# Patient Record
Sex: FEMALE | Race: WHITE | NOT HISPANIC OR LATINO | Employment: OTHER | URBAN - METROPOLITAN AREA
[De-identification: names, ages, dates, MRNs, and addresses within clinical notes are randomized per-mention and may not be internally consistent; named-entity substitution may affect disease eponyms.]

---

## 2017-01-03 ENCOUNTER — COMMUNICATION - HEALTHEAST (OUTPATIENT)
Dept: INTERNAL MEDICINE | Facility: CLINIC | Age: 77
End: 2017-01-03

## 2017-01-03 ENCOUNTER — AMBULATORY - HEALTHEAST (OUTPATIENT)
Dept: LAB | Facility: CLINIC | Age: 77
End: 2017-01-03

## 2017-01-03 ENCOUNTER — OFFICE VISIT - HEALTHEAST (OUTPATIENT)
Dept: PHYSICAL THERAPY | Facility: REHABILITATION | Age: 77
End: 2017-01-03

## 2017-01-03 DIAGNOSIS — M62.838 MUSCLE SPASMS OF NECK: ICD-10-CM

## 2017-01-03 DIAGNOSIS — M54.6 PAIN IN THORACIC SPINE: ICD-10-CM

## 2017-01-03 DIAGNOSIS — I26.99 PULMONARY EMBOLISM (H): ICD-10-CM

## 2017-01-03 DIAGNOSIS — M54.2 CERVICALGIA: ICD-10-CM

## 2017-01-03 DIAGNOSIS — R07.81 RIB PAIN ON LEFT SIDE: ICD-10-CM

## 2017-01-03 DIAGNOSIS — R29.898 DECREASED ROM OF NECK: ICD-10-CM

## 2017-01-03 DIAGNOSIS — R29.3 ABNORMAL POSTURE: ICD-10-CM

## 2017-01-05 ENCOUNTER — OFFICE VISIT - HEALTHEAST (OUTPATIENT)
Dept: PHYSICAL THERAPY | Facility: REHABILITATION | Age: 77
End: 2017-01-05

## 2017-01-05 ENCOUNTER — RECORDS - HEALTHEAST (OUTPATIENT)
Dept: ADMINISTRATIVE | Facility: OTHER | Age: 77
End: 2017-01-05

## 2017-01-05 DIAGNOSIS — E11.69 HYPERLIPIDEMIA ASSOCIATED WITH TYPE 2 DIABETES MELLITUS (H): ICD-10-CM

## 2017-01-05 DIAGNOSIS — R07.81 RIB PAIN ON LEFT SIDE: ICD-10-CM

## 2017-01-05 DIAGNOSIS — E78.5 HYPERLIPIDEMIA ASSOCIATED WITH TYPE 2 DIABETES MELLITUS (H): ICD-10-CM

## 2017-01-05 DIAGNOSIS — E66.9 OBESITY, UNSPECIFIED: ICD-10-CM

## 2017-01-05 DIAGNOSIS — R29.3 ABNORMAL POSTURE: ICD-10-CM

## 2017-01-05 DIAGNOSIS — I10 ESSENTIAL HYPERTENSION, BENIGN: ICD-10-CM

## 2017-01-05 DIAGNOSIS — M54.6 PAIN IN THORACIC SPINE: ICD-10-CM

## 2017-01-05 DIAGNOSIS — R29.898 DECREASED ROM OF NECK: ICD-10-CM

## 2017-01-05 DIAGNOSIS — M62.838 MUSCLE SPASMS OF NECK: ICD-10-CM

## 2017-01-05 DIAGNOSIS — M54.2 CERVICALGIA: ICD-10-CM

## 2017-01-10 ENCOUNTER — COMMUNICATION - HEALTHEAST (OUTPATIENT)
Dept: INTERNAL MEDICINE | Facility: CLINIC | Age: 77
End: 2017-01-10

## 2017-01-10 ENCOUNTER — OFFICE VISIT - HEALTHEAST (OUTPATIENT)
Dept: PHYSICAL THERAPY | Facility: REHABILITATION | Age: 77
End: 2017-01-10

## 2017-01-10 ENCOUNTER — AMBULATORY - HEALTHEAST (OUTPATIENT)
Dept: LAB | Facility: CLINIC | Age: 77
End: 2017-01-10

## 2017-01-10 DIAGNOSIS — I26.99 PULMONARY EMBOLISM (H): ICD-10-CM

## 2017-01-10 DIAGNOSIS — R29.898 DECREASED ROM OF NECK: ICD-10-CM

## 2017-01-10 DIAGNOSIS — R07.81 RIB PAIN ON LEFT SIDE: ICD-10-CM

## 2017-01-10 DIAGNOSIS — M54.6 PAIN IN THORACIC SPINE: ICD-10-CM

## 2017-01-10 DIAGNOSIS — R29.3 ABNORMAL POSTURE: ICD-10-CM

## 2017-01-10 DIAGNOSIS — M54.2 CERVICALGIA: ICD-10-CM

## 2017-01-10 DIAGNOSIS — M62.838 MUSCLE SPASMS OF NECK: ICD-10-CM

## 2017-01-12 ENCOUNTER — OFFICE VISIT - HEALTHEAST (OUTPATIENT)
Dept: PHYSICAL THERAPY | Facility: REHABILITATION | Age: 77
End: 2017-01-12

## 2017-01-12 DIAGNOSIS — R07.81 RIB PAIN ON LEFT SIDE: ICD-10-CM

## 2017-01-12 DIAGNOSIS — M54.2 CERVICALGIA: ICD-10-CM

## 2017-01-12 DIAGNOSIS — R29.898 DECREASED ROM OF NECK: ICD-10-CM

## 2017-01-12 DIAGNOSIS — M62.838 MUSCLE SPASMS OF NECK: ICD-10-CM

## 2017-01-12 DIAGNOSIS — R29.3 ABNORMAL POSTURE: ICD-10-CM

## 2017-01-12 DIAGNOSIS — M54.6 PAIN IN THORACIC SPINE: ICD-10-CM

## 2017-01-17 ENCOUNTER — OFFICE VISIT - HEALTHEAST (OUTPATIENT)
Dept: PHYSICAL THERAPY | Facility: REHABILITATION | Age: 77
End: 2017-01-17

## 2017-01-17 DIAGNOSIS — R07.81 RIB PAIN ON LEFT SIDE: ICD-10-CM

## 2017-01-17 DIAGNOSIS — R29.898 DECREASED ROM OF NECK: ICD-10-CM

## 2017-01-17 DIAGNOSIS — M54.6 PAIN IN THORACIC SPINE: ICD-10-CM

## 2017-01-17 DIAGNOSIS — R29.3 ABNORMAL POSTURE: ICD-10-CM

## 2017-01-17 DIAGNOSIS — M62.838 MUSCLE SPASMS OF NECK: ICD-10-CM

## 2017-01-17 DIAGNOSIS — M54.2 CERVICALGIA: ICD-10-CM

## 2017-01-24 ENCOUNTER — AMBULATORY - HEALTHEAST (OUTPATIENT)
Dept: LAB | Facility: CLINIC | Age: 77
End: 2017-01-24

## 2017-01-24 ENCOUNTER — OFFICE VISIT - HEALTHEAST (OUTPATIENT)
Dept: PHYSICAL THERAPY | Facility: REHABILITATION | Age: 77
End: 2017-01-24

## 2017-01-24 ENCOUNTER — COMMUNICATION - HEALTHEAST (OUTPATIENT)
Dept: INTERNAL MEDICINE | Facility: CLINIC | Age: 77
End: 2017-01-24

## 2017-01-24 DIAGNOSIS — R07.81 RIB PAIN ON LEFT SIDE: ICD-10-CM

## 2017-01-24 DIAGNOSIS — M54.2 CERVICALGIA: ICD-10-CM

## 2017-01-24 DIAGNOSIS — R29.898 DECREASED ROM OF NECK: ICD-10-CM

## 2017-01-24 DIAGNOSIS — I26.99 PULMONARY EMBOLISM (H): ICD-10-CM

## 2017-01-24 DIAGNOSIS — M54.6 PAIN IN THORACIC SPINE: ICD-10-CM

## 2017-01-24 DIAGNOSIS — R29.3 ABNORMAL POSTURE: ICD-10-CM

## 2017-01-25 ENCOUNTER — COMMUNICATION - HEALTHEAST (OUTPATIENT)
Dept: INTERNAL MEDICINE | Facility: CLINIC | Age: 77
End: 2017-01-25

## 2017-01-25 DIAGNOSIS — K21.9 ESOPHAGEAL REFLUX: ICD-10-CM

## 2017-01-25 DIAGNOSIS — E11.69 HYPERLIPIDEMIA ASSOCIATED WITH TYPE 2 DIABETES MELLITUS (H): ICD-10-CM

## 2017-01-25 DIAGNOSIS — E78.5 HYPERLIPIDEMIA ASSOCIATED WITH TYPE 2 DIABETES MELLITUS (H): ICD-10-CM

## 2017-01-27 ENCOUNTER — OFFICE VISIT - HEALTHEAST (OUTPATIENT)
Dept: INTERNAL MEDICINE | Facility: CLINIC | Age: 77
End: 2017-01-27

## 2017-01-27 DIAGNOSIS — R91.8 PULMONARY NODULES: ICD-10-CM

## 2017-01-27 DIAGNOSIS — E11.9 TYPE 2 DIABETES MELLITUS (H): ICD-10-CM

## 2017-01-27 DIAGNOSIS — I10 HTN (HYPERTENSION): ICD-10-CM

## 2017-01-27 DIAGNOSIS — I26.99 PULMONARY EMBOLISM (H): ICD-10-CM

## 2017-01-27 LAB — HBA1C MFR BLD: 6.9 % (ref 3.5–6)

## 2017-01-31 ENCOUNTER — OFFICE VISIT - HEALTHEAST (OUTPATIENT)
Dept: PHYSICAL THERAPY | Facility: REHABILITATION | Age: 77
End: 2017-01-31

## 2017-01-31 DIAGNOSIS — R29.898 DECREASED ROM OF NECK: ICD-10-CM

## 2017-01-31 DIAGNOSIS — R29.3 ABNORMAL POSTURE: ICD-10-CM

## 2017-01-31 DIAGNOSIS — M54.2 CERVICALGIA: ICD-10-CM

## 2017-01-31 DIAGNOSIS — M54.6 PAIN IN THORACIC SPINE: ICD-10-CM

## 2017-01-31 DIAGNOSIS — M62.838 MUSCLE SPASMS OF NECK: ICD-10-CM

## 2017-02-07 ENCOUNTER — OFFICE VISIT - HEALTHEAST (OUTPATIENT)
Dept: PHYSICAL THERAPY | Facility: REHABILITATION | Age: 77
End: 2017-02-07

## 2017-02-07 DIAGNOSIS — R29.3 ABNORMAL POSTURE: ICD-10-CM

## 2017-02-07 DIAGNOSIS — M54.2 CERVICALGIA: ICD-10-CM

## 2017-02-07 DIAGNOSIS — M62.838 MUSCLE SPASMS OF NECK: ICD-10-CM

## 2017-02-07 DIAGNOSIS — R29.898 DECREASED ROM OF NECK: ICD-10-CM

## 2017-02-07 DIAGNOSIS — M54.6 PAIN IN THORACIC SPINE: ICD-10-CM

## 2017-02-14 ENCOUNTER — COMMUNICATION - HEALTHEAST (OUTPATIENT)
Dept: INTERNAL MEDICINE | Facility: CLINIC | Age: 77
End: 2017-02-14

## 2017-02-15 ENCOUNTER — COMMUNICATION - HEALTHEAST (OUTPATIENT)
Dept: INTERNAL MEDICINE | Facility: CLINIC | Age: 77
End: 2017-02-15

## 2017-02-15 ENCOUNTER — AMBULATORY - HEALTHEAST (OUTPATIENT)
Dept: LAB | Facility: CLINIC | Age: 77
End: 2017-02-15

## 2017-02-15 DIAGNOSIS — I26.99 PULMONARY EMBOLISM (H): ICD-10-CM

## 2017-02-24 ENCOUNTER — OFFICE VISIT - HEALTHEAST (OUTPATIENT)
Dept: PHYSICAL THERAPY | Facility: REHABILITATION | Age: 77
End: 2017-02-24

## 2017-02-24 ENCOUNTER — COMMUNICATION - HEALTHEAST (OUTPATIENT)
Dept: INTERNAL MEDICINE | Facility: CLINIC | Age: 77
End: 2017-02-24

## 2017-02-24 ENCOUNTER — AMBULATORY - HEALTHEAST (OUTPATIENT)
Dept: LAB | Facility: CLINIC | Age: 77
End: 2017-02-24

## 2017-02-24 DIAGNOSIS — R29.898 DECREASED ROM OF NECK: ICD-10-CM

## 2017-02-24 DIAGNOSIS — M62.838 MUSCLE SPASMS OF NECK: ICD-10-CM

## 2017-02-24 DIAGNOSIS — M54.6 PAIN IN THORACIC SPINE: ICD-10-CM

## 2017-02-24 DIAGNOSIS — I26.99 PULMONARY EMBOLISM (H): ICD-10-CM

## 2017-02-24 DIAGNOSIS — M54.2 CERVICALGIA: ICD-10-CM

## 2017-03-02 ENCOUNTER — AMBULATORY - HEALTHEAST (OUTPATIENT)
Dept: LAB | Facility: CLINIC | Age: 77
End: 2017-03-02

## 2017-03-02 ENCOUNTER — OFFICE VISIT - HEALTHEAST (OUTPATIENT)
Dept: PHYSICAL THERAPY | Facility: REHABILITATION | Age: 77
End: 2017-03-02

## 2017-03-02 ENCOUNTER — COMMUNICATION - HEALTHEAST (OUTPATIENT)
Dept: INTERNAL MEDICINE | Facility: CLINIC | Age: 77
End: 2017-03-02

## 2017-03-02 DIAGNOSIS — M62.838 MUSCLE SPASMS OF NECK: ICD-10-CM

## 2017-03-02 DIAGNOSIS — M54.2 CERVICALGIA: ICD-10-CM

## 2017-03-02 DIAGNOSIS — M54.6 PAIN IN THORACIC SPINE: ICD-10-CM

## 2017-03-02 DIAGNOSIS — I26.99 PULMONARY EMBOLISM (H): ICD-10-CM

## 2017-03-02 DIAGNOSIS — R29.898 DECREASED ROM OF NECK: ICD-10-CM

## 2017-03-02 DIAGNOSIS — R29.3 ABNORMAL POSTURE: ICD-10-CM

## 2017-03-07 ENCOUNTER — COMMUNICATION - HEALTHEAST (OUTPATIENT)
Dept: INTERNAL MEDICINE | Facility: CLINIC | Age: 77
End: 2017-03-07

## 2017-03-07 DIAGNOSIS — E11.9 TYPE 2 DIABETES MELLITUS WITHOUT COMPLICATION (H): ICD-10-CM

## 2017-03-07 DIAGNOSIS — I26.99 PULMONARY EMBOLISM (H): ICD-10-CM

## 2017-03-09 ENCOUNTER — COMMUNICATION - HEALTHEAST (OUTPATIENT)
Dept: INTERNAL MEDICINE | Facility: CLINIC | Age: 77
End: 2017-03-09

## 2017-03-09 ENCOUNTER — OFFICE VISIT - HEALTHEAST (OUTPATIENT)
Dept: PHYSICAL THERAPY | Facility: REHABILITATION | Age: 77
End: 2017-03-09

## 2017-03-09 ENCOUNTER — AMBULATORY - HEALTHEAST (OUTPATIENT)
Dept: LAB | Facility: CLINIC | Age: 77
End: 2017-03-09

## 2017-03-09 DIAGNOSIS — M54.2 CERVICALGIA: ICD-10-CM

## 2017-03-09 DIAGNOSIS — I26.99 PULMONARY EMBOLISM (H): ICD-10-CM

## 2017-03-09 DIAGNOSIS — M54.6 PAIN IN THORACIC SPINE: ICD-10-CM

## 2017-03-09 DIAGNOSIS — R29.3 ABNORMAL POSTURE: ICD-10-CM

## 2017-03-09 DIAGNOSIS — R29.898 DECREASED ROM OF NECK: ICD-10-CM

## 2017-03-09 DIAGNOSIS — M62.838 MUSCLE SPASMS OF NECK: ICD-10-CM

## 2017-03-13 ENCOUNTER — RECORDS - HEALTHEAST (OUTPATIENT)
Dept: ADMINISTRATIVE | Facility: OTHER | Age: 77
End: 2017-03-13

## 2017-03-14 ENCOUNTER — OFFICE VISIT - HEALTHEAST (OUTPATIENT)
Dept: INTERNAL MEDICINE | Facility: CLINIC | Age: 77
End: 2017-03-14

## 2017-03-14 DIAGNOSIS — E11.69 HYPERLIPIDEMIA ASSOCIATED WITH TYPE 2 DIABETES MELLITUS (H): ICD-10-CM

## 2017-03-14 DIAGNOSIS — I10 ESSENTIAL HYPERTENSION: ICD-10-CM

## 2017-03-14 DIAGNOSIS — E78.5 HYPERLIPIDEMIA ASSOCIATED WITH TYPE 2 DIABETES MELLITUS (H): ICD-10-CM

## 2017-03-14 DIAGNOSIS — M94.9 DISORDER OF BONE AND CARTILAGE: ICD-10-CM

## 2017-03-14 DIAGNOSIS — M89.9 DISORDER OF BONE AND CARTILAGE: ICD-10-CM

## 2017-03-14 DIAGNOSIS — Z51.81 MEDICATION MONITORING ENCOUNTER: ICD-10-CM

## 2017-03-14 DIAGNOSIS — E11.9 TYPE 2 DIABETES MELLITUS WITHOUT COMPLICATION (H): ICD-10-CM

## 2017-03-14 LAB
CHOLEST SERPL-MCNC: 196 MG/DL
FASTING STATUS PATIENT QL REPORTED: NO
HBA1C MFR BLD: 6.6 % (ref 3.5–6)
HDLC SERPL-MCNC: 53 MG/DL
LDLC SERPL CALC-MCNC: 92 MG/DL
TRIGL SERPL-MCNC: 257 MG/DL

## 2017-03-14 ASSESSMENT — MIFFLIN-ST. JEOR: SCORE: 1437.51

## 2017-03-15 ENCOUNTER — COMMUNICATION - HEALTHEAST (OUTPATIENT)
Dept: INTERNAL MEDICINE | Facility: CLINIC | Age: 77
End: 2017-03-15

## 2017-03-15 DIAGNOSIS — I26.99 PULMONARY EMBOLISM (H): ICD-10-CM

## 2017-03-16 ENCOUNTER — OFFICE VISIT - HEALTHEAST (OUTPATIENT)
Dept: PHYSICAL THERAPY | Facility: REHABILITATION | Age: 77
End: 2017-03-16

## 2017-03-16 ENCOUNTER — COMMUNICATION - HEALTHEAST (OUTPATIENT)
Dept: INTERNAL MEDICINE | Facility: CLINIC | Age: 77
End: 2017-03-16

## 2017-03-16 ENCOUNTER — AMBULATORY - HEALTHEAST (OUTPATIENT)
Dept: LAB | Facility: CLINIC | Age: 77
End: 2017-03-16

## 2017-03-16 DIAGNOSIS — I26.99 PULMONARY EMBOLISM (H): ICD-10-CM

## 2017-03-16 DIAGNOSIS — R29.3 ABNORMAL POSTURE: ICD-10-CM

## 2017-03-16 DIAGNOSIS — R29.898 DECREASED ROM OF NECK: ICD-10-CM

## 2017-03-16 DIAGNOSIS — M54.2 CERVICALGIA: ICD-10-CM

## 2017-03-16 DIAGNOSIS — M62.838 MUSCLE SPASMS OF NECK: ICD-10-CM

## 2017-03-21 ENCOUNTER — RECORDS - HEALTHEAST (OUTPATIENT)
Dept: ADMINISTRATIVE | Facility: OTHER | Age: 77
End: 2017-03-21

## 2017-03-24 ENCOUNTER — RECORDS - HEALTHEAST (OUTPATIENT)
Dept: ADMINISTRATIVE | Facility: OTHER | Age: 77
End: 2017-03-24

## 2017-03-24 ENCOUNTER — AMBULATORY - HEALTHEAST (OUTPATIENT)
Dept: LAB | Facility: CLINIC | Age: 77
End: 2017-03-24

## 2017-03-24 ENCOUNTER — COMMUNICATION - HEALTHEAST (OUTPATIENT)
Dept: INTERNAL MEDICINE | Facility: CLINIC | Age: 77
End: 2017-03-24

## 2017-03-24 ENCOUNTER — RECORDS - HEALTHEAST (OUTPATIENT)
Dept: BONE DENSITY | Facility: CLINIC | Age: 77
End: 2017-03-24

## 2017-03-24 DIAGNOSIS — M89.9 DISORDER OF BONE, UNSPECIFIED: ICD-10-CM

## 2017-03-24 DIAGNOSIS — M94.9 DISORDER OF CARTILAGE, UNSPECIFIED: ICD-10-CM

## 2017-03-24 DIAGNOSIS — I26.99 PULMONARY EMBOLISM (H): ICD-10-CM

## 2017-03-31 ENCOUNTER — AMBULATORY - HEALTHEAST (OUTPATIENT)
Dept: LAB | Facility: CLINIC | Age: 77
End: 2017-03-31

## 2017-03-31 ENCOUNTER — COMMUNICATION - HEALTHEAST (OUTPATIENT)
Dept: INTERNAL MEDICINE | Facility: CLINIC | Age: 77
End: 2017-03-31

## 2017-03-31 DIAGNOSIS — I26.99 PULMONARY EMBOLISM (H): ICD-10-CM

## 2017-04-06 ENCOUNTER — AMBULATORY - HEALTHEAST (OUTPATIENT)
Dept: LAB | Facility: CLINIC | Age: 77
End: 2017-04-06

## 2017-04-06 ENCOUNTER — COMMUNICATION - HEALTHEAST (OUTPATIENT)
Dept: NURSING | Facility: CLINIC | Age: 77
End: 2017-04-06

## 2017-04-06 ENCOUNTER — OFFICE VISIT - HEALTHEAST (OUTPATIENT)
Dept: PHYSICAL THERAPY | Facility: REHABILITATION | Age: 77
End: 2017-04-06

## 2017-04-06 DIAGNOSIS — M62.838 MUSCLE SPASMS OF NECK: ICD-10-CM

## 2017-04-06 DIAGNOSIS — I26.99 PULMONARY EMBOLISM (H): ICD-10-CM

## 2017-04-06 DIAGNOSIS — R07.81 RIB PAIN ON LEFT SIDE: ICD-10-CM

## 2017-04-06 DIAGNOSIS — R29.898 DECREASED ROM OF NECK: ICD-10-CM

## 2017-04-06 DIAGNOSIS — M54.2 CERVICALGIA: ICD-10-CM

## 2017-04-07 ENCOUNTER — COMMUNICATION - HEALTHEAST (OUTPATIENT)
Dept: INTERNAL MEDICINE | Facility: CLINIC | Age: 77
End: 2017-04-07

## 2017-04-07 DIAGNOSIS — I26.99 PULMONARY EMBOLISM (H): ICD-10-CM

## 2017-04-13 ENCOUNTER — COMMUNICATION - HEALTHEAST (OUTPATIENT)
Dept: NURSING | Facility: CLINIC | Age: 77
End: 2017-04-13

## 2017-04-13 ENCOUNTER — AMBULATORY - HEALTHEAST (OUTPATIENT)
Dept: LAB | Facility: CLINIC | Age: 77
End: 2017-04-13

## 2017-04-13 DIAGNOSIS — I26.99 PULMONARY EMBOLISM (H): ICD-10-CM

## 2017-04-18 ENCOUNTER — OFFICE VISIT - HEALTHEAST (OUTPATIENT)
Dept: INTERNAL MEDICINE | Facility: CLINIC | Age: 77
End: 2017-04-18

## 2017-04-18 ENCOUNTER — COMMUNICATION - HEALTHEAST (OUTPATIENT)
Dept: INTERNAL MEDICINE | Facility: CLINIC | Age: 77
End: 2017-04-18

## 2017-04-18 ENCOUNTER — COMMUNICATION - HEALTHEAST (OUTPATIENT)
Dept: SCHEDULING | Facility: CLINIC | Age: 77
End: 2017-04-18

## 2017-04-18 DIAGNOSIS — I26.99 PULMONARY EMBOLISM (H): ICD-10-CM

## 2017-04-18 DIAGNOSIS — J40 BRONCHITIS: ICD-10-CM

## 2017-04-18 DIAGNOSIS — J10.1 INFLUENZA B: ICD-10-CM

## 2017-04-27 ENCOUNTER — AMBULATORY - HEALTHEAST (OUTPATIENT)
Dept: LAB | Facility: CLINIC | Age: 77
End: 2017-04-27

## 2017-04-27 ENCOUNTER — COMMUNICATION - HEALTHEAST (OUTPATIENT)
Dept: INTERNAL MEDICINE | Facility: CLINIC | Age: 77
End: 2017-04-27

## 2017-04-27 DIAGNOSIS — I26.99 PULMONARY EMBOLISM (H): ICD-10-CM

## 2017-05-02 ENCOUNTER — OFFICE VISIT - HEALTHEAST (OUTPATIENT)
Dept: PHYSICAL THERAPY | Facility: REHABILITATION | Age: 77
End: 2017-05-02

## 2017-05-02 DIAGNOSIS — R29.898 DECREASED ROM OF NECK: ICD-10-CM

## 2017-05-02 DIAGNOSIS — R07.81 RIB PAIN ON LEFT SIDE: ICD-10-CM

## 2017-05-02 DIAGNOSIS — M54.6 PAIN IN THORACIC SPINE: ICD-10-CM

## 2017-05-02 DIAGNOSIS — M54.2 CERVICALGIA: ICD-10-CM

## 2017-05-02 DIAGNOSIS — M62.838 MUSCLE SPASMS OF NECK: ICD-10-CM

## 2017-05-02 DIAGNOSIS — R29.3 ABNORMAL POSTURE: ICD-10-CM

## 2017-05-05 ENCOUNTER — COMMUNICATION - HEALTHEAST (OUTPATIENT)
Dept: INTERNAL MEDICINE | Facility: CLINIC | Age: 77
End: 2017-05-05

## 2017-05-05 ENCOUNTER — AMBULATORY - HEALTHEAST (OUTPATIENT)
Dept: LAB | Facility: CLINIC | Age: 77
End: 2017-05-05

## 2017-05-05 DIAGNOSIS — I26.99 PULMONARY EMBOLISM (H): ICD-10-CM

## 2017-05-09 ENCOUNTER — OFFICE VISIT - HEALTHEAST (OUTPATIENT)
Dept: PHYSICAL THERAPY | Facility: REHABILITATION | Age: 77
End: 2017-05-09

## 2017-05-09 DIAGNOSIS — R29.3 ABNORMAL POSTURE: ICD-10-CM

## 2017-05-09 DIAGNOSIS — R07.81 RIB PAIN ON LEFT SIDE: ICD-10-CM

## 2017-05-09 DIAGNOSIS — R29.898 DECREASED ROM OF NECK: ICD-10-CM

## 2017-05-09 DIAGNOSIS — M54.6 PAIN IN THORACIC SPINE: ICD-10-CM

## 2017-05-09 DIAGNOSIS — M54.2 CERVICALGIA: ICD-10-CM

## 2017-05-09 DIAGNOSIS — M62.838 MUSCLE SPASMS OF NECK: ICD-10-CM

## 2017-05-11 ENCOUNTER — AMBULATORY - HEALTHEAST (OUTPATIENT)
Dept: LAB | Facility: CLINIC | Age: 77
End: 2017-05-11

## 2017-05-11 ENCOUNTER — COMMUNICATION - HEALTHEAST (OUTPATIENT)
Dept: INTERNAL MEDICINE | Facility: CLINIC | Age: 77
End: 2017-05-11

## 2017-05-11 ENCOUNTER — OFFICE VISIT - HEALTHEAST (OUTPATIENT)
Dept: PHYSICAL THERAPY | Facility: REHABILITATION | Age: 77
End: 2017-05-11

## 2017-05-11 DIAGNOSIS — R29.3 ABNORMAL POSTURE: ICD-10-CM

## 2017-05-11 DIAGNOSIS — I26.99 PULMONARY EMBOLISM (H): ICD-10-CM

## 2017-05-11 DIAGNOSIS — R29.898 DECREASED ROM OF NECK: ICD-10-CM

## 2017-05-11 DIAGNOSIS — M62.838 MUSCLE SPASMS OF NECK: ICD-10-CM

## 2017-05-11 DIAGNOSIS — R07.81 RIB PAIN ON LEFT SIDE: ICD-10-CM

## 2017-05-11 DIAGNOSIS — M54.6 PAIN IN THORACIC SPINE: ICD-10-CM

## 2017-05-11 DIAGNOSIS — M54.2 CERVICALGIA: ICD-10-CM

## 2017-05-16 ENCOUNTER — OFFICE VISIT - HEALTHEAST (OUTPATIENT)
Dept: PHYSICAL THERAPY | Facility: REHABILITATION | Age: 77
End: 2017-05-16

## 2017-05-16 DIAGNOSIS — R07.81 RIB PAIN ON LEFT SIDE: ICD-10-CM

## 2017-05-16 DIAGNOSIS — R29.3 ABNORMAL POSTURE: ICD-10-CM

## 2017-05-16 DIAGNOSIS — R29.898 DECREASED ROM OF NECK: ICD-10-CM

## 2017-05-16 DIAGNOSIS — M54.2 CERVICALGIA: ICD-10-CM

## 2017-05-16 DIAGNOSIS — M62.838 MUSCLE SPASMS OF NECK: ICD-10-CM

## 2017-05-16 DIAGNOSIS — M54.6 PAIN IN THORACIC SPINE: ICD-10-CM

## 2017-05-25 ENCOUNTER — OFFICE VISIT - HEALTHEAST (OUTPATIENT)
Dept: PHYSICAL THERAPY | Facility: REHABILITATION | Age: 77
End: 2017-05-25

## 2017-05-25 ENCOUNTER — AMBULATORY - HEALTHEAST (OUTPATIENT)
Dept: LAB | Facility: CLINIC | Age: 77
End: 2017-05-25

## 2017-05-25 ENCOUNTER — COMMUNICATION - HEALTHEAST (OUTPATIENT)
Dept: FAMILY MEDICINE | Facility: CLINIC | Age: 77
End: 2017-05-25

## 2017-05-25 DIAGNOSIS — G89.29 CHRONIC LEFT SHOULDER PAIN: ICD-10-CM

## 2017-05-25 DIAGNOSIS — M54.2 CERVICALGIA: ICD-10-CM

## 2017-05-25 DIAGNOSIS — M25.512 CHRONIC LEFT SHOULDER PAIN: ICD-10-CM

## 2017-05-25 DIAGNOSIS — R29.3 ABNORMAL POSTURE: ICD-10-CM

## 2017-05-25 DIAGNOSIS — I26.99 PULMONARY EMBOLISM (H): ICD-10-CM

## 2017-05-25 DIAGNOSIS — R29.898 DECREASED ROM OF NECK: ICD-10-CM

## 2017-05-25 DIAGNOSIS — M62.838 MUSCLE SPASMS OF NECK: ICD-10-CM

## 2017-05-30 ENCOUNTER — OFFICE VISIT - HEALTHEAST (OUTPATIENT)
Dept: PHYSICAL THERAPY | Facility: REHABILITATION | Age: 77
End: 2017-05-30

## 2017-05-30 DIAGNOSIS — R29.3 ABNORMAL POSTURE: ICD-10-CM

## 2017-05-30 DIAGNOSIS — R29.898 DECREASED ROM OF NECK: ICD-10-CM

## 2017-05-30 DIAGNOSIS — M62.838 MUSCLE SPASMS OF NECK: ICD-10-CM

## 2017-05-30 DIAGNOSIS — M54.2 CERVICALGIA: ICD-10-CM

## 2017-06-14 ENCOUNTER — COMMUNICATION - HEALTHEAST (OUTPATIENT)
Dept: INTERNAL MEDICINE | Facility: CLINIC | Age: 77
End: 2017-06-14

## 2017-06-14 ENCOUNTER — OFFICE VISIT - HEALTHEAST (OUTPATIENT)
Dept: INTERNAL MEDICINE | Facility: CLINIC | Age: 77
End: 2017-06-14

## 2017-06-14 DIAGNOSIS — R19.7 DIARRHEA: ICD-10-CM

## 2017-06-14 DIAGNOSIS — E11.9 TYPE 2 DIABETES MELLITUS WITHOUT COMPLICATION (H): ICD-10-CM

## 2017-06-14 DIAGNOSIS — I26.99 PULMONARY EMBOLISM (H): ICD-10-CM

## 2017-06-14 DIAGNOSIS — K58.9 IRRITABLE BOWEL SYNDROME (IBS): ICD-10-CM

## 2017-06-14 LAB — HBA1C MFR BLD: 6.6 % (ref 3.5–6)

## 2017-07-10 ENCOUNTER — AMBULATORY - HEALTHEAST (OUTPATIENT)
Dept: INTERNAL MEDICINE | Facility: CLINIC | Age: 77
End: 2017-07-10

## 2017-07-10 ENCOUNTER — COMMUNICATION - HEALTHEAST (OUTPATIENT)
Dept: NURSING | Facility: CLINIC | Age: 77
End: 2017-07-10

## 2017-07-11 ENCOUNTER — AMBULATORY - HEALTHEAST (OUTPATIENT)
Dept: LAB | Facility: CLINIC | Age: 77
End: 2017-07-11

## 2017-07-11 ENCOUNTER — COMMUNICATION - HEALTHEAST (OUTPATIENT)
Dept: FAMILY MEDICINE | Facility: CLINIC | Age: 77
End: 2017-07-11

## 2017-07-11 DIAGNOSIS — I26.99 PULMONARY EMBOLISM (H): ICD-10-CM

## 2017-08-11 ENCOUNTER — COMMUNICATION - HEALTHEAST (OUTPATIENT)
Dept: INTERNAL MEDICINE | Facility: CLINIC | Age: 77
End: 2017-08-11

## 2017-08-11 ENCOUNTER — AMBULATORY - HEALTHEAST (OUTPATIENT)
Dept: LAB | Facility: CLINIC | Age: 77
End: 2017-08-11

## 2017-08-11 DIAGNOSIS — I26.99 PULMONARY EMBOLISM (H): ICD-10-CM

## 2017-08-23 ENCOUNTER — RECORDS - HEALTHEAST (OUTPATIENT)
Dept: ADMINISTRATIVE | Facility: OTHER | Age: 77
End: 2017-08-23

## 2017-09-13 ENCOUNTER — AMBULATORY - HEALTHEAST (OUTPATIENT)
Dept: LAB | Facility: CLINIC | Age: 77
End: 2017-09-13

## 2017-09-13 ENCOUNTER — COMMUNICATION - HEALTHEAST (OUTPATIENT)
Dept: INTERNAL MEDICINE | Facility: CLINIC | Age: 77
End: 2017-09-13

## 2017-09-13 DIAGNOSIS — I26.99 PULMONARY EMBOLISM (H): ICD-10-CM

## 2017-10-17 ENCOUNTER — COMMUNICATION - HEALTHEAST (OUTPATIENT)
Dept: INTERNAL MEDICINE | Facility: CLINIC | Age: 77
End: 2017-10-17

## 2017-10-17 ENCOUNTER — AMBULATORY - HEALTHEAST (OUTPATIENT)
Dept: LAB | Facility: CLINIC | Age: 77
End: 2017-10-17

## 2017-10-17 DIAGNOSIS — I26.99 PULMONARY EMBOLISM (H): ICD-10-CM

## 2017-10-17 DIAGNOSIS — Z51.81 MEDICATION MONITORING ENCOUNTER: ICD-10-CM

## 2017-10-17 DIAGNOSIS — E11.9 TYPE 2 DIABETES MELLITUS WITHOUT COMPLICATION, WITHOUT LONG-TERM CURRENT USE OF INSULIN (H): ICD-10-CM

## 2017-10-17 LAB
HBA1C MFR BLD: 6.6 % (ref 3.5–6)
LDLC SERPL CALC-MCNC: 108 MG/DL

## 2017-10-18 ENCOUNTER — OFFICE VISIT - HEALTHEAST (OUTPATIENT)
Dept: INTERNAL MEDICINE | Facility: CLINIC | Age: 77
End: 2017-10-18

## 2017-10-18 DIAGNOSIS — I10 ESSENTIAL HYPERTENSION: ICD-10-CM

## 2017-10-18 DIAGNOSIS — E11.9 TYPE 2 DIABETES MELLITUS WITHOUT COMPLICATION, WITHOUT LONG-TERM CURRENT USE OF INSULIN (H): ICD-10-CM

## 2017-10-18 DIAGNOSIS — K52.9 CHRONIC DIARRHEA: ICD-10-CM

## 2017-10-23 ENCOUNTER — COMMUNICATION - HEALTHEAST (OUTPATIENT)
Dept: INTERNAL MEDICINE | Facility: CLINIC | Age: 77
End: 2017-10-23

## 2017-10-23 ENCOUNTER — AMBULATORY - HEALTHEAST (OUTPATIENT)
Dept: INTERNAL MEDICINE | Facility: CLINIC | Age: 77
End: 2017-10-23

## 2017-10-24 ENCOUNTER — COMMUNICATION - HEALTHEAST (OUTPATIENT)
Dept: INTERNAL MEDICINE | Facility: CLINIC | Age: 77
End: 2017-10-24

## 2017-10-27 ENCOUNTER — OFFICE VISIT - HEALTHEAST (OUTPATIENT)
Dept: INTERNAL MEDICINE | Facility: CLINIC | Age: 77
End: 2017-10-27

## 2017-10-27 DIAGNOSIS — J40 BRONCHITIS: ICD-10-CM

## 2017-11-21 ENCOUNTER — COMMUNICATION - HEALTHEAST (OUTPATIENT)
Dept: INTERNAL MEDICINE | Facility: CLINIC | Age: 77
End: 2017-11-21

## 2017-12-01 ENCOUNTER — AMBULATORY - HEALTHEAST (OUTPATIENT)
Dept: LAB | Facility: CLINIC | Age: 77
End: 2017-12-01

## 2017-12-01 ENCOUNTER — COMMUNICATION - HEALTHEAST (OUTPATIENT)
Dept: INTERNAL MEDICINE | Facility: CLINIC | Age: 77
End: 2017-12-01

## 2017-12-01 DIAGNOSIS — I26.99 PULMONARY EMBOLISM (H): ICD-10-CM

## 2017-12-18 ENCOUNTER — COMMUNICATION - HEALTHEAST (OUTPATIENT)
Dept: INTERNAL MEDICINE | Facility: CLINIC | Age: 77
End: 2017-12-18

## 2017-12-18 DIAGNOSIS — E11.69 COMBINED HYPERLIPIDEMIA ASSOCIATED WITH TYPE 2 DIABETES MELLITUS (H): ICD-10-CM

## 2017-12-18 DIAGNOSIS — E78.2 COMBINED HYPERLIPIDEMIA ASSOCIATED WITH TYPE 2 DIABETES MELLITUS (H): ICD-10-CM

## 2017-12-19 ENCOUNTER — COMMUNICATION - HEALTHEAST (OUTPATIENT)
Dept: INTERNAL MEDICINE | Facility: CLINIC | Age: 77
End: 2017-12-19

## 2017-12-19 ENCOUNTER — AMBULATORY - HEALTHEAST (OUTPATIENT)
Dept: LAB | Facility: CLINIC | Age: 77
End: 2017-12-19

## 2017-12-19 DIAGNOSIS — I26.99 PULMONARY EMBOLISM (H): ICD-10-CM

## 2017-12-20 ENCOUNTER — RECORDS - HEALTHEAST (OUTPATIENT)
Dept: ADMINISTRATIVE | Facility: OTHER | Age: 77
End: 2017-12-20

## 2017-12-28 ENCOUNTER — COMMUNICATION - HEALTHEAST (OUTPATIENT)
Dept: INTERNAL MEDICINE | Facility: CLINIC | Age: 77
End: 2017-12-28

## 2017-12-28 DIAGNOSIS — E78.5 HYPERLIPIDEMIA ASSOCIATED WITH TYPE 2 DIABETES MELLITUS (H): ICD-10-CM

## 2017-12-28 DIAGNOSIS — E11.69 HYPERLIPIDEMIA ASSOCIATED WITH TYPE 2 DIABETES MELLITUS (H): ICD-10-CM

## 2017-12-28 DIAGNOSIS — K21.9 ESOPHAGEAL REFLUX: ICD-10-CM

## 2017-12-29 ENCOUNTER — COMMUNICATION - HEALTHEAST (OUTPATIENT)
Dept: INTERNAL MEDICINE | Facility: CLINIC | Age: 77
End: 2017-12-29

## 2017-12-29 DIAGNOSIS — K21.9 ESOPHAGEAL REFLUX: ICD-10-CM

## 2017-12-29 DIAGNOSIS — E78.5 HYPERLIPIDEMIA ASSOCIATED WITH TYPE 2 DIABETES MELLITUS (H): ICD-10-CM

## 2017-12-29 DIAGNOSIS — E11.69 HYPERLIPIDEMIA ASSOCIATED WITH TYPE 2 DIABETES MELLITUS (H): ICD-10-CM

## 2017-12-29 DIAGNOSIS — I10 ESSENTIAL HYPERTENSION: ICD-10-CM

## 2018-01-01 ENCOUNTER — COMMUNICATION - HEALTHEAST (OUTPATIENT)
Dept: INTERNAL MEDICINE | Facility: CLINIC | Age: 78
End: 2018-01-01

## 2018-01-05 ENCOUNTER — COMMUNICATION - HEALTHEAST (OUTPATIENT)
Dept: INTERNAL MEDICINE | Facility: CLINIC | Age: 78
End: 2018-01-05

## 2018-01-12 ENCOUNTER — COMMUNICATION - HEALTHEAST (OUTPATIENT)
Dept: INTERNAL MEDICINE | Facility: CLINIC | Age: 78
End: 2018-01-12

## 2018-01-12 ENCOUNTER — OFFICE VISIT - HEALTHEAST (OUTPATIENT)
Dept: INTERNAL MEDICINE | Facility: CLINIC | Age: 78
End: 2018-01-12

## 2018-01-12 DIAGNOSIS — K58.0 IRRITABLE BOWEL SYNDROME WITH DIARRHEA: ICD-10-CM

## 2018-01-12 DIAGNOSIS — I10 ESSENTIAL HYPERTENSION, BENIGN: ICD-10-CM

## 2018-01-12 DIAGNOSIS — Z12.39 SPECIAL SCREENING EXAMINATION FOR NEOPLASM OF BREAST: ICD-10-CM

## 2018-01-12 DIAGNOSIS — R49.0 HOARSENESS OF VOICE: ICD-10-CM

## 2018-01-12 DIAGNOSIS — I10 ESSENTIAL HYPERTENSION: ICD-10-CM

## 2018-01-12 DIAGNOSIS — Z00.00 PREVENTATIVE HEALTH CARE: ICD-10-CM

## 2018-01-12 DIAGNOSIS — I26.99 PULMONARY EMBOLISM (H): ICD-10-CM

## 2018-01-12 DIAGNOSIS — E11.9 TYPE 2 DIABETES MELLITUS WITHOUT COMPLICATION, WITHOUT LONG-TERM CURRENT USE OF INSULIN (H): ICD-10-CM

## 2018-01-12 DIAGNOSIS — E04.9 NON-TOXIC NODULAR GOITER: ICD-10-CM

## 2018-01-12 LAB
ALBUMIN SERPL-MCNC: 3.6 G/DL (ref 3.5–5)
ALP SERPL-CCNC: 84 U/L (ref 45–120)
ALT SERPL W P-5'-P-CCNC: 24 U/L (ref 0–45)
ANION GAP SERPL CALCULATED.3IONS-SCNC: 12 MMOL/L (ref 5–18)
AST SERPL W P-5'-P-CCNC: 22 U/L (ref 0–40)
BILIRUB SERPL-MCNC: 0.5 MG/DL (ref 0–1)
BUN SERPL-MCNC: 17 MG/DL (ref 8–28)
CALCIUM SERPL-MCNC: 9.6 MG/DL (ref 8.5–10.5)
CHLORIDE BLD-SCNC: 105 MMOL/L (ref 98–107)
CO2 SERPL-SCNC: 25 MMOL/L (ref 22–31)
CREAT SERPL-MCNC: 0.66 MG/DL (ref 0.6–1.1)
ERYTHROCYTE [DISTWIDTH] IN BLOOD BY AUTOMATED COUNT: 13.4 % (ref 11–14.5)
GFR SERPL CREATININE-BSD FRML MDRD: >60 ML/MIN/1.73M2
GLUCOSE BLD-MCNC: 104 MG/DL (ref 70–125)
HBA1C MFR BLD: 6.7 % (ref 3.5–6)
HCT VFR BLD AUTO: 41.6 % (ref 35–47)
HGB BLD-MCNC: 13.8 G/DL (ref 12–16)
INR PPP: 1.9 (ref 0.9–1.1)
MCH RBC QN AUTO: 27.5 PG (ref 27–34)
MCHC RBC AUTO-ENTMCNC: 33.2 G/DL (ref 32–36)
MCV RBC AUTO: 83 FL (ref 80–100)
PLATELET # BLD AUTO: 260 THOU/UL (ref 140–440)
PMV BLD AUTO: 9.2 FL (ref 7–10)
POTASSIUM BLD-SCNC: 4.5 MMOL/L (ref 3.5–5)
PROT SERPL-MCNC: 7 G/DL (ref 6–8)
RBC # BLD AUTO: 5.02 MILL/UL (ref 3.8–5.4)
SODIUM SERPL-SCNC: 142 MMOL/L (ref 136–145)
TSH SERPL DL<=0.005 MIU/L-ACNC: 0.63 UIU/ML (ref 0.3–5)
WBC: 11.6 THOU/UL (ref 4–11)

## 2018-01-26 ENCOUNTER — COMMUNICATION - HEALTHEAST (OUTPATIENT)
Dept: NURSING | Facility: CLINIC | Age: 78
End: 2018-01-26

## 2018-01-26 ENCOUNTER — AMBULATORY - HEALTHEAST (OUTPATIENT)
Dept: LAB | Facility: CLINIC | Age: 78
End: 2018-01-26

## 2018-01-26 DIAGNOSIS — I26.99 PULMONARY EMBOLISM (H): ICD-10-CM

## 2018-01-26 LAB — INR PPP: 2.6 (ref 0.9–1.1)

## 2018-02-07 ENCOUNTER — COMMUNICATION - HEALTHEAST (OUTPATIENT)
Dept: INTERNAL MEDICINE | Facility: CLINIC | Age: 78
End: 2018-02-07

## 2018-02-07 ENCOUNTER — OFFICE VISIT - HEALTHEAST (OUTPATIENT)
Dept: INTERNAL MEDICINE | Facility: CLINIC | Age: 78
End: 2018-02-07

## 2018-02-07 ENCOUNTER — AMBULATORY - HEALTHEAST (OUTPATIENT)
Dept: INTERNAL MEDICINE | Facility: CLINIC | Age: 78
End: 2018-02-07

## 2018-02-07 ENCOUNTER — COMMUNICATION - HEALTHEAST (OUTPATIENT)
Dept: NURSING | Facility: CLINIC | Age: 78
End: 2018-02-07

## 2018-02-07 DIAGNOSIS — I49.9 VENTRICULAR DYSRHYTHMIA: ICD-10-CM

## 2018-02-07 DIAGNOSIS — I49.3 PVC'S (PREMATURE VENTRICULAR CONTRACTIONS): ICD-10-CM

## 2018-02-07 DIAGNOSIS — I10 ESSENTIAL HYPERTENSION: ICD-10-CM

## 2018-02-07 DIAGNOSIS — I26.99 PULMONARY EMBOLISM (H): ICD-10-CM

## 2018-02-07 DIAGNOSIS — I49.3 VENTRICULAR PREMATURE DEPOLARIZATION: ICD-10-CM

## 2018-02-07 DIAGNOSIS — I49.9 IRREGULAR HEART RATE: ICD-10-CM

## 2018-02-07 LAB
ANION GAP SERPL CALCULATED.3IONS-SCNC: 12 MMOL/L (ref 5–18)
BUN SERPL-MCNC: 16 MG/DL (ref 8–28)
CALCIUM SERPL-MCNC: 9.7 MG/DL (ref 8.5–10.5)
CHLORIDE BLD-SCNC: 105 MMOL/L (ref 98–107)
CO2 SERPL-SCNC: 24 MMOL/L (ref 22–31)
CREAT SERPL-MCNC: 0.75 MG/DL (ref 0.6–1.1)
GFR SERPL CREATININE-BSD FRML MDRD: >60 ML/MIN/1.73M2
GLUCOSE BLD-MCNC: 178 MG/DL (ref 70–125)
INR PPP: 3.5 (ref 0.9–1.1)
MAGNESIUM SERPL-MCNC: 1.5 MG/DL (ref 1.8–2.6)
POTASSIUM BLD-SCNC: 4.2 MMOL/L (ref 3.5–5)
SODIUM SERPL-SCNC: 141 MMOL/L (ref 136–145)

## 2018-02-08 ENCOUNTER — COMMUNICATION - HEALTHEAST (OUTPATIENT)
Dept: INTERNAL MEDICINE | Facility: CLINIC | Age: 78
End: 2018-02-08

## 2018-02-08 LAB
ATRIAL RATE - MUSE: 100 BPM
DIASTOLIC BLOOD PRESSURE - MUSE: NORMAL MMHG
INTERPRETATION ECG - MUSE: NORMAL
P AXIS - MUSE: 35 DEGREES
PR INTERVAL - MUSE: 164 MS
QRS DURATION - MUSE: 86 MS
QT - MUSE: 398 MS
QTC - MUSE: 513 MS
R AXIS - MUSE: 67 DEGREES
SYSTOLIC BLOOD PRESSURE - MUSE: NORMAL MMHG
T AXIS - MUSE: 30 DEGREES
VENTRICULAR RATE- MUSE: 100 BPM

## 2018-02-09 ENCOUNTER — COMMUNICATION - HEALTHEAST (OUTPATIENT)
Dept: INTERNAL MEDICINE | Facility: CLINIC | Age: 78
End: 2018-02-09

## 2018-02-09 DIAGNOSIS — I26.99 PE (PULMONARY THROMBOEMBOLISM) (H): ICD-10-CM

## 2018-02-12 ENCOUNTER — HOSPITAL ENCOUNTER (OUTPATIENT)
Dept: MAMMOGRAPHY | Facility: CLINIC | Age: 78
Discharge: HOME | End: 2018-02-12
Attending: INTERNAL MEDICINE

## 2018-02-12 DIAGNOSIS — Z00.00 PREVENTATIVE HEALTH CARE: ICD-10-CM

## 2018-02-12 DIAGNOSIS — Z12.31 VISIT FOR SCREENING MAMMOGRAM: ICD-10-CM

## 2018-02-13 ENCOUNTER — COMMUNICATION - HEALTHEAST (OUTPATIENT)
Dept: INTERNAL MEDICINE | Facility: CLINIC | Age: 78
End: 2018-02-13

## 2018-02-13 ENCOUNTER — RECORDS - HEALTHEAST (OUTPATIENT)
Dept: ADMINISTRATIVE | Facility: OTHER | Age: 78
End: 2018-02-13

## 2018-02-13 ENCOUNTER — HOSPITAL ENCOUNTER (OUTPATIENT)
Dept: CARDIOLOGY | Facility: CLINIC | Age: 78
Discharge: HOME | End: 2018-02-13
Attending: INTERNAL MEDICINE

## 2018-02-13 DIAGNOSIS — I49.9 IRREGULAR HEART RATE: ICD-10-CM

## 2018-02-15 ENCOUNTER — HOSPITAL ENCOUNTER (OUTPATIENT)
Dept: CARDIOLOGY | Facility: CLINIC | Age: 78
Discharge: HOME | End: 2018-02-15
Attending: INTERNAL MEDICINE

## 2018-02-15 DIAGNOSIS — I49.3 VENTRICULAR PREMATURE DEPOLARIZATION: ICD-10-CM

## 2018-02-15 DIAGNOSIS — I49.9 VENTRICULAR DYSRHYTHMIA: ICD-10-CM

## 2018-02-15 LAB
AORTIC ROOT: 2.9 CM
AORTIC VALVE MEAN VELOCITY: 115 CM/S
ASCENDING AORTA: 3.3 CM
AV DIMENSIONLESS INDEX VTI: 0.6
AV MEAN GRADIENT: 6 MMHG
AV PEAK GRADIENT: 10 MMHG
AV VALVE AREA: 2.1 CM2
AV VELOCITY RATIO: 0.5
BSA FOR ECHO PROCEDURE: 2.11 M2
CV BLOOD PRESSURE: NORMAL MMHG
CV ECHO HEIGHT: 62 IN
CV ECHO WEIGHT: 224 LBS
DOP CALC AO PEAK VEL: 158 CM/S
DOP CALC AO VTI: 34.6 CM
DOP CALC LVOT AREA: 3.46 CM2
DOP CALC LVOT DIAMETER: 2.1 CM
DOP CALC LVOT PEAK VEL: 82 CM/S
DOP CALC LVOT STROKE VOLUME: 73.4 CM3
DOP CALCLVOT PEAK VEL VTI: 21.2 CM
EJECTION FRACTION: 60 % (ref 55–75)
FRACTIONAL SHORTENING: 26.5 % (ref 28–44)
INTERVENTRICULAR SEPTUM IN END DIASTOLE: 1.1 CM (ref 0.6–0.9)
IVS/PW RATIO: 0.8
LA AREA 1: 17.9 CM2
LA AREA 2: 15 CM2
LEFT ATRIUM LENGTH: 5.26 CM
LEFT ATRIUM SIZE: 4.1 CM
LEFT ATRIUM VOLUME INDEX: 20.6 ML/M2
LEFT ATRIUM VOLUME: 43.4 ML
LEFT VENTRICLE DIASTOLIC VOLUME INDEX: 22.7 CM3/M2 (ref 34–74)
LEFT VENTRICLE DIASTOLIC VOLUME: 48 CM3 (ref 46–106)
LEFT VENTRICLE MASS INDEX: 107.3 G/M2
LEFT VENTRICLE SYSTOLIC VOLUME INDEX: 9 CM3/M2 (ref 11–31)
LEFT VENTRICLE SYSTOLIC VOLUME: 19 CM3 (ref 14–42)
LEFT VENTRICULAR INTERNAL DIMENSION IN DIASTOLE: 4.9 CM (ref 3.8–5.2)
LEFT VENTRICULAR INTERNAL DIMENSION IN SYSTOLE: 3.6 CM (ref 2.2–3.5)
LEFT VENTRICULAR MASS: 226.4 G
LEFT VENTRICULAR OUTFLOW TRACT MEAN GRADIENT: 2 MMHG
LEFT VENTRICULAR OUTFLOW TRACT MEAN VELOCITY: 57.1 CM/S
LEFT VENTRICULAR OUTFLOW TRACT PEAK GRADIENT: 3 MMHG
LEFT VENTRICULAR POSTERIOR WALL IN END DIASTOLE: 1.3 CM (ref 0.6–0.9)
LV STROKE VOLUME INDEX: 34.8 ML/M2
MITRAL VALVE E/A RATIO: 0.8
MV AVERAGE E/E' RATIO: 9.7 CM/S
MV DECELERATION TIME: 218 MS
MV E'TISSUE VEL-LAT: 8.38 CM/S
MV E'TISSUE VEL-MED: 6.53 CM/S
MV LATERAL E/E' RATIO: 8.7
MV MEDIAL E/E' RATIO: 11.1
MV PEAK A VELOCITY: 87.9 CM/S
MV PEAK E VELOCITY: 72.6 CM/S
NUC REST DIASTOLIC VOLUME INDEX: 3586 LBS
NUC REST SYSTOLIC VOLUME INDEX: 62 IN
TRICUSPID REGURGITATION PEAK PRESSURE GRADIENT: 29.2 MMHG
TRICUSPID VALVE ANULAR PLANE SYSTOLIC EXCURSION: 2.4 CM
TRICUSPID VALVE PEAK REGURGITANT VELOCITY: 270 CM/S

## 2018-02-15 ASSESSMENT — MIFFLIN-ST. JEOR: SCORE: 1449.87

## 2018-02-16 ENCOUNTER — OFFICE VISIT - HEALTHEAST (OUTPATIENT)
Dept: INTERNAL MEDICINE | Facility: CLINIC | Age: 78
End: 2018-02-16

## 2018-02-16 ENCOUNTER — COMMUNICATION - HEALTHEAST (OUTPATIENT)
Dept: INTERNAL MEDICINE | Facility: CLINIC | Age: 78
End: 2018-02-16

## 2018-02-16 DIAGNOSIS — I49.3 FREQUENT PVCS: ICD-10-CM

## 2018-02-16 DIAGNOSIS — I26.99 PE (PULMONARY THROMBOEMBOLISM) (H): ICD-10-CM

## 2018-02-16 DIAGNOSIS — I10 HYPERTENSION: ICD-10-CM

## 2018-02-16 DIAGNOSIS — I26.99 PULMONARY EMBOLISM (H): ICD-10-CM

## 2018-02-16 LAB — INR PPP: 2.2 (ref 0.9–1.1)

## 2018-02-27 ENCOUNTER — COMMUNICATION - HEALTHEAST (OUTPATIENT)
Dept: INTERNAL MEDICINE | Facility: CLINIC | Age: 78
End: 2018-02-27

## 2018-02-27 DIAGNOSIS — I10 ESSENTIAL HYPERTENSION, BENIGN: ICD-10-CM

## 2018-03-02 ENCOUNTER — COMMUNICATION - HEALTHEAST (OUTPATIENT)
Dept: INTERNAL MEDICINE | Facility: CLINIC | Age: 78
End: 2018-03-02

## 2018-03-02 ENCOUNTER — AMBULATORY - HEALTHEAST (OUTPATIENT)
Dept: LAB | Facility: CLINIC | Age: 78
End: 2018-03-02

## 2018-03-02 DIAGNOSIS — I26.99 PULMONARY EMBOLISM (H): ICD-10-CM

## 2018-03-02 DIAGNOSIS — I26.99 PE (PULMONARY THROMBOEMBOLISM) (H): ICD-10-CM

## 2018-03-02 LAB — INR PPP: 2.7 (ref 0.9–1.1)

## 2018-03-09 ENCOUNTER — OFFICE VISIT - HEALTHEAST (OUTPATIENT)
Dept: CARDIOLOGY | Facility: CLINIC | Age: 78
End: 2018-03-09

## 2018-03-09 ENCOUNTER — COMMUNICATION - HEALTHEAST (OUTPATIENT)
Dept: INTERNAL MEDICINE | Facility: CLINIC | Age: 78
End: 2018-03-09

## 2018-03-09 ENCOUNTER — OFFICE VISIT - HEALTHEAST (OUTPATIENT)
Dept: INTERNAL MEDICINE | Facility: CLINIC | Age: 78
End: 2018-03-09

## 2018-03-09 DIAGNOSIS — I49.3 FREQUENT PVCS: ICD-10-CM

## 2018-03-09 DIAGNOSIS — J01.90 ACUTE SINUSITIS: ICD-10-CM

## 2018-03-09 DIAGNOSIS — E78.5 HYPERLIPIDEMIA ASSOCIATED WITH TYPE 2 DIABETES MELLITUS (H): ICD-10-CM

## 2018-03-09 DIAGNOSIS — J02.9 SORE THROAT: ICD-10-CM

## 2018-03-09 DIAGNOSIS — Z88.0 ALLERGY HISTORY, PENICILLIN: ICD-10-CM

## 2018-03-09 DIAGNOSIS — I49.9 VENTRICULAR ARRHYTHMIA: ICD-10-CM

## 2018-03-09 DIAGNOSIS — E11.69 HYPERLIPIDEMIA ASSOCIATED WITH TYPE 2 DIABETES MELLITUS (H): ICD-10-CM

## 2018-03-09 LAB
ATRIAL RATE - MUSE: 85 BPM
DEPRECATED S PYO AG THROAT QL EIA: NORMAL
DIASTOLIC BLOOD PRESSURE - MUSE: NORMAL MMHG
INTERPRETATION ECG - MUSE: NORMAL
P AXIS - MUSE: 53 DEGREES
PR INTERVAL - MUSE: 144 MS
QRS DURATION - MUSE: 92 MS
QT - MUSE: 362 MS
QTC - MUSE: 430 MS
R AXIS - MUSE: 56 DEGREES
SYSTOLIC BLOOD PRESSURE - MUSE: NORMAL MMHG
T AXIS - MUSE: 4 DEGREES
VENTRICULAR RATE- MUSE: 85 BPM

## 2018-03-09 ASSESSMENT — MIFFLIN-ST. JEOR: SCORE: 1440.24

## 2018-03-10 LAB — GROUP A STREP BY PCR: NORMAL

## 2018-03-15 ENCOUNTER — COMMUNICATION - HEALTHEAST (OUTPATIENT)
Dept: INTERNAL MEDICINE | Facility: CLINIC | Age: 78
End: 2018-03-15

## 2018-03-16 ENCOUNTER — COMMUNICATION - HEALTHEAST (OUTPATIENT)
Dept: INTERNAL MEDICINE | Facility: CLINIC | Age: 78
End: 2018-03-16

## 2018-03-16 ENCOUNTER — OFFICE VISIT - HEALTHEAST (OUTPATIENT)
Dept: INTERNAL MEDICINE | Facility: CLINIC | Age: 78
End: 2018-03-16

## 2018-03-16 ENCOUNTER — COMMUNICATION - HEALTHEAST (OUTPATIENT)
Dept: SCHEDULING | Facility: CLINIC | Age: 78
End: 2018-03-16

## 2018-03-16 DIAGNOSIS — I26.99 PULMONARY EMBOLISM (H): ICD-10-CM

## 2018-03-16 DIAGNOSIS — J98.8 RESPIRATORY TRACT INFECTION: ICD-10-CM

## 2018-03-16 DIAGNOSIS — J45.909 ASTHMA: ICD-10-CM

## 2018-03-16 DIAGNOSIS — I10 ESSENTIAL HYPERTENSION, BENIGN: ICD-10-CM

## 2018-03-16 DIAGNOSIS — R49.0 HOARSENESS: ICD-10-CM

## 2018-03-16 DIAGNOSIS — I26.99 PE (PULMONARY THROMBOEMBOLISM) (H): ICD-10-CM

## 2018-03-16 LAB — INR PPP: 2.7 (ref 0.9–1.1)

## 2018-03-19 ENCOUNTER — COMMUNICATION - HEALTHEAST (OUTPATIENT)
Dept: INTERNAL MEDICINE | Facility: CLINIC | Age: 78
End: 2018-03-19

## 2018-03-20 ENCOUNTER — COMMUNICATION - HEALTHEAST (OUTPATIENT)
Dept: INTERNAL MEDICINE | Facility: CLINIC | Age: 78
End: 2018-03-20

## 2018-03-20 DIAGNOSIS — I26.99 PULMONARY EMBOLISM (H): ICD-10-CM

## 2018-04-02 ENCOUNTER — AMBULATORY - HEALTHEAST (OUTPATIENT)
Dept: LAB | Facility: CLINIC | Age: 78
End: 2018-04-02

## 2018-04-02 ENCOUNTER — COMMUNICATION - HEALTHEAST (OUTPATIENT)
Dept: ANTICOAGULATION | Facility: CLINIC | Age: 78
End: 2018-04-02

## 2018-04-02 DIAGNOSIS — I26.99 PULMONARY EMBOLISM (H): ICD-10-CM

## 2018-04-02 DIAGNOSIS — I26.99 PE (PULMONARY THROMBOEMBOLISM) (H): ICD-10-CM

## 2018-04-02 LAB — INR PPP: 2.3 (ref 0.9–1.1)

## 2018-04-03 ENCOUNTER — RECORDS - HEALTHEAST (OUTPATIENT)
Dept: ADMINISTRATIVE | Facility: OTHER | Age: 78
End: 2018-04-03

## 2018-04-06 ENCOUNTER — COMMUNICATION - HEALTHEAST (OUTPATIENT)
Dept: SCHEDULING | Facility: CLINIC | Age: 78
End: 2018-04-06

## 2018-04-06 ENCOUNTER — OFFICE VISIT - HEALTHEAST (OUTPATIENT)
Dept: INTERNAL MEDICINE | Facility: CLINIC | Age: 78
End: 2018-04-06

## 2018-04-06 ENCOUNTER — COMMUNICATION - HEALTHEAST (OUTPATIENT)
Dept: INTERNAL MEDICINE | Facility: CLINIC | Age: 78
End: 2018-04-06

## 2018-04-06 DIAGNOSIS — E11.9 TYPE 2 DIABETES MELLITUS WITHOUT COMPLICATION, WITHOUT LONG-TERM CURRENT USE OF INSULIN (H): ICD-10-CM

## 2018-04-06 DIAGNOSIS — I10 ESSENTIAL HYPERTENSION, BENIGN: ICD-10-CM

## 2018-04-06 DIAGNOSIS — I26.99 PULMONARY EMBOLISM (H): ICD-10-CM

## 2018-04-06 DIAGNOSIS — R49.0 HOARSENESS: ICD-10-CM

## 2018-04-17 ENCOUNTER — RECORDS - HEALTHEAST (OUTPATIENT)
Dept: ADMINISTRATIVE | Facility: OTHER | Age: 78
End: 2018-04-17

## 2018-04-25 ENCOUNTER — COMMUNICATION - HEALTHEAST (OUTPATIENT)
Dept: INTERNAL MEDICINE | Facility: CLINIC | Age: 78
End: 2018-04-25

## 2018-04-26 ENCOUNTER — AMBULATORY - HEALTHEAST (OUTPATIENT)
Dept: LAB | Facility: CLINIC | Age: 78
End: 2018-04-26

## 2018-04-26 ENCOUNTER — AMBULATORY - HEALTHEAST (OUTPATIENT)
Dept: INTERNAL MEDICINE | Facility: CLINIC | Age: 78
End: 2018-04-26

## 2018-04-26 ENCOUNTER — COMMUNICATION - HEALTHEAST (OUTPATIENT)
Dept: ANTICOAGULATION | Facility: CLINIC | Age: 78
End: 2018-04-26

## 2018-04-26 DIAGNOSIS — I26.99 PULMONARY EMBOLISM (H): ICD-10-CM

## 2018-04-26 DIAGNOSIS — I26.99 PE (PULMONARY THROMBOEMBOLISM) (H): ICD-10-CM

## 2018-04-26 DIAGNOSIS — E11.9 DIABETES (H): ICD-10-CM

## 2018-04-26 DIAGNOSIS — E04.9 GOITER: ICD-10-CM

## 2018-04-26 LAB
ANION GAP SERPL CALCULATED.3IONS-SCNC: 12 MMOL/L (ref 5–18)
BUN SERPL-MCNC: 17 MG/DL (ref 8–28)
CALCIUM SERPL-MCNC: 9 MG/DL (ref 8.5–10.5)
CHLORIDE BLD-SCNC: 106 MMOL/L (ref 98–107)
CHOLEST SERPL-MCNC: 167 MG/DL
CO2 SERPL-SCNC: 23 MMOL/L (ref 22–31)
CREAT SERPL-MCNC: 0.71 MG/DL (ref 0.6–1.1)
FASTING STATUS PATIENT QL REPORTED: YES
GFR SERPL CREATININE-BSD FRML MDRD: >60 ML/MIN/1.73M2
GLUCOSE BLD-MCNC: 109 MG/DL (ref 70–125)
HBA1C MFR BLD: 7 % (ref 3.5–6)
HDLC SERPL-MCNC: 50 MG/DL
INR PPP: 2.4 (ref 0.9–1.1)
LDLC SERPL CALC-MCNC: 90 MG/DL
POTASSIUM BLD-SCNC: 4.5 MMOL/L (ref 3.5–5)
SODIUM SERPL-SCNC: 141 MMOL/L (ref 136–145)
TRIGL SERPL-MCNC: 134 MG/DL

## 2018-04-30 ENCOUNTER — COMMUNICATION - HEALTHEAST (OUTPATIENT)
Dept: INTERNAL MEDICINE | Facility: CLINIC | Age: 78
End: 2018-04-30

## 2018-05-01 ENCOUNTER — RECORDS - HEALTHEAST (OUTPATIENT)
Dept: ADMINISTRATIVE | Facility: OTHER | Age: 78
End: 2018-05-01

## 2018-05-03 ENCOUNTER — AMBULATORY - HEALTHEAST (OUTPATIENT)
Dept: SCHEDULING | Facility: CLINIC | Age: 78
End: 2018-05-03

## 2018-05-03 ENCOUNTER — COMMUNICATION - HEALTHEAST (OUTPATIENT)
Dept: INTERNAL MEDICINE | Facility: CLINIC | Age: 78
End: 2018-05-03

## 2018-05-03 DIAGNOSIS — E04.9 GOITER: ICD-10-CM

## 2018-05-08 ENCOUNTER — OFFICE VISIT - HEALTHEAST (OUTPATIENT)
Dept: INTERNAL MEDICINE | Facility: CLINIC | Age: 78
End: 2018-05-08

## 2018-05-08 DIAGNOSIS — E04.9 NON-TOXIC NODULAR GOITER: ICD-10-CM

## 2018-05-08 DIAGNOSIS — E11.9 TYPE 2 DIABETES MELLITUS WITHOUT COMPLICATION, WITHOUT LONG-TERM CURRENT USE OF INSULIN (H): ICD-10-CM

## 2018-05-08 DIAGNOSIS — I10 ESSENTIAL HYPERTENSION, BENIGN: ICD-10-CM

## 2018-05-17 ENCOUNTER — RECORDS - HEALTHEAST (OUTPATIENT)
Dept: ADMINISTRATIVE | Facility: OTHER | Age: 78
End: 2018-05-17

## 2018-06-05 ENCOUNTER — COMMUNICATION - HEALTHEAST (OUTPATIENT)
Dept: ANTICOAGULATION | Facility: CLINIC | Age: 78
End: 2018-06-05

## 2018-06-11 ENCOUNTER — COMMUNICATION - HEALTHEAST (OUTPATIENT)
Dept: INTERNAL MEDICINE | Facility: CLINIC | Age: 78
End: 2018-06-11

## 2018-06-11 ENCOUNTER — COMMUNICATION - HEALTHEAST (OUTPATIENT)
Dept: ANTICOAGULATION | Facility: CLINIC | Age: 78
End: 2018-06-11

## 2018-06-11 ENCOUNTER — AMBULATORY - HEALTHEAST (OUTPATIENT)
Dept: LAB | Facility: CLINIC | Age: 78
End: 2018-06-11

## 2018-06-11 DIAGNOSIS — I26.99 PE (PULMONARY THROMBOEMBOLISM) (H): ICD-10-CM

## 2018-06-11 DIAGNOSIS — I26.99 PULMONARY EMBOLISM (H): ICD-10-CM

## 2018-06-11 LAB — INR PPP: 4.4 (ref 0.9–1.1)

## 2018-06-20 ENCOUNTER — COMMUNICATION - HEALTHEAST (OUTPATIENT)
Dept: ANTICOAGULATION | Facility: CLINIC | Age: 78
End: 2018-06-20

## 2018-06-20 ENCOUNTER — OFFICE VISIT - HEALTHEAST (OUTPATIENT)
Dept: INTERNAL MEDICINE | Facility: CLINIC | Age: 78
End: 2018-06-20

## 2018-06-20 DIAGNOSIS — E11.9 TYPE 2 DIABETES MELLITUS WITHOUT COMPLICATION, WITHOUT LONG-TERM CURRENT USE OF INSULIN (H): ICD-10-CM

## 2018-06-20 DIAGNOSIS — K58.9 IBS (IRRITABLE BOWEL SYNDROME): ICD-10-CM

## 2018-06-20 DIAGNOSIS — I10 ESSENTIAL HYPERTENSION, BENIGN: ICD-10-CM

## 2018-06-20 DIAGNOSIS — I26.99 PULMONARY EMBOLISM (H): ICD-10-CM

## 2018-06-20 DIAGNOSIS — I26.99 PE (PULMONARY THROMBOEMBOLISM) (H): ICD-10-CM

## 2018-06-20 LAB — INR PPP: 2.2 (ref 0.9–1.1)

## 2018-06-27 ENCOUNTER — RECORDS - HEALTHEAST (OUTPATIENT)
Dept: ADMINISTRATIVE | Facility: OTHER | Age: 78
End: 2018-06-27

## 2018-07-01 ENCOUNTER — COMMUNICATION - HEALTHEAST (OUTPATIENT)
Dept: INTERNAL MEDICINE | Facility: CLINIC | Age: 78
End: 2018-07-01

## 2018-07-01 DIAGNOSIS — K58.0 IRRITABLE BOWEL SYNDROME WITH DIARRHEA: ICD-10-CM

## 2018-07-02 ENCOUNTER — COMMUNICATION - HEALTHEAST (OUTPATIENT)
Dept: ANTICOAGULATION | Facility: CLINIC | Age: 78
End: 2018-07-02

## 2018-07-02 ENCOUNTER — AMBULATORY - HEALTHEAST (OUTPATIENT)
Dept: LAB | Facility: CLINIC | Age: 78
End: 2018-07-02

## 2018-07-02 DIAGNOSIS — I26.99 PE (PULMONARY THROMBOEMBOLISM) (H): ICD-10-CM

## 2018-07-02 DIAGNOSIS — I26.99 PULMONARY EMBOLISM (H): ICD-10-CM

## 2018-07-02 LAB — INR PPP: 2.9 (ref 0.9–1.1)

## 2018-07-30 ENCOUNTER — COMMUNICATION - HEALTHEAST (OUTPATIENT)
Dept: ANTICOAGULATION | Facility: CLINIC | Age: 78
End: 2018-07-30

## 2018-07-30 ENCOUNTER — AMBULATORY - HEALTHEAST (OUTPATIENT)
Dept: LAB | Facility: CLINIC | Age: 78
End: 2018-07-30

## 2018-07-30 DIAGNOSIS — I26.99 PULMONARY EMBOLISM (H): ICD-10-CM

## 2018-07-30 DIAGNOSIS — I26.99 PE (PULMONARY THROMBOEMBOLISM) (H): ICD-10-CM

## 2018-07-30 LAB — INR PPP: 2.7 (ref 0.9–1.1)

## 2018-08-03 ENCOUNTER — COMMUNICATION - HEALTHEAST (OUTPATIENT)
Dept: INTERNAL MEDICINE | Facility: CLINIC | Age: 78
End: 2018-08-03

## 2018-08-08 ENCOUNTER — COMMUNICATION - HEALTHEAST (OUTPATIENT)
Dept: INTERNAL MEDICINE | Facility: CLINIC | Age: 78
End: 2018-08-08

## 2018-08-08 ENCOUNTER — OFFICE VISIT - HEALTHEAST (OUTPATIENT)
Dept: INTERNAL MEDICINE | Facility: CLINIC | Age: 78
End: 2018-08-08

## 2018-08-08 DIAGNOSIS — I10 ESSENTIAL HYPERTENSION, BENIGN: ICD-10-CM

## 2018-08-08 DIAGNOSIS — K59.1 FUNCTIONAL DIARRHEA: ICD-10-CM

## 2018-08-08 DIAGNOSIS — I49.3 PVC'S (PREMATURE VENTRICULAR CONTRACTIONS): ICD-10-CM

## 2018-08-08 DIAGNOSIS — E11.9 TYPE 2 DIABETES MELLITUS WITHOUT COMPLICATION, WITHOUT LONG-TERM CURRENT USE OF INSULIN (H): ICD-10-CM

## 2018-08-08 LAB
ANION GAP SERPL CALCULATED.3IONS-SCNC: 10 MMOL/L (ref 5–18)
BUN SERPL-MCNC: 18 MG/DL (ref 8–28)
CALCIUM SERPL-MCNC: 9.3 MG/DL (ref 8.5–10.5)
CHLORIDE BLD-SCNC: 107 MMOL/L (ref 98–107)
CO2 SERPL-SCNC: 23 MMOL/L (ref 22–31)
CREAT SERPL-MCNC: 0.69 MG/DL (ref 0.6–1.1)
ERYTHROCYTE [DISTWIDTH] IN BLOOD BY AUTOMATED COUNT: 13.4 % (ref 11–14.5)
GFR SERPL CREATININE-BSD FRML MDRD: >60 ML/MIN/1.73M2
GLUCOSE BLD-MCNC: 138 MG/DL (ref 70–125)
HBA1C MFR BLD: 6.9 % (ref 3.5–6)
HCT VFR BLD AUTO: 40.6 % (ref 35–47)
HGB BLD-MCNC: 13.4 G/DL (ref 12–16)
MCH RBC QN AUTO: 28.1 PG (ref 27–34)
MCHC RBC AUTO-ENTMCNC: 33.1 G/DL (ref 32–36)
MCV RBC AUTO: 85 FL (ref 80–100)
PLATELET # BLD AUTO: 227 THOU/UL (ref 140–440)
PMV BLD AUTO: 8.2 FL (ref 7–10)
POTASSIUM BLD-SCNC: 4.1 MMOL/L (ref 3.5–5)
RBC # BLD AUTO: 4.77 MILL/UL (ref 3.8–5.4)
SODIUM SERPL-SCNC: 140 MMOL/L (ref 136–145)
WBC: 11.4 THOU/UL (ref 4–11)

## 2018-08-15 ENCOUNTER — COMMUNICATION - HEALTHEAST (OUTPATIENT)
Dept: INTERNAL MEDICINE | Facility: CLINIC | Age: 78
End: 2018-08-15

## 2018-09-04 ENCOUNTER — RECORDS - HEALTHEAST (OUTPATIENT)
Dept: ADMINISTRATIVE | Facility: OTHER | Age: 78
End: 2018-09-04

## 2018-09-06 ENCOUNTER — COMMUNICATION - HEALTHEAST (OUTPATIENT)
Dept: ANTICOAGULATION | Facility: CLINIC | Age: 78
End: 2018-09-06

## 2018-09-11 ENCOUNTER — COMMUNICATION - HEALTHEAST (OUTPATIENT)
Dept: ANTICOAGULATION | Facility: CLINIC | Age: 78
End: 2018-09-11

## 2018-09-11 ENCOUNTER — AMBULATORY - HEALTHEAST (OUTPATIENT)
Dept: NURSING | Facility: CLINIC | Age: 78
End: 2018-09-11

## 2018-09-11 ENCOUNTER — AMBULATORY - HEALTHEAST (OUTPATIENT)
Dept: LAB | Facility: CLINIC | Age: 78
End: 2018-09-11

## 2018-09-11 DIAGNOSIS — I26.99 PE (PULMONARY THROMBOEMBOLISM) (H): ICD-10-CM

## 2018-09-11 DIAGNOSIS — I26.99 PULMONARY EMBOLISM (H): ICD-10-CM

## 2018-09-11 DIAGNOSIS — Z23 FLU VACCINE NEED: ICD-10-CM

## 2018-09-11 LAB — INR PPP: 2.7 (ref 0.9–1.1)

## 2018-09-13 ENCOUNTER — RECORDS - HEALTHEAST (OUTPATIENT)
Dept: ADMINISTRATIVE | Facility: OTHER | Age: 78
End: 2018-09-13

## 2018-09-18 ENCOUNTER — COMMUNICATION - HEALTHEAST (OUTPATIENT)
Dept: ADMINISTRATIVE | Facility: CLINIC | Age: 78
End: 2018-09-18

## 2018-09-18 DIAGNOSIS — Z00.00 ROUTINE HEALTH MAINTENANCE: ICD-10-CM

## 2018-09-18 DIAGNOSIS — Z86.0100 HISTORY OF COLONIC POLYPS: ICD-10-CM

## 2018-09-20 ENCOUNTER — COMMUNICATION - HEALTHEAST (OUTPATIENT)
Dept: INTERNAL MEDICINE | Facility: CLINIC | Age: 78
End: 2018-09-20

## 2018-09-20 DIAGNOSIS — Z51.81 MEDICATION MONITORING ENCOUNTER: ICD-10-CM

## 2018-09-21 ENCOUNTER — COMMUNICATION - HEALTHEAST (OUTPATIENT)
Dept: INTERNAL MEDICINE | Facility: CLINIC | Age: 78
End: 2018-09-21

## 2018-09-27 ENCOUNTER — OFFICE VISIT - HEALTHEAST (OUTPATIENT)
Dept: INTERNAL MEDICINE | Facility: CLINIC | Age: 78
End: 2018-09-27

## 2018-09-27 DIAGNOSIS — E11.9 TYPE 2 DIABETES MELLITUS WITHOUT COMPLICATION, WITHOUT LONG-TERM CURRENT USE OF INSULIN (H): ICD-10-CM

## 2018-09-27 DIAGNOSIS — L65.9 HAIR LOSS: ICD-10-CM

## 2018-09-27 DIAGNOSIS — E04.9 GOITER: ICD-10-CM

## 2018-09-27 DIAGNOSIS — L60.0 INGROWN NAIL OF FOURTH TOE OF LEFT FOOT: ICD-10-CM

## 2018-09-27 LAB
ERYTHROCYTE [SEDIMENTATION RATE] IN BLOOD BY WESTERGREN METHOD: 12 MM/HR (ref 0–20)
TSH SERPL DL<=0.005 MIU/L-ACNC: 0.73 UIU/ML (ref 0.3–5)

## 2018-10-05 ENCOUNTER — RECORDS - HEALTHEAST (OUTPATIENT)
Dept: ADMINISTRATIVE | Facility: OTHER | Age: 78
End: 2018-10-05

## 2018-10-05 ENCOUNTER — COMMUNICATION - HEALTHEAST (OUTPATIENT)
Dept: INTERNAL MEDICINE | Facility: CLINIC | Age: 78
End: 2018-10-05

## 2018-10-19 ENCOUNTER — RECORDS - HEALTHEAST (OUTPATIENT)
Dept: ADMINISTRATIVE | Facility: OTHER | Age: 78
End: 2018-10-19

## 2018-10-22 ENCOUNTER — RECORDS - HEALTHEAST (OUTPATIENT)
Dept: ADMINISTRATIVE | Facility: OTHER | Age: 78
End: 2018-10-22

## 2018-10-29 ENCOUNTER — COMMUNICATION - HEALTHEAST (OUTPATIENT)
Dept: PHARMACY | Facility: CLINIC | Age: 78
End: 2018-10-29

## 2018-10-30 ENCOUNTER — COMMUNICATION - HEALTHEAST (OUTPATIENT)
Dept: ANTICOAGULATION | Facility: CLINIC | Age: 78
End: 2018-10-30

## 2018-11-07 ENCOUNTER — AMBULATORY - HEALTHEAST (OUTPATIENT)
Dept: LAB | Facility: CLINIC | Age: 78
End: 2018-11-07

## 2018-11-07 ENCOUNTER — COMMUNICATION - HEALTHEAST (OUTPATIENT)
Dept: ANTICOAGULATION | Facility: CLINIC | Age: 78
End: 2018-11-07

## 2018-11-07 DIAGNOSIS — I26.99 PE (PULMONARY THROMBOEMBOLISM) (H): ICD-10-CM

## 2018-11-07 DIAGNOSIS — I26.99 PULMONARY EMBOLISM (H): ICD-10-CM

## 2018-11-07 LAB — INR PPP: 2.8 (ref 0.9–1.1)

## 2018-11-08 ENCOUNTER — COMMUNICATION - HEALTHEAST (OUTPATIENT)
Dept: PHARMACY | Facility: CLINIC | Age: 78
End: 2018-11-08

## 2018-11-08 DIAGNOSIS — I10 ESSENTIAL HYPERTENSION, BENIGN: ICD-10-CM

## 2018-11-08 DIAGNOSIS — E11.9 TYPE 2 DIABETES MELLITUS WITHOUT COMPLICATION, WITHOUT LONG-TERM CURRENT USE OF INSULIN (H): ICD-10-CM

## 2018-11-08 DIAGNOSIS — J45.40 MODERATE PERSISTENT ASTHMA, UNSPECIFIED WHETHER COMPLICATED: ICD-10-CM

## 2018-11-08 DIAGNOSIS — I49.3 PVC'S (PREMATURE VENTRICULAR CONTRACTIONS): ICD-10-CM

## 2018-11-08 DIAGNOSIS — K58.0 IRRITABLE BOWEL SYNDROME WITH DIARRHEA: ICD-10-CM

## 2018-11-08 DIAGNOSIS — I26.99 OTHER PULMONARY EMBOLISM WITHOUT ACUTE COR PULMONALE, UNSPECIFIED CHRONICITY (H): ICD-10-CM

## 2018-11-09 ENCOUNTER — OFFICE VISIT - HEALTHEAST (OUTPATIENT)
Dept: INTERNAL MEDICINE | Facility: CLINIC | Age: 78
End: 2018-11-09

## 2018-11-09 DIAGNOSIS — I49.3 PVC'S (PREMATURE VENTRICULAR CONTRACTIONS): ICD-10-CM

## 2018-11-09 DIAGNOSIS — E11.9 TYPE 2 DIABETES MELLITUS WITHOUT COMPLICATION, WITHOUT LONG-TERM CURRENT USE OF INSULIN (H): ICD-10-CM

## 2018-11-09 DIAGNOSIS — M25.561 RIGHT KNEE PAIN: ICD-10-CM

## 2018-11-09 LAB
ANION GAP SERPL CALCULATED.3IONS-SCNC: 9 MMOL/L (ref 5–18)
BUN SERPL-MCNC: 17 MG/DL (ref 8–28)
CALCIUM SERPL-MCNC: 10 MG/DL (ref 8.5–10.5)
CHLORIDE BLD-SCNC: 105 MMOL/L (ref 98–107)
CO2 SERPL-SCNC: 27 MMOL/L (ref 22–31)
CREAT SERPL-MCNC: 0.72 MG/DL (ref 0.6–1.1)
GFR SERPL CREATININE-BSD FRML MDRD: >60 ML/MIN/1.73M2
GLUCOSE BLD-MCNC: 126 MG/DL (ref 70–125)
HBA1C MFR BLD: 6.9 % (ref 3.5–6)
POTASSIUM BLD-SCNC: 4.3 MMOL/L (ref 3.5–5)
SODIUM SERPL-SCNC: 141 MMOL/L (ref 136–145)

## 2018-11-14 ENCOUNTER — COMMUNICATION - HEALTHEAST (OUTPATIENT)
Dept: INTERNAL MEDICINE | Facility: CLINIC | Age: 78
End: 2018-11-14

## 2018-11-14 DIAGNOSIS — I26.99 OTHER PULMONARY EMBOLISM WITHOUT ACUTE COR PULMONALE, UNSPECIFIED CHRONICITY (H): ICD-10-CM

## 2018-12-10 ENCOUNTER — HOSPITAL ENCOUNTER (OUTPATIENT)
Dept: CARDIOLOGY | Facility: CLINIC | Age: 78
Discharge: HOME | End: 2018-12-10
Attending: INTERNAL MEDICINE

## 2018-12-10 DIAGNOSIS — I49.3 PVC'S (PREMATURE VENTRICULAR CONTRACTIONS): ICD-10-CM

## 2018-12-10 LAB
AORTIC ROOT: 3.3 CM
AORTIC VALVE MEAN VELOCITY: 138 CM/S
ASCENDING AORTA: 3.5 CM
AV DIMENSIONLESS INDEX VTI: 0.6
AV MEAN GRADIENT: 8 MMHG
AV PEAK GRADIENT: 15.8 MMHG
AV VALVE AREA: 2 CM2
AV VELOCITY RATIO: 0.5
BSA FOR ECHO PROCEDURE: 2.11 M2
CV BLOOD PRESSURE: ABNORMAL MMHG
CV ECHO HEIGHT: 62 IN
CV ECHO WEIGHT: 224 LBS
DOP CALC AO PEAK VEL: 199 CM/S
DOP CALC AO VTI: 40.3 CM
DOP CALC LVOT AREA: 3.46 CM2
DOP CALC LVOT DIAMETER: 2.1 CM
DOP CALC LVOT PEAK VEL: 108 CM/S
DOP CALC LVOT STROKE VOLUME: 79.6 CM3
DOP CALCLVOT PEAK VEL VTI: 23 CM
EJECTION FRACTION: 65 % (ref 55–75)
FRACTIONAL SHORTENING: 24.5 % (ref 28–44)
INTERVENTRICULAR SEPTUM IN END DIASTOLE: 1 CM (ref 0.6–0.9)
IVS/PW RATIO: 1.3
LA AREA 2: 26.8 CM2
LEFT ATRIUM LENGTH: 6.07 CM
LEFT ATRIUM SIZE: 4.2 CM
LEFT ATRIUM TO AORTIC ROOT RATIO: 1.27 NO UNITS
LEFT VENTRICLE CARDIAC INDEX: 1.9 L/MIN/M2
LEFT VENTRICLE CARDIAC OUTPUT: 4 L/MIN
LEFT VENTRICLE DIASTOLIC VOLUME INDEX: 46.4 CM3/M2 (ref 29–61)
LEFT VENTRICLE DIASTOLIC VOLUME: 98 CM3 (ref 46–106)
LEFT VENTRICLE HEART RATE: 50 BPM
LEFT VENTRICLE MASS INDEX: 72.5 G/M2
LEFT VENTRICLE SYSTOLIC VOLUME INDEX: 16.1 CM3/M2 (ref 8–24)
LEFT VENTRICLE SYSTOLIC VOLUME: 34 CM3 (ref 14–42)
LEFT VENTRICULAR INTERNAL DIMENSION IN DIASTOLE: 4.9 CM (ref 3.8–5.2)
LEFT VENTRICULAR INTERNAL DIMENSION IN SYSTOLE: 3.7 CM (ref 2.2–3.5)
LEFT VENTRICULAR MASS: 153 G
LEFT VENTRICULAR OUTFLOW TRACT MEAN GRADIENT: 3 MMHG
LEFT VENTRICULAR OUTFLOW TRACT MEAN VELOCITY: 75.7 CM/S
LEFT VENTRICULAR OUTFLOW TRACT PEAK GRADIENT: 5 MMHG
LEFT VENTRICULAR POSTERIOR WALL IN END DIASTOLE: 0.8 CM (ref 0.6–0.9)
LV STROKE VOLUME INDEX: 37.7 ML/M2
MITRAL VALVE E/A RATIO: 0.7
MV AVERAGE E/E' RATIO: 10.5 CM/S
MV DECELERATION TIME: 203 MS
MV E'TISSUE VEL-LAT: 7.02 CM/S
MV E'TISSUE VEL-MED: 4.97 CM/S
MV LATERAL E/E' RATIO: 8.9
MV MEDIAL E/E' RATIO: 12.6
MV PEAK A VELOCITY: 89.8 CM/S
MV PEAK E VELOCITY: 62.7 CM/S
NUC REST DIASTOLIC VOLUME INDEX: 3584 LBS
NUC REST SYSTOLIC VOLUME INDEX: 62 IN
TRICUSPID VALVE ANULAR PLANE SYSTOLIC EXCURSION: 2.5 CM

## 2018-12-10 ASSESSMENT — MIFFLIN-ST. JEOR: SCORE: 1444.31

## 2018-12-14 ENCOUNTER — COMMUNICATION - HEALTHEAST (OUTPATIENT)
Dept: INTERNAL MEDICINE | Facility: CLINIC | Age: 78
End: 2018-12-14

## 2018-12-14 DIAGNOSIS — Z51.81 MEDICATION MONITORING ENCOUNTER: ICD-10-CM

## 2018-12-14 DIAGNOSIS — K21.9 ESOPHAGEAL REFLUX: ICD-10-CM

## 2018-12-26 ENCOUNTER — COMMUNICATION - HEALTHEAST (OUTPATIENT)
Dept: ANTICOAGULATION | Facility: CLINIC | Age: 78
End: 2018-12-26

## 2019-01-02 ENCOUNTER — COMMUNICATION - HEALTHEAST (OUTPATIENT)
Dept: INTERNAL MEDICINE | Facility: CLINIC | Age: 79
End: 2019-01-02

## 2019-01-02 ENCOUNTER — RECORDS - HEALTHEAST (OUTPATIENT)
Dept: ANTICOAGULATION | Facility: CLINIC | Age: 79
End: 2019-01-02

## 2019-01-02 ENCOUNTER — AMBULATORY - HEALTHEAST (OUTPATIENT)
Dept: LAB | Facility: CLINIC | Age: 79
End: 2019-01-02

## 2019-01-02 DIAGNOSIS — I26.99 OTHER PULMONARY EMBOLISM WITHOUT ACUTE COR PULMONALE, UNSPECIFIED CHRONICITY (H): ICD-10-CM

## 2019-01-02 DIAGNOSIS — I26.99 PE (PULMONARY THROMBOEMBOLISM) (H): ICD-10-CM

## 2019-01-02 LAB — INR PPP: 3.5 (ref 0.9–1.1)

## 2019-01-08 ENCOUNTER — RECORDS - HEALTHEAST (OUTPATIENT)
Dept: ADMINISTRATIVE | Facility: OTHER | Age: 79
End: 2019-01-08

## 2019-01-09 ENCOUNTER — RECORDS - HEALTHEAST (OUTPATIENT)
Dept: ADMINISTRATIVE | Facility: OTHER | Age: 79
End: 2019-01-09

## 2019-01-09 ENCOUNTER — COMMUNICATION - HEALTHEAST (OUTPATIENT)
Dept: INTERNAL MEDICINE | Facility: CLINIC | Age: 79
End: 2019-01-09

## 2019-01-09 DIAGNOSIS — I26.99 OTHER PULMONARY EMBOLISM WITHOUT ACUTE COR PULMONALE, UNSPECIFIED CHRONICITY (H): ICD-10-CM

## 2019-01-15 ENCOUNTER — COMMUNICATION - HEALTHEAST (OUTPATIENT)
Dept: ANTICOAGULATION | Facility: CLINIC | Age: 79
End: 2019-01-15

## 2019-01-15 DIAGNOSIS — I26.99 OTHER PULMONARY EMBOLISM WITHOUT ACUTE COR PULMONALE, UNSPECIFIED CHRONICITY (H): ICD-10-CM

## 2019-01-23 ENCOUNTER — COMMUNICATION - HEALTHEAST (OUTPATIENT)
Dept: ANTICOAGULATION | Facility: CLINIC | Age: 79
End: 2019-01-23

## 2019-01-23 ENCOUNTER — AMBULATORY - HEALTHEAST (OUTPATIENT)
Dept: LAB | Facility: CLINIC | Age: 79
End: 2019-01-23

## 2019-01-23 DIAGNOSIS — I26.99 OTHER PULMONARY EMBOLISM WITHOUT ACUTE COR PULMONALE, UNSPECIFIED CHRONICITY (H): ICD-10-CM

## 2019-01-23 LAB — INR PPP: 2.4 (ref 0.9–1.1)

## 2019-01-30 ENCOUNTER — COMMUNICATION - HEALTHEAST (OUTPATIENT)
Dept: INTERNAL MEDICINE | Facility: CLINIC | Age: 79
End: 2019-01-30

## 2019-02-06 ENCOUNTER — COMMUNICATION - HEALTHEAST (OUTPATIENT)
Dept: ANTICOAGULATION | Facility: CLINIC | Age: 79
End: 2019-02-06

## 2019-02-08 ENCOUNTER — COMMUNICATION - HEALTHEAST (OUTPATIENT)
Dept: ANTICOAGULATION | Facility: CLINIC | Age: 79
End: 2019-02-08

## 2019-02-08 ENCOUNTER — OFFICE VISIT - HEALTHEAST (OUTPATIENT)
Dept: INTERNAL MEDICINE | Facility: CLINIC | Age: 79
End: 2019-02-08

## 2019-02-08 DIAGNOSIS — E11.9 TYPE 2 DIABETES MELLITUS WITHOUT COMPLICATION, WITHOUT LONG-TERM CURRENT USE OF INSULIN (H): ICD-10-CM

## 2019-02-08 DIAGNOSIS — Z79.899 ENCOUNTER FOR LONG-TERM (CURRENT) USE OF MEDICATIONS: ICD-10-CM

## 2019-02-08 DIAGNOSIS — I49.3 PVC'S (PREMATURE VENTRICULAR CONTRACTIONS): ICD-10-CM

## 2019-02-08 DIAGNOSIS — M79.10 MYALGIA: ICD-10-CM

## 2019-02-08 DIAGNOSIS — E04.9 NON-TOXIC NODULAR GOITER: ICD-10-CM

## 2019-02-08 DIAGNOSIS — I10 ESSENTIAL HYPERTENSION, BENIGN: ICD-10-CM

## 2019-02-08 DIAGNOSIS — I26.99 OTHER PULMONARY EMBOLISM WITHOUT ACUTE COR PULMONALE, UNSPECIFIED CHRONICITY (H): ICD-10-CM

## 2019-02-08 LAB
ALBUMIN SERPL-MCNC: 3.6 G/DL (ref 3.5–5)
ALP SERPL-CCNC: 79 U/L (ref 45–120)
ALT SERPL W P-5'-P-CCNC: 19 U/L (ref 0–45)
ANION GAP SERPL CALCULATED.3IONS-SCNC: 10 MMOL/L (ref 5–18)
AST SERPL W P-5'-P-CCNC: 18 U/L (ref 0–40)
BILIRUB SERPL-MCNC: 0.6 MG/DL (ref 0–1)
BUN SERPL-MCNC: 19 MG/DL (ref 8–28)
CALCIUM SERPL-MCNC: 9.4 MG/DL (ref 8.5–10.5)
CHLORIDE BLD-SCNC: 106 MMOL/L (ref 98–107)
CHOLEST SERPL-MCNC: 191 MG/DL
CO2 SERPL-SCNC: 25 MMOL/L (ref 22–31)
CREAT SERPL-MCNC: 0.75 MG/DL (ref 0.6–1.1)
GFR SERPL CREATININE-BSD FRML MDRD: >60 ML/MIN/1.73M2
GLUCOSE BLD-MCNC: 134 MG/DL (ref 70–125)
HBA1C MFR BLD: 7 % (ref 3.5–6)
HDLC SERPL-MCNC: 56 MG/DL
INR PPP: 2.6 (ref 0.9–1.1)
LDLC SERPL CALC-MCNC: 112 MG/DL
LDLC SERPL CALC-MCNC: 83 MG/DL
POTASSIUM BLD-SCNC: 4.1 MMOL/L (ref 3.5–5)
PROT SERPL-MCNC: 6.8 G/DL (ref 6–8)
SODIUM SERPL-SCNC: 141 MMOL/L (ref 136–145)
TRIGL SERPL-MCNC: 261 MG/DL

## 2019-02-28 ENCOUNTER — RECORDS - HEALTHEAST (OUTPATIENT)
Dept: ADMINISTRATIVE | Facility: OTHER | Age: 79
End: 2019-02-28

## 2019-03-01 ENCOUNTER — AMBULATORY - HEALTHEAST (OUTPATIENT)
Dept: LAB | Facility: CLINIC | Age: 79
End: 2019-03-01

## 2019-03-01 ENCOUNTER — COMMUNICATION - HEALTHEAST (OUTPATIENT)
Dept: INTERNAL MEDICINE | Facility: CLINIC | Age: 79
End: 2019-03-01

## 2019-03-01 ENCOUNTER — COMMUNICATION - HEALTHEAST (OUTPATIENT)
Dept: ANTICOAGULATION | Facility: CLINIC | Age: 79
End: 2019-03-01

## 2019-03-01 DIAGNOSIS — I26.99 OTHER PULMONARY EMBOLISM WITHOUT ACUTE COR PULMONALE, UNSPECIFIED CHRONICITY (H): ICD-10-CM

## 2019-03-01 DIAGNOSIS — M25.569 KNEE PAIN: ICD-10-CM

## 2019-03-01 LAB
C REACTIVE PROTEIN LHE: 2.2 MG/DL (ref 0–0.8)
ERYTHROCYTE [DISTWIDTH] IN BLOOD BY AUTOMATED COUNT: 12.6 % (ref 11–14.5)
ERYTHROCYTE [SEDIMENTATION RATE] IN BLOOD BY WESTERGREN METHOD: 14 MM/HR (ref 0–20)
HCT VFR BLD AUTO: 40.9 % (ref 35–47)
HGB BLD-MCNC: 13.8 G/DL (ref 12–16)
INR PPP: 3.3 (ref 0.9–1.1)
MCH RBC QN AUTO: 28.4 PG (ref 27–34)
MCHC RBC AUTO-ENTMCNC: 33.7 G/DL (ref 32–36)
MCV RBC AUTO: 84 FL (ref 80–100)
PLATELET # BLD AUTO: 243 THOU/UL (ref 140–440)
PMV BLD AUTO: 8.5 FL (ref 7–10)
RBC # BLD AUTO: 4.85 MILL/UL (ref 3.8–5.4)
WBC: 11.9 THOU/UL (ref 4–11)

## 2019-03-04 ENCOUNTER — COMMUNICATION - HEALTHEAST (OUTPATIENT)
Dept: INTERNAL MEDICINE | Facility: CLINIC | Age: 79
End: 2019-03-04

## 2019-03-04 ENCOUNTER — RECORDS - HEALTHEAST (OUTPATIENT)
Dept: ADMINISTRATIVE | Facility: OTHER | Age: 79
End: 2019-03-04

## 2019-03-06 ENCOUNTER — OFFICE VISIT - HEALTHEAST (OUTPATIENT)
Dept: INTERNAL MEDICINE | Facility: CLINIC | Age: 79
End: 2019-03-06

## 2019-03-06 DIAGNOSIS — E11.9 TYPE 2 DIABETES MELLITUS WITHOUT COMPLICATION, WITHOUT LONG-TERM CURRENT USE OF INSULIN (H): ICD-10-CM

## 2019-03-06 DIAGNOSIS — R05.9 COUGH: ICD-10-CM

## 2019-03-06 LAB
FLUAV AG SPEC QL IA: NORMAL
FLUBV AG SPEC QL IA: NORMAL

## 2019-03-15 ENCOUNTER — COMMUNICATION - HEALTHEAST (OUTPATIENT)
Dept: LAB | Facility: CLINIC | Age: 79
End: 2019-03-15

## 2019-03-15 DIAGNOSIS — M25.569 KNEE PAIN: ICD-10-CM

## 2019-03-18 ENCOUNTER — COMMUNICATION - HEALTHEAST (OUTPATIENT)
Dept: ANTICOAGULATION | Facility: CLINIC | Age: 79
End: 2019-03-18

## 2019-03-18 ENCOUNTER — AMBULATORY - HEALTHEAST (OUTPATIENT)
Dept: LAB | Facility: CLINIC | Age: 79
End: 2019-03-18

## 2019-03-18 DIAGNOSIS — M25.569 KNEE PAIN: ICD-10-CM

## 2019-03-18 DIAGNOSIS — I26.99 OTHER PULMONARY EMBOLISM WITHOUT ACUTE COR PULMONALE, UNSPECIFIED CHRONICITY (H): ICD-10-CM

## 2019-03-18 LAB
C REACTIVE PROTEIN LHE: 0.4 MG/DL (ref 0–0.8)
ERYTHROCYTE [DISTWIDTH] IN BLOOD BY AUTOMATED COUNT: 12.6 % (ref 11–14.5)
ERYTHROCYTE [SEDIMENTATION RATE] IN BLOOD BY WESTERGREN METHOD: 14 MM/HR (ref 0–20)
HCT VFR BLD AUTO: 41.4 % (ref 35–47)
HGB BLD-MCNC: 13.8 G/DL (ref 12–16)
INR PPP: 3 (ref 0.9–1.1)
MCH RBC QN AUTO: 28.2 PG (ref 27–34)
MCHC RBC AUTO-ENTMCNC: 33.4 G/DL (ref 32–36)
MCV RBC AUTO: 84 FL (ref 80–100)
PLATELET # BLD AUTO: 300 THOU/UL (ref 140–440)
PMV BLD AUTO: 8.1 FL (ref 7–10)
RBC # BLD AUTO: 4.9 MILL/UL (ref 3.8–5.4)
WBC: 10.5 THOU/UL (ref 4–11)

## 2019-03-19 ENCOUNTER — RECORDS - HEALTHEAST (OUTPATIENT)
Dept: ADMINISTRATIVE | Facility: OTHER | Age: 79
End: 2019-03-19

## 2019-04-02 ENCOUNTER — COMMUNICATION - HEALTHEAST (OUTPATIENT)
Dept: ANTICOAGULATION | Facility: CLINIC | Age: 79
End: 2019-04-02

## 2019-04-02 ENCOUNTER — OFFICE VISIT - HEALTHEAST (OUTPATIENT)
Dept: INTERNAL MEDICINE | Facility: CLINIC | Age: 79
End: 2019-04-02

## 2019-04-02 DIAGNOSIS — I26.99 OTHER PULMONARY EMBOLISM WITHOUT ACUTE COR PULMONALE, UNSPECIFIED CHRONICITY (H): ICD-10-CM

## 2019-04-02 DIAGNOSIS — R29.898 WEAKNESS OF RIGHT LOWER EXTREMITY: ICD-10-CM

## 2019-04-02 DIAGNOSIS — I49.3 PVC'S (PREMATURE VENTRICULAR CONTRACTIONS): ICD-10-CM

## 2019-04-02 DIAGNOSIS — E11.9 TYPE 2 DIABETES MELLITUS WITHOUT COMPLICATION, WITHOUT LONG-TERM CURRENT USE OF INSULIN (H): ICD-10-CM

## 2019-04-02 DIAGNOSIS — I10 ESSENTIAL HYPERTENSION, BENIGN: ICD-10-CM

## 2019-04-02 LAB — INR PPP: 3 (ref 0.9–1.1)

## 2019-04-21 ENCOUNTER — COMMUNICATION - HEALTHEAST (OUTPATIENT)
Dept: INTERNAL MEDICINE | Facility: CLINIC | Age: 79
End: 2019-04-21

## 2019-04-21 DIAGNOSIS — E11.9 TYPE 2 DIABETES MELLITUS WITHOUT COMPLICATION, WITHOUT LONG-TERM CURRENT USE OF INSULIN (H): ICD-10-CM

## 2019-04-29 ENCOUNTER — COMMUNICATION - HEALTHEAST (OUTPATIENT)
Dept: INTERNAL MEDICINE | Facility: CLINIC | Age: 79
End: 2019-04-29

## 2019-05-08 ENCOUNTER — COMMUNICATION - HEALTHEAST (OUTPATIENT)
Dept: ANTICOAGULATION | Facility: CLINIC | Age: 79
End: 2019-05-08

## 2019-05-10 ENCOUNTER — OFFICE VISIT - HEALTHEAST (OUTPATIENT)
Dept: INTERNAL MEDICINE | Facility: CLINIC | Age: 79
End: 2019-05-10

## 2019-05-10 ENCOUNTER — RECORDS - HEALTHEAST (OUTPATIENT)
Dept: ANTICOAGULATION | Facility: CLINIC | Age: 79
End: 2019-05-10

## 2019-05-10 ENCOUNTER — COMMUNICATION - HEALTHEAST (OUTPATIENT)
Dept: ANTICOAGULATION | Facility: CLINIC | Age: 79
End: 2019-05-10

## 2019-05-10 DIAGNOSIS — I10 ESSENTIAL HYPERTENSION, BENIGN: ICD-10-CM

## 2019-05-10 DIAGNOSIS — K58.0 IRRITABLE BOWEL SYNDROME WITH DIARRHEA: ICD-10-CM

## 2019-05-10 DIAGNOSIS — I26.99 OTHER PULMONARY EMBOLISM WITHOUT ACUTE COR PULMONALE, UNSPECIFIED CHRONICITY (H): ICD-10-CM

## 2019-05-10 DIAGNOSIS — E11.9 TYPE 2 DIABETES MELLITUS WITHOUT COMPLICATION, WITHOUT LONG-TERM CURRENT USE OF INSULIN (H): ICD-10-CM

## 2019-05-10 LAB
ANION GAP SERPL CALCULATED.3IONS-SCNC: 13 MMOL/L (ref 5–18)
BUN SERPL-MCNC: 16 MG/DL (ref 8–28)
CALCIUM SERPL-MCNC: 10.7 MG/DL (ref 8.5–10.5)
CHLORIDE BLD-SCNC: 105 MMOL/L (ref 98–107)
CO2 SERPL-SCNC: 24 MMOL/L (ref 22–31)
CREAT SERPL-MCNC: 0.75 MG/DL (ref 0.6–1.1)
ERYTHROCYTE [DISTWIDTH] IN BLOOD BY AUTOMATED COUNT: 12.5 % (ref 11–14.5)
GFR SERPL CREATININE-BSD FRML MDRD: >60 ML/MIN/1.73M2
GLUCOSE BLD-MCNC: 114 MG/DL (ref 70–125)
HBA1C MFR BLD: 7 % (ref 3.5–6)
HCT VFR BLD AUTO: 44.3 % (ref 35–47)
HGB BLD-MCNC: 14.7 G/DL (ref 12–16)
INR PPP: 2.9 (ref 0.9–1.1)
MCH RBC QN AUTO: 28.1 PG (ref 27–34)
MCHC RBC AUTO-ENTMCNC: 33.1 G/DL (ref 32–36)
MCV RBC AUTO: 85 FL (ref 80–100)
PLATELET # BLD AUTO: 221 THOU/UL (ref 140–440)
PMV BLD AUTO: 8.7 FL (ref 7–10)
POTASSIUM BLD-SCNC: 4.3 MMOL/L (ref 3.5–5)
RBC # BLD AUTO: 5.22 MILL/UL (ref 3.8–5.4)
SODIUM SERPL-SCNC: 142 MMOL/L (ref 136–145)
WBC: 10.9 THOU/UL (ref 4–11)

## 2019-05-10 ASSESSMENT — MIFFLIN-ST. JEOR: SCORE: 1444.31

## 2019-06-14 ENCOUNTER — COMMUNICATION - HEALTHEAST (OUTPATIENT)
Dept: ANTICOAGULATION | Facility: CLINIC | Age: 79
End: 2019-06-14

## 2019-06-21 ENCOUNTER — AMBULATORY - HEALTHEAST (OUTPATIENT)
Dept: LAB | Facility: CLINIC | Age: 79
End: 2019-06-21

## 2019-06-21 ENCOUNTER — COMMUNICATION - HEALTHEAST (OUTPATIENT)
Dept: ANTICOAGULATION | Facility: CLINIC | Age: 79
End: 2019-06-21

## 2019-06-21 DIAGNOSIS — I26.99 OTHER PULMONARY EMBOLISM WITHOUT ACUTE COR PULMONALE, UNSPECIFIED CHRONICITY (H): ICD-10-CM

## 2019-06-21 LAB — INR PPP: 2.3 (ref 0.9–1.1)

## 2019-06-25 ENCOUNTER — OFFICE VISIT - HEALTHEAST (OUTPATIENT)
Dept: INTERNAL MEDICINE | Facility: CLINIC | Age: 79
End: 2019-06-25

## 2019-06-25 DIAGNOSIS — E78.5 HYPERLIPIDEMIA ASSOCIATED WITH TYPE 2 DIABETES MELLITUS (H): ICD-10-CM

## 2019-06-25 DIAGNOSIS — E11.69 HYPERLIPIDEMIA ASSOCIATED WITH TYPE 2 DIABETES MELLITUS (H): ICD-10-CM

## 2019-06-25 DIAGNOSIS — J45.40 MODERATE PERSISTENT ASTHMA, UNSPECIFIED WHETHER COMPLICATED: ICD-10-CM

## 2019-06-25 DIAGNOSIS — E11.9 TYPE 2 DIABETES MELLITUS WITHOUT COMPLICATION, WITHOUT LONG-TERM CURRENT USE OF INSULIN (H): ICD-10-CM

## 2019-06-25 DIAGNOSIS — I10 ESSENTIAL HYPERTENSION, BENIGN: ICD-10-CM

## 2019-06-25 DIAGNOSIS — I49.3 PVC'S (PREMATURE VENTRICULAR CONTRACTIONS): ICD-10-CM

## 2019-07-30 ENCOUNTER — COMMUNICATION - HEALTHEAST (OUTPATIENT)
Dept: ANTICOAGULATION | Facility: CLINIC | Age: 79
End: 2019-07-30

## 2019-08-07 ENCOUNTER — AMBULATORY - HEALTHEAST (OUTPATIENT)
Dept: INTERNAL MEDICINE | Facility: CLINIC | Age: 79
End: 2019-08-07

## 2019-08-07 ENCOUNTER — COMMUNICATION - HEALTHEAST (OUTPATIENT)
Dept: ANTICOAGULATION | Facility: CLINIC | Age: 79
End: 2019-08-07

## 2019-08-07 ENCOUNTER — AMBULATORY - HEALTHEAST (OUTPATIENT)
Dept: LAB | Facility: CLINIC | Age: 79
End: 2019-08-07

## 2019-08-07 DIAGNOSIS — I26.99 OTHER PULMONARY EMBOLISM WITHOUT ACUTE COR PULMONALE, UNSPECIFIED CHRONICITY (H): ICD-10-CM

## 2019-08-07 LAB — INR PPP: 2.5 (ref 0.9–1.1)

## 2019-08-13 ENCOUNTER — COMMUNICATION - HEALTHEAST (OUTPATIENT)
Dept: ANTICOAGULATION | Facility: CLINIC | Age: 79
End: 2019-08-13

## 2019-09-17 ENCOUNTER — COMMUNICATION - HEALTHEAST (OUTPATIENT)
Dept: ANTICOAGULATION | Facility: CLINIC | Age: 79
End: 2019-09-17

## 2019-09-17 ENCOUNTER — OFFICE VISIT - HEALTHEAST (OUTPATIENT)
Dept: INTERNAL MEDICINE | Facility: CLINIC | Age: 79
End: 2019-09-17

## 2019-09-17 DIAGNOSIS — M89.9 DISORDER OF BONE: ICD-10-CM

## 2019-09-17 DIAGNOSIS — I26.99 OTHER PULMONARY EMBOLISM WITHOUT ACUTE COR PULMONALE, UNSPECIFIED CHRONICITY (H): ICD-10-CM

## 2019-09-17 DIAGNOSIS — E11.9 TYPE 2 DIABETES MELLITUS WITHOUT COMPLICATION, WITHOUT LONG-TERM CURRENT USE OF INSULIN (H): ICD-10-CM

## 2019-09-17 DIAGNOSIS — Z12.31 ENCOUNTER FOR SCREENING MAMMOGRAM FOR MALIGNANT NEOPLASM OF BREAST: ICD-10-CM

## 2019-09-17 DIAGNOSIS — M85.80 LOW BONE MASS: ICD-10-CM

## 2019-09-17 DIAGNOSIS — I10 ESSENTIAL HYPERTENSION, BENIGN: ICD-10-CM

## 2019-09-17 DIAGNOSIS — Z23 FLU VACCINE NEED: ICD-10-CM

## 2019-09-17 DIAGNOSIS — E55.9 VITAMIN D DEFICIENCY: ICD-10-CM

## 2019-09-17 DIAGNOSIS — Z00.00 PREVENTATIVE HEALTH CARE: ICD-10-CM

## 2019-09-17 LAB
ANION GAP SERPL CALCULATED.3IONS-SCNC: 8 MMOL/L (ref 5–18)
BUN SERPL-MCNC: 22 MG/DL (ref 8–28)
CALCIUM SERPL-MCNC: 9.6 MG/DL (ref 8.5–10.5)
CHLORIDE BLD-SCNC: 107 MMOL/L (ref 98–107)
CO2 SERPL-SCNC: 27 MMOL/L (ref 22–31)
CREAT SERPL-MCNC: 0.76 MG/DL (ref 0.6–1.1)
GFR SERPL CREATININE-BSD FRML MDRD: >60 ML/MIN/1.73M2
GLUCOSE BLD-MCNC: 165 MG/DL (ref 70–125)
HBA1C MFR BLD: 7.1 % (ref 3.5–6)
INR PPP: 2.5 (ref 0.9–1.1)
POTASSIUM BLD-SCNC: 4.8 MMOL/L (ref 3.5–5)
SODIUM SERPL-SCNC: 142 MMOL/L (ref 136–145)

## 2019-09-20 ENCOUNTER — RECORDS - HEALTHEAST (OUTPATIENT)
Dept: ADMINISTRATIVE | Facility: OTHER | Age: 79
End: 2019-09-20

## 2019-09-30 ENCOUNTER — RECORDS - HEALTHEAST (OUTPATIENT)
Dept: HEALTH INFORMATION MANAGEMENT | Facility: CLINIC | Age: 79
End: 2019-09-30

## 2019-10-02 ENCOUNTER — RECORDS - HEALTHEAST (OUTPATIENT)
Dept: BONE DENSITY | Facility: CLINIC | Age: 79
End: 2019-10-02

## 2019-10-02 DIAGNOSIS — M85.80 OTHER SPECIFIED DISORDERS OF BONE DENSITY AND STRUCTURE, UNSPECIFIED SITE: ICD-10-CM

## 2019-10-02 DIAGNOSIS — M89.9 DISORDER OF BONE, UNSPECIFIED: ICD-10-CM

## 2019-10-08 ENCOUNTER — RECORDS - HEALTHEAST (OUTPATIENT)
Dept: ADMINISTRATIVE | Facility: OTHER | Age: 79
End: 2019-10-08

## 2019-10-30 ENCOUNTER — COMMUNICATION - HEALTHEAST (OUTPATIENT)
Dept: CARDIOLOGY | Facility: CLINIC | Age: 79
End: 2019-10-30

## 2019-11-01 ENCOUNTER — AMBULATORY - HEALTHEAST (OUTPATIENT)
Dept: LAB | Facility: CLINIC | Age: 79
End: 2019-11-01

## 2019-11-01 ENCOUNTER — COMMUNICATION - HEALTHEAST (OUTPATIENT)
Dept: ANTICOAGULATION | Facility: CLINIC | Age: 79
End: 2019-11-01

## 2019-11-01 DIAGNOSIS — I26.99 OTHER PULMONARY EMBOLISM WITHOUT ACUTE COR PULMONALE, UNSPECIFIED CHRONICITY (H): ICD-10-CM

## 2019-11-01 LAB — INR PPP: 3.3 (ref 0.9–1.1)

## 2019-11-04 ENCOUNTER — AMBULATORY - HEALTHEAST (OUTPATIENT)
Dept: CARDIOLOGY | Facility: CLINIC | Age: 79
End: 2019-11-04

## 2019-11-04 DIAGNOSIS — I49.3 PVC'S (PREMATURE VENTRICULAR CONTRACTIONS): ICD-10-CM

## 2019-11-04 DIAGNOSIS — I49.9 IRREGULAR HEART RATE: ICD-10-CM

## 2019-11-04 DIAGNOSIS — I49.8 BIGEMINY: ICD-10-CM

## 2019-11-04 DIAGNOSIS — Z00.6 RESEARCH EXAM: ICD-10-CM

## 2019-11-04 LAB
ATRIAL RATE - MUSE: 101 BPM
DIASTOLIC BLOOD PRESSURE - MUSE: NORMAL
INTERPRETATION ECG - MUSE: NORMAL
P AXIS - MUSE: 56 DEGREES
PR INTERVAL - MUSE: 160 MS
QRS DURATION - MUSE: 88 MS
QT - MUSE: 380 MS
QTC - MUSE: 492 MS
R AXIS - MUSE: 73 DEGREES
SYSTOLIC BLOOD PRESSURE - MUSE: NORMAL
T AXIS - MUSE: 33 DEGREES
VENTRICULAR RATE- MUSE: 101 BPM

## 2019-11-04 ASSESSMENT — MIFFLIN-ST. JEOR: SCORE: 1473.68

## 2019-11-05 ENCOUNTER — HOSPITAL ENCOUNTER (OUTPATIENT)
Dept: MAMMOGRAPHY | Facility: CLINIC | Age: 79
Discharge: HOME | End: 2019-11-05
Attending: INTERNAL MEDICINE

## 2019-11-05 DIAGNOSIS — Z00.00 PREVENTATIVE HEALTH CARE: ICD-10-CM

## 2019-11-05 DIAGNOSIS — Z12.31 ENCOUNTER FOR SCREENING MAMMOGRAM FOR MALIGNANT NEOPLASM OF BREAST: ICD-10-CM

## 2019-11-07 ENCOUNTER — AMBULATORY - HEALTHEAST (OUTPATIENT)
Dept: CARDIOLOGY | Facility: CLINIC | Age: 79
End: 2019-11-07

## 2019-11-13 ENCOUNTER — COMMUNICATION - HEALTHEAST (OUTPATIENT)
Dept: CARDIOLOGY | Facility: CLINIC | Age: 79
End: 2019-11-13

## 2019-11-13 DIAGNOSIS — I49.3 PVC'S (PREMATURE VENTRICULAR CONTRACTIONS): ICD-10-CM

## 2019-11-18 ENCOUNTER — COMMUNICATION - HEALTHEAST (OUTPATIENT)
Dept: CARDIOLOGY | Facility: CLINIC | Age: 79
End: 2019-11-18

## 2019-11-22 ENCOUNTER — COMMUNICATION - HEALTHEAST (OUTPATIENT)
Dept: ANTICOAGULATION | Facility: CLINIC | Age: 79
End: 2019-11-22

## 2019-11-22 ENCOUNTER — HOSPITAL ENCOUNTER (OUTPATIENT)
Dept: CARDIOLOGY | Facility: CLINIC | Age: 79
Discharge: HOME | End: 2019-11-22
Attending: INTERNAL MEDICINE

## 2019-11-22 DIAGNOSIS — I49.3 PVC'S (PREMATURE VENTRICULAR CONTRACTIONS): ICD-10-CM

## 2019-11-22 LAB
AORTIC ROOT: 3.2 CM
AORTIC VALVE MEAN VELOCITY: 79.9 CM/S
ASCENDING AORTA: 3.3 CM
AV DIMENSIONLESS INDEX VTI: 1
AV MEAN GRADIENT: 3 MMHG
AV PEAK GRADIENT: 9.9 MMHG
AV VALVE AREA: 3 CM2
AV VELOCITY RATIO: 0.5
BSA FOR ECHO PROCEDURE: 2.13 M2
CV BLOOD PRESSURE: ABNORMAL MMHG
CV ECHO HEIGHT: 62.3 IN
CV ECHO WEIGHT: 229 LBS
DOP CALC AO PEAK VEL: 157 CM/S
DOP CALC AO VTI: 16.5 CM
DOP CALC LVOT AREA: 3.14 CM2
DOP CALC LVOT DIAMETER: 2 CM
DOP CALC LVOT PEAK VEL: 79.7 CM/S
DOP CALC LVOT STROKE VOLUME: 49.3 CM3
DOP CALCLVOT PEAK VEL VTI: 15.7 CM
EJECTION FRACTION: 63 % (ref 55–75)
FRACTIONAL SHORTENING: 36.4 % (ref 28–44)
INTERVENTRICULAR SEPTUM IN END DIASTOLE: 1 CM (ref 0.6–0.9)
IVS/PW RATIO: 1
LA AREA 1: 22.2 CM2
LA AREA 2: 18 CM2
LEFT ATRIUM LENGTH: 5.62 CM
LEFT ATRIUM SIZE: 4.6 CM
LEFT ATRIUM TO AORTIC ROOT RATIO: 1.44 NO UNITS
LEFT ATRIUM VOLUME INDEX: 28.4 ML/M2
LEFT ATRIUM VOLUME: 60.4 ML
LEFT VENTRICLE CARDIAC INDEX: 1.5 L/MIN/M2
LEFT VENTRICLE CARDIAC OUTPUT: 3.2 L/MIN
LEFT VENTRICLE DIASTOLIC VOLUME INDEX: 37.6 CM3/M2 (ref 29–61)
LEFT VENTRICLE DIASTOLIC VOLUME: 80 CM3 (ref 46–106)
LEFT VENTRICLE HEART RATE: 65 BPM
LEFT VENTRICLE MASS INDEX: 100.1 G/M2
LEFT VENTRICLE SYSTOLIC VOLUME INDEX: 14.1 CM3/M2 (ref 8–24)
LEFT VENTRICLE SYSTOLIC VOLUME: 30 CM3 (ref 14–42)
LEFT VENTRICULAR INTERNAL DIMENSION IN DIASTOLE: 5.5 CM (ref 3.8–5.2)
LEFT VENTRICULAR INTERNAL DIMENSION IN SYSTOLE: 3.5 CM (ref 2.2–3.5)
LEFT VENTRICULAR MASS: 213.2 G
LEFT VENTRICULAR OUTFLOW TRACT MEAN GRADIENT: 1 MMHG
LEFT VENTRICULAR OUTFLOW TRACT MEAN VELOCITY: 56.6 CM/S
LEFT VENTRICULAR OUTFLOW TRACT PEAK GRADIENT: 3 MMHG
LEFT VENTRICULAR POSTERIOR WALL IN END DIASTOLE: 1 CM (ref 0.6–0.9)
LV STROKE VOLUME INDEX: 23.1 ML/M2
MV E'TISSUE VEL-LAT: 5.85 CM/S
MV E'TISSUE VEL-MED: 4.68 CM/S
NUC REST DIASTOLIC VOLUME INDEX: 3664 LBS
NUC REST SYSTOLIC VOLUME INDEX: 62.25 IN
TRICUSPID REGURGITATION PEAK PRESSURE GRADIENT: 23.6 MMHG
TRICUSPID VALVE ANULAR PLANE SYSTOLIC EXCURSION: 2.2 CM
TRICUSPID VALVE PEAK REGURGITANT VELOCITY: 243 CM/S

## 2019-11-22 ASSESSMENT — MIFFLIN-ST. JEOR: SCORE: 1470.96

## 2019-11-26 ENCOUNTER — COMMUNICATION - HEALTHEAST (OUTPATIENT)
Dept: SCHEDULING | Facility: CLINIC | Age: 79
End: 2019-11-26

## 2019-11-27 ENCOUNTER — COMMUNICATION - HEALTHEAST (OUTPATIENT)
Dept: ANTICOAGULATION | Facility: CLINIC | Age: 79
End: 2019-11-27

## 2019-11-27 DIAGNOSIS — I26.99 OTHER PULMONARY EMBOLISM WITHOUT ACUTE COR PULMONALE, UNSPECIFIED CHRONICITY (H): ICD-10-CM

## 2019-12-02 ENCOUNTER — COMMUNICATION - HEALTHEAST (OUTPATIENT)
Dept: INTERNAL MEDICINE | Facility: CLINIC | Age: 79
End: 2019-12-02

## 2019-12-04 ENCOUNTER — COMMUNICATION - HEALTHEAST (OUTPATIENT)
Dept: ANTICOAGULATION | Facility: CLINIC | Age: 79
End: 2019-12-04

## 2019-12-04 ENCOUNTER — OFFICE VISIT - HEALTHEAST (OUTPATIENT)
Dept: CARDIOLOGY | Facility: CLINIC | Age: 79
End: 2019-12-04

## 2019-12-04 ENCOUNTER — COMMUNICATION - HEALTHEAST (OUTPATIENT)
Dept: INTERNAL MEDICINE | Facility: CLINIC | Age: 79
End: 2019-12-04

## 2019-12-04 ENCOUNTER — OFFICE VISIT - HEALTHEAST (OUTPATIENT)
Dept: INTERNAL MEDICINE | Facility: CLINIC | Age: 79
End: 2019-12-04

## 2019-12-04 DIAGNOSIS — Z51.81 ENCOUNTER FOR MONITORING SOTALOL THERAPY: ICD-10-CM

## 2019-12-04 DIAGNOSIS — I49.3 PVC'S (PREMATURE VENTRICULAR CONTRACTIONS): ICD-10-CM

## 2019-12-04 DIAGNOSIS — I10 ESSENTIAL HYPERTENSION, BENIGN: ICD-10-CM

## 2019-12-04 DIAGNOSIS — E04.9 NON-TOXIC NODULAR GOITER: ICD-10-CM

## 2019-12-04 DIAGNOSIS — I26.99 PULMONARY EMBOLISM (H): ICD-10-CM

## 2019-12-04 DIAGNOSIS — R10.9 FLANK PAIN: ICD-10-CM

## 2019-12-04 DIAGNOSIS — I48.0 PAF (PAROXYSMAL ATRIAL FIBRILLATION) (H): ICD-10-CM

## 2019-12-04 DIAGNOSIS — I20.89 ANGINAL EQUIVALENT (H): ICD-10-CM

## 2019-12-04 DIAGNOSIS — E11.9 TYPE 2 DIABETES MELLITUS WITHOUT COMPLICATION, WITHOUT LONG-TERM CURRENT USE OF INSULIN (H): ICD-10-CM

## 2019-12-04 DIAGNOSIS — Z79.899 ENCOUNTER FOR MONITORING SOTALOL THERAPY: ICD-10-CM

## 2019-12-04 DIAGNOSIS — I26.99 OTHER PULMONARY EMBOLISM WITHOUT ACUTE COR PULMONALE, UNSPECIFIED CHRONICITY (H): ICD-10-CM

## 2019-12-04 LAB
ALBUMIN UR-MCNC: NEGATIVE MG/DL
APPEARANCE UR: CLEAR
BACTERIA #/AREA URNS HPF: ABNORMAL HPF
BILIRUB UR QL STRIP: NEGATIVE
COLOR UR AUTO: YELLOW
ERYTHROCYTE [DISTWIDTH] IN BLOOD BY AUTOMATED COUNT: 13 % (ref 11–14.5)
GLUCOSE UR STRIP-MCNC: NEGATIVE MG/DL
HBA1C MFR BLD: 7.2 % (ref 3.5–6)
HCT VFR BLD AUTO: 43.6 % (ref 35–47)
HGB BLD-MCNC: 14.6 G/DL (ref 12–16)
HGB UR QL STRIP: ABNORMAL
INR PPP: 2.8 (ref 0.9–1.1)
KETONES UR STRIP-MCNC: NEGATIVE MG/DL
LEUKOCYTE ESTERASE UR QL STRIP: ABNORMAL
MCH RBC QN AUTO: 29.3 PG (ref 27–34)
MCHC RBC AUTO-ENTMCNC: 33.5 G/DL (ref 32–36)
MCV RBC AUTO: 87 FL (ref 80–100)
NITRATE UR QL: NEGATIVE
PH UR STRIP: 5.5 [PH] (ref 5–8)
PLATELET # BLD AUTO: 256 THOU/UL (ref 140–440)
PMV BLD AUTO: 8.8 FL (ref 7–10)
RBC # BLD AUTO: 4.99 MILL/UL (ref 3.8–5.4)
RBC #/AREA URNS AUTO: ABNORMAL HPF
SP GR UR STRIP: <=1.005 (ref 1–1.03)
SQUAMOUS #/AREA URNS AUTO: ABNORMAL LPF
TSH SERPL DL<=0.005 MIU/L-ACNC: 0.66 UIU/ML (ref 0.3–5)
UROBILINOGEN UR STRIP-ACNC: ABNORMAL
WBC #/AREA URNS AUTO: ABNORMAL HPF
WBC: 10.8 THOU/UL (ref 4–11)

## 2019-12-04 ASSESSMENT — MIFFLIN-ST. JEOR
SCORE: 1485.47
SCORE: 1482.58

## 2019-12-05 LAB — BACTERIA SPEC CULT: NO GROWTH

## 2019-12-09 ENCOUNTER — AMBULATORY - HEALTHEAST (OUTPATIENT)
Dept: CARDIOLOGY | Facility: CLINIC | Age: 79
End: 2019-12-09

## 2019-12-09 DIAGNOSIS — Z51.81 ENCOUNTER FOR MONITORING SOTALOL THERAPY: ICD-10-CM

## 2019-12-09 DIAGNOSIS — I48.0 PAROXYSMAL ATRIAL FIBRILLATION (H): ICD-10-CM

## 2019-12-09 DIAGNOSIS — I49.3 PVC'S (PREMATURE VENTRICULAR CONTRACTIONS): ICD-10-CM

## 2019-12-09 DIAGNOSIS — Z79.899 ENCOUNTER FOR MONITORING SOTALOL THERAPY: ICD-10-CM

## 2019-12-09 DIAGNOSIS — I48.0 PAF (PAROXYSMAL ATRIAL FIBRILLATION) (H): ICD-10-CM

## 2019-12-09 LAB
ATRIAL RATE - MUSE: 83 BPM
DIASTOLIC BLOOD PRESSURE - MUSE: NORMAL
INTERPRETATION ECG - MUSE: NORMAL
P AXIS - MUSE: 2 DEGREES
PR INTERVAL - MUSE: 170 MS
QRS DURATION - MUSE: 88 MS
QT - MUSE: 380 MS
QTC - MUSE: 446 MS
R AXIS - MUSE: 76 DEGREES
SYSTOLIC BLOOD PRESSURE - MUSE: NORMAL
T AXIS - MUSE: 35 DEGREES
VENTRICULAR RATE- MUSE: 83 BPM

## 2019-12-09 ASSESSMENT — MIFFLIN-ST. JEOR: SCORE: 1479.58

## 2019-12-10 ENCOUNTER — AMBULATORY - HEALTHEAST (OUTPATIENT)
Dept: CARDIOLOGY | Facility: CLINIC | Age: 79
End: 2019-12-10

## 2019-12-10 ENCOUNTER — COMMUNICATION - HEALTHEAST (OUTPATIENT)
Dept: CARDIOLOGY | Facility: CLINIC | Age: 79
End: 2019-12-10

## 2019-12-10 DIAGNOSIS — I49.3 PVC'S (PREMATURE VENTRICULAR CONTRACTIONS): ICD-10-CM

## 2019-12-10 DIAGNOSIS — I48.0 PAF (PAROXYSMAL ATRIAL FIBRILLATION) (H): ICD-10-CM

## 2019-12-13 ENCOUNTER — AMBULATORY - HEALTHEAST (OUTPATIENT)
Dept: CARDIOLOGY | Facility: CLINIC | Age: 79
End: 2019-12-13

## 2019-12-13 DIAGNOSIS — I48.0 PAROXYSMAL ATRIAL FIBRILLATION (H): ICD-10-CM

## 2019-12-13 DIAGNOSIS — I49.3 PVC'S (PREMATURE VENTRICULAR CONTRACTIONS): ICD-10-CM

## 2019-12-13 LAB
ATRIAL RATE - MUSE: 79 BPM
DIASTOLIC BLOOD PRESSURE - MUSE: NORMAL
INTERPRETATION ECG - MUSE: NORMAL
P AXIS - MUSE: 16 DEGREES
PR INTERVAL - MUSE: 154 MS
QRS DURATION - MUSE: 88 MS
QT - MUSE: 406 MS
QTC - MUSE: 465 MS
R AXIS - MUSE: -16 DEGREES
SYSTOLIC BLOOD PRESSURE - MUSE: NORMAL
T AXIS - MUSE: 31 DEGREES
VENTRICULAR RATE- MUSE: 79 BPM

## 2019-12-13 ASSESSMENT — MIFFLIN-ST. JEOR: SCORE: 1475.49

## 2019-12-14 ENCOUNTER — AMBULATORY - HEALTHEAST (OUTPATIENT)
Dept: CARDIOLOGY | Facility: CLINIC | Age: 79
End: 2019-12-14

## 2019-12-16 ENCOUNTER — COMMUNICATION - HEALTHEAST (OUTPATIENT)
Dept: INTERNAL MEDICINE | Facility: CLINIC | Age: 79
End: 2019-12-16

## 2019-12-16 ENCOUNTER — AMBULATORY - HEALTHEAST (OUTPATIENT)
Dept: CARDIOLOGY | Facility: CLINIC | Age: 79
End: 2019-12-16

## 2019-12-16 ENCOUNTER — COMMUNICATION - HEALTHEAST (OUTPATIENT)
Dept: CARDIOLOGY | Facility: CLINIC | Age: 79
End: 2019-12-16

## 2019-12-16 DIAGNOSIS — J45.20 ASTHMA IN ADULT, MILD INTERMITTENT, UNCOMPLICATED: ICD-10-CM

## 2019-12-16 DIAGNOSIS — J30.89 NON-SEASONAL ALLERGIC RHINITIS, UNSPECIFIED TRIGGER: ICD-10-CM

## 2019-12-16 DIAGNOSIS — E78.00 HYPERCHOLESTEROLEMIA: ICD-10-CM

## 2019-12-16 DIAGNOSIS — I10 ESSENTIAL HYPERTENSION, BENIGN: ICD-10-CM

## 2019-12-16 DIAGNOSIS — K21.9 ESOPHAGEAL REFLUX: ICD-10-CM

## 2019-12-16 DIAGNOSIS — I48.0 PAF (PAROXYSMAL ATRIAL FIBRILLATION) (H): ICD-10-CM

## 2019-12-16 DIAGNOSIS — N39.0 URINARY TRACT INFECTION WITHOUT HEMATURIA, SITE UNSPECIFIED: ICD-10-CM

## 2019-12-16 DIAGNOSIS — K58.9 IRRITABLE BOWEL SYNDROME, UNSPECIFIED TYPE: ICD-10-CM

## 2019-12-16 DIAGNOSIS — E11.9 TYPE 2 DIABETES MELLITUS WITHOUT COMPLICATION, WITHOUT LONG-TERM CURRENT USE OF INSULIN (H): ICD-10-CM

## 2019-12-16 DIAGNOSIS — I48.20 CHRONIC ATRIAL FIBRILLATION (H): ICD-10-CM

## 2019-12-16 DIAGNOSIS — I49.3 PVC'S (PREMATURE VENTRICULAR CONTRACTIONS): ICD-10-CM

## 2019-12-18 ENCOUNTER — SURGERY - HEALTHEAST (OUTPATIENT)
Dept: CARDIOLOGY | Facility: CLINIC | Age: 79
End: 2019-12-18

## 2019-12-19 ENCOUNTER — AMBULATORY - HEALTHEAST (OUTPATIENT)
Dept: CARDIOLOGY | Facility: CLINIC | Age: 79
End: 2019-12-19

## 2019-12-20 ENCOUNTER — COMMUNICATION - HEALTHEAST (OUTPATIENT)
Dept: INTERNAL MEDICINE | Facility: CLINIC | Age: 79
End: 2019-12-20

## 2019-12-20 DIAGNOSIS — J45.40 MODERATE PERSISTENT ASTHMA, UNSPECIFIED WHETHER COMPLICATED: ICD-10-CM

## 2019-12-26 ENCOUNTER — COMMUNICATION - HEALTHEAST (OUTPATIENT)
Dept: ANTICOAGULATION | Facility: CLINIC | Age: 79
End: 2019-12-26

## 2019-12-26 DIAGNOSIS — I26.99 PULMONARY EMBOLISM (H): ICD-10-CM

## 2019-12-30 ENCOUNTER — COMMUNICATION - HEALTHEAST (OUTPATIENT)
Dept: INTERNAL MEDICINE | Facility: CLINIC | Age: 79
End: 2019-12-30

## 2019-12-30 ENCOUNTER — COMMUNICATION - HEALTHEAST (OUTPATIENT)
Dept: CARDIOLOGY | Facility: CLINIC | Age: 79
End: 2019-12-30

## 2019-12-30 DIAGNOSIS — E11.9 TYPE 2 DIABETES MELLITUS WITHOUT COMPLICATION, WITHOUT LONG-TERM CURRENT USE OF INSULIN (H): ICD-10-CM

## 2019-12-30 DIAGNOSIS — I49.3 PVC'S (PREMATURE VENTRICULAR CONTRACTIONS): ICD-10-CM

## 2019-12-30 DIAGNOSIS — I48.0 PAF (PAROXYSMAL ATRIAL FIBRILLATION) (H): ICD-10-CM

## 2020-01-07 ENCOUNTER — COMMUNICATION - HEALTHEAST (OUTPATIENT)
Dept: ANTICOAGULATION | Facility: CLINIC | Age: 80
End: 2020-01-07

## 2020-01-08 ENCOUNTER — COMMUNICATION - HEALTHEAST (OUTPATIENT)
Dept: CARDIOLOGY | Facility: CLINIC | Age: 80
End: 2020-01-08

## 2020-01-08 ENCOUNTER — COMMUNICATION - HEALTHEAST (OUTPATIENT)
Dept: ANTICOAGULATION | Facility: CLINIC | Age: 80
End: 2020-01-08

## 2020-01-08 ENCOUNTER — AMBULATORY - HEALTHEAST (OUTPATIENT)
Dept: LAB | Facility: CLINIC | Age: 80
End: 2020-01-08

## 2020-01-08 DIAGNOSIS — I26.99 PULMONARY EMBOLISM (H): ICD-10-CM

## 2020-01-08 DIAGNOSIS — I26.99 OTHER PULMONARY EMBOLISM WITHOUT ACUTE COR PULMONALE, UNSPECIFIED CHRONICITY (H): ICD-10-CM

## 2020-01-08 LAB — INR PPP: 2.7 (ref 0.9–1.1)

## 2020-01-09 ENCOUNTER — RECORDS - HEALTHEAST (OUTPATIENT)
Dept: CARDIOLOGY | Facility: CLINIC | Age: 80
End: 2020-01-09

## 2020-01-10 ENCOUNTER — COMMUNICATION - HEALTHEAST (OUTPATIENT)
Dept: INTERNAL MEDICINE | Facility: CLINIC | Age: 80
End: 2020-01-10

## 2020-01-10 DIAGNOSIS — E11.9 TYPE 2 DIABETES MELLITUS WITHOUT COMPLICATION, WITHOUT LONG-TERM CURRENT USE OF INSULIN (H): ICD-10-CM

## 2020-01-14 ENCOUNTER — HOSPITAL ENCOUNTER (OUTPATIENT)
Dept: MRI IMAGING | Facility: HOSPITAL | Age: 80
Discharge: HOME | End: 2020-01-14
Attending: INTERNAL MEDICINE

## 2020-01-14 DIAGNOSIS — I20.89 ANGINAL EQUIVALENT (H): ICD-10-CM

## 2020-01-14 DIAGNOSIS — I49.3 PVC'S (PREMATURE VENTRICULAR CONTRACTIONS): ICD-10-CM

## 2020-01-14 DIAGNOSIS — I48.0 PAF (PAROXYSMAL ATRIAL FIBRILLATION) (H): ICD-10-CM

## 2020-01-14 LAB
ATRIAL RATE - MUSE: 77 BPM
ATRIAL RATE - MUSE: 83 BPM
CREAT BLD-MCNC: 0.8 MG/DL (ref 0.6–1.1)
DIASTOLIC BLOOD PRESSURE - MUSE: NORMAL
DIASTOLIC BLOOD PRESSURE - MUSE: NORMAL
GFR SERPL CREATININE-BSD FRML MDRD: >60 ML/MIN/1.73M2
INTERPRETATION ECG - MUSE: NORMAL
INTERPRETATION ECG - MUSE: NORMAL
MR HEIGHT: 1.58 M
MR WEIGHT: 104.3 KG
P AXIS - MUSE: 35 DEGREES
P AXIS - MUSE: 9 DEGREES
PR INTERVAL - MUSE: 150 MS
PR INTERVAL - MUSE: 160 MS
QRS DURATION - MUSE: 92 MS
QRS DURATION - MUSE: 94 MS
QT - MUSE: 388 MS
QT - MUSE: 414 MS
QTC - MUSE: 455 MS
QTC - MUSE: 468 MS
R AXIS - MUSE: 61 DEGREES
R AXIS - MUSE: 75 DEGREES
SYSTOLIC BLOOD PRESSURE - MUSE: NORMAL
SYSTOLIC BLOOD PRESSURE - MUSE: NORMAL
T AXIS - MUSE: 15 DEGREES
T AXIS - MUSE: 35 DEGREES
VENTRICULAR RATE- MUSE: 77 BPM
VENTRICULAR RATE- MUSE: 83 BPM

## 2020-01-20 ENCOUNTER — COMMUNICATION - HEALTHEAST (OUTPATIENT)
Dept: ANTICOAGULATION | Facility: CLINIC | Age: 80
End: 2020-01-20

## 2020-01-20 ENCOUNTER — OFFICE VISIT - HEALTHEAST (OUTPATIENT)
Dept: CARDIOLOGY | Facility: CLINIC | Age: 80
End: 2020-01-20

## 2020-01-20 DIAGNOSIS — I26.99 PULMONARY EMBOLISM (H): ICD-10-CM

## 2020-01-20 DIAGNOSIS — I49.3 PVC'S (PREMATURE VENTRICULAR CONTRACTIONS): ICD-10-CM

## 2020-01-20 ASSESSMENT — MIFFLIN-ST. JEOR: SCORE: 1470.96

## 2020-01-27 ENCOUNTER — COMMUNICATION - HEALTHEAST (OUTPATIENT)
Dept: INTERNAL MEDICINE | Facility: CLINIC | Age: 80
End: 2020-01-27

## 2020-01-27 DIAGNOSIS — E11.9 TYPE 2 DIABETES MELLITUS WITHOUT COMPLICATION, WITHOUT LONG-TERM CURRENT USE OF INSULIN (H): ICD-10-CM

## 2020-02-04 ENCOUNTER — COMMUNICATION - HEALTHEAST (OUTPATIENT)
Dept: ANTICOAGULATION | Facility: CLINIC | Age: 80
End: 2020-02-04

## 2020-02-04 ENCOUNTER — OFFICE VISIT - HEALTHEAST (OUTPATIENT)
Dept: INTERNAL MEDICINE | Facility: CLINIC | Age: 80
End: 2020-02-04

## 2020-02-04 DIAGNOSIS — I26.99 PULMONARY EMBOLISM (H): ICD-10-CM

## 2020-02-04 DIAGNOSIS — I10 ESSENTIAL HYPERTENSION, BENIGN: ICD-10-CM

## 2020-02-04 DIAGNOSIS — E78.5 HYPERLIPIDEMIA ASSOCIATED WITH TYPE 2 DIABETES MELLITUS (H): ICD-10-CM

## 2020-02-04 DIAGNOSIS — E11.9 TYPE 2 DIABETES MELLITUS WITHOUT COMPLICATION, WITHOUT LONG-TERM CURRENT USE OF INSULIN (H): ICD-10-CM

## 2020-02-04 DIAGNOSIS — I26.99 OTHER PULMONARY EMBOLISM WITHOUT ACUTE COR PULMONALE, UNSPECIFIED CHRONICITY (H): ICD-10-CM

## 2020-02-04 DIAGNOSIS — I49.3 PVC'S (PREMATURE VENTRICULAR CONTRACTIONS): ICD-10-CM

## 2020-02-04 DIAGNOSIS — E11.69 HYPERLIPIDEMIA ASSOCIATED WITH TYPE 2 DIABETES MELLITUS (H): ICD-10-CM

## 2020-02-04 LAB — INR PPP: 3.3 (ref 0.9–1.1)

## 2020-02-05 ENCOUNTER — COMMUNICATION - HEALTHEAST (OUTPATIENT)
Dept: INTERNAL MEDICINE | Facility: CLINIC | Age: 80
End: 2020-02-05

## 2020-02-05 DIAGNOSIS — I48.0 PAF (PAROXYSMAL ATRIAL FIBRILLATION) (H): ICD-10-CM

## 2020-02-05 DIAGNOSIS — J45.20 ASTHMA IN ADULT, MILD INTERMITTENT, UNCOMPLICATED: ICD-10-CM

## 2020-02-05 DIAGNOSIS — N39.0 URINARY TRACT INFECTION WITHOUT HEMATURIA, SITE UNSPECIFIED: ICD-10-CM

## 2020-02-05 DIAGNOSIS — E78.00 HYPERCHOLESTEROLEMIA: ICD-10-CM

## 2020-02-05 DIAGNOSIS — K58.9 IRRITABLE BOWEL SYNDROME, UNSPECIFIED TYPE: ICD-10-CM

## 2020-02-05 DIAGNOSIS — E11.9 TYPE 2 DIABETES MELLITUS WITHOUT COMPLICATION, WITHOUT LONG-TERM CURRENT USE OF INSULIN (H): ICD-10-CM

## 2020-02-05 DIAGNOSIS — J30.89 NON-SEASONAL ALLERGIC RHINITIS, UNSPECIFIED TRIGGER: ICD-10-CM

## 2020-02-05 DIAGNOSIS — I10 ESSENTIAL HYPERTENSION, BENIGN: ICD-10-CM

## 2020-02-05 DIAGNOSIS — K21.9 ESOPHAGEAL REFLUX: ICD-10-CM

## 2020-02-05 DIAGNOSIS — I49.3 PVC'S (PREMATURE VENTRICULAR CONTRACTIONS): ICD-10-CM

## 2020-02-07 ENCOUNTER — COMMUNICATION - HEALTHEAST (OUTPATIENT)
Dept: INTERNAL MEDICINE | Facility: CLINIC | Age: 80
End: 2020-02-07

## 2020-02-26 ENCOUNTER — COMMUNICATION - HEALTHEAST (OUTPATIENT)
Dept: ANTICOAGULATION | Facility: CLINIC | Age: 80
End: 2020-02-26

## 2020-03-03 ENCOUNTER — OFFICE VISIT - HEALTHEAST (OUTPATIENT)
Dept: INTERNAL MEDICINE | Facility: CLINIC | Age: 80
End: 2020-03-03

## 2020-03-03 ENCOUNTER — COMMUNICATION - HEALTHEAST (OUTPATIENT)
Dept: ANTICOAGULATION | Facility: CLINIC | Age: 80
End: 2020-03-03

## 2020-03-03 DIAGNOSIS — E04.9 GOITER: ICD-10-CM

## 2020-03-03 DIAGNOSIS — I26.99 OTHER PULMONARY EMBOLISM WITHOUT ACUTE COR PULMONALE, UNSPECIFIED CHRONICITY (H): ICD-10-CM

## 2020-03-03 DIAGNOSIS — K58.0 IRRITABLE BOWEL SYNDROME WITH DIARRHEA: ICD-10-CM

## 2020-03-03 DIAGNOSIS — I26.99 PULMONARY EMBOLISM (H): ICD-10-CM

## 2020-03-03 DIAGNOSIS — E11.9 TYPE 2 DIABETES MELLITUS WITHOUT COMPLICATION, WITHOUT LONG-TERM CURRENT USE OF INSULIN (H): ICD-10-CM

## 2020-03-03 DIAGNOSIS — I48.0 PAROXYSMAL ATRIAL FIBRILLATION (H): ICD-10-CM

## 2020-03-03 LAB
ANION GAP SERPL CALCULATED.3IONS-SCNC: 10 MMOL/L (ref 5–18)
BUN SERPL-MCNC: 18 MG/DL (ref 8–28)
CALCIUM SERPL-MCNC: 9.3 MG/DL (ref 8.5–10.5)
CHLORIDE BLD-SCNC: 104 MMOL/L (ref 98–107)
CO2 SERPL-SCNC: 26 MMOL/L (ref 22–31)
CREAT SERPL-MCNC: 0.71 MG/DL (ref 0.6–1.1)
ERYTHROCYTE [DISTWIDTH] IN BLOOD BY AUTOMATED COUNT: 12.1 % (ref 11–14.5)
GFR SERPL CREATININE-BSD FRML MDRD: >60 ML/MIN/1.73M2
GLUCOSE BLD-MCNC: 94 MG/DL (ref 70–125)
HBA1C MFR BLD: 7.3 % (ref 3.5–6)
HCT VFR BLD AUTO: 44.3 % (ref 35–47)
HGB BLD-MCNC: 14.9 G/DL (ref 12–16)
INR PPP: 4 (ref 0.9–1.1)
MCH RBC QN AUTO: 29.9 PG (ref 27–34)
MCHC RBC AUTO-ENTMCNC: 33.6 G/DL (ref 32–36)
MCV RBC AUTO: 89 FL (ref 80–100)
PLATELET # BLD AUTO: 222 THOU/UL (ref 140–440)
PMV BLD AUTO: 8.6 FL (ref 7–10)
POTASSIUM BLD-SCNC: 4.6 MMOL/L (ref 3.5–5)
RBC # BLD AUTO: 4.97 MILL/UL (ref 3.8–5.4)
SODIUM SERPL-SCNC: 140 MMOL/L (ref 136–145)
WBC: 10 THOU/UL (ref 4–11)

## 2020-03-03 RX ORDER — METRONIDAZOLE 7.5 MG/G
1 GEL TOPICAL PRN
Status: SHIPPED | COMMUNITY
Start: 2020-03-03

## 2020-03-05 ENCOUNTER — HOSPITAL ENCOUNTER (OUTPATIENT)
Dept: ULTRASOUND IMAGING | Facility: CLINIC | Age: 80
Discharge: HOME | End: 2020-03-05
Attending: INTERNAL MEDICINE

## 2020-03-05 DIAGNOSIS — E04.9 GOITER: ICD-10-CM

## 2020-03-06 ENCOUNTER — COMMUNICATION - HEALTHEAST (OUTPATIENT)
Dept: ADMINISTRATIVE | Facility: CLINIC | Age: 80
End: 2020-03-06

## 2020-03-06 ENCOUNTER — COMMUNICATION - HEALTHEAST (OUTPATIENT)
Dept: INTERNAL MEDICINE | Facility: CLINIC | Age: 80
End: 2020-03-06

## 2020-03-11 ENCOUNTER — COMMUNICATION - HEALTHEAST (OUTPATIENT)
Dept: ANTICOAGULATION | Facility: CLINIC | Age: 80
End: 2020-03-11

## 2020-03-13 ENCOUNTER — AMBULATORY - HEALTHEAST (OUTPATIENT)
Dept: LAB | Facility: CLINIC | Age: 80
End: 2020-03-13

## 2020-03-13 ENCOUNTER — COMMUNICATION - HEALTHEAST (OUTPATIENT)
Dept: ANTICOAGULATION | Facility: CLINIC | Age: 80
End: 2020-03-13

## 2020-03-13 DIAGNOSIS — I26.99 PULMONARY EMBOLISM (H): ICD-10-CM

## 2020-03-13 LAB — INR PPP: 3.3 (ref 0.9–1.1)

## 2020-03-20 ENCOUNTER — COMMUNICATION - HEALTHEAST (OUTPATIENT)
Dept: ANTICOAGULATION | Facility: CLINIC | Age: 80
End: 2020-03-20

## 2020-03-20 ENCOUNTER — AMBULATORY - HEALTHEAST (OUTPATIENT)
Dept: LAB | Facility: CLINIC | Age: 80
End: 2020-03-20

## 2020-03-20 DIAGNOSIS — I26.99 PULMONARY EMBOLISM (H): ICD-10-CM

## 2020-03-20 LAB — INR PPP: 4.3 (ref 0.9–1.1)

## 2020-03-27 ENCOUNTER — AMBULATORY - HEALTHEAST (OUTPATIENT)
Dept: LAB | Facility: CLINIC | Age: 80
End: 2020-03-27

## 2020-03-27 ENCOUNTER — COMMUNICATION - HEALTHEAST (OUTPATIENT)
Dept: ANTICOAGULATION | Facility: CLINIC | Age: 80
End: 2020-03-27

## 2020-03-27 DIAGNOSIS — I26.99 PULMONARY EMBOLISM (H): ICD-10-CM

## 2020-03-27 LAB — INR PPP: 1.7 (ref 0.9–1.1)

## 2020-04-03 ENCOUNTER — AMBULATORY - HEALTHEAST (OUTPATIENT)
Dept: LAB | Facility: CLINIC | Age: 80
End: 2020-04-03

## 2020-04-03 ENCOUNTER — COMMUNICATION - HEALTHEAST (OUTPATIENT)
Dept: ANTICOAGULATION | Facility: CLINIC | Age: 80
End: 2020-04-03

## 2020-04-03 DIAGNOSIS — I26.99 PULMONARY EMBOLISM (H): ICD-10-CM

## 2020-04-03 LAB — INR PPP: 2 (ref 0.9–1.1)

## 2020-04-10 ENCOUNTER — COMMUNICATION - HEALTHEAST (OUTPATIENT)
Dept: ANTICOAGULATION | Facility: CLINIC | Age: 80
End: 2020-04-10

## 2020-04-10 ENCOUNTER — AMBULATORY - HEALTHEAST (OUTPATIENT)
Dept: LAB | Facility: CLINIC | Age: 80
End: 2020-04-10

## 2020-04-10 DIAGNOSIS — I26.99 PULMONARY EMBOLISM (H): ICD-10-CM

## 2020-04-10 LAB — INR PPP: 2.1 (ref 0.9–1.1)

## 2020-04-21 ENCOUNTER — AMBULATORY - HEALTHEAST (OUTPATIENT)
Dept: LAB | Facility: CLINIC | Age: 80
End: 2020-04-21

## 2020-04-21 ENCOUNTER — COMMUNICATION - HEALTHEAST (OUTPATIENT)
Dept: ANTICOAGULATION | Facility: CLINIC | Age: 80
End: 2020-04-21

## 2020-04-21 DIAGNOSIS — I26.99 PULMONARY EMBOLISM (H): ICD-10-CM

## 2020-04-21 LAB — INR PPP: 2.5 (ref 0.9–1.1)

## 2020-05-13 ENCOUNTER — COMMUNICATION - HEALTHEAST (OUTPATIENT)
Dept: PHARMACY | Facility: HOSPITAL | Age: 80
End: 2020-05-13

## 2020-05-13 DIAGNOSIS — I49.3 PVC'S (PREMATURE VENTRICULAR CONTRACTIONS): ICD-10-CM

## 2020-05-13 DIAGNOSIS — I48.0 PAF (PAROXYSMAL ATRIAL FIBRILLATION) (H): ICD-10-CM

## 2020-05-21 ENCOUNTER — AMBULATORY - HEALTHEAST (OUTPATIENT)
Dept: LAB | Facility: CLINIC | Age: 80
End: 2020-05-21

## 2020-05-21 ENCOUNTER — COMMUNICATION - HEALTHEAST (OUTPATIENT)
Dept: ANTICOAGULATION | Facility: CLINIC | Age: 80
End: 2020-05-21

## 2020-05-21 DIAGNOSIS — I26.99 PULMONARY EMBOLISM (H): ICD-10-CM

## 2020-05-21 LAB — INR PPP: 2.8 (ref 0.9–1.1)

## 2020-06-08 ENCOUNTER — COMMUNICATION - HEALTHEAST (OUTPATIENT)
Dept: INTERNAL MEDICINE | Facility: CLINIC | Age: 80
End: 2020-06-08

## 2020-06-17 ENCOUNTER — AMBULATORY - HEALTHEAST (OUTPATIENT)
Dept: LAB | Facility: CLINIC | Age: 80
End: 2020-06-17

## 2020-06-17 ENCOUNTER — COMMUNICATION - HEALTHEAST (OUTPATIENT)
Dept: ANTICOAGULATION | Facility: CLINIC | Age: 80
End: 2020-06-17

## 2020-06-17 ENCOUNTER — COMMUNICATION - HEALTHEAST (OUTPATIENT)
Dept: INTERNAL MEDICINE | Facility: CLINIC | Age: 80
End: 2020-06-17

## 2020-06-17 DIAGNOSIS — K21.9 ESOPHAGEAL REFLUX: ICD-10-CM

## 2020-06-17 DIAGNOSIS — I10 ESSENTIAL HYPERTENSION, BENIGN: ICD-10-CM

## 2020-06-17 DIAGNOSIS — J30.89 NON-SEASONAL ALLERGIC RHINITIS, UNSPECIFIED TRIGGER: ICD-10-CM

## 2020-06-17 DIAGNOSIS — I48.0 PAF (PAROXYSMAL ATRIAL FIBRILLATION) (H): ICD-10-CM

## 2020-06-17 DIAGNOSIS — J45.40 MODERATE PERSISTENT ASTHMA, UNSPECIFIED WHETHER COMPLICATED: ICD-10-CM

## 2020-06-17 DIAGNOSIS — E11.9 TYPE 2 DIABETES MELLITUS WITHOUT COMPLICATION, WITHOUT LONG-TERM CURRENT USE OF INSULIN (H): ICD-10-CM

## 2020-06-17 DIAGNOSIS — K58.9 IRRITABLE BOWEL SYNDROME, UNSPECIFIED TYPE: ICD-10-CM

## 2020-06-17 DIAGNOSIS — I26.99 PULMONARY EMBOLISM (H): ICD-10-CM

## 2020-06-17 DIAGNOSIS — I49.3 PVC'S (PREMATURE VENTRICULAR CONTRACTIONS): ICD-10-CM

## 2020-06-17 DIAGNOSIS — J45.20 ASTHMA IN ADULT, MILD INTERMITTENT, UNCOMPLICATED: ICD-10-CM

## 2020-06-17 DIAGNOSIS — N39.0 URINARY TRACT INFECTION WITHOUT HEMATURIA, SITE UNSPECIFIED: ICD-10-CM

## 2020-06-17 DIAGNOSIS — E78.00 HYPERCHOLESTEROLEMIA: ICD-10-CM

## 2020-06-17 DIAGNOSIS — Z51.81 MEDICATION MONITORING ENCOUNTER: ICD-10-CM

## 2020-06-17 LAB
HBA1C MFR BLD: 6.9 % (ref 3.5–6)
INR PPP: 3.9 (ref 0.9–1.1)

## 2020-06-18 ENCOUNTER — OFFICE VISIT - HEALTHEAST (OUTPATIENT)
Dept: INTERNAL MEDICINE | Facility: CLINIC | Age: 80
End: 2020-06-18

## 2020-06-18 DIAGNOSIS — E04.1 THYROID NODULE: ICD-10-CM

## 2020-06-18 DIAGNOSIS — K58.9 IRRITABLE BOWEL SYNDROME, UNSPECIFIED TYPE: ICD-10-CM

## 2020-06-18 DIAGNOSIS — E11.9 TYPE 2 DIABETES MELLITUS WITHOUT COMPLICATION, WITHOUT LONG-TERM CURRENT USE OF INSULIN (H): ICD-10-CM

## 2020-06-18 DIAGNOSIS — K31.84 GASTROPARESIS: ICD-10-CM

## 2020-06-29 ENCOUNTER — COMMUNICATION - HEALTHEAST (OUTPATIENT)
Dept: ANTICOAGULATION | Facility: CLINIC | Age: 80
End: 2020-06-29

## 2020-06-29 ENCOUNTER — AMBULATORY - HEALTHEAST (OUTPATIENT)
Dept: LAB | Facility: CLINIC | Age: 80
End: 2020-06-29

## 2020-06-29 DIAGNOSIS — I26.99 PULMONARY EMBOLISM (H): ICD-10-CM

## 2020-06-29 LAB — INR PPP: 3.9 (ref 0.9–1.1)

## 2020-07-13 ENCOUNTER — COMMUNICATION - HEALTHEAST (OUTPATIENT)
Dept: ANTICOAGULATION | Facility: CLINIC | Age: 80
End: 2020-07-13

## 2020-07-13 ENCOUNTER — AMBULATORY - HEALTHEAST (OUTPATIENT)
Dept: LAB | Facility: CLINIC | Age: 80
End: 2020-07-13

## 2020-07-13 DIAGNOSIS — I26.99 PULMONARY EMBOLISM (H): ICD-10-CM

## 2020-07-13 LAB — INR PPP: 2 (ref 0.9–1.1)

## 2020-07-20 ENCOUNTER — HOSPITAL ENCOUNTER (OUTPATIENT)
Dept: CARDIOLOGY | Facility: CLINIC | Age: 80
Discharge: HOME | End: 2020-07-20
Attending: INTERNAL MEDICINE

## 2020-07-20 ENCOUNTER — COMMUNICATION - HEALTHEAST (OUTPATIENT)
Dept: ANTICOAGULATION | Facility: CLINIC | Age: 80
End: 2020-07-20

## 2020-07-20 ENCOUNTER — AMBULATORY - HEALTHEAST (OUTPATIENT)
Dept: LAB | Facility: CLINIC | Age: 80
End: 2020-07-20

## 2020-07-20 DIAGNOSIS — I26.99 PULMONARY EMBOLISM (H): ICD-10-CM

## 2020-07-20 DIAGNOSIS — I49.3 PVC'S (PREMATURE VENTRICULAR CONTRACTIONS): ICD-10-CM

## 2020-07-20 LAB
AORTIC ROOT: 3.3 CM
BSA FOR ECHO PROCEDURE: 2.13 M2
CV BLOOD PRESSURE: ABNORMAL MMHG
CV ECHO HEIGHT: 62.3 IN
CV ECHO WEIGHT: 228 LBS
EJECTION FRACTION: 57 % (ref 55–75)
FRACTIONAL SHORTENING: 21.7 % (ref 28–44)
INR PPP: 2.5 (ref 0.9–1.1)
INTERVENTRICULAR SEPTUM IN END DIASTOLE: 1.2 CM (ref 0.6–0.9)
IVS/PW RATIO: 1
LA AREA 1: 23.6 CM2
LA AREA 2: 18.5 CM2
LEFT ATRIUM LENGTH: 5 CM
LEFT ATRIUM SIZE: 4 CM
LEFT ATRIUM TO AORTIC ROOT RATIO: 1.21 NO UNITS
LEFT ATRIUM VOLUME INDEX: 34.8 ML/M2
LEFT ATRIUM VOLUME: 74.2 ML
LEFT VENTRICLE DIASTOLIC VOLUME INDEX: 38 CM3/M2 (ref 29–61)
LEFT VENTRICLE DIASTOLIC VOLUME: 81 CM3 (ref 46–106)
LEFT VENTRICLE HEART RATE: 70 BPM
LEFT VENTRICLE MASS INDEX: 96.2 G/M2
LEFT VENTRICLE SYSTOLIC VOLUME INDEX: 16.4 CM3/M2 (ref 8–24)
LEFT VENTRICLE SYSTOLIC VOLUME: 35 CM3 (ref 14–42)
LEFT VENTRICULAR INTERNAL DIMENSION IN DIASTOLE: 4.6 CM (ref 3.8–5.2)
LEFT VENTRICULAR INTERNAL DIMENSION IN SYSTOLE: 3.6 CM (ref 2.2–3.5)
LEFT VENTRICULAR MASS: 205 G
LEFT VENTRICULAR POSTERIOR WALL IN END DIASTOLE: 1.2 CM (ref 0.6–0.9)
NUC REST DIASTOLIC VOLUME INDEX: 3648 LBS
NUC REST SYSTOLIC VOLUME INDEX: 62.25 IN
TRICUSPID VALVE ANULAR PLANE SYSTOLIC EXCURSION: 2.5 CM

## 2020-07-20 ASSESSMENT — MIFFLIN-ST. JEOR: SCORE: 1466.42

## 2020-08-04 ENCOUNTER — COMMUNICATION - HEALTHEAST (OUTPATIENT)
Dept: PHARMACY | Facility: HOSPITAL | Age: 80
End: 2020-08-04

## 2020-08-04 DIAGNOSIS — E78.00 HYPERCHOLESTEROLEMIA: ICD-10-CM

## 2020-08-04 DIAGNOSIS — Z51.81 MEDICATION MONITORING ENCOUNTER: ICD-10-CM

## 2020-08-05 ENCOUNTER — COMMUNICATION - HEALTHEAST (OUTPATIENT)
Dept: PHARMACY | Facility: HOSPITAL | Age: 80
End: 2020-08-05

## 2020-08-05 DIAGNOSIS — E11.9 TYPE 2 DIABETES MELLITUS WITHOUT COMPLICATION, WITHOUT LONG-TERM CURRENT USE OF INSULIN (H): ICD-10-CM

## 2020-08-05 DIAGNOSIS — J45.40 MODERATE PERSISTENT ASTHMA, UNSPECIFIED WHETHER COMPLICATED: ICD-10-CM

## 2020-08-06 ENCOUNTER — AMBULATORY - HEALTHEAST (OUTPATIENT)
Dept: LAB | Facility: CLINIC | Age: 80
End: 2020-08-06

## 2020-08-06 ENCOUNTER — COMMUNICATION - HEALTHEAST (OUTPATIENT)
Dept: ANTICOAGULATION | Facility: CLINIC | Age: 80
End: 2020-08-06

## 2020-08-06 DIAGNOSIS — I26.99 PULMONARY EMBOLISM (H): ICD-10-CM

## 2020-08-06 LAB — INR PPP: 2.6 (ref 0.9–1.1)

## 2020-08-19 ENCOUNTER — COMMUNICATION - HEALTHEAST (OUTPATIENT)
Dept: CARDIOLOGY | Facility: CLINIC | Age: 80
End: 2020-08-19

## 2020-08-20 ENCOUNTER — OFFICE VISIT - HEALTHEAST (OUTPATIENT)
Dept: CARDIOLOGY | Facility: CLINIC | Age: 80
End: 2020-08-20

## 2020-08-20 DIAGNOSIS — I49.3 PVC'S (PREMATURE VENTRICULAR CONTRACTIONS): ICD-10-CM

## 2020-08-20 ASSESSMENT — MIFFLIN-ST. JEOR: SCORE: 1451.45

## 2020-09-11 ENCOUNTER — AMBULATORY - HEALTHEAST (OUTPATIENT)
Dept: LAB | Facility: CLINIC | Age: 80
End: 2020-09-11

## 2020-09-11 ENCOUNTER — COMMUNICATION - HEALTHEAST (OUTPATIENT)
Dept: ANTICOAGULATION | Facility: CLINIC | Age: 80
End: 2020-09-11

## 2020-09-11 DIAGNOSIS — I26.99 PULMONARY EMBOLISM (H): ICD-10-CM

## 2020-09-11 LAB — INR PPP: 2.4 (ref 0.9–1.1)

## 2020-10-01 ENCOUNTER — RECORDS - HEALTHEAST (OUTPATIENT)
Dept: ADMINISTRATIVE | Facility: OTHER | Age: 80
End: 2020-10-01

## 2020-10-01 LAB — RETINOPATHY: NORMAL

## 2020-10-02 ENCOUNTER — COMMUNICATION - HEALTHEAST (OUTPATIENT)
Dept: INTERNAL MEDICINE | Facility: CLINIC | Age: 80
End: 2020-10-02

## 2020-10-02 ENCOUNTER — COMMUNICATION - HEALTHEAST (OUTPATIENT)
Dept: ANTICOAGULATION | Facility: CLINIC | Age: 80
End: 2020-10-02

## 2020-10-02 ENCOUNTER — OFFICE VISIT - HEALTHEAST (OUTPATIENT)
Dept: INTERNAL MEDICINE | Facility: CLINIC | Age: 80
End: 2020-10-02

## 2020-10-02 DIAGNOSIS — R06.09 DYSPNEA ON EXERTION: ICD-10-CM

## 2020-10-02 DIAGNOSIS — Z23 ENCOUNTER FOR IMMUNIZATION: ICD-10-CM

## 2020-10-02 DIAGNOSIS — I26.99 PULMONARY EMBOLISM (H): ICD-10-CM

## 2020-10-02 DIAGNOSIS — I26.99 PULMONARY EMBOLISM, OTHER, UNSPECIFIED CHRONICITY, UNSPECIFIED WHETHER ACUTE COR PULMONALE PRESENT (H): ICD-10-CM

## 2020-10-02 DIAGNOSIS — M54.2 NECK PAIN: ICD-10-CM

## 2020-10-02 DIAGNOSIS — R42 VERTIGO: ICD-10-CM

## 2020-10-02 DIAGNOSIS — E55.9 VITAMIN D DEFICIENCY: ICD-10-CM

## 2020-10-02 DIAGNOSIS — R61 NIGHT SWEATS: ICD-10-CM

## 2020-10-02 DIAGNOSIS — E11.9 TYPE 2 DIABETES MELLITUS WITHOUT COMPLICATION, WITHOUT LONG-TERM CURRENT USE OF INSULIN (H): ICD-10-CM

## 2020-10-02 DIAGNOSIS — I49.3 VENTRICULAR ECTOPY: ICD-10-CM

## 2020-10-02 LAB
ALBUMIN SERPL-MCNC: 3.6 G/DL (ref 3.5–5)
ALP SERPL-CCNC: 77 U/L (ref 45–120)
ALT SERPL W P-5'-P-CCNC: 20 U/L (ref 0–45)
ANION GAP SERPL CALCULATED.3IONS-SCNC: 10 MMOL/L (ref 5–18)
AST SERPL W P-5'-P-CCNC: 15 U/L (ref 0–40)
BILIRUB SERPL-MCNC: 0.5 MG/DL (ref 0–1)
BNP SERPL-MCNC: 180 PG/ML (ref 0–147)
BUN SERPL-MCNC: 18 MG/DL (ref 8–28)
CALCIUM SERPL-MCNC: 9.4 MG/DL (ref 8.5–10.5)
CHLORIDE BLD-SCNC: 105 MMOL/L (ref 98–107)
CO2 SERPL-SCNC: 25 MMOL/L (ref 22–31)
CREAT SERPL-MCNC: 0.77 MG/DL (ref 0.6–1.1)
ERYTHROCYTE [DISTWIDTH] IN BLOOD BY AUTOMATED COUNT: 12.5 % (ref 11–14.5)
GFR SERPL CREATININE-BSD FRML MDRD: >60 ML/MIN/1.73M2
GLUCOSE BLD-MCNC: 112 MG/DL (ref 70–125)
HBA1C MFR BLD: 7 %
HCT VFR BLD AUTO: 44 % (ref 35–47)
HGB BLD-MCNC: 14.9 G/DL (ref 12–16)
INR PPP: 3 (ref 0.9–1.1)
LDLC SERPL CALC-MCNC: 97 MG/DL
MCH RBC QN AUTO: 30.5 PG (ref 27–34)
MCHC RBC AUTO-ENTMCNC: 34 G/DL (ref 32–36)
MCV RBC AUTO: 90 FL (ref 80–100)
PLATELET # BLD AUTO: 247 THOU/UL (ref 140–440)
PMV BLD AUTO: 9 FL (ref 7–10)
POTASSIUM BLD-SCNC: 4.3 MMOL/L (ref 3.5–5)
PROT SERPL-MCNC: 6.9 G/DL (ref 6–8)
RBC # BLD AUTO: 4.9 MILL/UL (ref 3.8–5.4)
SODIUM SERPL-SCNC: 140 MMOL/L (ref 136–145)
WBC: 10.9 THOU/UL (ref 4–11)

## 2020-10-05 ENCOUNTER — COMMUNICATION - HEALTHEAST (OUTPATIENT)
Dept: INTERNAL MEDICINE | Facility: CLINIC | Age: 80
End: 2020-10-05

## 2020-10-05 DIAGNOSIS — I49.3 PVC'S (PREMATURE VENTRICULAR CONTRACTIONS): ICD-10-CM

## 2020-10-05 DIAGNOSIS — I10 ESSENTIAL HYPERTENSION, BENIGN: ICD-10-CM

## 2020-10-05 DIAGNOSIS — E78.00 HYPERCHOLESTEROLEMIA: ICD-10-CM

## 2020-10-05 DIAGNOSIS — N95.2 ATROPHIC VAGINITIS: ICD-10-CM

## 2020-10-05 DIAGNOSIS — K21.9 ESOPHAGEAL REFLUX: ICD-10-CM

## 2020-10-05 DIAGNOSIS — K58.9 IRRITABLE BOWEL SYNDROME, UNSPECIFIED TYPE: ICD-10-CM

## 2020-10-05 DIAGNOSIS — J45.40 MODERATE PERSISTENT ASTHMA, UNSPECIFIED WHETHER COMPLICATED: ICD-10-CM

## 2020-10-05 DIAGNOSIS — N39.0 URINARY TRACT INFECTION WITHOUT HEMATURIA, SITE UNSPECIFIED: ICD-10-CM

## 2020-10-05 DIAGNOSIS — Z51.81 MEDICATION MONITORING ENCOUNTER: ICD-10-CM

## 2020-10-05 DIAGNOSIS — J30.89 NON-SEASONAL ALLERGIC RHINITIS, UNSPECIFIED TRIGGER: ICD-10-CM

## 2020-10-05 DIAGNOSIS — I26.99 PULMONARY EMBOLISM (H): ICD-10-CM

## 2020-10-05 DIAGNOSIS — I48.0 PAF (PAROXYSMAL ATRIAL FIBRILLATION) (H): ICD-10-CM

## 2020-10-05 DIAGNOSIS — E11.9 TYPE 2 DIABETES MELLITUS WITHOUT COMPLICATION, WITHOUT LONG-TERM CURRENT USE OF INSULIN (H): ICD-10-CM

## 2020-10-05 DIAGNOSIS — J45.20 ASTHMA IN ADULT, MILD INTERMITTENT, UNCOMPLICATED: ICD-10-CM

## 2020-10-05 RX ORDER — MONTELUKAST SODIUM 10 MG/1
10 TABLET ORAL DAILY
Qty: 90 TABLET | Refills: 3 | Status: SHIPPED | OUTPATIENT
Start: 2020-10-05 | End: 2021-11-23

## 2020-10-05 RX ORDER — LANCING DEVICE
EACH MISCELLANEOUS
Qty: 1 EACH | Refills: 3 | Status: SHIPPED | OUTPATIENT
Start: 2020-10-05 | End: 2022-12-06

## 2020-10-05 RX ORDER — DICYCLOMINE HYDROCHLORIDE 10 MG/1
10 CAPSULE ORAL 2 TIMES DAILY PRN
Qty: 90 CAPSULE | Refills: 3 | Status: SHIPPED | OUTPATIENT
Start: 2020-10-05 | End: 2022-01-18

## 2020-10-05 RX ORDER — SOTALOL HYDROCHLORIDE 80 MG/1
40 TABLET ORAL 2 TIMES DAILY
Qty: 90 TABLET | Refills: 3 | Status: SHIPPED | OUTPATIENT
Start: 2020-10-05 | End: 2021-11-22

## 2020-10-05 RX ORDER — BECLOMETHASONE DIPROPIONATE HFA 40 UG/1
2 AEROSOL, METERED RESPIRATORY (INHALATION) 2 TIMES DAILY
Qty: 31.8 G | Refills: 3 | Status: SHIPPED | OUTPATIENT
Start: 2020-10-05 | End: 2021-11-23

## 2020-10-05 RX ORDER — ATORVASTATIN CALCIUM 80 MG/1
80 TABLET, FILM COATED ORAL AT BEDTIME
Qty: 90 TABLET | Refills: 3 | Status: SHIPPED | OUTPATIENT
Start: 2020-10-05 | End: 2021-10-24

## 2020-10-05 RX ORDER — ALBUTEROL SULFATE 90 UG/1
AEROSOL, METERED RESPIRATORY (INHALATION)
Qty: 25.5 G | Refills: 3 | Status: SHIPPED | OUTPATIENT
Start: 2020-10-05 | End: 2021-11-23

## 2020-10-05 RX ORDER — METFORMIN HCL 500 MG
1000 TABLET, EXTENDED RELEASE 24 HR ORAL 2 TIMES DAILY
Qty: 270 TABLET | Refills: 4 | Status: SHIPPED | OUTPATIENT
Start: 2020-10-05 | End: 2021-08-04

## 2020-10-05 RX ORDER — AMLODIPINE BESYLATE 5 MG/1
5 TABLET ORAL DAILY
Qty: 90 TABLET | Refills: 3 | Status: SHIPPED | OUTPATIENT
Start: 2020-10-05 | End: 2021-11-22

## 2020-10-05 RX ORDER — ESTRADIOL 0.1 MG/G
2 CREAM VAGINAL EVERY OTHER DAY
Qty: 42.5 G | Refills: 0 | Status: SHIPPED | OUTPATIENT
Start: 2020-10-05 | End: 2021-11-23

## 2020-10-05 RX ORDER — LOSARTAN POTASSIUM 100 MG/1
100 TABLET ORAL DAILY
Qty: 90 TABLET | Refills: 3 | Status: SHIPPED | OUTPATIENT
Start: 2020-10-05 | End: 2021-10-24

## 2020-10-05 RX ORDER — CLINDAMYCIN HCL 150 MG
CAPSULE ORAL
Qty: 30 CAPSULE | Refills: 3 | Status: SHIPPED | OUTPATIENT
Start: 2020-10-05 | End: 2022-06-01

## 2020-10-05 RX ORDER — MONTELUKAST SODIUM 4 MG/1
1 TABLET, CHEWABLE ORAL 2 TIMES DAILY
Qty: 180 TABLET | Refills: 3 | Status: SHIPPED | OUTPATIENT
Start: 2020-10-05 | End: 2022-01-28

## 2020-10-08 ENCOUNTER — RECORDS - HEALTHEAST (OUTPATIENT)
Dept: HEALTH INFORMATION MANAGEMENT | Facility: CLINIC | Age: 80
End: 2020-10-08

## 2020-10-09 ENCOUNTER — OFFICE VISIT - HEALTHEAST (OUTPATIENT)
Dept: OCCUPATIONAL THERAPY | Facility: REHABILITATION | Age: 80
End: 2020-10-09

## 2020-10-09 DIAGNOSIS — M54.2 NECK PAIN: ICD-10-CM

## 2020-10-09 DIAGNOSIS — H81.12 BENIGN PAROXYSMAL POSITIONAL VERTIGO, LEFT: ICD-10-CM

## 2020-10-09 DIAGNOSIS — R26.81 UNSTEADINESS ON FEET: ICD-10-CM

## 2020-10-09 DIAGNOSIS — Z78.9 DECREASED ACTIVITIES OF DAILY LIVING (ADL): ICD-10-CM

## 2020-10-13 ENCOUNTER — COMMUNICATION - HEALTHEAST (OUTPATIENT)
Dept: INTERNAL MEDICINE | Facility: CLINIC | Age: 80
End: 2020-10-13

## 2020-10-21 ENCOUNTER — OFFICE VISIT - HEALTHEAST (OUTPATIENT)
Dept: PHYSICAL THERAPY | Facility: REHABILITATION | Age: 80
End: 2020-10-21

## 2020-10-21 DIAGNOSIS — R29.898 DECREASED ROM OF NECK: ICD-10-CM

## 2020-10-21 DIAGNOSIS — H81.12 BPPV (BENIGN PAROXYSMAL POSITIONAL VERTIGO), LEFT: ICD-10-CM

## 2020-10-21 DIAGNOSIS — M54.2 ACUTE NECK PAIN: ICD-10-CM

## 2020-10-21 DIAGNOSIS — R29.3 ABNORMAL POSTURE: ICD-10-CM

## 2020-10-27 ENCOUNTER — OFFICE VISIT - HEALTHEAST (OUTPATIENT)
Dept: PHYSICAL THERAPY | Facility: REHABILITATION | Age: 80
End: 2020-10-27

## 2020-10-27 DIAGNOSIS — R29.3 ABNORMAL POSTURE: ICD-10-CM

## 2020-10-27 DIAGNOSIS — M54.2 ACUTE NECK PAIN: ICD-10-CM

## 2020-10-27 DIAGNOSIS — R29.898 DECREASED ROM OF NECK: ICD-10-CM

## 2020-10-27 DIAGNOSIS — H81.12 BPPV (BENIGN PAROXYSMAL POSITIONAL VERTIGO), LEFT: ICD-10-CM

## 2020-10-30 ENCOUNTER — OFFICE VISIT - HEALTHEAST (OUTPATIENT)
Dept: PHYSICAL THERAPY | Facility: REHABILITATION | Age: 80
End: 2020-10-30

## 2020-10-30 DIAGNOSIS — R29.898 DECREASED ROM OF NECK: ICD-10-CM

## 2020-10-30 DIAGNOSIS — H81.12 BPPV (BENIGN PAROXYSMAL POSITIONAL VERTIGO), LEFT: ICD-10-CM

## 2020-10-30 DIAGNOSIS — M54.2 ACUTE NECK PAIN: ICD-10-CM

## 2020-10-30 DIAGNOSIS — R29.3 ABNORMAL POSTURE: ICD-10-CM

## 2020-11-03 ENCOUNTER — OFFICE VISIT - HEALTHEAST (OUTPATIENT)
Dept: PHYSICAL THERAPY | Facility: REHABILITATION | Age: 80
End: 2020-11-03

## 2020-11-03 DIAGNOSIS — R29.3 ABNORMAL POSTURE: ICD-10-CM

## 2020-11-03 DIAGNOSIS — R29.898 DECREASED ROM OF NECK: ICD-10-CM

## 2020-11-03 DIAGNOSIS — M54.2 ACUTE NECK PAIN: ICD-10-CM

## 2020-11-03 DIAGNOSIS — H81.12 BPPV (BENIGN PAROXYSMAL POSITIONAL VERTIGO), LEFT: ICD-10-CM

## 2020-11-05 ENCOUNTER — OFFICE VISIT - HEALTHEAST (OUTPATIENT)
Dept: PHYSICAL THERAPY | Facility: REHABILITATION | Age: 80
End: 2020-11-05

## 2020-11-05 DIAGNOSIS — M54.2 ACUTE NECK PAIN: ICD-10-CM

## 2020-11-05 DIAGNOSIS — R29.898 DECREASED ROM OF NECK: ICD-10-CM

## 2020-11-05 DIAGNOSIS — R29.3 ABNORMAL POSTURE: ICD-10-CM

## 2020-11-05 DIAGNOSIS — H81.12 BPPV (BENIGN PAROXYSMAL POSITIONAL VERTIGO), LEFT: ICD-10-CM

## 2020-11-06 ENCOUNTER — OFFICE VISIT - HEALTHEAST (OUTPATIENT)
Dept: INTERNAL MEDICINE | Facility: CLINIC | Age: 80
End: 2020-11-06

## 2020-11-06 ENCOUNTER — COMMUNICATION - HEALTHEAST (OUTPATIENT)
Dept: ANTICOAGULATION | Facility: CLINIC | Age: 80
End: 2020-11-06

## 2020-11-06 DIAGNOSIS — E11.9 TYPE 2 DIABETES MELLITUS WITHOUT COMPLICATION, WITHOUT LONG-TERM CURRENT USE OF INSULIN (H): ICD-10-CM

## 2020-11-06 DIAGNOSIS — R42 LIGHT HEADED: ICD-10-CM

## 2020-11-06 DIAGNOSIS — M54.2 NECK PAIN: ICD-10-CM

## 2020-11-06 DIAGNOSIS — I48.0 PAROXYSMAL ATRIAL FIBRILLATION (H): ICD-10-CM

## 2020-11-06 DIAGNOSIS — I26.99 PULMONARY EMBOLISM, OTHER, UNSPECIFIED CHRONICITY, UNSPECIFIED WHETHER ACUTE COR PULMONALE PRESENT (H): ICD-10-CM

## 2020-11-06 DIAGNOSIS — R42 VERTIGO: ICD-10-CM

## 2020-11-06 DIAGNOSIS — I26.99 PULMONARY EMBOLISM (H): ICD-10-CM

## 2020-11-06 DIAGNOSIS — I49.3 PVC'S (PREMATURE VENTRICULAR CONTRACTIONS): ICD-10-CM

## 2020-11-06 DIAGNOSIS — I10 ESSENTIAL HYPERTENSION, BENIGN: ICD-10-CM

## 2020-11-06 LAB — INR PPP: 2.6 (ref 0.9–1.1)

## 2020-11-09 ENCOUNTER — COMMUNICATION - HEALTHEAST (OUTPATIENT)
Dept: INTERNAL MEDICINE | Facility: CLINIC | Age: 80
End: 2020-11-09

## 2020-11-09 DIAGNOSIS — E11.9 TYPE 2 DIABETES MELLITUS WITHOUT COMPLICATION, WITHOUT LONG-TERM CURRENT USE OF INSULIN (H): ICD-10-CM

## 2020-11-10 ENCOUNTER — OFFICE VISIT - HEALTHEAST (OUTPATIENT)
Dept: PHYSICAL THERAPY | Facility: REHABILITATION | Age: 80
End: 2020-11-10

## 2020-11-10 DIAGNOSIS — R29.898 DECREASED ROM OF NECK: ICD-10-CM

## 2020-11-10 DIAGNOSIS — M54.2 ACUTE NECK PAIN: ICD-10-CM

## 2020-11-10 DIAGNOSIS — H81.12 BPPV (BENIGN PAROXYSMAL POSITIONAL VERTIGO), LEFT: ICD-10-CM

## 2020-11-10 DIAGNOSIS — R29.3 ABNORMAL POSTURE: ICD-10-CM

## 2020-11-12 ENCOUNTER — COMMUNICATION - HEALTHEAST (OUTPATIENT)
Dept: INTERNAL MEDICINE | Facility: CLINIC | Age: 80
End: 2020-11-12

## 2020-11-12 DIAGNOSIS — H90.8 MIXED CONDUCTIVE AND SENSORINEURAL HEARING LOSS, UNSPECIFIED LATERALITY: ICD-10-CM

## 2020-11-17 ENCOUNTER — OFFICE VISIT - HEALTHEAST (OUTPATIENT)
Dept: PHYSICAL THERAPY | Facility: REHABILITATION | Age: 80
End: 2020-11-17

## 2020-11-17 DIAGNOSIS — R29.3 ABNORMAL POSTURE: ICD-10-CM

## 2020-11-17 DIAGNOSIS — H81.12 BPPV (BENIGN PAROXYSMAL POSITIONAL VERTIGO), LEFT: ICD-10-CM

## 2020-11-17 DIAGNOSIS — M54.2 ACUTE NECK PAIN: ICD-10-CM

## 2020-11-17 DIAGNOSIS — R29.898 DECREASED ROM OF NECK: ICD-10-CM

## 2020-11-24 ENCOUNTER — OFFICE VISIT - HEALTHEAST (OUTPATIENT)
Dept: PHYSICAL THERAPY | Facility: REHABILITATION | Age: 80
End: 2020-11-24

## 2020-11-24 DIAGNOSIS — H81.12 BPPV (BENIGN PAROXYSMAL POSITIONAL VERTIGO), LEFT: ICD-10-CM

## 2020-11-24 DIAGNOSIS — M54.2 ACUTE NECK PAIN: ICD-10-CM

## 2020-11-24 DIAGNOSIS — R29.898 DECREASED ROM OF NECK: ICD-10-CM

## 2020-11-24 DIAGNOSIS — R29.3 ABNORMAL POSTURE: ICD-10-CM

## 2020-12-01 ENCOUNTER — OFFICE VISIT - HEALTHEAST (OUTPATIENT)
Dept: PHYSICAL THERAPY | Facility: REHABILITATION | Age: 80
End: 2020-12-01

## 2020-12-01 DIAGNOSIS — R29.898 DECREASED ROM OF NECK: ICD-10-CM

## 2020-12-01 DIAGNOSIS — R29.3 ABNORMAL POSTURE: ICD-10-CM

## 2020-12-01 DIAGNOSIS — H81.12 BPPV (BENIGN PAROXYSMAL POSITIONAL VERTIGO), LEFT: ICD-10-CM

## 2020-12-01 DIAGNOSIS — M54.2 ACUTE NECK PAIN: ICD-10-CM

## 2020-12-07 ENCOUNTER — AMBULATORY - HEALTHEAST (OUTPATIENT)
Dept: LAB | Facility: CLINIC | Age: 80
End: 2020-12-07

## 2020-12-07 ENCOUNTER — COMMUNICATION - HEALTHEAST (OUTPATIENT)
Dept: ANTICOAGULATION | Facility: CLINIC | Age: 80
End: 2020-12-07

## 2020-12-07 DIAGNOSIS — I26.99 PULMONARY EMBOLISM (H): ICD-10-CM

## 2020-12-07 LAB — INR PPP: 1.4 (ref 0.9–1.1)

## 2020-12-08 ENCOUNTER — COMMUNICATION - HEALTHEAST (OUTPATIENT)
Dept: SCHEDULING | Facility: CLINIC | Age: 80
End: 2020-12-08

## 2020-12-08 ENCOUNTER — OFFICE VISIT - HEALTHEAST (OUTPATIENT)
Dept: PHYSICAL THERAPY | Facility: REHABILITATION | Age: 80
End: 2020-12-08

## 2020-12-08 ENCOUNTER — COMMUNICATION - HEALTHEAST (OUTPATIENT)
Dept: INTERNAL MEDICINE | Facility: CLINIC | Age: 80
End: 2020-12-08

## 2020-12-08 DIAGNOSIS — R29.898 DECREASED ROM OF NECK: ICD-10-CM

## 2020-12-08 DIAGNOSIS — R19.7 DIARRHEA, UNSPECIFIED TYPE: ICD-10-CM

## 2020-12-08 DIAGNOSIS — E11.9 TYPE 2 DIABETES MELLITUS WITHOUT COMPLICATION, WITHOUT LONG-TERM CURRENT USE OF INSULIN (H): ICD-10-CM

## 2020-12-08 DIAGNOSIS — M54.2 ACUTE NECK PAIN: ICD-10-CM

## 2020-12-08 DIAGNOSIS — K31.84 GASTROPARESIS: ICD-10-CM

## 2020-12-08 DIAGNOSIS — K58.0 IRRITABLE BOWEL SYNDROME WITH DIARRHEA: ICD-10-CM

## 2020-12-08 DIAGNOSIS — R29.3 ABNORMAL POSTURE: ICD-10-CM

## 2020-12-08 DIAGNOSIS — H81.12 BPPV (BENIGN PAROXYSMAL POSITIONAL VERTIGO), LEFT: ICD-10-CM

## 2020-12-09 ENCOUNTER — OFFICE VISIT - HEALTHEAST (OUTPATIENT)
Dept: CARDIOLOGY | Facility: CLINIC | Age: 80
End: 2020-12-09

## 2020-12-09 DIAGNOSIS — I49.3 PVC'S (PREMATURE VENTRICULAR CONTRACTIONS): ICD-10-CM

## 2020-12-09 DIAGNOSIS — I49.3 FREQUENT UNIFOCAL PVCS: ICD-10-CM

## 2020-12-14 ENCOUNTER — COMMUNICATION - HEALTHEAST (OUTPATIENT)
Dept: ANTICOAGULATION | Facility: CLINIC | Age: 80
End: 2020-12-14

## 2020-12-14 ENCOUNTER — AMBULATORY - HEALTHEAST (OUTPATIENT)
Dept: LAB | Facility: CLINIC | Age: 80
End: 2020-12-14

## 2020-12-14 DIAGNOSIS — I26.99 PULMONARY EMBOLISM (H): ICD-10-CM

## 2020-12-14 LAB — INR PPP: 1.3 (ref 0.9–1.1)

## 2020-12-15 ENCOUNTER — OFFICE VISIT - HEALTHEAST (OUTPATIENT)
Dept: PHYSICAL THERAPY | Facility: REHABILITATION | Age: 80
End: 2020-12-15

## 2020-12-15 DIAGNOSIS — R29.898 DECREASED ROM OF NECK: ICD-10-CM

## 2020-12-15 DIAGNOSIS — M54.2 ACUTE NECK PAIN: ICD-10-CM

## 2020-12-15 DIAGNOSIS — R29.3 ABNORMAL POSTURE: ICD-10-CM

## 2020-12-15 DIAGNOSIS — H81.12 BPPV (BENIGN PAROXYSMAL POSITIONAL VERTIGO), LEFT: ICD-10-CM

## 2020-12-22 ENCOUNTER — HOSPITAL ENCOUNTER (OUTPATIENT)
Dept: MAMMOGRAPHY | Facility: CLINIC | Age: 80
Discharge: HOME | End: 2020-12-22

## 2020-12-22 DIAGNOSIS — Z12.31 VISIT FOR SCREENING MAMMOGRAM: ICD-10-CM

## 2020-12-29 ENCOUNTER — AMBULATORY - HEALTHEAST (OUTPATIENT)
Dept: LAB | Facility: CLINIC | Age: 80
End: 2020-12-29

## 2020-12-29 ENCOUNTER — COMMUNICATION - HEALTHEAST (OUTPATIENT)
Dept: ANTICOAGULATION | Facility: CLINIC | Age: 80
End: 2020-12-29

## 2020-12-29 DIAGNOSIS — I26.99 PULMONARY EMBOLISM (H): ICD-10-CM

## 2020-12-29 LAB — INR PPP: 3 (ref 0.9–1.1)

## 2021-01-05 ENCOUNTER — OFFICE VISIT - HEALTHEAST (OUTPATIENT)
Dept: PHYSICAL THERAPY | Facility: REHABILITATION | Age: 81
End: 2021-01-05

## 2021-01-05 ENCOUNTER — AMBULATORY - HEALTHEAST (OUTPATIENT)
Dept: LAB | Facility: CLINIC | Age: 81
End: 2021-01-05

## 2021-01-05 ENCOUNTER — COMMUNICATION - HEALTHEAST (OUTPATIENT)
Dept: ANTICOAGULATION | Facility: CLINIC | Age: 81
End: 2021-01-05

## 2021-01-05 DIAGNOSIS — I26.99 PULMONARY EMBOLISM (H): ICD-10-CM

## 2021-01-05 DIAGNOSIS — R29.3 ABNORMAL POSTURE: ICD-10-CM

## 2021-01-05 DIAGNOSIS — M54.2 ACUTE NECK PAIN: ICD-10-CM

## 2021-01-05 DIAGNOSIS — H81.12 BPPV (BENIGN PAROXYSMAL POSITIONAL VERTIGO), LEFT: ICD-10-CM

## 2021-01-05 DIAGNOSIS — R29.898 DECREASED ROM OF NECK: ICD-10-CM

## 2021-01-05 LAB — INR PPP: 2.7 (ref 0.9–1.1)

## 2021-01-06 ENCOUNTER — HOSPITAL ENCOUNTER (OUTPATIENT)
Dept: CARDIOLOGY | Facility: HOSPITAL | Age: 81
Discharge: HOME | End: 2021-01-06
Attending: INTERNAL MEDICINE

## 2021-01-06 DIAGNOSIS — I49.3 PVC'S (PREMATURE VENTRICULAR CONTRACTIONS): ICD-10-CM

## 2021-01-06 LAB
BSA FOR ECHO PROCEDURE: 2.1 M2
CV BLOOD PRESSURE: NORMAL MMHG
CV ECHO HEIGHT: 62.3 IN
CV ECHO WEIGHT: 222 LBS
EJECTION FRACTION: 57 % (ref 55–75)
LEFT VENTRICLE DIASTOLIC VOLUME INDEX: 37.6 CM3/M2 (ref 29–61)
LEFT VENTRICLE DIASTOLIC VOLUME: 79 CM3 (ref 46–106)
LEFT VENTRICLE HEART RATE: 65 BPM
LEFT VENTRICLE SYSTOLIC VOLUME INDEX: 16.2 CM3/M2 (ref 8–24)
LEFT VENTRICLE SYSTOLIC VOLUME: 34 CM3 (ref 14–42)
NUC REST DIASTOLIC VOLUME INDEX: 3552 LBS
NUC REST SYSTOLIC VOLUME INDEX: 62.25 IN

## 2021-01-06 ASSESSMENT — MIFFLIN-ST. JEOR: SCORE: 1439.21

## 2021-01-12 ENCOUNTER — AMBULATORY - HEALTHEAST (OUTPATIENT)
Dept: LAB | Facility: CLINIC | Age: 81
End: 2021-01-12

## 2021-01-12 ENCOUNTER — OFFICE VISIT - HEALTHEAST (OUTPATIENT)
Dept: PHYSICAL THERAPY | Facility: REHABILITATION | Age: 81
End: 2021-01-12

## 2021-01-12 ENCOUNTER — COMMUNICATION - HEALTHEAST (OUTPATIENT)
Dept: ANTICOAGULATION | Facility: CLINIC | Age: 81
End: 2021-01-12

## 2021-01-12 DIAGNOSIS — I26.99 PULMONARY EMBOLISM (H): ICD-10-CM

## 2021-01-12 DIAGNOSIS — R29.898 DECREASED ROM OF NECK: ICD-10-CM

## 2021-01-12 DIAGNOSIS — M54.2 ACUTE NECK PAIN: ICD-10-CM

## 2021-01-12 DIAGNOSIS — R29.3 ABNORMAL POSTURE: ICD-10-CM

## 2021-01-12 DIAGNOSIS — H81.12 BPPV (BENIGN PAROXYSMAL POSITIONAL VERTIGO), LEFT: ICD-10-CM

## 2021-01-12 LAB — INR PPP: 1.9 (ref 0.9–1.1)

## 2021-01-18 ENCOUNTER — COMMUNICATION - HEALTHEAST (OUTPATIENT)
Dept: ANTICOAGULATION | Facility: CLINIC | Age: 81
End: 2021-01-18

## 2021-01-18 DIAGNOSIS — I48.0 PAROXYSMAL ATRIAL FIBRILLATION (H): ICD-10-CM

## 2021-01-18 DIAGNOSIS — I26.99 PULMONARY EMBOLISM (H): ICD-10-CM

## 2021-01-19 ENCOUNTER — AMBULATORY - HEALTHEAST (OUTPATIENT)
Dept: LAB | Facility: CLINIC | Age: 81
End: 2021-01-19

## 2021-01-19 ENCOUNTER — COMMUNICATION - HEALTHEAST (OUTPATIENT)
Dept: ANTICOAGULATION | Facility: CLINIC | Age: 81
End: 2021-01-19

## 2021-01-19 ENCOUNTER — OFFICE VISIT - HEALTHEAST (OUTPATIENT)
Dept: PHYSICAL THERAPY | Facility: REHABILITATION | Age: 81
End: 2021-01-19

## 2021-01-19 DIAGNOSIS — I48.0 PAROXYSMAL ATRIAL FIBRILLATION (H): ICD-10-CM

## 2021-01-19 DIAGNOSIS — M54.2 ACUTE NECK PAIN: ICD-10-CM

## 2021-01-19 DIAGNOSIS — R29.3 ABNORMAL POSTURE: ICD-10-CM

## 2021-01-19 DIAGNOSIS — R29.898 DECREASED ROM OF NECK: ICD-10-CM

## 2021-01-19 DIAGNOSIS — I26.99 PULMONARY EMBOLISM (H): ICD-10-CM

## 2021-01-19 DIAGNOSIS — H81.12 BPPV (BENIGN PAROXYSMAL POSITIONAL VERTIGO), LEFT: ICD-10-CM

## 2021-01-19 LAB — INR PPP: 2.4 (ref 0.9–1.1)

## 2021-01-25 ENCOUNTER — AMBULATORY - HEALTHEAST (OUTPATIENT)
Dept: LAB | Facility: CLINIC | Age: 81
End: 2021-01-25

## 2021-01-25 ENCOUNTER — COMMUNICATION - HEALTHEAST (OUTPATIENT)
Dept: ANTICOAGULATION | Facility: CLINIC | Age: 81
End: 2021-01-25

## 2021-01-25 DIAGNOSIS — I48.0 PAROXYSMAL ATRIAL FIBRILLATION (H): ICD-10-CM

## 2021-01-25 DIAGNOSIS — I26.99 PULMONARY EMBOLISM (H): ICD-10-CM

## 2021-01-25 LAB — INR PPP: 2.9 (ref 0.9–1.1)

## 2021-01-29 ENCOUNTER — COMMUNICATION - HEALTHEAST (OUTPATIENT)
Dept: INTERNAL MEDICINE | Facility: CLINIC | Age: 81
End: 2021-01-29

## 2021-01-29 DIAGNOSIS — R19.7 DIARRHEA, UNSPECIFIED TYPE: ICD-10-CM

## 2021-01-29 DIAGNOSIS — Z51.81 MEDICATION MONITORING ENCOUNTER: ICD-10-CM

## 2021-02-09 ENCOUNTER — OFFICE VISIT - HEALTHEAST (OUTPATIENT)
Dept: INTERNAL MEDICINE | Facility: CLINIC | Age: 81
End: 2021-02-09

## 2021-02-09 ENCOUNTER — COMMUNICATION - HEALTHEAST (OUTPATIENT)
Dept: ANTICOAGULATION | Facility: CLINIC | Age: 81
End: 2021-02-09

## 2021-02-09 DIAGNOSIS — K58.0 IRRITABLE BOWEL SYNDROME WITH DIARRHEA: ICD-10-CM

## 2021-02-09 DIAGNOSIS — I26.99 PULMONARY EMBOLISM (H): ICD-10-CM

## 2021-02-09 DIAGNOSIS — D12.6 BENIGN NEOPLASM OF COLON, UNSPECIFIED PART OF COLON: ICD-10-CM

## 2021-02-09 DIAGNOSIS — I49.3 PVC'S (PREMATURE VENTRICULAR CONTRACTIONS): ICD-10-CM

## 2021-02-09 DIAGNOSIS — I10 ESSENTIAL HYPERTENSION, BENIGN: ICD-10-CM

## 2021-02-09 DIAGNOSIS — E11.9 TYPE 2 DIABETES MELLITUS WITHOUT COMPLICATION, WITHOUT LONG-TERM CURRENT USE OF INSULIN (H): ICD-10-CM

## 2021-02-09 LAB
ANION GAP SERPL CALCULATED.3IONS-SCNC: 8 MMOL/L (ref 5–18)
BUN SERPL-MCNC: 20 MG/DL (ref 8–28)
CALCIUM SERPL-MCNC: 9 MG/DL (ref 8.5–10.5)
CHLORIDE BLD-SCNC: 104 MMOL/L (ref 98–107)
CO2 SERPL-SCNC: 29 MMOL/L (ref 22–31)
CREAT SERPL-MCNC: 0.74 MG/DL (ref 0.6–1.1)
ERYTHROCYTE [DISTWIDTH] IN BLOOD BY AUTOMATED COUNT: 13.8 % (ref 11–14.5)
GFR SERPL CREATININE-BSD FRML MDRD: >60 ML/MIN/1.73M2
GLUCOSE BLD-MCNC: 187 MG/DL (ref 70–125)
HBA1C MFR BLD: 6.7 %
HCT VFR BLD AUTO: 43 % (ref 35–47)
HGB BLD-MCNC: 14.1 G/DL (ref 12–16)
INR PPP: 3.4 (ref 0.9–1.1)
MCH RBC QN AUTO: 29.5 PG (ref 27–34)
MCHC RBC AUTO-ENTMCNC: 32.8 G/DL (ref 32–36)
MCV RBC AUTO: 90 FL (ref 80–100)
PLATELET # BLD AUTO: 260 THOU/UL (ref 140–440)
PMV BLD AUTO: 10.4 FL (ref 7–10)
POTASSIUM BLD-SCNC: 4.4 MMOL/L (ref 3.5–5)
RBC # BLD AUTO: 4.78 MILL/UL (ref 3.8–5.4)
SODIUM SERPL-SCNC: 141 MMOL/L (ref 136–145)
WBC: 11 THOU/UL (ref 4–11)

## 2021-02-12 ENCOUNTER — COMMUNICATION - HEALTHEAST (OUTPATIENT)
Dept: INTERNAL MEDICINE | Facility: CLINIC | Age: 81
End: 2021-02-12

## 2021-02-12 DIAGNOSIS — I26.99 PULMONARY EMBOLISM WITHOUT ACUTE COR PULMONALE, UNSPECIFIED CHRONICITY, UNSPECIFIED PULMONARY EMBOLISM TYPE (H): ICD-10-CM

## 2021-02-16 ENCOUNTER — COMMUNICATION - HEALTHEAST (OUTPATIENT)
Dept: ANTICOAGULATION | Facility: CLINIC | Age: 81
End: 2021-02-16

## 2021-02-16 ENCOUNTER — AMBULATORY - HEALTHEAST (OUTPATIENT)
Dept: LAB | Facility: CLINIC | Age: 81
End: 2021-02-16

## 2021-02-16 DIAGNOSIS — I26.99 PULMONARY EMBOLISM WITHOUT ACUTE COR PULMONALE, UNSPECIFIED CHRONICITY, UNSPECIFIED PULMONARY EMBOLISM TYPE (H): ICD-10-CM

## 2021-02-16 DIAGNOSIS — I26.99 PULMONARY EMBOLISM (H): ICD-10-CM

## 2021-02-16 LAB — INR PPP: 1.4 (ref 0.9–1.1)

## 2021-04-16 ENCOUNTER — OFFICE VISIT - HEALTHEAST (OUTPATIENT)
Dept: INTERNAL MEDICINE | Facility: CLINIC | Age: 81
End: 2021-04-16

## 2021-04-16 DIAGNOSIS — K58.0 IRRITABLE BOWEL SYNDROME WITH DIARRHEA: ICD-10-CM

## 2021-04-16 DIAGNOSIS — Z78.0 MENOPAUSE: ICD-10-CM

## 2021-04-16 DIAGNOSIS — E11.9 TYPE 2 DIABETES MELLITUS WITHOUT COMPLICATION, WITHOUT LONG-TERM CURRENT USE OF INSULIN (H): ICD-10-CM

## 2021-04-16 DIAGNOSIS — E11.69 HYPERLIPIDEMIA ASSOCIATED WITH TYPE 2 DIABETES MELLITUS (H): ICD-10-CM

## 2021-04-16 DIAGNOSIS — E78.5 HYPERLIPIDEMIA ASSOCIATED WITH TYPE 2 DIABETES MELLITUS (H): ICD-10-CM

## 2021-04-16 DIAGNOSIS — E55.9 VITAMIN D DEFICIENCY: ICD-10-CM

## 2021-04-16 DIAGNOSIS — Z79.899 POLYPHARMACY: ICD-10-CM

## 2021-04-16 DIAGNOSIS — E83.50 UNSPECIFIED DISORDER OF CALCIUM METABOLISM: ICD-10-CM

## 2021-04-16 RX ORDER — ZINC GLUCONATE 50 MG
50 TABLET ORAL DAILY
Status: SHIPPED | COMMUNITY
Start: 2021-04-16

## 2021-04-16 ASSESSMENT — MIFFLIN-ST. JEOR: SCORE: 1443.74

## 2021-05-03 ENCOUNTER — OFFICE VISIT - HEALTHEAST (OUTPATIENT)
Dept: PHARMACY | Facility: CLINIC | Age: 81
End: 2021-05-03

## 2021-05-03 DIAGNOSIS — I49.3 PVC'S (PREMATURE VENTRICULAR CONTRACTIONS): ICD-10-CM

## 2021-05-03 DIAGNOSIS — E11.9 TYPE 2 DIABETES MELLITUS WITHOUT COMPLICATION, WITHOUT LONG-TERM CURRENT USE OF INSULIN (H): ICD-10-CM

## 2021-05-03 DIAGNOSIS — I26.99 PULMONARY EMBOLISM WITHOUT ACUTE COR PULMONALE, UNSPECIFIED CHRONICITY, UNSPECIFIED PULMONARY EMBOLISM TYPE (H): ICD-10-CM

## 2021-05-03 DIAGNOSIS — I10 ESSENTIAL HYPERTENSION, BENIGN: ICD-10-CM

## 2021-05-03 DIAGNOSIS — J45.50 SEVERE PERSISTENT ASTHMA, UNSPECIFIED WHETHER COMPLICATED (H): ICD-10-CM

## 2021-05-03 DIAGNOSIS — K58.0 IRRITABLE BOWEL SYNDROME WITH DIARRHEA: ICD-10-CM

## 2021-05-03 DIAGNOSIS — Z78.0 MENOPAUSE: ICD-10-CM

## 2021-05-03 PROCEDURE — 99605 MTMS BY PHARM NP 15 MIN: CPT | Performed by: PHARMACIST

## 2021-05-03 PROCEDURE — 99607 MTMS BY PHARM ADDL 15 MIN: CPT | Performed by: PHARMACIST

## 2021-05-03 RX ORDER — PANCRELIPASE 30000; 6000; 19000 [USP'U]/1; [USP'U]/1; [USP'U]/1
6000 CAPSULE, DELAYED RELEASE PELLETS ORAL 2 TIMES DAILY WITH MEALS
Qty: 270 CAPSULE | Refills: 3 | Status: SHIPPED
Start: 2021-05-03 | End: 2022-02-22

## 2021-05-03 RX ORDER — LOPERAMIDE HCL 2 MG
2 CAPSULE ORAL 4 TIMES DAILY PRN
Status: SHIPPED | COMMUNITY
Start: 2021-05-03

## 2021-05-03 RX ORDER — AZELASTINE 1 MG/ML
2 SPRAY, METERED NASAL 2 TIMES DAILY
Qty: 90 ML | Refills: 3 | Status: SHIPPED | OUTPATIENT
Start: 2021-05-03 | End: 2021-11-23

## 2021-05-03 RX ORDER — TIOTROPIUM BROMIDE 18 UG/1
18 CAPSULE ORAL; RESPIRATORY (INHALATION) PRN
Status: SHIPPED
Start: 2021-05-03 | End: 2021-11-23

## 2021-05-24 ENCOUNTER — RECORDS - HEALTHEAST (OUTPATIENT)
Dept: ADMINISTRATIVE | Facility: CLINIC | Age: 81
End: 2021-05-24

## 2021-05-25 ENCOUNTER — RECORDS - HEALTHEAST (OUTPATIENT)
Dept: ADMINISTRATIVE | Facility: CLINIC | Age: 81
End: 2021-05-25

## 2021-05-26 ENCOUNTER — RECORDS - HEALTHEAST (OUTPATIENT)
Dept: ADMINISTRATIVE | Facility: CLINIC | Age: 81
End: 2021-05-26

## 2021-05-27 ENCOUNTER — RECORDS - HEALTHEAST (OUTPATIENT)
Dept: ADMINISTRATIVE | Facility: CLINIC | Age: 81
End: 2021-05-27

## 2021-05-27 NOTE — TELEPHONE ENCOUNTER
ANTICOAGULATION  MANAGEMENT    Assessment     Today's INR result of 3.0 is Therapeutic (goal INR of 2.0-3.0)        Warfarin taken as previously instructed    No new diet changes affecting INR    No new medication/supplements affecting INR    Continues to tolerate warfarin with no reported s/s of bleeding or thromboembolism     Previous INR was Therapeutic    Plan:     Spoke with Krystle regarding INR result and instructed:     Warfarin Dosing Instructions:  Continue current warfarin dose 2.5 mg daily   (0 % change)    Instructed patient to follow up no later than: one month    Krystle verbalizes understanding and agrees to warfarin dosing plan.    Instructed to call the [facility] for any changes, questions or concerns. (#[phone])   ?   Mikki Morley RN    Subjective/Objective:      Krystle Polanco, a 78 y.o. female is on warfarin.     Krystle reports:     Home warfarin dose: as updated on anticoagulation calendar per template     Missed doses: No     Medication changes:  No     S/S of bleeding or thromboembolism:  No     New Injury or illness:  No     Changes in diet or alcohol consumption:  No     Upcoming surgery, procedure or cardioversion:  No    Anticoagulation Episode Summary     Current INR goal:   2.0-3.0   TTR:   78.1 % (2.3 y)   Next INR check:   4/30/2019   INR from last check:   3.00 (4/2/2019)   Weekly max warfarin dose:      Target end date:   Indefinite   INR check location:      Preferred lab:      Send INR reminders to:   ANTICOAGULATION POOL A (WBY,WBE,MID,RSC)    Indications    Pulmonary embolism (H) [I26.99]           Comments:            Anticoagulation Care Providers     Provider Role Specialty Phone number    Elke Mehta MD  Internal Medicine [phone]

## 2021-05-27 NOTE — PROGRESS NOTES
[facility] Follow Up Note    Assessment/Plan:  1. Weakness of right lower extremity  Now resolved.  Of interest, it was associated with the onset of a cold.  She did see orthopedic surgery for evaluation of knee weakness.  She did not have pain per se.  Of note, she is status post replacement of the right knee.  She had x-rays and an evaluation that was unremarkable.  Dr. Kaur thought perhaps her weakness was from the impending viral infection.  Of interest, labs were altered and are now normal.  No associated back pain.  Recommendation: We will continue to monitor.  If she develops leg weakness again, I would like her to check in with me.  Consideration for a back MRI.    2. Type 2 diabetes mellitus without complication, without long-term current use of insulin (H)  Due for labs in May    3. PVC's (premature ventricular contractions)  No change in echocardiogram.  Unremarkable    4. Benign Essential Hypertension  Stable    5. Other pulmonary embolism without acute cor pulmonale, unspecified chronicity (H)  History thereof, she is on warfarin  - INR      follow up in May    Elke Mehta MD    Chief Complaint:  Chief Complaint   Patient presents with     Follow-up       History of Present Illness:  Krystle is a 78 y.o. female who is here today for follow-up with regard to evaluation of her right leg weakness.  Of note, she developed symptoms of her knee buckling and giving way.  This happened a day or 2 before the onset of respiratory tract infection.  She states it was very H strange and she had experienced this 1 time in the past.  She denies any pain per se.  She states that, however, the knee was very weak.  She denied any associated back pain, numbness or tingling.  She is status post right knee replacement.  Therefore, she visited with her orthopedic surgeon.  Apparently, a bone scan was performed.  She had some laboratory studies done that revealed elevated sed rate, white count and CRP.   Interestingly, these coincided with a respiratory tract infection.  When the respiratory tract infection resolved, her laboratory studies all resolved.  She does report that she has had a bone scan.  She states it was negative.  I would like to review the results.    Today, she is feeling well.    Review of Systems:  A comprehensive review of systems was performed and was otherwise negative    PFSH:  Social History: Describes feeling overwhelmed.  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  They are contemplating palliative care.  She is at the hospital 4-5 hours/day.  Social History     Tobacco Use   Smoking Status Never Smoker   Smokeless Tobacco Never Used       Past History:   Past Medical History:   Diagnosis Date     Adenomatous goiter      Arthropathy of shoulder region      Asthma      Bartholin gland cyst      Diabetes mellitus type II, controlled (H)      Esophageal reflux      Essential hypertension      Gastroparesis      Herpes simplex type 1 infection      IBS (irritable bowel syndrome)      Leukocytosis      Osteopenia      Vitamin D deficiency        Current Outpatient Medications   Medication Sig Dispense Refill     acetaminophen (TYLENOL) 500 MG tablet Take 500 mg by mouth every 4 (four) hours as needed for pain.        atorvastatin (LIPITOR) 80 MG tablet Take 1 tablet (80 mg total) by mouth at bedtime. 90 tablet 3     beclomethasone dipropionate (QVAR INHL) Inhale 2 puffs 2 (two) times a day.       blood glucose test (ACCU-CHEK MOISE) strips Use 1 each As Directed daily. 100 strip 3     blood-glucose meter Misc Use Daily 1 each 0     CALCIUM CARBONATE (CALCIUM 500 ORAL) Take by mouth 2 (two) times a day.       cholecalciferol, vitamin D3, 1,000 unit tablet Take 1,000 Units by mouth 2 (two) times a day.       colestipol (COLESTID) 1 gram tablet Take 1 tablet (1 g total) by mouth 2 (two) times a day. 180 tablet 1     cranberry extract 300 mg Tab Take 1 tablet  by mouth daily.        dicyclomine (BENTYL) 10 MG capsule TAKE ONE TO TWO CAPSULES BY MOUTH EVERY 6 HOURS AS NEEDED 120 capsule 0     LACTOBACILLUS ACIDOPHILUS (PROBIOTIC ORAL) Take 1 tablet by mouth 2 (two) times a day.        lancing device Misc Use As Directed 4 (four) times a week.        losartan (COZAAR) 100 MG tablet Take 1 tablet (100 mg total) by mouth daily. 90 tablet 3     LUTEIN ORAL Take 1 tablet by mouth daily as needed.        metFORMIN (GLUCOPHAGE XR) 500 MG 24 hr tablet Take 2 tablets (1,000 mg total) by mouth daily with supper. Am meal 180 tablet 3     montelukast (SINGULAIR) 10 mg tablet Take 1 tablet (10 mg total) by mouth daily. 90 tablet 3     nitrofurantoin (MACRODANTIN) 50 MG capsule Take 1 capsule (50 mg total) by mouth daily. 90 capsule 3     omeprazole (PRILOSEC) 20 MG capsule Take 1 capsule (20 mg total) by mouth daily. 90 capsule 3     PROAIR HFA 90 mcg/actuation inhaler INHALE TWO PUFFS BY MOUTH FOUR TIMES A DAY AS NEEDED FOR WHEEZING 25.5 g 0     psyllium (METAMUCIL) 3.4 gram packet Take 1 packet by mouth every evening.       QVAR REDIHALER 40 mcg/actuation HFAB inhaler INHALE TWO PUFFS BY MOUTH TWICE A DAY AS NEEDED 10.6 g 2     SIMETHICONE (GAS-X ORAL) Take by mouth 2 (two) times a day.       tiotropium (SPIRIVA WITH HANDIHALER) 18 mcg inhalation capsule Place 1 capsule (2 puffs total) into inhaler and inhale daily. Place 1 capsule into the inhaler device. Close and press button to crush the capsule. Inhale the contents of the crushed capsule in 2 breaths. 30 capsule 2     UBIDECARENONE (COENZYME Q10 ORAL) Take 1 tablet by mouth daily.        warfarin (COUMADIN/JANTOVEN) 5 MG tablet One tab daily.  Adjust to INR of 2 to 3 90 tablet 3     azelastine (ASTELIN) 137 mcg (0.1 %) nasal spray 2 sprays into each nostril 2 (two) times a day Use in each nostril as directed. 90 mL 3     No current facility-administered medications for this visit.        Family History: Nothing new    Physical  Exam:  General Appearance:   She is neatly groomed.  She appears at baseline and is pleasant and in no acute distress  Vitals:    04/02/19 1014   BP: 136/58   Patient Site: Left Arm   Patient Position: Sitting   Cuff Size: Adult Large   Pulse: 60   SpO2: 97%   Weight: (!) 224 lb (101.6 kg)     Wt Readings from Last 3 Encounters:   04/02/19 (!) 224 lb (101.6 kg)   03/06/19 221 lb (100.2 kg)   02/08/19 (!) 225 lb 11.2 oz (102.4 kg)     Body mass index is 40.97 kg/m .        Data Review:    Analysis and Summary of Old Records (2): Viewed orthopedic surgery records    Records Requested (1): Need bone scan results    Other History Summarized (from other people in the room) (2):     Radiology Tests Summarized (XRAY/CT/MRI/DXA) (1):     Labs Reviewed (1): Reviewed labs that were ordered by Dr. Rosado.  They were improved    Medicine Tests Reviewed (EKG/ECHO/COLONOSCOPY/EGD) (1):     Independent Review of EKG or X-RAY (2):

## 2021-05-28 ENCOUNTER — RECORDS - HEALTHEAST (OUTPATIENT)
Dept: ADMINISTRATIVE | Facility: CLINIC | Age: 81
End: 2021-05-28

## 2021-05-28 ASSESSMENT — ASTHMA QUESTIONNAIRES
ACT_TOTALSCORE: 22
ACT_TOTALSCORE: 21
ACT_TOTALSCORE: 21

## 2021-05-28 NOTE — TELEPHONE ENCOUNTER
ANTICOAGULATION  MANAGEMENT    Assessment     Today's INR result of 2.9 is Therapeutic (goal INR of 2.0-3.0)        Warfarin taken as previously instructed    No new diet changes affecting INR    No new medication/supplements affecting INR    Continues to tolerate warfarin with no reported s/s of bleeding or thromboembolism     Previous INR was Therapeutic    Plan:     Left a detailed message for Krystle regarding INR result and instructed:     Warfarin Dosing Instructions:  Continue current warfarin dose 2.5 mg daily     Instructed patient to follow up no later than: one month      Instructed to call the [facility] for any changes, questions or concerns. [phone])   ?   Mikki Morley RN    Subjective/Objective:      Krystle Polanco, a 78 y.o. female is on warfarin.     Krystle reports:     Home warfarin dose: as updated on anticoagulation calendar per template     Missed doses: No     Medication changes:  No     S/S of bleeding or thromboembolism:  No     New Injury or illness:  No     Changes in diet or alcohol consumption:  No     Upcoming surgery, procedure or cardioversion:  No    Anticoagulation Episode Summary     Current INR goal:   2.0-3.0   TTR:   79.1 % (2.4 y)   Next INR check:   6/7/2019   INR from last check:      Weekly max warfarin dose:      Target end date:   Indefinite   INR check location:      Preferred lab:      Send INR reminders to:   ANTICOAGULATION POOL A (WBY,WBE,MID,RSC)    Indications    Pulmonary embolism (H) [I26.99]           Comments:            Anticoagulation Care Providers     Provider Role Specialty Phone number    Elke Mehta MD  Internal Medicine [phone]

## 2021-05-28 NOTE — TELEPHONE ENCOUNTER
ANTICOAGULATION  MANAGEMENT PROGRAM    Krystle Polanco is overdue for INR check.  Reminder call made.    Left message for Krystle reminding them to have INR checked at upcoming office visit on 5/10    Mikki Morley RN

## 2021-05-28 NOTE — TELEPHONE ENCOUNTER
RN cannot approve Refill Request    RN can NOT refill this medication overdue for office visits and/or labs.    Cameron Fagan, [facility] Triage/Med Refill 4/22/2019    Requested Prescriptions   Pending Prescriptions Disp Refills     metFORMIN (GLUCOPHAGE-XR) 500 MG 24 hr tablet [Pharmacy Med Name: METFORMIN HCL ER 500MG TB24] 180 tablet 3     Sig: TAKE TWO TABLETS BY MOUTH EVERY DAY WITH SUPPER OR AFTER EVENING MEAL       Metformin Refill Protocol Failed - 4/21/2019  9:35 AM        Failed - Microalbumin in last year      Microalbumin, Random Urine   Date Value Ref Range Status   07/07/2015 <0.50 0.00 - 1.99 mg/dL Final                  Passed - Blood pressure in last 12 months     BP Readings from Last 1 Encounters:   04/02/19 136/58             Passed - LFT or AST or ALT in last 12 months     Albumin   Date Value Ref Range Status   02/08/2019 3.6 3.5 - 5.0 g/dL Final     Bilirubin, Total   Date Value Ref Range Status   02/08/2019 0.6 0.0 - 1.0 mg/dL Final     Bilirubin, Direct   Date Value Ref Range Status   12/04/2016 0.1 <=0.5 mg/dL Final     Alkaline Phosphatase   Date Value Ref Range Status   02/08/2019 79 45 - 120 U/L Final     AST   Date Value Ref Range Status   02/08/2019 18 0 - 40 U/L Final     ALT   Date Value Ref Range Status   02/08/2019 19 0 - 45 U/L Final     Protein, Total   Date Value Ref Range Status   02/08/2019 6.8 6.0 - 8.0 g/dL Final                Passed - GFR or Serum Creatinine in last 6 months     GFR MDRD Non Af Amer   Date Value Ref Range Status   02/08/2019 >60 >60 mL/min/1.73m2 Final     GFR MDRD Af Amer   Date Value Ref Range Status   02/08/2019 >60 >60 mL/min/1.73m2 Final             Passed - Visit with PCP or prescribing provider visit in last 6 months or next 3 months     Last office visit with prescriber/PCP: 4/2/2019 OR same dept: 4/2/2019 Elke Mehta MD OR same specialty: 4/2/2019 Elke Mehta MD Last physical: Visit date not found Last MTM visit: Visit  date not found         Next appt within 3 mo: Visit date not found  Next physical within 3 mo: Visit date not found  Prescriber OR PCP: Elke Mehta MD  Last diagnosis associated with med order: There are no diagnoses linked to this encounter.   If protocol passes may refill for 12 months if within 3 months of last provider visit (or a total of 15 months).           Passed - A1C in last 6 months     Hemoglobin A1c   Date Value Ref Range Status   02/08/2019 7.0 (H) 3.5 - 6.0 % Final

## 2021-05-28 NOTE — PROGRESS NOTES
[facility] Follow Up Note    Assessment/Plan:  1. Type 2 diabetes mellitus without complication, without long-term current use of insulin (H)  Glycohemoglobin at 7.0.  Still with exceedingly stressful lifestyle with  and recent hip fracture, friend and hospice, etc.  Recommendation: Try to resume a diabetic diet if able.  Continue current medications.  Extended release metformin has been helpful with IBS.  We will continue the same for now.  - Glycosylated Hemoglobin A1c  - HM2(CBC w/o Differential)  - Basic Metabolic Panel    2. Other pulmonary embolism without acute cor pulmonale, unspecified chronicity (H)  Remains on warfarin  - INR    3. Benign Essential Hypertension  Controlled on current medications    4. Irritable bowel syndrome with diarrhea  Doing better with colestipol and extended release metformin.  We will continue the same      Follow-up in 4 to 6 months    Elke Mehta MD    Chief Complaint:  Chief Complaint   Patient presents with     Follow Up     Diabetes       History of Present Illness:  Krystle is a 78 y.o. female who is here today for follow-up with regard to type 2 diabetes and her usual medical problems which include IBS, hypothyroidism, hypertension, etc.  More recently, she has been under a great deal of stress.  Her  has had 2 surgeries and recently fractured his hip.  Her dear friend, for whom she is a guardian, is on hospice.  She attends to her on a daily basis.  She states that as a result of this, her eating has not been optimal.  They are eating sandwiches and food on the go.  She is here today for follow-up on her diabetes.    Additionally, she reports that her insurance may no longer cover our clinic starting June.  They are very sad if this is truly the case.    She denies polyuria or polydipsia.  She states that her IBS is under much better control with colestipol and the extended release metformin.    Review of Systems:  A comprehensive review of  systems was performed and was otherwise negative    PFSH:  Social History:  with adult children.  She is recently sold her home in [place] and moved to a [facility]  Social History     Tobacco Use   Smoking Status Never Smoker   Smokeless Tobacco Never Used       Past History:   Past Medical History:   Diagnosis Date     Adenomatous goiter      Arthropathy of shoulder region      Asthma      Bartholin gland cyst      Diabetes mellitus type II, controlled (H)      Esophageal reflux      Essential hypertension      Gastroparesis      Herpes simplex type 1 infection      IBS (irritable bowel syndrome)      Leukocytosis      Osteopenia      Vitamin D deficiency        Current Outpatient Medications   Medication Sig Dispense Refill     acetaminophen (TYLENOL) 500 MG tablet Take 500 mg by mouth every 4 (four) hours as needed for pain.        atorvastatin (LIPITOR) 80 MG tablet Take 1 tablet (80 mg total) by mouth at bedtime. 90 tablet 3     azelastine (ASTELIN) 137 mcg (0.1 %) nasal spray 2 sprays into each nostril 2 (two) times a day Use in each nostril as directed. 90 mL 3     beclomethasone dipropionate (QVAR INHL) Inhale 2 puffs 2 (two) times a day.       blood glucose test (ACCU-CHEK MOISE) strips Use 1 each As Directed daily. 100 strip 3     blood-glucose meter Misc Use Daily 1 each 0     CALCIUM CARBONATE (CALCIUM 500 ORAL) Take by mouth 2 (two) times a day.       cholecalciferol, vitamin D3, 1,000 unit tablet Take 1,000 Units by mouth 2 (two) times a day.       colestipol (COLESTID) 1 gram tablet Take 1 tablet (1 g total) by mouth 2 (two) times a day. 180 tablet 1     cranberry extract 300 mg Tab Take 1 tablet by mouth daily.        dicyclomine (BENTYL) 10 MG capsule TAKE ONE TO TWO CAPSULES BY MOUTH EVERY 6 HOURS AS NEEDED 120 capsule 0     LACTOBACILLUS ACIDOPHILUS (PROBIOTIC ORAL) Take 1 tablet by mouth 2 (two) times a day.        lancing device Misc Use As Directed 4 (four) times a week.         "losartan (COZAAR) 100 MG tablet Take 1 tablet (100 mg total) by mouth daily. 90 tablet 3     LUTEIN ORAL Take 1 tablet by mouth daily as needed.        metFORMIN (GLUCOPHAGE XR) 500 MG 24 hr tablet Take 2 tablets (1,000 mg total) by mouth daily with supper. Am meal 180 tablet 3     montelukast (SINGULAIR) 10 mg tablet Take 1 tablet (10 mg total) by mouth daily. 90 tablet 3     nitrofurantoin (MACRODANTIN) 50 MG capsule Take 1 capsule (50 mg total) by mouth daily. 90 capsule 3     omeprazole (PRILOSEC) 20 MG capsule Take 1 capsule (20 mg total) by mouth daily. 90 capsule 3     PROAIR HFA 90 mcg/actuation inhaler INHALE TWO PUFFS BY MOUTH FOUR TIMES A DAY AS NEEDED FOR WHEEZING 25.5 g 0     psyllium (METAMUCIL) 3.4 gram packet Take 1 packet by mouth every evening.       QVAR REDIHALER 40 mcg/actuation HFAB inhaler INHALE TWO PUFFS BY MOUTH TWICE A DAY AS NEEDED 10.6 g 2     SIMETHICONE (GAS-X ORAL) Take by mouth 2 (two) times a day.       tiotropium (SPIRIVA WITH HANDIHALER) 18 mcg inhalation capsule Place 1 capsule (2 puffs total) into inhaler and inhale daily. Place 1 capsule into the inhaler device. Close and press button to crush the capsule. Inhale the contents of the crushed capsule in 2 breaths. 30 capsule 2     UBIDECARENONE (COENZYME Q10 ORAL) Take 1 tablet by mouth daily.        warfarin (COUMADIN/JANTOVEN) 5 MG tablet One tab daily.  Adjust to INR of 2 to 3 90 tablet 3     No current facility-administered medications for this visit.        Family History: Nothing new    Physical Exam:  General Appearance:   Does not and well-appearing and in no acute distress  Vitals:    05/10/19 1346   BP: 136/54   Patient Site: Left Arm   Patient Position: Sitting   Cuff Size: Adult Large   Pulse: (!) 52   SpO2: 97%   Weight: (!) 224 lb (101.6 kg)   Height: 5' 2\" (1.575 m)     Wt Readings from Last 3 Encounters:   05/10/19 (!) 224 lb (101.6 kg)   04/02/19 (!) 224 lb (101.6 kg)   03/06/19 221 lb (100.2 kg)     Body mass " index is 40.97 kg/m .

## 2021-05-29 ENCOUNTER — RECORDS - HEALTHEAST (OUTPATIENT)
Dept: ADMINISTRATIVE | Facility: CLINIC | Age: 81
End: 2021-05-29

## 2021-05-29 NOTE — TELEPHONE ENCOUNTER
ANTICOAGULATION  MANAGEMENT PROGRAM    Krystle JUANA Sherri is overdue for INR check.  Reminder call made.    Spoke with Krystle who declined to schedule INR at this time.  If calling back, please schedule INR check as soon as possible.    Tammy Nava RN

## 2021-05-30 ENCOUNTER — RECORDS - HEALTHEAST (OUTPATIENT)
Dept: ADMINISTRATIVE | Facility: CLINIC | Age: 81
End: 2021-05-30

## 2021-05-30 VITALS — WEIGHT: 216 LBS | BODY MASS INDEX: 39.51 KG/M2

## 2021-05-30 VITALS — BODY MASS INDEX: 40.11 KG/M2 | WEIGHT: 219.3 LBS

## 2021-05-30 VITALS — WEIGHT: 221.4 LBS | BODY MASS INDEX: 40.74 KG/M2 | HEIGHT: 62 IN

## 2021-05-30 NOTE — PROGRESS NOTES
[facility] Follow Up Note    Assessment/Plan:  1. Type 2 diabetes mellitus without complication, without long-term current use of insulin (H)  Her glycohemoglobin is up-to-date.  Now that her friend has passed, she will have more time to work on diet and exercise.  Recommendation: Recheck in August    2. Benign Essential Hypertension  Suboptimal control-likely secondary to increased stress.  Recommendation: We will add very low-dose of amlodipine and monitor.  This will likely need to be titrated upward.  However, given side effect of lower extremity edema, will proceed cautiously.  - amLODIPine (NORVASC) 5 MG tablet; Take 0.5 tablets (2.5 mg total) by mouth daily.  Dispense: 45 tablet; Refill: 11    3. Moderate persistent asthma, unspecified whether complicated  Stable    4. PVC's (premature ventricular contractions)  Stable    5. Hyperlipidemia associated with type 2 diabetes mellitus (H)  Labs up-to-date    6.  History of pulmonary embolism-felt to be hypercoagulable-unprovoked  Currently on warfarin.  Would like to switch to Eliquis.  She is checking with insurance.    7.  Psychosocial stressors  Lengthy discussion was had today with regard to insurance issues.  With [facility] joining for review of systems, or clinics are no longer covered under her health partners insurance.  She and her  are very sad.  They would like to remain with the clinic and are working to file an appeal.  She is    Elke Mehta MD    Chief Complaint:  Chief Complaint   Patient presents with     Medication Management     wannegra discuss with MD       History of Present Illness:  Krystle is a 79 y.o. female who is here today for discussion of her chronic medical problems, which are currently stable.  Of note, she and her  may be undergoing an insurance change.  She is feeling very disappointed.  Apparently, her health partners insurance is no longer covering me as a primary care physician.  He will be quite  costly to continue care here, nonetheless, they are contemplating this.    She reports today that her dear friend with whom she has been caring for has passed away.  She suffered dramatically at the end with dementia and significant agitation.  This is somewhat of a relief for Omer.  She was with her friend on a daily basis for several months.  Additionally, she has stress from her 's poorly healing hip fracture.  She continues to worry about him.  As a result, she has gained weight and has been somewhat noncompliant with diet and exercise.    Her medical problems include type 2 diabetes, history of pulmonary [id number]-[id number]-eh indefinite anticoagulation per hematology/oncology, irritable bowel with loose stools-nicely controlled now with colestipol, seasonal allergies and sinus problems, sleep apnea.  She also has hypertension and hyperlipidemia.  She has elevated body mass index as well.    For the most part, things are stable.  With regard to her anticoagulant, she would like to switch to a newer novel anticoagulant.  Of note, since being on warfarin, she has had significant hair loss and a general sense of nausea.  When her medication is held for couple of days, the symptoms asha.  She is going to check with her insurance regarding coverage.    Review of Systems:  A comprehensive review of systems was performed and was otherwise negative    PFSH:  Social History: She is  with adult children.  She is a retired professor.  Social History     Tobacco Use   Smoking Status Never Smoker   Smokeless Tobacco Never Used       Past History:   Past Medical History:   Diagnosis Date     Adenomatous goiter      Arthropathy of shoulder region      Asthma      Bartholin gland cyst      Diabetes mellitus type II, controlled (H)      Esophageal reflux      Essential hypertension      Gastroparesis      Herpes simplex type 1 infection      IBS (irritable bowel syndrome)      Leukocytosis      Osteopenia       Vitamin D deficiency        Current Outpatient Medications   Medication Sig Dispense Refill     acetaminophen (TYLENOL) 500 MG tablet Take 500 mg by mouth every 4 (four) hours as needed for pain.        atorvastatin (LIPITOR) 80 MG tablet Take 1 tablet (80 mg total) by mouth at bedtime. 90 tablet 3     azelastine (ASTELIN) 137 mcg (0.1 %) nasal spray 2 sprays into each nostril 2 (two) times a day Use in each nostril as directed. 90 mL 3     beclomethasone dipropionate (QVAR INHL) Inhale 2 puffs 2 (two) times a day.       blood glucose test (ACCU-CHEK MOISE) strips Use 1 each As Directed daily. 100 strip 3     blood-glucose meter Misc Use Daily 1 each 0     CALCIUM CARBONATE (CALCIUM 500 ORAL) Take by mouth 2 (two) times a day.       cholecalciferol, vitamin D3, 1,000 unit tablet Take 1,000 Units by mouth 2 (two) times a day.       colestipol (COLESTID) 1 gram tablet Take 1 tablet (1 g total) by mouth 2 (two) times a day. 180 tablet 1     cranberry extract 300 mg Tab Take 1 tablet by mouth daily.        dicyclomine (BENTYL) 10 MG capsule TAKE ONE TO TWO CAPSULES BY MOUTH EVERY 6 HOURS AS NEEDED 120 capsule 0     LACTOBACILLUS ACIDOPHILUS (PROBIOTIC ORAL) Take 1 tablet by mouth 2 (two) times a day.        lancing device Misc Use As Directed 4 (four) times a week.        losartan (COZAAR) 100 MG tablet Take 1 tablet (100 mg total) by mouth daily. 90 tablet 3     LUTEIN ORAL Take 1 tablet by mouth daily.              metFORMIN (GLUCOPHAGE XR) 500 MG 24 hr tablet Take 2 tablets (1,000 mg total) by mouth daily with supper. Am meal 180 tablet 3     montelukast (SINGULAIR) 10 mg tablet Take 1 tablet (10 mg total) by mouth daily. 90 tablet 3     nitrofurantoin (MACRODANTIN) 50 MG capsule Take 1 capsule (50 mg total) by mouth daily. 90 capsule 3     omeprazole (PRILOSEC) 20 MG capsule Take 1 capsule (20 mg total) by mouth daily. 90 capsule 3     PROAIR HFA 90 mcg/actuation inhaler INHALE TWO PUFFS BY MOUTH FOUR TIMES A DAY AS  NEEDED FOR WHEEZING 25.5 g 0     psyllium (METAMUCIL) 3.4 gram packet Take 1 packet by mouth every evening.       QVAR REDIHALER 40 mcg/actuation HFAB inhaler INHALE TWO PUFFS BY MOUTH TWICE A DAY AS NEEDED (Patient taking differently: INHALE TWO PUFFS BY MOUTH TWICE A DAY) 10.6 g 2     SIMETHICONE (GAS-X ORAL) Take by mouth 2 (two) times a day.       tiotropium (SPIRIVA WITH HANDIHALER) 18 mcg inhalation capsule Place 1 capsule (2 puffs total) into inhaler and inhale daily. Place 1 capsule into the inhaler device. Close and press button to crush the capsule. Inhale the contents of the crushed capsule in 2 breaths. 30 capsule 2     UBIDECARENONE (COENZYME Q10 ORAL) Take 1 tablet by mouth daily.        warfarin (COUMADIN/JANTOVEN) 5 MG tablet One tab daily.  Adjust to INR of 2 to 3 90 tablet 3     amLODIPine (NORVASC) 5 MG tablet Take 0.5 tablets (2.5 mg total) by mouth daily. 45 tablet 11     No current facility-administered medications for this visit.        Family History:   Family History   Problem Relation Age of Onset     Breast cancer Sister 56     Depression Mother      Dementia Mother          Physical Exam:  General Appearance:   She is pleasant, neatly groomed and well-appearing and in no acute distress  Vitals:    06/25/19 1336 06/25/19 1340   BP: 152/78 148/72   Patient Site: Right Arm Left Arm   Patient Position: Sitting Sitting   Cuff Size: Adult Large Adult Large   Pulse: 60    SpO2: 95%    Weight: (!) 227 lb (103 kg)      Wt Readings from Last 3 Encounters:   06/25/19 (!) 227 lb (103 kg)   05/10/19 (!) 224 lb (101.6 kg)   04/02/19 (!) 224 lb (101.6 kg)     Body mass index is 41.52 kg/m .      Time spent today approximately 25 minutes

## 2021-05-30 NOTE — TELEPHONE ENCOUNTER
ANTICOAGULATION  MANAGEMENT PROGRAM    Krystle M Sherri is overdue for INR check.  Reminder call made.    Spoke with Omer who declined to schedule INR at this time.  If calling back, please schedule INR check as soon as possible.    Mikki Morley RN

## 2021-05-31 ENCOUNTER — RECORDS - HEALTHEAST (OUTPATIENT)
Dept: ADMINISTRATIVE | Facility: CLINIC | Age: 81
End: 2021-05-31

## 2021-05-31 VITALS — WEIGHT: 218 LBS | BODY MASS INDEX: 39.87 KG/M2

## 2021-05-31 VITALS — BODY MASS INDEX: 39.32 KG/M2 | WEIGHT: 215 LBS

## 2021-05-31 VITALS — WEIGHT: 220.8 LBS | BODY MASS INDEX: 40.38 KG/M2

## 2021-05-31 NOTE — TELEPHONE ENCOUNTER
ANTICOAGULATION  MANAGEMENT    Assessment     Today's INR result of 2.5 is Therapeutic (goal INR of 2.0-3.0)        Warfarin taken as previously instructed    No new diet changes affecting INR    No new medication/supplements affecting INR    Continues to tolerate warfarin with no reported s/s of bleeding or thromboembolism     Previous INR was Therapeutic    Plan:     Spoke with Krystle regarding INR result and instructed:     Warfarin Dosing Instructions:  Continue current warfarin dose 2.5 mg daily     Instructed patient to follow up no later than: 4-6 weeks    Krystle verbalizes understanding and agrees to warfarin dosing plan.    Instructed to call the [facility] for any changes, questions or concerns. (#[phone])   ?   Mikki Morley RN    Subjective/Objective:      Krystle Polanco, a 79 y.o. female is on warfarin.     Krystle reports:     Home warfarin dose: as updated on anticoagulation calendar per template     Missed doses: No     Medication changes:  No     S/S of bleeding or thromboembolism:  No     New Injury or illness:  No     Changes in diet or alcohol consumption:  No     Upcoming surgery, procedure or cardioversion:  No    Anticoagulation Episode Summary     Current INR goal:   2.0-3.0   TTR:   81.0 % (2.6 y)   Next INR check:   9/7/2019   INR from last check:   2.50 (8/7/2019)   Weekly max warfarin dose:      Target end date:   Indefinite   INR check location:      Preferred lab:      Send INR reminders to:   [facility]    Indications    Pulmonary embolism (H) [I26.99]           Comments:            Anticoagulation Care Providers     Provider Role Specialty Phone number    Elke Mehta MD  Internal Medicine [phone]

## 2021-05-31 NOTE — TELEPHONE ENCOUNTER
----- Message from Elke Mehta MD sent at 8/13/2019  1:12 PM CDT -----  Mikki, I am still not able to access and signed the note on Omer.  I have been able to sign the other anticoagulation notes and everything else in the chart.  It says I am not authorized to access the record.  Any ideas?

## 2021-06-01 ENCOUNTER — RECORDS - HEALTHEAST (OUTPATIENT)
Dept: ADMINISTRATIVE | Facility: CLINIC | Age: 81
End: 2021-06-01

## 2021-06-01 VITALS — BODY MASS INDEX: 40.6 KG/M2 | WEIGHT: 222 LBS

## 2021-06-01 VITALS — BODY MASS INDEX: 40.85 KG/M2 | WEIGHT: 222 LBS | HEIGHT: 62 IN

## 2021-06-01 VITALS — BODY MASS INDEX: 40.77 KG/M2 | WEIGHT: 222.9 LBS

## 2021-06-01 VITALS — BODY MASS INDEX: 40.67 KG/M2 | WEIGHT: 222.38 LBS

## 2021-06-01 VITALS — BODY MASS INDEX: 40.48 KG/M2 | WEIGHT: 221.31 LBS

## 2021-06-01 VITALS — WEIGHT: 224.13 LBS | BODY MASS INDEX: 40.99 KG/M2

## 2021-06-01 VITALS — WEIGHT: 226.5 LBS | BODY MASS INDEX: 41.43 KG/M2

## 2021-06-01 VITALS — WEIGHT: 222.31 LBS | BODY MASS INDEX: 40.66 KG/M2

## 2021-06-01 VITALS — WEIGHT: 220 LBS | BODY MASS INDEX: 40.24 KG/M2

## 2021-06-01 VITALS — WEIGHT: 224.13 LBS | BODY MASS INDEX: 41.24 KG/M2 | HEIGHT: 62 IN

## 2021-06-01 NOTE — PROGRESS NOTES
[facility] Follow Up Note    Assessment/Plan:  1. Type 2 diabetes mellitus without complication, without long-term current use of insulin (H)  Glycohemoglobin at 7.1.  Relatively stable.  Recommendations: Weight is up.  Would like her to see her back at the [facility] for increased exercise.  She needs to clean up diet.  They have not yet settled into a regular routine since her friend passed and her  had numerous medical problems.  Recommend a weight watchers-low-carb type program for her.  - Basic Metabolic Panel  - Glycosylated Hemoglobin A1c  - JIC LAV  - JIC RED    2. Other pulmonary embolism without acute cor pulmonale, unspecified chronicity (H)  She is on long-term Coumadin.  We have discussed whether apixaban would be worth a try.  Because she will get into the donut hole sooner, we are continuing with warfarin for now  - INR    3.  Irritable bowel  Improved with colestipol.  Continue to monitor      4. Benign Essential Hypertension  Blood pressure with suboptimal control.  Recommend weight loss, sodium restriction and exercise.  Recommendations: Increase amlodipine to 5 mg and monitor for lower extremity edema  - amLODIPine (NORVASC) 5 MG tablet; Take 1 tablet (5 mg total) by mouth daily.  Dispense: 90 tablet; Refill: 11    5. Vitamin D deficiency  Will monitor at next appointment    6. Encounter for screening mammogram for malignant neoplasm of breast   Ordered  - Mammo Screening Bilateral; Future    8. Low bone mass  Update on bone density ordered  - DXA Bone Density Scan; Future        Follow-up before the end of the year and before insurance change    Elke Mehta MD    Chief Complaint:  Chief Complaint   Patient presents with     Follow-up     Diabetes       History of Present Illness:  Krystle is a 79 y.o. female who is here today for follow-up of her multiple medical problems which include hypertension, type 2 diabetes, morbid obesity, irritable bowel and history of pulmonary  "embolism-on long-term Coumadin.  Of note, she states her life is starting to settle.  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  She thought her life should start to slow down and improve.  However, her  Jack had many medical issues.  Finally, her life is quieting.  She has not yet been able to implement healthier eating.  She continues to suffer from irritable bowel.  She is not exercising.  Of note, she does report that she \"pulled my back \"gardening.  Of note, she is quite out of shape.    She denies any chest pain or shortness of breath.  She is contemplating an insurance change.  She is also contemplating a trial of apixaban and in lieu of warfarin since she has significant lack of well-being on this medication.  Of note, apixaban use would get her into the donut hole quicker.  She is deciding whether she would like to give it a try.    Other health maintenance issues are reviewed with her in person and in detail.    Review of Systems:  A comprehensive review of systems was performed and was otherwise negative    PFSH:  Social History:  with adult children.  They did have recently sold their [place] home and are living in a [facility]  Social History     Tobacco Use   Smoking Status Never Smoker   Smokeless Tobacco Never Used       Past History:   Past Medical History:   Diagnosis Date     Adenomatous goiter      Arthropathy of shoulder region      Asthma      Bartholin gland cyst      Diabetes mellitus type II, controlled (H)      Esophageal reflux      Essential hypertension      Gastroparesis      Herpes simplex type 1 infection      IBS (irritable bowel syndrome)      Leukocytosis      Osteopenia      Vitamin D deficiency        Current Outpatient Medications   Medication Sig Dispense Refill     acetaminophen (TYLENOL) 500 MG tablet Take 500 mg by mouth every 4 (four) hours as needed for pain.        amLODIPine (NORVASC) 5 MG tablet Take 1 tablet (5 mg total) " by mouth daily. 90 tablet 11     atorvastatin (LIPITOR) 80 MG tablet Take 1 tablet (80 mg total) by mouth at bedtime. 90 tablet 3     azelastine (ASTELIN) 137 mcg (0.1 %) nasal spray 2 sprays into each nostril 2 (two) times a day Use in each nostril as directed. 90 mL 3     blood glucose test (ACCU-CHEK MOISE) strips Use 1 each As Directed daily. 100 strip 3     blood-glucose meter Misc Use Daily 1 each 0     CALCIUM CARBONATE (CALCIUM 500 ORAL) Take by mouth 2 (two) times a day.       cholecalciferol, vitamin D3, 1,000 unit tablet Take 1,000 Units by mouth 2 (two) times a day.       colestipol (COLESTID) 1 gram tablet Take 1 tablet (1 g total) by mouth 2 (two) times a day. 180 tablet 1     cranberry extract 300 mg Tab Take 1 tablet by mouth daily.        dicyclomine (BENTYL) 10 MG capsule TAKE ONE TO TWO CAPSULES BY MOUTH EVERY 6 HOURS AS NEEDED 120 capsule 0     LACTOBACILLUS ACIDOPHILUS (PROBIOTIC ORAL) Take 1 tablet by mouth 2 (two) times a day.        lancing device Misc Use As Directed 4 (four) times a week.        losartan (COZAAR) 100 MG tablet Take 1 tablet (100 mg total) by mouth daily. 90 tablet 3     LUTEIN ORAL Take 1 tablet by mouth daily.              metFORMIN (GLUCOPHAGE XR) 500 MG 24 hr tablet Take 2 tablets (1,000 mg total) by mouth daily with supper. Am meal (Patient taking differently: Take 1,000 mg by mouth daily with breakfast. Am meal      ) 180 tablet 3     montelukast (SINGULAIR) 10 mg tablet Take 1 tablet (10 mg total) by mouth daily. 90 tablet 3     nitrofurantoin (MACRODANTIN) 50 MG capsule Take 1 capsule (50 mg total) by mouth daily. 90 capsule 3     omeprazole (PRILOSEC) 20 MG capsule Take 1 capsule (20 mg total) by mouth daily. 90 capsule 3     PROAIR HFA 90 mcg/actuation inhaler INHALE TWO PUFFS BY MOUTH FOUR TIMES A DAY AS NEEDED FOR WHEEZING 25.5 g 0     psyllium (METAMUCIL) 3.4 gram packet Take 1 packet by mouth every evening.       QVAR REDIHALER 40 mcg/actuation HFAB inhaler  INHALE TWO PUFFS BY MOUTH TWICE A DAY AS NEEDED (Patient taking differently: INHALE TWO PUFFS BY MOUTH TWICE A DAY) 10.6 g 2     SIMETHICONE (GAS-X ORAL) Take by mouth 2 (two) times a day.       tiotropium (SPIRIVA WITH HANDIHALER) 18 mcg inhalation capsule Place 1 capsule (2 puffs total) into inhaler and inhale daily. Place 1 capsule into the inhaler device. Close and press button to crush the capsule. Inhale the contents of the crushed capsule in 2 breaths. 30 capsule 2     UBIDECARENONE (COENZYME Q10 ORAL) Take 1 tablet by mouth daily.        warfarin (COUMADIN/JANTOVEN) 5 MG tablet One tab daily.  Adjust to INR of 2 to 3 90 tablet 3     No current facility-administered medications for this visit.        Family History: Nothing new    Physical Exam:  General Appearance:   Pleasant and well-appearing and in no acute distress.  Weight is up 4 pounds  Vitals:    09/17/19 1031   BP: 138/76   Patient Site: Left Arm   Patient Position: Sitting   Cuff Size: Adult Large   Pulse: 66   SpO2: 98%   Weight: (!) 231 lb 14.4 oz (105.2 kg)     Wt Readings from Last 3 Encounters:   09/17/19 (!) 231 lb 14.4 oz (105.2 kg)   06/25/19 (!) 227 lb (103 kg)   05/10/19 (!) 224 lb (101.6 kg)     Body mass index is 42.42 kg/m .      Time spent more than 30 minutes with more than half that time spent in counseling and discussion of diabetes, diet and health maintenance issues

## 2021-06-01 NOTE — TELEPHONE ENCOUNTER
ANTICOAGULATION  MANAGEMENT    Assessment     Today's INR result of 2.5 is Therapeutic (goal INR of 2.0-3.0)        Warfarin taken as previously instructed    No new diet changes affecting INR    No new medication/supplements affecting INR    Continues to tolerate warfarin with no reported s/s of bleeding or thromboembolism     Previous INR was Therapeutic     Is considering switching to a doac, will discuss at ov today    Plan:     Spoke with Krystle regarding INR result and instructed:     Warfarin Dosing Instructions:  Continue current warfarin dose 2.5 mg daily     Instructed patient to follow up no later than: 4-6 weeks    Krystle verbalizes understanding and agrees to warfarin dosing plan.    Instructed to call the [facility] for any changes, questions or concerns. (#[phone])   ?   Mikki Morley RN    Subjective/Objective:      Krystle Polanco, a 79 y.o. female is on warfarin.     Krystle reports:     Home warfarin dose: as updated on anticoagulation calendar per template     Missed doses: No     Medication changes:  No     S/S of bleeding or thromboembolism:  No     New Injury or illness:  No     Changes in diet or alcohol consumption:  No     Upcoming surgery, procedure or cardioversion:  No    Anticoagulation Episode Summary     Current INR goal:   2.0-3.0   TTR:   75.9 %   Next INR check:   10/29/2019   INR from last check:   2.50 (9/17/2019)   Weekly max warfarin dose:      Target end date:   Indefinite   INR check location:      Preferred lab:      Send INR reminders to:   [facility]    Indications    Pulmonary embolism (H) [I26.99]           Comments:            Anticoagulation Care Providers     Provider Role Specialty Phone number    Elke Mehta MD  Internal Medicine [phone]

## 2021-06-02 ENCOUNTER — RECORDS - HEALTHEAST (OUTPATIENT)
Dept: ADMINISTRATIVE | Facility: CLINIC | Age: 81
End: 2021-06-02

## 2021-06-02 VITALS — WEIGHT: 224 LBS | BODY MASS INDEX: 40.97 KG/M2

## 2021-06-02 VITALS — WEIGHT: 225.7 LBS | BODY MASS INDEX: 41.28 KG/M2

## 2021-06-02 VITALS — BODY MASS INDEX: 40.6 KG/M2 | WEIGHT: 222 LBS

## 2021-06-02 VITALS — BODY MASS INDEX: 40.42 KG/M2 | WEIGHT: 221 LBS

## 2021-06-02 VITALS — BODY MASS INDEX: 41.22 KG/M2 | WEIGHT: 224 LBS | HEIGHT: 62 IN

## 2021-06-02 VITALS — BODY MASS INDEX: 41.1 KG/M2 | WEIGHT: 224.7 LBS

## 2021-06-02 NOTE — TELEPHONE ENCOUNTER
ANTICOAGULATION  MANAGEMENT    Assessment     Today's INR result of 3.3 is Therapeutic (goal INR of 2.0-3.0)        Warfarin taken as previously instructed    No new diet changes affecting INR    No new medication/supplements affecting INR    Continues to tolerate warfarin with no reported s/s of bleeding or thromboembolism     Previous INR was Therapeutic    Plan:     Left a detailed message for Krystle regarding INR result and instructed:     Warfarin Dosing Instructions:  hold today then continue current warfarin dose     Instructed patient to follow up no later than: two weeks      Instructed to call the [facility] for any changes, questions or concerns. (#[phone])   ?   Mikki Morley RN    Subjective/Objective:      Krystle Polanco, a 79 y.o. female is on warfarin.     Krystle reports:     Home warfarin dose: as updated on anticoagulation calendar per template     Missed doses: No     Medication changes:  No     S/S of bleeding or thromboembolism:  No     New Injury or illness:  No     Changes in diet or alcohol consumption:  No     Upcoming surgery, procedure or cardioversion:  No    Anticoagulation Episode Summary     Current INR goal:   2.0-3.0   TTR:   74.2 %   Next INR check:   11/15/2019   INR from last check:   3.30! (11/1/2019)   Weekly max warfarin dose:      Target end date:   Indefinite   INR check location:      Preferred lab:      Send INR reminders to:   [facility]    Indications    Pulmonary embolism (H) [I26.99]           Comments:            Anticoagulation Care Providers     Provider Role Specialty Phone number    Elke Mehta MD  Internal Medicine [phone]

## 2021-06-02 NOTE — TELEPHONE ENCOUNTER
Zestar Study Consent Visit    Study description: ECG and PPG Study: Zestar Study      Krystle Polanco a 79 y.o. female , was contacted by today to discuss participation in the Zestar study.     The patient called the Clinical Trials Office to inquire about study participation.  The consent form was reviewed with the patient.     The review of the study included:    Study purpose     Conflict of interest    Device description      Study visits    Risks of participation    Benefits (if any)    Alternatives    Voluntary participation    Confidentiality     Compensation/costs of participation    Study stipends    Injury and legal rights    The subject was queried in regards to her willingness to continue and come in for scheduled appointment. her questions were answered to her satisfaction.   The patient has given her preliminary agreement to volunteer to participate in the above noted study.     Plan: Krystle Polanco will come to [facility] on 11/04/19  to continue consent process. If she continues to agrees to participate, the study visit will be done on the same day.  she was instructed to eat as usual before coming for study visit and take medications as usual too. she was encouraged to wear comfortable clothes and shoes to the study appointment.       Clau Coates RN

## 2021-06-03 VITALS
HEIGHT: 62 IN | TEMPERATURE: 97.6 F | SYSTOLIC BLOOD PRESSURE: 140 MMHG | HEART RATE: 55 BPM | WEIGHT: 229.6 LBS | BODY MASS INDEX: 42.25 KG/M2 | RESPIRATION RATE: 18 BRPM | DIASTOLIC BLOOD PRESSURE: 54 MMHG

## 2021-06-03 VITALS — WEIGHT: 224 LBS | HEIGHT: 62 IN | BODY MASS INDEX: 41.22 KG/M2

## 2021-06-03 VITALS — BODY MASS INDEX: 41.52 KG/M2 | WEIGHT: 227 LBS

## 2021-06-03 VITALS
OXYGEN SATURATION: 98 % | WEIGHT: 231.9 LBS | SYSTOLIC BLOOD PRESSURE: 138 MMHG | HEART RATE: 66 BPM | DIASTOLIC BLOOD PRESSURE: 76 MMHG | BODY MASS INDEX: 42.42 KG/M2

## 2021-06-03 NOTE — TELEPHONE ENCOUNTER
talked with Krystle who is unhappy with the central phones. She does not like the questioning from people who are not medical and are not in the clinic and feels the questions are invasive.   inr wed

## 2021-06-03 NOTE — TELEPHONE ENCOUNTER
Really bad side pain since sunday on right side.  Can hardly walk today.    Treated it with hot packs and aleve and doing better.    Also has appointment with chiropractor.    Pain a lot worse this morning.    Bursa in that hip pain in past and blood clots.    On Warfarin.    Rates pain now as 7/10.    Last BM 5am, has been a bit harder than usual. Not black or bloody    Able to urinate.    Has eaten small bowl of beans and ham sandwich last night.    No eating today, drinking water and tea.    No shortness of breath.    When pain is bad its hard to get up and walk, to even get to bathroom.    She agrees to go to ED.    Asha Foster, RN, [facility] Nurse Triage/Med Refills RN

## 2021-06-03 NOTE — TELEPHONE ENCOUNTER
ANTICOAGULATION  MANAGEMENT: Discharge Review    Krystle Polanco chart reviewed for anticoagulation continuity of care    Emergency room visit on 11/26 for flank pain.    Discharge disposition: Home    INR Results:       Recent labs: (last 7 days)     11/26/19  1540   INR 2.28*       Warfarin inpatient management: home regimen continued    Warfarin discharge instructions: home regimen continued     Medication Changes Affecting Anticoagulation: No    Additional Factors Affecting Anticoagulation: No    Plan     No adjustment to anticoagulation plan needed      Patient not contacted    Anticoagulation calendar updated inr with ov 12/4    Mikki Morley RN

## 2021-06-03 NOTE — TELEPHONE ENCOUNTER
ANTICOAGULATION  MANAGEMENT PROGRAM    Krystle Polanco is overdue for INR check.     Spoke with Omer who declined to schedule INR at this time. If calling back please schedule as soon as possible.      Mikki Morley RN

## 2021-06-03 NOTE — TELEPHONE ENCOUNTER
Called patient to let her know Dr. Wong suggested she follow up with Dr. Saenz for the ECG findings from the recent study visit.  She confirmed that these appointments have been made.    Roya Russo RN, BSN  Clinical Trials Nurse

## 2021-06-03 NOTE — TELEPHONE ENCOUNTER
Reason for Disposition    Constant abdominal pain lasting > 2 hours    Protocols used: ABDOMINAL PAIN - FEMALE-A-OH

## 2021-06-04 VITALS
HEART RATE: 54 BPM | DIASTOLIC BLOOD PRESSURE: 60 MMHG | HEART RATE: 60 BPM | HEIGHT: 62 IN | DIASTOLIC BLOOD PRESSURE: 70 MMHG | BODY MASS INDEX: 42.73 KG/M2 | RESPIRATION RATE: 18 BRPM | WEIGHT: 232.2 LBS | OXYGEN SATURATION: 97 % | BODY MASS INDEX: 42.61 KG/M2 | SYSTOLIC BLOOD PRESSURE: 138 MMHG | HEIGHT: 62 IN | WEIGHT: 231.56 LBS | SYSTOLIC BLOOD PRESSURE: 130 MMHG | RESPIRATION RATE: 16 BRPM

## 2021-06-04 VITALS
BODY MASS INDEX: 42.49 KG/M2 | HEART RATE: 83 BPM | HEIGHT: 62 IN | SYSTOLIC BLOOD PRESSURE: 114 MMHG | RESPIRATION RATE: 16 BRPM | WEIGHT: 230.9 LBS | DIASTOLIC BLOOD PRESSURE: 60 MMHG

## 2021-06-04 VITALS
SYSTOLIC BLOOD PRESSURE: 134 MMHG | WEIGHT: 228 LBS | BODY MASS INDEX: 41.37 KG/M2 | OXYGEN SATURATION: 98 % | DIASTOLIC BLOOD PRESSURE: 78 MMHG | HEART RATE: 72 BPM

## 2021-06-04 VITALS
SYSTOLIC BLOOD PRESSURE: 126 MMHG | DIASTOLIC BLOOD PRESSURE: 74 MMHG | HEART RATE: 72 BPM | OXYGEN SATURATION: 98 % | BODY MASS INDEX: 41.68 KG/M2 | WEIGHT: 229.7 LBS

## 2021-06-04 VITALS
DIASTOLIC BLOOD PRESSURE: 82 MMHG | BODY MASS INDEX: 41.35 KG/M2 | RESPIRATION RATE: 16 BRPM | HEART RATE: 68 BPM | SYSTOLIC BLOOD PRESSURE: 122 MMHG | HEIGHT: 62 IN | WEIGHT: 224.7 LBS

## 2021-06-04 VITALS — HEIGHT: 62 IN | WEIGHT: 228 LBS | BODY MASS INDEX: 41.96 KG/M2

## 2021-06-04 VITALS
RESPIRATION RATE: 14 BRPM | BODY MASS INDEX: 42.14 KG/M2 | HEIGHT: 62 IN | DIASTOLIC BLOOD PRESSURE: 72 MMHG | OXYGEN SATURATION: 95 % | HEART RATE: 68 BPM | WEIGHT: 229 LBS | SYSTOLIC BLOOD PRESSURE: 108 MMHG

## 2021-06-04 VITALS
RESPIRATION RATE: 18 BRPM | BODY MASS INDEX: 42.33 KG/M2 | WEIGHT: 230 LBS | DIASTOLIC BLOOD PRESSURE: 80 MMHG | HEART RATE: 80 BPM | SYSTOLIC BLOOD PRESSURE: 142 MMHG | HEIGHT: 62 IN

## 2021-06-04 VITALS — HEIGHT: 62 IN | WEIGHT: 229 LBS | BODY MASS INDEX: 42.14 KG/M2

## 2021-06-04 NOTE — PROGRESS NOTES
[facility] Follow Up Note    Assessment/Plan:  1. Benign Essential Hypertension  Under reasonable control with current medications.  Will update labs.  - HM2(CBC w/o Differential)      2. Type 2 diabetes mellitus without complication, without long-term current use of insulin (H)  Glycohemoglobin at 7.2.  If renal function within normal limits, and abdominal symptoms controlled, could consider an increase in metformin.  - HM2(CBC w/o Differential)  - Glycosylated Hemoglobin A1c    3. Adenomatous Goiter  We will update labs  - Thyroid [place]    4. Flank pain  Seen in the emergency department for right flank pain.  Area of tenderness anterior axillary line.  The pain is along the lower rib border.  Seems musculoskeletal in origin.  Abdominal CT scan from ER and labs reviewed, WBC was elevated.  Chest x-ray negative  Recommendation: Ice to the affected area.  For 2 weeks, she is to avoid any lifting, bending or twisting.  Tylenol for pain.  If symptoms do not improve, will pursue additional evaluation.  - cyclobenzaprine (FLEXERIL) 10 MG tablet; Take 1 tablet (10 mg total) by mouth 2 (two) times a day as needed for muscle spasms.  Dispense: 20 tablet; Refill: 0  - Urinalysis-UC if Indicated  - Culture, Urine    5. Other pulmonary embolism without acute cor pulmonale, unspecified chronicity (H)  History of pulmonary embolism.  Of note, at no point was her INR subtherapeutic  - INR    6.  Paroxysmal atrial fibrillation with PVCs  She has just come from cardiology consult.  This consult was reviewed with her in its entirety.  Reassurance provided that she is getting an excellent work-up which includes stress MRI, along with an EKG following 1 week of sotalol and a Holter monitor in 2 to 3 months.    Follow-up in a month    Elke Mehta MD    Chief Complaint:  Chief Complaint   Patient presents with     Follow-up     3 mo ck       History of Present Illness:  Krystle is a 79 y.o. female who is here today  for follow-up of her usual chronic medical problems.  Additionally, she is here for emergency department follow-up for flank pain as well as a follow-up for paroxysmal atrial fibrillation noted on a study examination.    First, she is here today immediately following her cardiology evaluation.  She met with an electrophysiologist regarding her high burden of PVCs.  This has been known in the past.  Her echocardiogram has been stable over the last couple of years.  She was enrolled in a as a starter study.  High burden of ectopy was noted.  Therefore, she underwent a Holter monitor which showed bursts of atrial fibrillation.  Therefore, a cardiology consult was initiated and a work-up is in progress.  She is very worried about this.  She would like my opinion on the cardiologist and his plans.    Additionally, she was seen in the emergency department with right flank pain.  The pain is not present with respiration.  It is not a gastrointestinal pain.  The evaluation in the ER was negative for kidney stones or an abdominal process.  A chest x-ray was negative.  Of note, she was therapeutic on her Coumadin at the time.  A d-dimer was negative.  She does have definitive tenderness over the right lower rib cage with palpation.    Additionally, with regard to her irritable bowel, it comes and goes.  She has been able to reduce her colestipol.    With regard to diabetes, her blood sugars are running up a bit higher now that she is on extended release metformin.  This was ordered to reduce gastrointestinal symptoms noted with metformin.    She has not been able to make any headway with regard to weight loss, exercise etc.      Review of Systems:  A comprehensive review of systems was performed and was otherwise negative    PFSH:  Social History: She is  with adult children.  Of note, she is significantly relieved that she found an insurance plan that covers her physicians at [facility] as well as here.  Social  History     Tobacco Use   Smoking Status Never Smoker   Smokeless Tobacco Never Used       Past History:   Past Medical History:   Diagnosis Date     Adenomatous goiter 2004     Arthropathy of shoulder region unknown     Asthma 2009     Atrial flutter (H) 2017     Bartholin gland cyst unknown     Diabetes mellitus type II, controlled (H) 2004     Esophageal reflux 1980     Essential hypertension 1995     Gastroparesis 1999     Herpes simplex type 1 infection unknown     IBS (irritable bowel syndrome) 1971     Leukocytosis unknown     Osteopenia 2009     Vitamin D deficiency 2014       Current Outpatient Medications   Medication Sig Dispense Refill     acetaminophen (TYLENOL) 500 MG tablet Take 500 mg by mouth every 4 (four) hours as needed for pain.        amLODIPine (NORVASC) 5 MG tablet Take 1 tablet (5 mg total) by mouth daily. 90 tablet 11     atorvastatin (LIPITOR) 80 MG tablet Take 1 tablet (80 mg total) by mouth at bedtime. 90 tablet 3     azelastine (ASTELIN) 137 mcg (0.1 %) nasal spray 2 sprays into each nostril 2 (two) times a day Use in each nostril as directed. 90 mL 3     blood glucose test (ACCU-CHEK MOISE) strips Use 1 each As Directed daily. 100 strip 3     blood-glucose meter Misc Use Daily 1 each 0     CALCIUM CARBONATE (CALCIUM 500 ORAL) Take by mouth 2 (two) times a day.       cholecalciferol, vitamin D3, 1,000 unit tablet Take 1,000 Units by mouth 2 (two) times a day.       colestipol (COLESTID) 1 gram tablet Take 1 tablet (1 g total) by mouth 2 (two) times a day. 180 tablet 1     cranberry extract 300 mg Tab Take 1 tablet by mouth daily.        cyclobenzaprine (FLEXERIL) 10 MG tablet Take 1 tablet (10 mg total) by mouth 2 (two) times a day as needed for muscle spasms. 20 tablet 0     dicyclomine (BENTYL) 10 MG capsule TAKE ONE TO TWO CAPSULES BY MOUTH EVERY 6 HOURS AS NEEDED 120 capsule 0     LACTOBACILLUS ACIDOPHILUS (PROBIOTIC ORAL) Take 1 tablet by mouth 2 (two) times a day.        lancing  device Misc Use As Directed 4 (four) times a week.        losartan (COZAAR) 100 MG tablet Take 1 tablet (100 mg total) by mouth daily. 90 tablet 3     LUTEIN ORAL Take 1 tablet by mouth daily.              metFORMIN (GLUCOPHAGE XR) 500 MG 24 hr tablet Take 2 tablets (1,000 mg total) by mouth daily with supper. Am meal (Patient taking differently: Take 1,000 mg by mouth daily with breakfast. Am meal      ) 180 tablet 3     montelukast (SINGULAIR) 10 mg tablet Take 1 tablet (10 mg total) by mouth daily. 90 tablet 3     nitrofurantoin (MACRODANTIN) 50 MG capsule Take 1 capsule (50 mg total) by mouth daily. 90 capsule 3     omeprazole (PRILOSEC) 20 MG capsule Take 1 capsule (20 mg total) by mouth daily. 90 capsule 3     PROAIR HFA 90 mcg/actuation inhaler INHALE TWO PUFFS BY MOUTH FOUR TIMES A DAY AS NEEDED FOR WHEEZING 25.5 g 0     psyllium (METAMUCIL) 3.4 gram packet Take 1 packet by mouth every evening.       QVAR REDIHALER 40 mcg/actuation HFAB inhaler INHALE TWO PUFFS BY MOUTH TWICE A DAY AS NEEDED (Patient taking differently: INHALE TWO PUFFS BY MOUTH TWICE A DAY) 10.6 g 2     SIMETHICONE (GAS-X ORAL) Take by mouth 2 (two) times a day.       sotalol (BETAPACE) 80 MG tablet Take 0.5 tablets (40 mg total) by mouth 2 (two) times a day. 60 tablet 1     tiotropium (SPIRIVA WITH HANDIHALER) 18 mcg inhalation capsule Place 1 capsule (2 puffs total) into inhaler and inhale daily. Place 1 capsule into the inhaler device. Close and press button to crush the capsule. Inhale the contents of the crushed capsule in 2 breaths. 30 capsule 2     UBIDECARENONE (COENZYME Q10 ORAL) Take 1 tablet by mouth daily.        warfarin (COUMADIN/JANTOVEN) 5 MG tablet One tab daily.  Adjust to INR of 2 to 3 90 tablet 3     No current facility-administered medications for this visit.        Family History: Nothing new    Physical Exam:  General Appearance:   She is pleasant and well-appearing and in no acute distress  Vitals:    12/04/19 1136  "  BP: 138/70   Patient Site: Left Arm   Patient Position: Sitting   Cuff Size: Adult Large   Pulse: (!) 54   Resp: 18   SpO2: 97%   Weight: (!) 231 lb 9 oz (105 kg)   Height: 5' 2.25\" (1.581 m)     Wt Readings from Last 3 Encounters:   12/04/19 (!) 231 lb 9 oz (105 kg)   12/04/19 (!) 232 lb 3.2 oz (105.3 kg)   11/26/19 (!) 228 lb (103.4 kg)     Body mass index is 42.01 kg/m .    Head neck exam is negative  Lungs are clear to auscultation percussion  Cardiac exam reveals bradycardia  Abdomen is with centripetal obesity  Extremities are intact  Chest wall is examined.  There is a definitive area of point tenderness along the right rib cage at about the area of the anterior clavicular line.    Data Review:    Analysis and Summary of Old Records (2): Detailed review of ED records and cardiology records    Records Requested (1):       Other History Summarized (from other people in the room) (2):     Radiology Tests Summarized (XRAY/CT/MRI/DXA) (1): Reviewed CT scan, Holter monitor, zoster report, echocardiogram    Labs Reviewed (1): Reviewed labs and ordered labs    Medicine Tests Reviewed (EKG/ECHO/COLONOSCOPY/EGD) (1):     Independent Review of EKG or X-RAY (2):     Time spent in excess of 40 minutes with more than half that time spent in counseling and discussion of cardiology visit for paroxysmal A. fib and flank pain    "

## 2021-06-04 NOTE — TELEPHONE ENCOUNTER
Called patient regarding concerns she had on Saturday after Sotolol medication increase on 12/11/19    LVM for patient to call back to discuss this further and reassess mouth sores after Dr. Wong advised pt to reduce dose back to 40mg two times a day.    Yusra

## 2021-06-04 NOTE — PROGRESS NOTES
Dr. Edis OLVERA- Since the EKG check last week, pt developed symptoms of mouth sores while on the increased dose of 80mg two times a day.  Pt was educated to cut dose back down to Sotolol 40mg two times a day by Dr. Wong who was on call Saturday 12/14/19 when the patient called in.      Spoke to patient who stated her mouth sores were better on the lowered dosage and is tolerating the medication.      Thank you,    Yusra

## 2021-06-04 NOTE — TELEPHONE ENCOUNTER
----- Message from Tanner Wong MD sent at 12/14/2019  2:47 PM CST -----  Yusra, Please see my chart note

## 2021-06-04 NOTE — PROGRESS NOTES
"Phone call from patient Saturday, December 14, 2018.  Patient is concerned about development of \"mouth sores\" since initiating sotalol therapy.  The patient dose was increased 4 days ago from 40 mg twice daily to 80 mg twice daily.  I instructed the patient to cut her dose back to 40 mg twice daily and contact Yusra Hoover RN on Monday.  "

## 2021-06-04 NOTE — TELEPHONE ENCOUNTER
ANTICOAGULATION  MANAGEMENT PROGRAM    Krystle Polanco is overdue for INR check.     Left message to call and schedule INR appointment as soon as possible.      Mikki Morley RN

## 2021-06-04 NOTE — PROGRESS NOTES
EKG Visit    Pt here for EKG, QTc check after increasing Sotalol to 80mg BID on 12/11/19    EKG shows SR freq PVCs with HR of 79, QT of 406, and QTc of 465  QTc within acceptable limits, pts EKG was compared to previous EKG in EMR, EKG is stable, and there has been minimal change in QTc    Pt reports tolerating medication without complaint, reviewed with pt reasoning for above medication, reviewed and confirmed pt understands current dose, administration, and frequency of medication, most common potential side effects from the medication, s/s to call the clinic with and when to seek emergency medical care  Ptwas instructed to continue current medication as prescribed, results would be sent to ordering provider for review, pt will be contacted with further changes if necessary and pt verbalized understanding     Results sent to  Dr Saenz for further review and recommendations if necessary.    12/13/2019 2:08 PM  Yusra Perez RN

## 2021-06-04 NOTE — TELEPHONE ENCOUNTER
ANTICOAGULATION  MANAGEMENT    Assessment     Today's INR result of 2.8 is Therapeutic (goal INR of 2.0-3.0)        Warfarin taken as previously instructed    No new diet changes affecting INR    No new medication/supplements affecting INR    Potential interaction between sotalol and warfarin which may affect subsequent INRs sotalol 80 mg bid    Continues to tolerate warfarin with no reported s/s of bleeding or thromboembolism     Previous INR was Therapeutic    Plan:     Left a detailed message for Krystle regarding INR result and instructed:     Warfarin Dosing Instructions:  Continue current warfarin dose 2.5 mg daily   (0 % change)    Instructed patient to follow up no later than: 1-2 weeks      Instructed to call the [facility] for any changes, questions or concerns. (#[phone])   ?   Mikki Morley RN    Subjective/Objective:      Krystle Polanco, a 79 y.o. female is on warfarin.     Krystle reports:     Home warfarin dose: as updated on anticoagulation calendar per template     Missed doses: No     Medication changes:  No     S/S of bleeding or thromboembolism:  No     New Injury or illness:  No     Changes in diet or alcohol consumption:  No     Upcoming surgery, procedure or cardioversion:  No    Anticoagulation Episode Summary     Current INR goal:   2.0-3.0   TTR:   75.5 % (1 y)   Next INR check:   12/18/2019   INR from last check:   2.80 (12/4/2019)   Weekly max warfarin dose:      Target end date:   Indefinite   INR check location:      Preferred lab:      Send INR reminders to:   [facility]    Indications    Pulmonary embolism (H) [I26.99]           Comments:            Anticoagulation Care Providers     Provider Role Specialty Phone number    Elke Mehta MD  Internal Medicine [phone]

## 2021-06-04 NOTE — TELEPHONE ENCOUNTER
Spoke with patient, her mouth sores and diarrhea has resolved with lower sotalol dose.  Medication list updated.    She has questions about her blood pressure, she is encouraged to follow up with her PMD regarding ongoing HTN management.  KIANA

## 2021-06-04 NOTE — TELEPHONE ENCOUNTER
Who is calling:  Patient  Reason for Call:  The patient is returning a call to anticoagulation regarding her INR today 12/4.  Okay to leave a detailed message: Yes

## 2021-06-04 NOTE — PROGRESS NOTES
EKG Visit    Pt here for EKG, QTc check after starting 40mg Sotalol BID on 12/7/19    EKG shows SR with HR of 83, QT of 380, and QTc of 446  QTc within acceptable limits, pts EKG was compared to previous EKG in EMR, EKG is stable, and there has been minimal change in QTc    Pt reports tolerating medication without complaint, reviewed with pt reasoning for above medication, reviewed and confirmed pt understands current dose, administration, and frequency of medication, most common potential side effects from the medication, s/s to call the clinic with and when to seek emergency medical care  Ptwas instructed to continue current medication as prescribed, results would be sent to ordering provider for review, pt will be contacted with further changes if necessary and pt verbalized understanding     Per Dr. Saenz's office visit, plan is to increase sotolol to 80mg two times a day if BP and QTC allow.  /60.     Educated pt that Dr. Saenz will review EKG and we will be calling her with recommendations to increase her dose and will also be scheduling another follow up EKG visit.  Pt agrees and looks forward to our call.    Results sent to  Dr Saenz for further review and recommendations if necessary  12/9/2019 10:59 AM  Yusra Perez RN

## 2021-06-04 NOTE — PROGRESS NOTES
11/04/19- Pt was noted to have frequent PVCs via Zestar study EKG.  11/13/19- Dr. Wong ordered Holter monitor to assess frequency and to follow up with Dr. Saenz.  12/04/19- Dr. Saenz saw pt in clinic, started Sotolol 40mg two times a day. Pt was to follow up in 3 months with holter monitor prior.  12/09/19- Pt came in for QTC check, pt was very hesitant but agreed to increased Sotolol to 80mg two times a day on 12/11/19.   12/14/19- Pt called with symptoms of mouth sores and diarrhea, was advised to decrease dose to 40mg two times a day.  12/16/19- Pt stated symptoms resolved and is tolerating the medication.  12/18/19- Initial holter monitor read by Dr. Saenz- pt scheduled for MRI on 1/14/19 and needs follow up after results.  12/19/19- Message sent to scheduling to move follow up to the end of January to go over results and review plan moving forward.  Will discuss ordering repeat holter at this office visit.      Yusra

## 2021-06-04 NOTE — TELEPHONE ENCOUNTER
Question following Office Visit  When did you see your provider:   Today, 12/04/19    What is your question:   Please send medication to LOCAL pharmacy as requested at this ov ASAP      cyclobenzaprine (FLEXERIL) 10 MG tablet 20 tablet 0 12/4/2019     [id number]     Okay to leave a detailed message: Yes

## 2021-06-04 NOTE — TELEPHONE ENCOUNTER
RN cannot approve Refill Request    RN can NOT refill this medication medication not on med list. Last office visit: 12/4/2019 Elke Mehta MD Last Physical: Visit date not found Last MTM visit: Visit date not found Last visit same specialty: 12/4/2019 Elke Mehta MD.  Next visit within 3 mo: Visit date not found  Next physical within 3 mo: Visit date not found      Christiana CANCHOLA Agustín, [facility] Triage/Med Refill 12/20/2019    Requested Prescriptions   Pending Prescriptions Disp Refills     albuterol (PROAIR HFA) 90 mcg/actuation inhaler 25.5 g 0     Sig: INHALE TWO PUFFS BY MOUTH FOUR TIMES A DAY AS NEEDED FOR WHEEZING       Albuterol/Levalbuterol Refill Protocol Passed - 12/20/2019 10:06 AM        Passed - PCP or prescribing provider visit in last year     Last office visit with prescriber/PCP: 12/4/2019 Elke Mehta MD OR same dept: 12/4/2019 Elke Mehta MD OR same specialty: 12/4/2019 Elke Mehta MD Last physical: Visit date not found       Next appt within 3 mo: Visit date not found  Next physical within 3 mo: Visit date not found  Prescriber OR PCP: Elke Mehta MD  Last diagnosis associated with med order: 1. Moderate persistent asthma, unspecified whether complicated  - albuterol (PROAIR HFA) 90 mcg/actuation inhaler; INHALE TWO PUFFS BY MOUTH FOUR TIMES A DAY AS NEEDED FOR WHEEZING  Dispense: 25.5 g; Refill: 0    If protocol passes may refill for 6 months if within 3 months of last provider visit (or a total of 9 months). If patient requesting >1 inhaler per month refill x 6 months and have patient make appointment with provider.

## 2021-06-04 NOTE — TELEPHONE ENCOUNTER
Medication Question or Clarification  Who is calling: Pharmacy: [facility]  What medication are you calling about? (include dose and sig)   metFORMIN (GLUCOPHAGE XR) 500 MG 24 hr tablet 180 tablet 3 12/17/2019     Sig - Route: Take 2 tablets (1,000 mg total) by mouth daily with supper. Am meal - Oral    Sent to pharmacy as: metFORMIN  mg tablet,extended release 24 hr (Glucophage XR)        Who prescribed the medication?: Dr Ng  What is your question/concern?: Clarify sig as it has take 2 tabs with supper. Am meal.  Pharmacy: [facility]  Okay to leave a detailed message?: Yes  Site CMT - Please call the pharmacy to obtain any additional needed information.

## 2021-06-04 NOTE — PROGRESS NOTES
From: Rivera Saenz MD  Sent: 12/18/2019  12:33 PM CST  To: Yusra Perez RN    Sorry, I did not notice that sotalol has been already increased.  Let us rediscuss after stress MRI results.   Meanwhile, patient can continue taking sotalol 80 mg twice daily.  Thank you  ----- Message -----  From: Yusra Perez RN  Sent: 12/13/2019   2:12 PM CST  To: MD Dr. Dany Mclean- please review EKG regarding sotolol increase to 80mg two times a day.  Per your office visit, the plan was to follow up in 3 months with holter monitor prior to assess PVCs.    Any new recommendations?      Thank you,  Cruz

## 2021-06-04 NOTE — TELEPHONE ENCOUNTER
Refill Request  Did you contact pharmacy: No  Medication name:   Requested Prescriptions     Pending Prescriptions Disp Refills     atorvastatin (LIPITOR) 80 MG tablet 90 tablet 3     Sig: Take 1 tablet (80 mg total) by mouth at bedtime.     losartan (COZAAR) 100 MG tablet 90 tablet 3     Sig: Take 1 tablet (100 mg total) by mouth daily.     metFORMIN (GLUCOPHAGE XR) 500 MG 24 hr tablet 180 tablet 3     Sig: Take 2 tablets (1,000 mg total) by mouth daily with supper. Am meal     montelukast (SINGULAIR) 10 mg tablet 90 tablet 3     Sig: Take 1 tablet (10 mg total) by mouth daily.     nitrofurantoin (MACRODANTIN) 50 MG capsule 90 capsule 3     Sig: Take 1 capsule (50 mg total) by mouth daily.     warfarin ANTICOAGULANT (COUMADIN/JANTOVEN) 5 MG tablet 90 tablet 3     Sig: One tab daily.  Adjust to INR of 2 to 3     Who prescribed the medication: Dr Mehta  Pharmacy Name and Location: [facility]  Is patient out of medication: no, she is very worried that she will run out when Dr Mehta is not at the office.   Patient notified refills processed in 72 hours:  no  Okay to leave a detailed message: yes, would like to speak to Priya the providers nurse.

## 2021-06-04 NOTE — TELEPHONE ENCOUNTER
Spoke to patient regarding increasing Sotolol medication.  Spoke with Dr. Saenz regarding EKG recheck, he would like 48 hours not 8 hours after increase.  Pt has agreed to come in Friday morning for EKG check after 5 doses.  She will start her increased dose tomorrow morning.    EKG order in.    No further questions or concerns at this time,    Yusra

## 2021-06-04 NOTE — TELEPHONE ENCOUNTER
----- Message from Rivera Saenz MD sent at 12/10/2019  3:54 PM CST -----  Okay to increase sotalol to 80 mg twice daily for PAF and PVC suppression.  Repeat ECG for 8 hours after up titration of sotalol.  Thank you very much  ----- Message -----  From: Yusra Perez RN  Sent: 12/9/2019  11:10 AM CST  To: Elke Mehta MD, Rivera Saenz MD

## 2021-06-05 ENCOUNTER — RECORDS - HEALTHEAST (OUTPATIENT)
Dept: INTERNAL MEDICINE | Facility: CLINIC | Age: 81
End: 2021-06-05

## 2021-06-05 VITALS
BODY MASS INDEX: 40.82 KG/M2 | SYSTOLIC BLOOD PRESSURE: 132 MMHG | HEART RATE: 72 BPM | TEMPERATURE: 97.7 F | OXYGEN SATURATION: 97 % | WEIGHT: 225 LBS | DIASTOLIC BLOOD PRESSURE: 64 MMHG

## 2021-06-05 VITALS
BODY MASS INDEX: 41.04 KG/M2 | OXYGEN SATURATION: 98 % | SYSTOLIC BLOOD PRESSURE: 124 MMHG | HEART RATE: 80 BPM | WEIGHT: 223 LBS | DIASTOLIC BLOOD PRESSURE: 70 MMHG | TEMPERATURE: 99 F | HEIGHT: 62 IN

## 2021-06-05 VITALS — BODY MASS INDEX: 40.85 KG/M2 | HEIGHT: 62 IN | WEIGHT: 222 LBS

## 2021-06-05 VITALS
BODY MASS INDEX: 40.1 KG/M2 | HEART RATE: 45 BPM | WEIGHT: 221 LBS | SYSTOLIC BLOOD PRESSURE: 138 MMHG | DIASTOLIC BLOOD PRESSURE: 58 MMHG | OXYGEN SATURATION: 97 %

## 2021-06-05 VITALS
HEART RATE: 70 BPM | OXYGEN SATURATION: 95 % | WEIGHT: 222 LBS | SYSTOLIC BLOOD PRESSURE: 122 MMHG | BODY MASS INDEX: 40.28 KG/M2 | DIASTOLIC BLOOD PRESSURE: 78 MMHG | RESPIRATION RATE: 20 BRPM

## 2021-06-05 VITALS
DIASTOLIC BLOOD PRESSURE: 62 MMHG | BODY MASS INDEX: 40.46 KG/M2 | OXYGEN SATURATION: 94 % | WEIGHT: 223 LBS | HEART RATE: 80 BPM | SYSTOLIC BLOOD PRESSURE: 116 MMHG

## 2021-06-05 DIAGNOSIS — R92.0 MAMMOGRAPHIC MICROCALCIFICATION: ICD-10-CM

## 2021-06-05 NOTE — TELEPHONE ENCOUNTER
Return message left from patient yesterday morning at 11:04 stating she would be in meetings until after 2:30.  She requested a call back at that time.      Return call to patient at 4:15 pm yesterday afternoon.      Pt states that she is concerned as she did not get any instructions regarding her stress MRI and is getting conflicting information regarding coverage.  Reviewed instructions for MRI listed on scheduling page as well as arrival time and location.  Explained ordering procedure and that if a hard stop is noted we cannot place the order.  Explained that a message has been sent to insurance verification and that if her coverage is denied then we will let her know.  She is frustrated with her lack of information prior to conversation, answered all her questions and she seemed satisfied by the time our conversation was complete.  Reviewed contact information for further questions or concerns.

## 2021-06-05 NOTE — TELEPHONE ENCOUNTER
Prior Authorization Request  Who s requesting:  Pharmacy  Pharmacy Name and Location: [facility] #[id number]  Medication Name: semaglutide (RYBELSUS) 3 mg tablet  Insurance Plan: [facility]   Insurance Member ID Number:  [id number]  CoverMyMeds Key: [id number]  Informed patient that prior authorizations can take up to 10 business days for response:   NA  Okay to leave a detailed message: No

## 2021-06-05 NOTE — TELEPHONE ENCOUNTER
ANTICOAGULATION  MANAGEMENT    Assessment     Today's INR result of 2.7 is Therapeutic (goal INR of 2.0-3.0)        Warfarin taken as previously instructed    No new diet changes affecting INR    No new medication/supplements affecting INR    Continues to tolerate warfarin with no reported s/s of bleeding or thromboembolism     Previous INR was Therapeutic    Plan:     Spoke with Krystle regarding INR result and instructed:     Warfarin Dosing Instructions:  Continue current warfarin dose 2.5 mg daily     Instructed patient to follow up no later than: one month      Krystle verbalizes understanding and agrees to warfarin dosing plan.    Instructed to call the [facility] for any changes, questions or concerns. (#[phone])   ?   Mikki Morley RN    Subjective/Objective:      Krystle Polanco, a 79 y.o. female is on warfarin.     Krystle reports:     Home warfarin dose: as updated on anticoagulation calendar per template     Missed doses: No     Medication changes:  No     S/S of bleeding or thromboembolism:  No     New Injury or illness:  No     Changes in diet or alcohol consumption:  No     Upcoming surgery, procedure or cardioversion:  No    Anticoagulation Episode Summary     Current INR goal:   2.0-3.0   TTR:   85.1 % (1 y)   Next INR check:   2/4/2020   INR from last check:   2.70 (1/8/2020)   Weekly max warfarin dose:      Target end date:   Indefinite   INR check location:      Preferred lab:      Send INR reminders to:   [facility]    Indications    Pulmonary embolism (H) [I26.99]           Comments:            Anticoagulation Care Providers     Provider Role Specialty Phone number    Elke Mehta MD  Internal Medicine [phone]

## 2021-06-05 NOTE — TELEPHONE ENCOUNTER
Medication Request  Medication name:   1. Contour Next 1 test strips , Test 1 xs daily   90 day supply   Requested Pharmacy:   [facility] # [id number]  [address] [facility] [address]    Reason for request: Insurance will only cover this brand of diabetic supplies. Please send New Rx     When did you use medication last?:  N/A    Patient offered appointment:  No     Okay to leave a detailed message: yes

## 2021-06-05 NOTE — PROGRESS NOTES
Here for MRI stress test, EKG with frequent single PVC's, scanner unable to synch properly to irregular rhythm. Lidocaine 2%  100 mg given IV per order for PVC suppression.  Tolerated without complaint.

## 2021-06-05 NOTE — TELEPHONE ENCOUNTER
"Anticoagulation Annual Referral Renewal Review    Krystle Polanco's chart reviewed for annual renewal of referral to anticoagulation monitoring.        Criteria for anticoagulation nurse and/or pharmacist renewal met   Warfarin indication: PE   bilateral Yes, per indication   Current with INR monitoring/compliant Yes Yes   Date of last office visit 12/4/19 Yes, had office visit within last year   Time in Therapeutic Range (TTR) 48.1 % No, TTR < 60 %       Krystle Polanco did NOT meet all criteria for anticoagulation management program initiated renewal and requires provider review. Using dot phrase, \".acmrenewalprovider\", please advise if [place] anticoagulation management referral should be renewed or if patient should be seen in office to review anticoagulation therapy      Mikki Morley RN  4:53 PM      "

## 2021-06-05 NOTE — TELEPHONE ENCOUNTER
PC from pt  She has cardiac MRI stress scheduled on 1/14/19  Pt has concerns as she has allergy to Conray Dye  She also discussed that no one has contacted her with preporation instructions  Also wanting to know about insurance authorization for the cardiac MRI stress test  Pt left msg at 2:58pm and advised that she would not be available after 3pm today, but would be available tomorrow 1/9/19    MRI at [facility] was contacted, MRI uses gadolinium and has no contraindication with pts that have allergy to conray dye  Insurance verification was contacted regarding clearance, likely pt will need to contact insurance co directly  Cardiac nurse per MRI will contact the pt with instructions 1-2 days prior  Lesley

## 2021-06-05 NOTE — TELEPHONE ENCOUNTER
Medication Question or Clarification  Who is calling: Pharmacy  What medication are you calling about (include dose and sig)?:    Disp Refills Start End    colestipoL (COLESTID) 1 gram tablet 90 tablet 3 2/5/2020     Sig - Route: Take 1 tablet (1 g total) by mouth 2 (two) times a day. - Oral    Sent to pharmacy as: colestipoL 1 gram tablet (COLESTID)    Notes to Pharmacy: 90 DAY SAVE    E-Prescribing Status: Receipt confirmed by pharmacy (2/5/2020  9:49 AM CST)      Who prescribed the medication?: Elke Mehta MD   What is your question/concern?: Caller stated she is calling to request for an increase QTY to 180 for a 3 month supply. Caller stated that the message to the pharmacy and QTY does not make up to 3 months supply. Please call.  Requested Pharmacy: [facility] #[id number]  Okay to leave a detailed message?: Yes  [phone]

## 2021-06-05 NOTE — TELEPHONE ENCOUNTER
Pt came in today stating her insurance has changed.  Now has supplemental BC/BS and reports her diabetic supplies need to be changed.  Reports insurance will now cover [employer].  Needs new test strips and glucometer.

## 2021-06-05 NOTE — PROGRESS NOTES
[facility] Follow Up Note-follow-up of usual medical problems    Assessment/Plan:  1. PVC's (premature ventricular contractions)  Note from the electrophysiology team reviewed.  She has been started on low-dose sotalol for suppression of heavy burden of PVCs.  Reviewed MRI of the heart.  No signs of myocardial aberration or ischemia.  Recommendation: Follow-up per cardiology.  She is having an echo and Holter in 6 months.  Continue the same    2. Benign Essential Hypertension  Nicely controlled on current medications.  I suspect the low-dose sotalol has helped improve blood pressure control    3. Type 2 diabetes mellitus without complication, without long-term current use of insulin (H)  Sugars have been somewhat elevated at 140-150.  Glycohemoglobin has been inching up.  Patient would like to continue trying to optimize diet for now.  I suspect we may need to add an additional medication.  It is somewhat tricky with her with regard to her irritable bowel and GI disturbances.  Recommendations: Have printed a prescription for oral semaglutide.  She is going to contact her pharmacist and see if this would be something that could be affordable or covered.  She will not yet start this.  Labs will be done in 1 month and we will go from there  - semaglutide (RYBELSUS) 3 mg tablet; Take 3 mg by mouth daily.  Dispense: 30 tablet; Refill: 0  - Glycosylated Hemoglobin A1c; Future  - HM2(CBC w/o Differential); Future  - Basic Metabolic Panel; Future    4. BMI 40.0-44.9, adult (H)  Encourage regular physical activity    5. Hyperlipidemia associated with type 2 diabetes mellitus (H)  Monitoring    6. Other pulmonary embolism without acute cor pulmonale, unspecified chronicity (H)  On chronic Coumadin        Elke Mehta MD    Chief Complaint:  Chief Complaint   Patient presents with     Follow-up     Diabetes       History of Present Illness:  Krystle is a 79 y.o. female who is here today for follow-up with  regard to her usual medical problems.  Since last visit, she has had bronchitis and a respiratory tract infection.  She has also been evaluated by electrophysiology for her heavy burden of PVCs.  She has been seen for this in the past.  She has since had an MRI stress test as well as an evaluation of her myocardium.  This all came back relatively unremarkable.  She does state that she was developing some symptoms of the PVCs to include some shortness of breath with exertion.  Since being started on the sotalol, many of the symptoms have markedly improved.  Overall, she is feeling better.  She does state that the sotalol at therapeutic or higher doses does cause diarrhea.  With her irritable bowel, this has been somewhat difficulty.    She states that now that they are over the respiratory tract infections, she is hoping to do better.  She does note that her blood sugars have been elevated in the 140s to 150s.  She had gone about 6 weeks without checking.  Now that she is back on board, she is worried about her sugars.  She denies polyuria or polydipsia.    She is currently free from symptoms of an infectious disease nature.    She is not exercising much.    Review of Systems:  A comprehensive review of systems was performed and was otherwise negative    PFSH:  Social History: She is  with adult children  Social History     Tobacco Use   Smoking Status Never Smoker   Smokeless Tobacco Never Used       Past History:   Past Medical History:   Diagnosis Date     Adenomatous goiter 2004     Arthropathy of shoulder region unknown     Asthma 2009     Atrial flutter (H) 2017     Bartholin gland cyst unknown     Diabetes mellitus type II, controlled (H) 2004     Esophageal reflux 1980     Essential hypertension 1995     Gastroparesis 1999     Herpes simplex type 1 infection unknown     IBS (irritable bowel syndrome) 1971     Leukocytosis unknown     Osteopenia 2009     Vitamin D deficiency 2014   \  Current Outpatient  Medications   Medication Sig Dispense Refill     acetaminophen (TYLENOL) 500 MG tablet Take 500 mg by mouth every 4 (four) hours as needed for pain.        albuterol (PROAIR HFA) 90 mcg/actuation inhaler INHALE TWO PUFFS BY MOUTH FOUR TIMES A DAY AS NEEDED FOR WHEEZING 25.5 g 0     amLODIPine (NORVASC) 5 MG tablet Take 1 tablet (5 mg total) by mouth daily. 90 tablet 11     atorvastatin (LIPITOR) 80 MG tablet Take 1 tablet (80 mg total) by mouth at bedtime. 90 tablet 3     azelastine (ASTELIN) 137 mcg (0.1 %) nasal spray 2 sprays into each nostril 2 (two) times a day Use in each nostril as directed. 90 mL 3     beclomethasone (QVAR REDIHALER) 40 mcg/actuation HFAB inhaler Inhale 2 puffs 2 (two) times a day. 31.8 g 3     blood sugar diagnostic, disc Strp Use as directed with testing blood sugars once daily.  Dispense Contour Next brand per pt's insurance coverage 100 strip 3     CALCIUM CARBONATE (CALCIUM 500 ORAL) Take by mouth 2 (two) times a day.       cholecalciferol, vitamin D3, 1,000 unit tablet Take 1,000 Units by mouth 2 (two) times a day.       colestipol (COLESTID) 1 gram tablet TAKE ONE TABLET BY MOUTH TWICE A  tablet 3     cranberry extract 300 mg Tab Take 1 tablet by mouth daily.        cyclobenzaprine (FLEXERIL) 10 MG tablet Take 1 tablet (10 mg total) by mouth 2 (two) times a day as needed for muscle spasms. 20 tablet 0     dicyclomine (BENTYL) 10 MG capsule TAKE ONE TO TWO CAPSULES BY MOUTH EVERY 6 HOURS AS NEEDED 120 capsule 0     LACTOBACILLUS ACIDOPHILUS (PROBIOTIC ORAL) Take 1 tablet by mouth 2 (two) times a day.        lancing device Misc Use As Directed 4 (four) times a week.        losartan (COZAAR) 100 MG tablet Take 1 tablet (100 mg total) by mouth daily. 90 tablet 3     LUTEIN ORAL Take 1 tablet by mouth daily.              metFORMIN (GLUCOPHAGE XR) 500 MG 24 hr tablet Take 2 tablets (1,000 mg total) by mouth daily with breakfast. 180 tablet 3     montelukast (SINGULAIR) 10 mg tablet  Take 1 tablet (10 mg total) by mouth daily. 90 tablet 3     nitrofurantoin (MACRODANTIN) 50 MG capsule Take 1 capsule (50 mg total) by mouth daily. 90 capsule 3     omeprazole (PRILOSEC) 20 MG capsule TAKE ONE CAPSULE BY MOUTH EVERY DAY 90 capsule 3     psyllium (METAMUCIL) 3.4 gram packet Take 1 packet by mouth every evening.       SIMETHICONE (GAS-X ORAL) Take by mouth 2 (two) times a day.       sotalol (BETAPACE) 80 MG tablet Take 0.5 tablets (40 mg total) by mouth 2 (two) times a day. 90 tablet 2     tiotropium (SPIRIVA WITH HANDIHALER) 18 mcg inhalation capsule Place 1 capsule (2 puffs total) into inhaler and inhale daily. Place 1 capsule into the inhaler device. Close and press button to crush the capsule. Inhale the contents of the crushed capsule in 2 breaths. 30 capsule 2     UBIDECARENONE (COENZYME Q10 ORAL) Take 1 tablet by mouth daily.        warfarin ANTICOAGULANT (COUMADIN/JANTOVEN) 5 MG tablet One tab daily.  Adjust to INR of 2 to 3 90 tablet 3     semaglutide (RYBELSUS) 3 mg tablet Take 3 mg by mouth daily. 30 tablet 0     No current facility-administered medications for this visit.        Family History: Nothing new    Physical Exam:  General Appearance:   Vitals are as below  Vitals:    02/04/20 1217   BP: 126/74   Patient Site: Left Arm   Patient Position: Sitting   Cuff Size: Adult Large   Pulse: 72   SpO2: 98%   Weight: (!) 229 lb 11.2 oz (104.2 kg)     Wt Readings from Last 3 Encounters:   02/04/20 (!) 229 lb 11.2 oz (104.2 kg)   01/20/20 (!) 229 lb (103.9 kg)   12/13/19 (!) 230 lb (104.3 kg)     Body mass index is 41.68 kg/m .        Data Review:    Analysis and Summary of Old Records (2): Reviewed electrophysiology records    Records Requested (1):       Other History Summarized (from other people in the room) (2):     Radiology Tests Summarized (XRAY/CT/MRI/DXA) (1): Reviewed chest x-ray    Labs Reviewed (1): Ordered future labs and reviewed labs from December including glycohemoglobin of  7.2    Medicine Tests Reviewed (EKG/ECHO/COLONOSCOPY/EGD) (1): Viewed stress MRI    Independent Review of EKG or X-RAY (2):

## 2021-06-05 NOTE — TELEPHONE ENCOUNTER
Who is calling:  Patient  Reason for Call:  Patient asked for a call back from care team or [employer] message, patient states that prescription should have been sent to [facility] and they have not received prescription.    Date of last appointment with primary care: na  Okay to leave a detailed message: Yes

## 2021-06-05 NOTE — TELEPHONE ENCOUNTER
ANTICOAGULATION  MANAGEMENT    Assessment     Today's INR result of 3.3 is Supratherapeutic (goal INR of 2.0-3.0)        Warfarin taken as previously instructed    No new diet changes affecting INR    No new medication/supplements affecting INR    Continues to tolerate warfarin with no reported s/s of bleeding or thromboembolism     Previous INR was Therapeutic    Plan:     Spoke with Krystle regarding INR result and instructed:     Warfarin Dosing Instructions:  hold today then continue current warfarin dose 2.5 mg daily     Instructed patient to follow up no later than: two weeks        Instructed to call the [facility] for any changes, questions or concerns. (#[phone])   ?   Mikki Morley RN    Subjective/Objective:      Krystle Polanco, a 79 y.o. female is on warfarin.     Krystle reports:     Home warfarin dose: as updated on anticoagulation calendar per template     Missed doses: No     Medication changes:  No     S/S of bleeding or thromboembolism:  No     New Injury or illness:  No     Changes in diet or alcohol consumption:  No     Upcoming surgery, procedure or cardioversion:  No    Anticoagulation Episode Summary     Current INR goal:   2.0-3.0   TTR:   82.5 % (1 y)   Next INR check:   2/18/2020   INR from last check:   3.30! (2/4/2020)   Weekly max warfarin dose:      Target end date:   Indefinite   INR check location:      Preferred lab:      Send INR reminders to:   [facility]    Indications    Pulmonary embolism (H) [I26.99]           Comments:            Anticoagulation Care Providers     Provider Role Specialty Phone number    Elke Mehta MD  Internal Medicine [phone]

## 2021-06-06 NOTE — TELEPHONE ENCOUNTER
PRIOR AUTHORIZATION DENIED    Denial Rational: Patient must try and fail two of the following medications:  Ozempic, Trulicity, Bydureon, or Victoza.             Appeal Information: If provider would like to appeal please provide a letter of medical necessity stating why formulary alternatives would not be clinically appropriate for the patient and route back to the PA team.

## 2021-06-06 NOTE — TELEPHONE ENCOUNTER
Talked to pt and she wanted me to let Dr Mehta know that her last US Throid was done at  as well as they could pull up the results.    I did some digging and it was actually done at [place] [facility] and I found it under MEDIA for 5/1/2018.    Pt wanted me to let Dr Mehta know for comparison her to one yesterday 3/5/20.
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
## 2021-06-06 NOTE — TELEPHONE ENCOUNTER
Central PA team  [phone]  Pool: HE PA MED ([id number])          PA has been initiated.       PA form completed and faxed insurance via Cover My Meds     Key:  [id number]     Medication:  Rybelsus 3MG tablets    Insurance:  [employer] [place]        Response will be received via fax and may take up to 5-10 business days depending on plan

## 2021-06-06 NOTE — TELEPHONE ENCOUNTER
ANTICOAGULATION  MANAGEMENT    Assessment     Today's INR result of 4.0 is Supratherapeutic (goal INR of 2.0-3.0)        Warfarin taken as previously instructed    No new diet changes affecting INR    Interaction between doxycycline, prednisone and warfarin may be affecting INR    Continues to tolerate warfarin with no reported s/s of bleeding or thromboembolism     Previous INR was Supratherapeutic    Plan:     Left a detailed message for Krystle regarding INR result and instructed:     Warfarin Dosing Instructions:  skip today then continue current warfarin dose 2.5 mg daily     Instructed patient to follow up no later than: one week  Instructed to call the [facility] for any changes, questions or concerns. (#[phone])   ?   Mikki Morley RN    Subjective/Objective:      Krystle Polanco, a 79 y.o. female is on warfarin.     Krystle reports:     Home warfarin dose: as updated on anticoagulation calendar per template     Missed doses: No     Medication changes:  Yes: noted     S/S of bleeding or thromboembolism:  No     New Injury or illness:  No     Changes in diet or alcohol consumption:  No     Upcoming surgery, procedure or cardioversion:  No    Anticoagulation Episode Summary     Current INR goal:   2.0-3.0   TTR:   78.0 % (1 y)   Next INR check:   3/10/2020   INR from last check:   4.00! (3/3/2020)   Weekly max warfarin dose:      Target end date:   Indefinite   INR check location:      Preferred lab:      Send INR reminders to:   [facility]    Indications    Pulmonary embolism (H) [I26.99]           Comments:            Anticoagulation Care Providers     Provider Role Specialty Phone number    Elke Mehta MD  Internal Medicine [phone]

## 2021-06-07 NOTE — TELEPHONE ENCOUNTER
ANTICOAGULATION  MANAGEMENT    Assessment     Today's INR result of 2.1 is Therapeutic (goal INR of 2.0-3.0)        Warfarin taken as previously instructed    No new diet changes affecting INR    No new medication/supplements affecting INR    Continues to tolerate warfarin with no reported s/s of bleeding or thromboembolism     Previous INR was Therapeutic    Plan:     Spoke with Krystle regarding INR result and instructed:     Warfarin Dosing Instructions:  Continue current warfarin dose 1.25 mg daily on Wed; and 2.5 mg daily rest of week  (0 % change)    Instructed patient to follow up no later than: 2 weeks    Education provided: target INR goal and significance of current INR result, importance of following up for INR monitoring at instructed interval, importance of taking warfarin as instructed, importance of notifying clinic for changes in medications and importance of notifying clinic for diarrhea, nausea/vomiting, reduced intake and/or illness    Krystle verbalizes understanding and agrees to warfarin dosing plan.    Instructed to call the [facility] for any changes, questions or concerns. (#[phone])   ?   Priya Mena RN    Subjective/Objective:      Krsytle ARIAS Gulliver, a 79 y.o. female is on warfarin.     Krystle reports:     Home warfarin dose: verbally confirmed home dose with Krystle and updated on anticoagulation calendar     Missed doses: No     Medication changes:  No     S/S of bleeding or thromboembolism:  No     New Injury or illness:  No     Changes in diet or alcohol consumption:  No     Upcoming surgery, procedure or cardioversion:  No    Anticoagulation Episode Summary     Current INR goal:   2.0-3.0   TTR:   74.3 % (1 y)   Next INR check:   4/24/2020   INR from last check:   2.10 (4/10/2020)   Weekly max warfarin dose:      Target end date:   Indefinite   INR check location:      Preferred lab:      Send INR reminders to:   [facility]    Indications    Pulmonary embolism (H) [I26.99]            Comments:            Anticoagulation Care Providers     Provider Role Specialty Phone number    Ekle Mehta MD  Internal Medicine [phone]

## 2021-06-07 NOTE — TELEPHONE ENCOUNTER
ANTICOAGULATION  MANAGEMENT    Assessment     Today's INR result of 2.5 is Therapeutic (goal INR of 2.0-3.0)        Warfarin taken as previously instructed    No new diet changes affecting INR    No new medication/supplements affecting INR    Continues to tolerate warfarin with no reported s/s of bleeding or thromboembolism     Previous INR was Therapeutic    Plan:     Left a detailed message for Krystle regarding INR result and instructed:     Warfarin Dosing Instructions:  Continue current warfarin dose 1.25 mg daily on wed; and 2.5 mg daily rest of week  (0 % change)    Instructed patient to follow up no later than: one month    Instructed to call the [facility] for any changes, questions or concerns. (#[phone])   ?   Mikki Morley RN    Subjective/Objective:      Krystle Polanco, a 79 y.o. female is on warfarin.     Krystle reports:     Home warfarin dose: as updated on anticoagulation calendar per template     Missed doses: No     Medication changes:  No     S/S of bleeding or thromboembolism:  No     New Injury or illness:  No     Changes in diet or alcohol consumption:  No     Upcoming surgery, procedure or cardioversion:  No    Anticoagulation Episode Summary     Current INR goal:   2.0-3.0   TTR:   74.3 % (1 y)   Next INR check:   5/19/2020   INR from last check:   2.50 (4/21/2020)   Weekly max warfarin dose:      Target end date:   Indefinite   INR check location:      Preferred lab:      Send INR reminders to:   [facility]    Indications    Pulmonary embolism (H) [I26.99]           Comments:            Anticoagulation Care Providers     Provider Role Specialty Phone number    Elke Mehta MD  Internal Medicine [phone]

## 2021-06-08 NOTE — PROGRESS NOTES
[facility] Follow Up Note    Assessment/Plan:  1. HTN (hypertension)  Overall, improved control on current medications.  Her blood pressure slightly elevated today due to a respiratory tract infection.  Home blood pressures are nicely in the 120s.  We'll continue the same medication  - losartan (COZAAR) 25 MG tablet; TAKE 1 TABLET BY MOUTH DAILY  Dispense: 90 tablet; Refill: 3  - HM2(CBC w/o Differential); Future  - Basic Metabolic Panel  - HM2(CBC w/o Differential)    2. Pulmonary embolism  Recent history of pulmonary embolism toward the end of 2016.  She did see hematology for a hypercoagulable workup completion.  No identifiable causes for this unprovoked PE was determined.  Therefore, it is recommended that she be on indefinite anticoagulation.  Except her diagnosis.  Will discontinue aspirin and fish oil.    3. Type 2 diabetes mellitus  Due for an update on labs.  She denies polyuria or polydipsia.  Asymptomatic  - Glycosylated Hemoglobin A1c    4.  Pulmonary nodules  She will need a repeat CT scan in the future    The following high BMI interventions were performed this visit: encouragement to exercise  Elke Mehta MD    Chief Complaint:  Chief Complaint   Patient presents with     Follow-up       History of Present Illness:  Krystle is a 76 y.o. female who is here today for follow-up of her usual medical problems which are many.  She has had a recent diagnosis of multifocal pulmonary emboli-unprovoked.  It was somewhat of an atypical presentation complicated by at least 3 months of upper neck and back pain.  This may be coincidental and not secondary to her PEs.  She has been adequately anticoagulated and is now on a monthly routine.  She did see a hematologist from [facility] who recommended indefinite anticoagulation as her procoagulant workup was negative.  Of note, there is also the issue of pulmonary nodules that has yet to be addressed.  We will likely just be repeating a CT scan  in the future.    With regard to her current state of health, she has intermittent gastrointestinal disturbances and irritable bowel.  We did review her supplements in the context of that problem.    She currently has a cold that she is recovering from.    Home blood pressures have been followed and are within a normal range.  She is pleased with progress on her blood pressure control.    Review of Systems:  A comprehensive review of systems was performed and was otherwise negative    PFSH:  Social History: She is  and a retired .  History   Smoking Status     Never Smoker   Smokeless Tobacco     Not on file       Past History: As noted in the snapshot  Current Outpatient Prescriptions   Medication Sig Dispense Refill     acetaminophen (TYLENOL) 500 MG tablet Take 500 mg by mouth every 6 (six) hours as needed for pain.        atorvastatin (LIPITOR) 80 MG tablet Take 1 tablet (80 mg total) by mouth bedtime. 90 tablet 3     azelastine (ASTELIN) 137 mcg nasal spray 2 sprays daily (Patient taking differently: 2 sprays into each nostril daily as needed for rhinitis. 2 sprays daily) 90 mL 3     blood sugar diagnostic (ACCU-CHEK MOISE) Strp Use 1 strip As Directed 3 (three) times a week.        CALCIUM CARBONATE (CALCIUM 500 ORAL) Take by mouth 2 (two) times a day.       cholecalciferol, vitamin D3, 1,000 unit tablet Take 1,000 Units by mouth 2 (two) times a day.       cod liver oil cap Take 1 capsule by mouth daily.        cranberry extract 300 mg Tab Take 1 tablet by mouth daily.        dicyclomine (BENTYL) 10 MG capsule Take 10-20 mg by mouth every 6 (six) hours as needed.       docusate sodium (COLACE) 100 MG capsule Take 100 mg by mouth 3 (three) times a day as needed for constipation.       fexofenadine (ALLEGRA) 60 MG tablet Take 60 mg by mouth daily.       LACTOBACILLUS ACIDOPHILUS (PROBIOTIC ORAL) Take 1 tablet by mouth 2 (two) times a day.        lancing device Misc Use As Directed 4  (four) times a week.        loperamide-simethicone (IMODIUM MULTI-SYMPTOM RELIEF) 2-125 mg Tab Take 1-2 tablets by mouth 2 (two) times a day as needed.        LUTEIN ORAL Take 1 tablet by mouth daily as needed.        metFORMIN (GLUCOPHAGE) 500 MG tablet Take 2 tablets (1,000 mg total) by mouth 2 times a day at 6:00 am and 4:00 pm. 180 tablet 5     multivitamin therapeutic (THERAGRAN) tablet Take 1 tablet by mouth daily.       omeprazole (PRILOSEC) 20 MG capsule TAKE ONE CAPSULE BY MOUTH EVERY DAY 90 capsule 3     PROAIR HFA 90 mcg/actuation inhaler INHALE TWO PUFFS BY MOUTH FOUR TIMES A DAY AS NEEDED FOR WHEEZING 25.5 g 0     psyllium (METAMUCIL) 3.4 gram packet Take 1 packet by mouth every evening.       SIMETHICONE (GAS-X ORAL) Take by mouth 2 (two) times a day.       tiotropium bromide (SPIRIVA RESPIMAT) 2.5 mcg/actuation Mist Inhale daily as needed.        UBIDECARENONE (COENZYME Q10 ORAL) Take 1 tablet by mouth daily.        aspirin 81 MG tablet Take 81 mg by mouth daily.       beclomethasone (QVAR) 80 mcg/actuation inhaler Inhale 2 puffs 2 (two) times a day as needed. 3 Inhaler 12     [START ON 1/28/2017] estradiol (ESTRACE) 0.01 % (0.1 mg/gram) vaginal cream Insert 2 g into the vagina 4 (four) times a week. 42.5 g 3     losartan (COZAAR) 25 MG tablet TAKE 1 TABLET BY MOUTH DAILY 90 tablet 3     montelukast (SINGULAIR) 10 mg tablet Take 1 tablet (10 mg total) by mouth daily with supper. 90 tablet 3     nitrofurantoin (MACRODANTIN) 50 MG capsule Take 1 capsule (50 mg total) by mouth daily. 90 capsule 3     sucralfate (CARAFATE) 1 gram tablet Take 1 tablet (1 g total) by mouth bedtime. 90 tablet 3     warfarin (COUMADIN) 5 MG tablet One tab daily.  Adjust to INR of 2 to 3 90 tablet 3     No current facility-administered medications for this visit.        Family History: Nothing new.  Her  has a severe respiratory tract infection    Physical Exam:  General Appearance:   She is pleasant and well appearing  and in no acute distress.  Her weight is up about 3 pounds  Vitals:    01/27/17 0959 01/27/17 1009   BP: 170/78 142/74   Patient Site: Left Arm Left Arm   Patient Position: Sitting Sitting   Cuff Size: Adult Large Adult Large   Pulse: 81    SpO2: 96%    Weight: 219 lb 4.8 oz (99.5 kg)      Wt Readings from Last 3 Encounters:   01/27/17 219 lb 4.8 oz (99.5 kg)   12/28/16 216 lb 11.2 oz (98.3 kg)   12/20/16 214 lb (97.1 kg)     Body mass index is 40.11 kg/(m^2).        Data Review:    Analysis and Summary of Old Records (2): Reviewed hematology consult      Records Requested (1): 0      Other History Summarized (from other people in the room) (2): 0    Radiology Tests Summarized (XRAY/CT/MRI/DXA) (1): 0    Labs Reviewed (1): Ordered    Medicine Tests Reviewed (EKG/ECHO/COLONOSCOPY/EGD) (1): 0    Independent Review of EKG or X-RAY (2): 0    0

## 2021-06-08 NOTE — TELEPHONE ENCOUNTER
Orders being requested:   A1c- standing Order    Reason service is needed/diagnosis:   Type 2 diabetes mellitus without complication, without long-term current use of insulin (H)  E11.9        When are orders needed by: STAT    Where to send Orders: EPIC     Okay to leave detailed message?  Yes    [facility] called and patient is there for her INR.  Patient states there should be a standing orders for her A1c..  CMA request that the lab draw JIC tube and will forward message to PCP for A1C order.  Lab agrees with plan and will await orders.    Please place order in Epic to proceed with testing.

## 2021-06-08 NOTE — PROGRESS NOTES
[facility] Daily Progress     Patient Name: Krystle Polanco  Date: 1/17/2017  Visit #: 10  PTA visit #:  0  PRECAUTIONS/RESTRICTIONS: Acute Pulmonary Embolism/Warfaren/DM, left shoulder arthropathy  Referral Diagnosis: Acute Pulmonary Embolism/Neck and Upper Back Pain and HA  Referring provider: Elke Mehta MD  Visit Diagnosis:     ICD-10-CM    1. Cervicalgia M54.2    2. Pain in thoracic spine M54.6    3. Decreased ROM of neck R29.898    4. Muscle spasms of neck M62.838    5. Abnormal posture R29.3    6. Rib pain on left side R07.81          Assessment:     HEP/POC compliance is  good .  Patient demonstrates understanding/independence with home program.  Response to Intervention Overall, pt reporting increased activity level at home, however plateau over the past 2 weeks with continued pain R>L subocciptial and posterolateral neck.  Patient is appropriate to continue with skilled physical therapy intervention, as indicated by initial plan of care.  Continued complete resolution of left anterolateral rib pain after MFR 2-3 weeks ago but is frustrated on how long it has taken for resolution and she is worried about it.  She reports that the manual therapy last week was helpful albeit themporary.    Goal Status: on-going  Pt. will demonstrate/verbalize independence in self-management of condition in : Progressing toward  Pt. will be independent with home exercise program in : Progressing toward  Pt. will report decreased intensity, frequency of : Progressing toward  Pt. will have improved quality of sleep: Progressing toward  Comment:: increase sleep duration and walking with less neck and back discomfort, in 6 weeks.  Patient will transfer: Progressing toward  Patient will decrease : NDI score;by _ points;for improved quality of function;for improved quality of life;in 6 weeks  by ___ points: 5  Pt will: be able to get back into a normal function/back into life/resume more normal  "activity-progress toward goal    Plan / Patient Education:     Continue with initial plan of care.  Progress with home program as tolerated.  Continue manual therapy to neck.    Complete NDI next visit.  See MD on Feb 27-28,20017    Subjective:     Pain Rating: 3-3.5/10 marianne cervical spine (R>L). Still can get up into the 4/10 range, Symptoms are worse in morning and at bed time.  Symptoms are more noticeable at right SCM and right and central cervical/thoracic spine.  Had more relief after last visit with manual therapy.  Pain will increase more with increased activity but has been more active than one month ago.      Objective:     Tender bilat cervical paraspinals/subocciptial.  Slightly dizzy after treatment again but resolved before she left clinic.  Decreased pain/greater relaxation after manual therapy.    No new exercises.    Treatment Today     TREATMENT MINUTES COMMENTS   Evaluation     Self-care/ Home management     Manual therapy 25 -Supine:  SO release, gentle C distraction, 60\" hold, 10\" relaxation, MFR:  Yoke bilat UT and STM/up glides.   Neuromuscular Re-education 3  -Brief instruction in sleep posture with pillows at SL.   Therapeutic Activity     Therapeutic Exercises  Brief discussion of HEP and persistence of pain.   Gait training     Modality__________________                Total 28    Blank areas are intentional and mean the treatment did not include these items.       Venus Callaway, PT  1/17/2017  "

## 2021-06-08 NOTE — PROGRESS NOTES
[facility] Daily Progress     Patient Name: rKystle Polanco  Date: 1/3/2017  Visit #: 6  PTA visit #:  0  PRECAUTIONS/RESTRICTIONS: Acute Pulmonary Embolism/Warfaren/DM, left shoulder arthropathy  Referral Diagnosis: Acute Pulmonary Embolism/Neck and Upper Back Pain and HA  Referring provider: Elke Mehta MD  Visit Diagnosis:     ICD-10-CM    1. Cervicalgia M54.2    2. Pain in thoracic spine M54.6    3. Decreased ROM of neck R29.898    4. Muscle spasms of neck M62.838    5. Abnormal posture R29.3    6. Rib pain on left side R07.81          Assessment:     HEP/POC compliance is  good .  Patient demonstrates understanding/independence with home program.  Response to Intervention Pt able to progress scapular strengthening with theraband exercises without an increase in pain. Overall, pain continues to decrease with intermittent days with increased pain.  Patient is benefitting from skilled physical therapy and is making steady progress toward functional goals.  Patient is appropriate to continue with skilled physical therapy intervention, as indicated by initial plan of care.    Goal Status: on-going  Pt. will demonstrate/verbalize independence in self-management of condition in : 6 weeks  Pt. will be independent with home exercise program in : 6 weeks  Pt. will report decreased intensity, frequency of : Headache;in 6 weeks  Pt. will have improved quality of sleep: waking less times/night;in 6 weeks;Comment;with less pain  Comment:: increase sleep duration and walking with less neck and back discomfort, in 6 weeks.  Patient will transfer: supine/sit;for in/out of bed;with less pain;with less difficulty;in 6 weeks  Patient will decrease : NDI score;by _ points;for improved quality of function;for improved quality of life;in 6 weeks  by ___ points: 5  Pt will: be able to get back into a normal function/back into life/resume more normal activity    Plan / Patient Education:     Continue with initial  plan of care.  Progress with home program as tolerated. Continue x2/week for 2 more weeks, then decrease to x1/week or every other week if pain remains stable    Subjective:     Pain Ratin/10 marianne cervical spine  Pt arrived 5 min late.  Neck is feeling good today. Was painful yesterday. Feels the KT and STM is helping.    Objective:     Decreased muscle tenderness and spasm in marianne upper trapezius with STM today      Treatment Today     TREATMENT MINUTES COMMENTS   Evaluation     Self-care/ Home management     Manual therapy 10 -STM to marianne upper trapezius, cervical paraspinals and occiputs  -gentle central cervical PAs in supine, grade II, 15 sec oscillations with tenderness at C4-7   Neuromuscular Re-education 4 -KT to marianne upper trapezius with 2 I strips, reviewed /precautions avoiding area of irriation.  Skin prep applied.   Therapeutic Activity     Therapeutic Exercises 10 -see exercise flow sheet   Gait training     Modality__________________                Total 24    Blank areas are intentional and mean the treatment did not include these items.       Belkys Oviedo, PT, DPT  1/3/2017

## 2021-06-08 NOTE — PROGRESS NOTES
[facility] Daily Progress     Patient Name: Krystle Polanco  Date: 1/12/2017  Visit #: 9  PTA visit #:  0  PRECAUTIONS/RESTRICTIONS: Acute Pulmonary Embolism/Warfaren/DM, left shoulder arthropathy  Referral Diagnosis: Acute Pulmonary Embolism/Neck and Upper Back Pain and HA  Referring provider: Elke Mehta MD  Visit Diagnosis:     ICD-10-CM    1. Cervicalgia M54.2    2. Pain in thoracic spine M54.6    3. Decreased ROM of neck R29.898    4. Muscle spasms of neck M62.838    5. Abnormal posture R29.3    6. Rib pain on left side R07.81          Assessment:     HEP/POC compliance is  good .  Patient demonstrates understanding/independence with home program.  Response to Intervention Overall, pt reporting increased activity level at home, however plateau over the past 2 weeks with continued pain R>L subocciptial and posterolateral neck.  Patient is benefitting from skilled physical therapy and is making steady progress toward functional goals.  Patient is appropriate to continue with skilled physical therapy intervention, as indicated by initial plan of care.  Complete resolution of left anterolateral rib pain after MFR 2 weeks ago..    Goal Status: on-going  Pt. will demonstrate/verbalize independence in self-management of condition in : 6 weeks  Pt. will be independent with home exercise program in : 6 weeks  Pt. will report decreased intensity, frequency of : Headache;in 6 weeks  Pt. will have improved quality of sleep: waking less times/night;in 6 weeks;Comment;with less pain  Comment:: increase sleep duration and walking with less neck and back discomfort, in 6 weeks.  Patient will transfer: supine/sit;for in/out of bed;with less pain;with less difficulty;in 6 weeks  Patient will decrease : NDI score;by _ points;for improved quality of function;for improved quality of life;in 6 weeks  by ___ points: 5  Pt will: be able to get back into a normal function/back into life/resume more normal  "activity    Plan / Patient Education:     Continue with initial plan of care.  Progress with home program as tolerated.  Assess response to manual therapy to neck and holding kinesiotape to assess reponse without.  decrease to x1/week or every other week if pain remains stable    Subjective:     Pain Ratin/10 marianne cervical spine (R>L).  Awakened with higher pain at 3/10 but lessens with activity.  Pain will also increase with the exercises.  Pain will increase more with increased activity but is frustrated that she hasn't been able to resume her life.  Able to resume some card playing, attend book club and increase     Chin tuck in sitting still causes pain. Is not taking any pain medications but is still using muscle relaxers and tyelonol.    Objective:     Tender bilat cervical paraspinals/subocciptial.  Slightly dizzy after treatment but resolved before she left clinic.  Decreased pain/greater relaxation after manual therapy.    No new exercises.    Treatment Today     TREATMENT MINUTES COMMENTS   Evaluation     Self-care/ Home management     Manual therapy 20 -Supine:  SO release, gentle C distraction, 60\" hold, 10\" relaxation, MFR:  Yoke bilat UT and STM/up glides.   Neuromuscular Re-education  -KT to marianne cervical paraspinals with 2  reviewed /precautions avoiding area of irriation.  Skin prep applied.   Therapeutic Activity     Therapeutic Exercises 3 Brief discussion of HEP and persistence of pain.   Gait training     Modality__________________                Total 23    Blank areas are intentional and mean the treatment did not include these items.       Venus Callaway, PT  2017  "

## 2021-06-08 NOTE — PROGRESS NOTES
[facility] Daily Progress     Patient Name: Krystle Polanco  Date: 1/24/2017  Visit #: 11  PTA visit #:  0  PRECAUTIONS/RESTRICTIONS: Acute Pulmonary Embolism/Warfaren/DM, left shoulder arthropathy  Referral Diagnosis: Acute Pulmonary Embolism/Neck and Upper Back Pain and HA  Referring provider: Elke Mehta MD  Visit Diagnosis:     ICD-10-CM    1. Cervicalgia M54.2    2. Pain in thoracic spine M54.6    3. Decreased ROM of neck R29.898    4. Abnormal posture R29.3    5. Rib pain on left side R07.81          Assessment:     HEP/POC compliance is  good .  Patient demonstrates understanding/independence with home program.  Response to Intervention Overall 75% improvement.  Reports significant pain relief after last visit stating no pain until 1/22/2017.  Patient is appropriate to continue with skilled physical therapy intervention, as indicated by initial plan of care.  Meeting expectations.  Continued complete resolution of left anterolateral rib pain after MFR 3-4 weeks ago.    Goal Status: on-going  Pt. will demonstrate/verbalize independence in self-management of condition in : Progressing toward  Pt. will be independent with home exercise program in : Progressing toward  Pt. will report decreased intensity, frequency of : Progressing toward  Pt. will have improved quality of sleep: Progressing toward  Comment:: increase sleep duration and walking with less neck and back discomfort, in 6 weeks.  Patient will transfer: Progressing toward  Patient will decrease : NDI score;by _ points;for improved quality of function;for improved quality of life;in 6 weeks  by ___ points: 5  Pt will: be able to get back into a normal function/back into life/resume more normal activity-progress toward goal    Plan / Patient Education:     Continue with initial plan of care.  Progress with home program as tolerated.  Continue manual therapy to neck.    Complete NDI next visit.  See MD on Feb 27-28,2017  Patient  "will request continuation orders.  Will send Medicare Recertification after next weeks appointment.    Subjective:     Pain Ratin-3/10  Report almost complete, if not complete, pain resolution after last visit until 2017.  Then dull pain returned.  Symptoms are worse in morning and at bed time.  Symptoms are more noticeable at right SCM/subocciptial and posterolateral neck.  Pain will increase more with increased activity but has been more active than one month ago.    Sleeping postures somewhat helpful for better sleep.    Objective:     Tender bilat cervical paraspinals/subocciptial.  Slightly dizzy after treatment again but resolved before she left clinic.  Decreased pain/greater relaxation after manual therapy.    No new exercises.    Treatment Today     TREATMENT MINUTES COMMENTS   Evaluation     Self-care/ Home management     Manual therapy 25 -Supine:  SO release, gentle C distraction, 60\" hold, 10\" relaxation, MFR:  Yoke bilat UT and STM/up glides.   Neuromuscular Re-education  -Brief instruction in sleep posture with pillows at SL.   Therapeutic Activity     Therapeutic Exercises  Brief discussion of HEP and persistence of pain.   Gait training     Modality__________________                Total 25    Blank areas are intentional and mean the treatment did not include these items.       Venus Callaway, PT  2017  "

## 2021-06-08 NOTE — TELEPHONE ENCOUNTER
ANTICOAGULATION  MANAGEMENT    Assessment     Today's INR result of 3.9 is Supratherapeutic (goal INR of 2.0-3.0)        More warfarin taken than instructed which may be affecting INR took 2.5 mg daily    No new diet changes affecting INR    No new medication/supplements affecting INR    Continues to tolerate warfarin with no reported s/s of bleeding or thromboembolism     Previous INR was Therapeutic    Plan:     Spoke with Krystle regarding INR result and instructed:     Warfarin Dosing Instructions:  skip today then continue current warfarin dose 1.25 mg daily on sun; and 2.5 mg daily rest of week  (7.7 % change)    Instructed patient to follow up no later than: 1-2 weeks      Instructed to call the [facility] for any changes, questions or concerns. (#[phone])   ?   Mikki Morley RN    Subjective/Objective:      Krystle Polanco, a 80 y.o. female is on warfarin.     Krystle reports:     Home warfarin dose: as updated on anticoagulation calendar per template     Missed doses: No     Medication changes:  No     S/S of bleeding or thromboembolism:  No     New Injury or illness:  No     Changes in diet or alcohol consumption:  No     Upcoming surgery, procedure or cardioversion:  No    Anticoagulation Episode Summary     Current INR goal:   2.0-3.0   TTR:   68.1 % (1 y)   Next INR check:   7/1/2020   INR from last check:   3.90! (6/17/2020)   Weekly max warfarin dose:      Target end date:   Indefinite   INR check location:      Preferred lab:      Send INR reminders to:   [facility]    Indications    Pulmonary embolism (H) [I26.99]           Comments:            Anticoagulation Care Providers     Provider Role Specialty Phone number    Elke Mehta MD  Internal Medicine [phone]

## 2021-06-08 NOTE — TELEPHONE ENCOUNTER
Who is calling:  Patient  Reason for Call:  Patient is scheduled for appointment on 6/18/20 at 2:00pm I scheduled it patient was really upset that she was cancelled and Janine did not have appointments open for VV until 6/23 in the PM, Please change appointment on 6/18/20 at 2:00pm for a Video Visit please that is what the note said please.  Date of last appointment with primary care:   Okay to leave a detailed message: No

## 2021-06-08 NOTE — TELEPHONE ENCOUNTER
ANTICOAGULATION  MANAGEMENT    Assessment     Today's INR result of 2.8 is Therapeutic (goal INR of 2.0-3.0)        Warfarin taken as previously instructed    No new diet changes affecting INR    No new medication/supplements affecting INR    Continues to tolerate warfarin with no reported s/s of bleeding or thromboembolism     Previous INR was Therapeutic    Plan:     Left a detailed message for Krystle regarding INR result and instructed:     Warfarin Dosing Instructions:  Continue current warfarin dose 1.25 mg daily on wed; and 2.5 mg daily rest of week  (0 % change)    Instructed patient to follow up no later than: one month      Krystle verbalizes understanding and agrees to warfarin dosing plan.    Instructed to call the [facility] for any changes, questions or concerns. (#[phone])   ?   Mikki Morley RN    Subjective/Objective:      Krystle Polanco, a 79 y.o. female is on warfarin.     Krystle reports:     Home warfarin dose: as updated on anticoagulation calendar per template     Missed doses: No     Medication changes:  No     S/S of bleeding or thromboembolism:  No     New Injury or illness:  No     Changes in diet or alcohol consumption:  No     Upcoming surgery, procedure or cardioversion:  No    Anticoagulation Episode Summary     Current INR goal:   2.0-3.0   TTR:   74.3 % (1 y)   Next INR check:   6/18/2020   INR from last check:   2.80 (5/21/2020)   Weekly max warfarin dose:      Target end date:   Indefinite   INR check location:      Preferred lab:      Send INR reminders to:   [facility]    Indications    Pulmonary embolism (H) [I26.99]           Comments:            Anticoagulation Care Providers     Provider Role Specialty Phone number    Elke Mehta MD  Internal Medicine [phone]

## 2021-06-08 NOTE — PROGRESS NOTES
[facility] Daily Progress     Patient Name: Krystle Polanco  Date: 1/10/2017  Visit #: 8  PTA visit #:  0  PRECAUTIONS/RESTRICTIONS: Acute Pulmonary Embolism/Warfaren/DM, left shoulder arthropathy  Referral Diagnosis: Acute Pulmonary Embolism/Neck and Upper Back Pain and HA  Referring provider: Elke Mehta MD  Visit Diagnosis:     ICD-10-CM    1. Cervicalgia M54.2    2. Pain in thoracic spine M54.6    3. Decreased ROM of neck R29.898    4. Muscle spasms of neck M62.838    5. Abnormal posture R29.3    6. Rib pain on left side R07.81          Assessment:     HEP/POC compliance is  good .  Patient demonstrates understanding/independence with home program.  Response to Intervention Overall, pt reporting increased activity level at home, however pain remains about the same. Added cervical ext and rotation with towel today to help increase motion and reduce pain.   Patient is benefitting from skilled physical therapy and is making steady progress toward functional goals.  Patient is appropriate to continue with skilled physical therapy intervention, as indicated by initial plan of care.    Goal Status: on-going  Pt. will demonstrate/verbalize independence in self-management of condition in : 6 weeks  Pt. will be independent with home exercise program in : 6 weeks  Pt. will report decreased intensity, frequency of : Headache;in 6 weeks  Pt. will have improved quality of sleep: waking less times/night;in 6 weeks;Comment;with less pain  Comment:: increase sleep duration and walking with less neck and back discomfort, in 6 weeks.  Patient will transfer: supine/sit;for in/out of bed;with less pain;with less difficulty;in 6 weeks  Patient will decrease : NDI score;by _ points;for improved quality of function;for improved quality of life;in 6 weeks  by ___ points: 5  Pt will: be able to get back into a normal function/back into life/resume more normal activity    Plan / Patient Education:     Continue with  initial plan of care.  Progress with home program as tolerated.  decrease to x1/week or every other week if pain remains stable    Subjective:     Pain Ratin/10 marianne cervical spine (R>L)  Continues to be able to tolerate more activity. Still waking up at night around 4am but feels she needs to go to the bathroom (not from pain). Chin tuck in sitting still causes pain. Is not taking any pain medications but is still using muscle relaxers and tyelonol.    Objective:     Tender at R cervical paraspinals    Treatment Today     TREATMENT MINUTES COMMENTS   Evaluation     Self-care/ Home management     Manual therapy 8 -seated unilateral mobilizations with active R rotation, grade IV, 4x10 sec oscillations. Pt reported reduced pain with R rotation at end of session.   Neuromuscular Re-education 7 -KT to marianne cervical paraspinals with 2  reviewed /precautions avoiding area of irriation.  Skin prep applied.   Therapeutic Activity     Therapeutic Exercises 15 -see exercise flow sheet   Gait training     Modality__________________                Total 30    Blank areas are intentional and mean the treatment did not include these items.       Belkys Oviedo, PT, DPT  1/10/2017

## 2021-06-08 NOTE — PROGRESS NOTES
[facility] Daily Progress     Patient Name: Krystle Polanco  Date: 1/5/2017  Visit #: 7  PTA visit #:  0  PRECAUTIONS/RESTRICTIONS: Acute Pulmonary Embolism/Warfaren/DM, left shoulder arthropathy  Referral Diagnosis: Acute Pulmonary Embolism/Neck and Upper Back Pain and HA  Referring provider: Elke Mehta MD  Visit Diagnosis:     ICD-10-CM    1. Cervicalgia M54.2    2. Pain in thoracic spine M54.6    3. Decreased ROM of neck R29.898    4. Muscle spasms of neck M62.838    5. Abnormal posture R29.3    6. Rib pain on left side R07.81          Assessment:     HEP/POC compliance is  good .  Patient demonstrates understanding/independence with home program.  Response to Intervention Reduced pain after manual therapy and overall, significant reduction in pain since starting PT. She is also able to do more activity and was able to work at her desk for several hours yesterday. Continued to progress HEP to work on posture and scapular strengthening to reduce pain.  Patient is benefitting from skilled physical therapy and is making steady progress toward functional goals.  Patient is appropriate to continue with skilled physical therapy intervention, as indicated by initial plan of care.    Goal Status: on-going  Pt. will demonstrate/verbalize independence in self-management of condition in : 6 weeks  Pt. will be independent with home exercise program in : 6 weeks  Pt. will report decreased intensity, frequency of : Headache;in 6 weeks  Pt. will have improved quality of sleep: waking less times/night;in 6 weeks;Comment;with less pain  Comment:: increase sleep duration and walking with less neck and back discomfort, in 6 weeks.  Patient will transfer: supine/sit;for in/out of bed;with less pain;with less difficulty;in 6 weeks  Patient will decrease : NDI score;by _ points;for improved quality of function;for improved quality of life;in 6 weeks  by ___ points: 5  Pt will: be able to get back into a normal  function/back into life/resume more normal activity    Plan / Patient Education:     Continue with initial plan of care.  Progress with home program as tolerated. Continue x2/week for 1 more weeks, then decrease to x1/week or every other week if pain remains stable    Subjective:     Pain Ratin/10 marianne cervical spine (R>L)  Overall, significant improvements since starting PT. Pain increased to 4/10 last night and used ice/heat to help reduce pain.    Objective:     Progressed HEP with seated cervical retraction and L2 band for rows and pull downs  Tender at R C6-T1 with unilateral PAs, tender at T1-T2 with central PAs    Treatment Today     TREATMENT MINUTES COMMENTS   Evaluation     Self-care/ Home management     Manual therapy 10 -central and unilateral cervical PAs in prone, grade III, 10 sec oscillations with tenderness at T1-T2 with central and R C6-T1  Pt reported decreased pain after   Neuromuscular Re-education 4 -KT to marianne upper trapezius with 2 I strips, reviewed /precautions avoiding area of irriation.  Skin prep applied.   Therapeutic Activity     Therapeutic Exercises 13 -see exercise flow sheet   Gait training     Modality__________________                Total 27    Blank areas are intentional and mean the treatment did not include these items.       Belkys Oviedo, PT, DPT  2017

## 2021-06-09 NOTE — TELEPHONE ENCOUNTER
ANTICOAGULATION  MANAGEMENT    Assessment     Today's INR result of 3.9 is Supratherapeutic (goal INR of 2.0-3.0)        Warfarin taken as previously instructed    No new diet changes affecting INR    No new medication/supplements affecting INR    Continues to tolerate warfarin with no reported s/s of bleeding or thromboembolism     Previous INR was Supratherapeutic    Plan:     Spoke with Krystle regarding INR result and instructed:     Warfarin Dosing Instructions:  hold today then change warfarin dose to 1.25 mg daily on sun/wed/fri; and 2.5 mg daily rest of week  (0 % change)    Instructed patient to follow up no later than: 7/10      Krystle verbalizes understanding and agrees to warfarin dosing plan.    Instructed to call the [facility] for any changes, questions or concerns. (#[phone])   ?   Mikki Morley RN    Subjective/Objective:      Krystle Polanco, a 80 y.o. female is on warfarin.     Krystle reports:     Home warfarin dose: as updated on anticoagulation calendar per template     Missed doses: No     Medication changes:  No     S/S of bleeding or thromboembolism:  No     New Injury or illness:  No     Changes in diet or alcohol consumption:  No     Upcoming surgery, procedure or cardioversion:  No    Anticoagulation Episode Summary     Current INR goal:   2.0-3.0   TTR:   65.0 % (1 y)   Next INR check:   7/10/2020   INR from last check:   3.90! (6/29/2020)   Weekly max warfarin dose:      Target end date:   Indefinite   INR check location:      Preferred lab:      Send INR reminders to:   [facility]    Indications    Pulmonary embolism (H) [I26.99]           Comments:            Anticoagulation Care Providers     Provider Role Specialty Phone number    Elke Mehta MD  Internal Medicine [phone]

## 2021-06-09 NOTE — PROGRESS NOTES
[facility] Daily Progress     Patient Name: Krystle Polanco  Date: 3/9/2017  Visit #: 12 +4/12  PTA visit #:  0  PRECAUTIONS/RESTRICTIONS: Acute Pulmonary Embolism/Warfaren/DM, left shoulder arthropathy  Referral Diagnosis: Acute Pulmonary Embolism/Neck and Upper Back Pain and HA  Referring provider: Elke Mehta MD  Visit Diagnosis:     ICD-10-CM    1. Cervicalgia M54.2    2. Pain in thoracic spine M54.6    3. Decreased ROM of neck R29.898    4. Muscle spasms of neck M62.838    5. Abnormal posture R29.3          Assessment:     HEP/POC compliance is  fair .  Patient demonstrates understanding/independence with home program.  Response to Intervention overall good and demonstrating some progress again after recovery from trip 2 weeks ago..  Reports more stamina this week.   Meeting expectations.       Goal Status:   Pt. will demonstrate/verbalize independence in self-management of condition in : Met  Pt. will be independent with home exercise program in : Met  Pt. will report decreased intensity, frequency of : Progressing toward (Less intense HA)  Pt. will have improved quality of sleep: Progressing toward (able to get back to sleep w/greater ease but C- pap machine)  Comment:: increase sleep duration and walking with less neck and back discomfort, in 6 weeks.  Patient will transfer: Progressing toward (Still aware/need to be cautious with transfer&  bed mobility)  Patient will decrease : Met  by ___ points: 5  Pt will: be able to get back into a normal function/back into life/resume more normal activity-met but still lacking stamina, ability to lift and be in car for long distances.-progress toward goal    Plan / Patient Education:     Continue with initial plan of care.  Progress with home program as tolerated.   Continuex1 then have patient return in approximately one month to assess progress without PT and continuing independently on HEP and heat.  Continue manual therapy, reassess C-ROM.   "Patient has NDI for completion at home before next appointment.        Subjective:     Pain Ratin/10  Most improvement is in increased stamina.; 80% improvement again  Noted a bulge on right side of neck yesterday  No HA today.  Improvements:  Able to get back to sleep after being awakened at 4 AM;stamina and getting back to usual life activity.  Bed transfers and mobility are better but still bothersome.  C/C:  Left neck pain and tightness, lifting, long drives>stamina loss.    NDI Outcome Measures:  Initial 74%  Current 18%    Patient estimates 80% overall improvement     Objective:     Neck rotation right mod loss with end range pain, left min loss.  No pain with neck flex now.    Palpation: small pea/hard nodule right high to mid posterior cervical paraspinals.  Moderate tenderness tightness bilateral neck/upper trap,right>>left.    Cervical ROM           Date: 2016  *=Pain 2017  *=pain     *Indicate scale AROM AROM AROM   Cervical Flexion Min loss, NE Min loss, pain      Cervical Extension Mod loss, NE Min-mod loss, NE        Right Left Right Left Right Left   Cervical Sidebending Mod loss, ** Mod loss* Mod loss, *  Mod loss *        Cervical Rotation Mod loss** Min loss * Min loss, * on right   Min loss, * on left       Cervical Protraction         Cervical Retraction         Thoracic Flexion         Thoracic Extension         Thoracic Sidebending               Thoracic Rotation                    Decreased pain/greater relaxation after manual therapy.  Tolerated treatment well.    Treatment Today     TREATMENT MINUTES COMMENTS   Evaluation     Self-care/ Home management     Manual therapy 25 -Supine:  SO release, gentle C [id number] waiting for MFR to high cervicalx6 reps,   STM with /up glides.     Neuromuscular Re-education  -Brief instruction in sleep posture with pillows at SL.   Therapeutic Activity     Therapeutic Exercises  Added C-SB to program and to try HP>CP for pain relief. "   Gait training     Modality__________________     ROM Assessment  Neck         Total 25    Blank areas are intentional and mean the treatment did not include these items.       Venus Callaway, PT  3/9/2017

## 2021-06-09 NOTE — PROGRESS NOTES
[facility] Daily Progress     Patient Name: Krystle Polanco  Date: 3/16/2017  Visit #: 12 +5/12  PTA visit #:  0  PRECAUTIONS/RESTRICTIONS: Acute Pulmonary Embolism/Warfaren/DM, left shoulder arthropathy  Referral Diagnosis: Acute Pulmonary Embolism/Neck and Upper Back Pain and HA  Referring provider: Elke Mehta MD  Visit Diagnosis:     ICD-10-CM    1. Cervicalgia M54.2    2. Muscle spasms of neck M62.838    3. Abnormal posture R29.3    4. Decreased ROM of neck R29.898          Assessment:     HEP/POC compliance is  fair.  Patient demonstrates understanding/independence with home program.  Response to Intervention overall good and slow but steady progress and demonstrating continued improvement in neck pain after trip 2 weeks ago..  Reports increased energy but lacking  stamina yet.   Meeting expectations.       Goal Status:   Pt. will demonstrate/verbalize independence in self-management of condition in : Met  Pt. will be independent with home exercise program in : Met  Pt. will report decreased intensity, frequency of : Progressing toward (Less intense HA)  Pt. will have improved quality of sleep: Progressing toward (able to get back to sleep w/greater ease but C- pap machine)  Comment:: increase sleep duration and walking with less neck and back discomfort, in 6 weeks.  Patient will transfer: Progressing toward (Still aware/need to be cautious with transfer&  bed mobility)  Patient will decrease : Met  by ___ points: 5  Pt will: be able to get back into a normal function/back into life/resume more normal activity-met but still lacking stamina, ability to lift and be in car for long distances.-progress toward goal    Plan / Patient Education:     Continue with initial plan of care.  Progress with home program as tolerated.   Continuex1 then have patient return in approximately one month to assess progress without PT and continuing independently on HEP and heat.  Continue manual therapy,  "reassess C-ROM.  Patient has NDI for completion at home before next appointment.        Subjective:     Pain Ratin.5/10  Continues to have increase energy but stamina is still lacking.  Notes end range pain in neck   Pressure HA today, possible due to increased blood pressure.  Improvements:  Able to get back to sleep after being awakened at 4 AM;stamina and getting back to usual life activity.  Bed transfers and mobility are better but still bothersome.  C/C:  Left neck pain and tightness, lifting, long drives>stamina loss.    NDI Outcome Measures:  Initial 74%  Current 22%    Patient estimates 80% overall improvement   Saw MD yesterday cautioned about lifting as she packs for moving residence in early May.    Objective:       Palpation: small pea/hard nodule right high to mid posterior cervical paraspinals.  Moderate tenderness tightness bilateral neck/upper trap,right>>left.    Cervical ROM           Date: 2016  *=Pain 2017  *=pain 3/16/2017    *Indicate scale AROM AROM AROM   Cervical Flexion Min loss, NE Min loss, pain   Min loss, NE   Cervical Extension Mod loss, NE Min-mod loss, NE  Min loss , NE     Right Left Right Left Right Left   Cervical Sidebending Mod loss, ** Mod loss* Mod loss, *  Mod loss *  MOd loss,pain   MOd loss   Cervical Rotation Mod loss** Min loss * Min loss, * on right   Min loss, * on left  Min-mod loss, end range pain Min loss, NE   Cervical Protraction         Cervical Retraction         Thoracic Flexion         Thoracic Extension         Thoracic Sidebending               Thoracic Rotation                    Decreased pain/greater relaxation after manual therapy.  Tolerated treatment well.    Treatment Today     TREATMENT MINUTES COMMENTS   Evaluation     Self-care/ Home management     Manual therapy 25 -Supine:  SO release, gentle C [id number]\" waiting for MFR to high cervicalx6 reps,   STM with /up glides.     Neuromuscular Re-education  -Brief instruction in " sleep posture with pillows at SL.   Therapeutic Activity     Therapeutic Exercises  Added C-SB to program and to try HP>CP for pain relief.   Gait training     Modality__________________     ROM Assessment  Neck         Total 25    Blank areas are intentional and mean the treatment did not include these items.       Venus Callaway, PT  3/16/2017

## 2021-06-09 NOTE — PROGRESS NOTES
[facility] Daily Progress     Patient Name: Krystle Polanco  Date: 2/24/2017  Visit #: 12 +2/12  PTA visit #:  0  PRECAUTIONS/RESTRICTIONS: Acute Pulmonary Embolism/Warfaren/DM, left shoulder arthropathy  Referral Diagnosis: Acute Pulmonary Embolism/Neck and Upper Back Pain and HA  Referring provider: Elke Mehta MD  Visit Diagnosis:     ICD-10-CM    1. Cervicalgia M54.2    2. Pain in thoracic spine M54.6    3. Decreased ROM of neck R29.898    4. Muscle spasms of neck M62.838          Assessment:     HEP/POC compliance is  good .  Patient demonstrates understanding/independence with home program.  Response to Intervention overall good but plateaued in progress.    Meeting expectations.       Goal Status: on-going  Pt. will demonstrate/verbalize independence in self-management of condition in : Met  Pt. will be independent with home exercise program in : Met  Pt. will report decreased intensity, frequency of : Progressing toward (has 4 HA/week)  Pt. will have improved quality of sleep: Progressing toward (Still awakens at 4 AM but is able to get back to sleep)  Comment:: increase sleep duration and walking with less neck and back discomfort, in 6 weeks.  Patient will transfer: Progressing toward  Patient will decrease : Met  by ___ points: 5  Pt will: be able to get back into a normal function/back into life/resume more normal activity-met    Plan / Patient Education:     Continue with initial plan of care.  Progress with home program as tolerated.  Continue manual therapy to neck x3 and if no significant further benefits with DC on HEP.      Subjective:     Pain Rating: 3-4/10.    More achy all over today  after 2 days of early rising and riding plane yesterday.  No PT for 2 weeks and was out of town for one week 2/16 to 2/23 but felt she had done well on sleeping surface and at lecture   Denies dizziness.  Driving is limited to 30 minutes before neck pain increases to severe.  Improvements:   "Able to get back to sleep after being awakened at 4 AM;  more stamina and getting back to usual life activity. Bed transfers are ok/less difficult  Continued HA and discomfort with bed mobility when turning over.    NDI Outcome Measures:  Initial 74%  Current 32%    Patient estimates 75% overall improvement but slightly more achy today after flying and rising early the past 2 days.    Objective:     Patient arrived 7 minutes late today.    Palpation: Moderate tenderness tightness bilateral neck/upper trap..    Cervical ROM           Date: 12/16/2016  *=Pain 2/7/2017  *=pain     *Indicate scale AROM AROM AROM   Cervical Flexion Min loss, NE Min loss, pain      Cervical Extension Mod loss, NE Min-mod loss, NE        Right Left Right Left Right Left   Cervical Sidebending Mod loss, ** Mod loss* Mod loss, *  Mod loss *        Cervical Rotation Mod loss** Min loss * Min loss, * on right   Min loss, * on left       Cervical Protraction         Cervical Retraction         Thoracic Flexion         Thoracic Extension         Thoracic Sidebending               Thoracic Rotation                    Decreased pain/greater relaxation after manual therapy but not quite as beneficial as with other sessions.  Tolerated gentle C-distraction without UE paresthesia.      Treatment Today     TREATMENT MINUTES COMMENTS   Evaluation     Self-care/ Home management     Manual therapy 23 -Supine:  SO release, gentle C distraction but stopped after 30\" due to bilat UE tingle/numb.  STM with /up glides.   Neuromuscular Re-education  -Brief instruction in sleep posture with pillows at SL.   Therapeutic Activity     Therapeutic Exercises  Added C-SB to program and to try HP>CP for pain relief.   Gait training     Modality__________________     ROM Assessment  Neck         Total 23    Blank areas are intentional and mean the treatment did not include these items.       Venus Callaway, PT  2/24/2017  "

## 2021-06-09 NOTE — TELEPHONE ENCOUNTER
ANTICOAGULATION  MANAGEMENT    Assessment     Today's INR result of 2.5 is Therapeutic (goal INR of 2.0-3.0)        Warfarin taken as previously instructed    No new diet changes affecting INR    No new medication/supplements affecting INR    Continues to tolerate warfarin with no reported s/s of bleeding or thromboembolism     Previous INR was Therapeutic    Plan:     Spoke with Krystle regarding INR result and instructed:     Warfarin Dosing Instructions:  Continue current warfarin dose 1.25 mg daily on Sundays, Wednesdays and Fridays; and 2.5 mg daily rest of week  (0 % change)    Instructed patient to follow up no later than: 2-3 weeks     Education provided: importance of therapeutic range, importance of following up for INR monitoring at instructed interval and importance of taking warfarin as instructed    Krystle verbalizes understanding and agrees to warfarin dosing plan.    Instructed to call the [facility] for any changes, questions or concerns. (#[phone])   ?   Vani Gallegos RN    Subjective/Objective:      Krystle Polanco, a 80 y.o. female is on warfarin.     Krystle reports:     Home warfarin dose: template incorrect; verbally confirmed home dose with Krystle and updated on anticoagulation calendar     Missed doses: No     Medication changes:  No     S/S of bleeding or thromboembolism:  No     New Injury or illness:  No     Changes in diet or alcohol consumption:  No     Upcoming surgery, procedure or cardioversion:  No    Anticoagulation Episode Summary     Current INR goal:   2.0-3.0   TTR:   63.1 % (1 y)   Next INR check:   8/3/2020   INR from last check:   2.50 (7/20/2020)   Weekly max warfarin dose:      Target end date:   Indefinite   INR check location:      Preferred lab:      Send INR reminders to:   [facility]    Indications    Pulmonary embolism (H) [I26.99]           Comments:            Anticoagulation Care Providers     Provider Role Specialty Phone number    Elke Mehta MD   Internal Medicine [phone]

## 2021-06-09 NOTE — PROGRESS NOTES
[facility] Daily Progress     Patient Name: Krystle Polanco  Date: 2017  Visit #: 12 +6  PTA visit #:  0  PRECAUTIONS/RESTRICTIONS: Acute Pulmonary Embolism/Warfaren/DM, left shoulder arthropathy  Referral Diagnosis: Acute Pulmonary Embolism/Neck and Upper Back Pain and HA  Referring provider: Elke Mehta MD  Visit Diagnosis:     ICD-10-CM    1. Cervicalgia M54.2    2. Muscle spasms of neck M62.838    3. Decreased ROM of neck R29.898    4. Rib pain on left side R07.81          Assessment:     HEP/POC compliance is  good .  Patient demonstrates understanding/independence with home program.  Response to Intervention overall good and gaining stamina but continued limitation with neck rotation right>left and SB bilat.   Meeting expectations.       Goal Status:   Pt. will demonstrate/verbalize independence in self-management of condition in : Met  Pt. will be independent with home exercise program in : Met  Pt. will report decreased intensity, frequency of : Progressing toward (Less intense HA)  Pt. will have improved quality of sleep: Progressing toward (able to get back to sleep w/greater ease but C- pap machine)  Patient will transfer: Progressing toward (Still aware/need to be cautious with transfer&  bed mobility)  Patient will decrease : Met  Pt will: be able to get back into a normal function/back into life/resume more normal activity-met but still lacking stamina, ability to lift and be in car for long distances.-progress toward goal (gaining stamina)    Plan / Patient Education:     Continue with initial plan of care.  Progress with home program as tolerated.   Continue up to 4 more visits over the next month as she is preparing to move and feels she will be over extending that may aggravate symptoms.  Continue manual therapy, complete NDI.    Subjective:     Pain Ratin.5/10  Continues to have increase energy noting increased stamina.  Today more discomfort at end range of neck  "motion the past two days.  Improvement as the day progresses.  Patient is preparing to move residence so may be flexing neck more affecting pain    Improvements:  stamina is improving and getting back to usual life activity.    Objective:       Palpation: Moderate tenderness tightness bilateral neck/upper trap,right>>left.    Cervical ROM           Date: 12/16/2016  *=Pain 2/7/2017  *=pain 3/16/2017    *Indicate scale AROM AROM AROM   Cervical Flexion Min loss, NE Min loss, pain   Min loss, NE   Cervical Extension Mod loss, NE Min-mod loss, NE  Min loss , NE     Right Left Right Left Right Left   Cervical Sidebending Mod loss, ** Mod loss* Mod loss, *  Mod loss *  MOd loss,pain   MOd loss   Cervical Rotation Mod loss** Min loss * Min loss, * on right   Min loss, * on left  Min-mod loss, end range pain Min loss, NE   Cervical Protraction         Cervical Retraction         Thoracic Flexion         Thoracic Extension         Thoracic Sidebending               Thoracic Rotation                  Flex nil loss, NE; Ext NT, Rot right mod loss with end range, left min loss NE, SB right mod loss end range tightness, left mod loss with end range tightness    Decreased pain/greater relaxation after manual therapy but ROM is still limited and sore at end range of rotation, right>left.  Tolerated treatment well.    Treatment Today     TREATMENT MINUTES COMMENTS   Evaluation     Self-care/ Home management     Manual therapy 25 -Supine:  SO release, gentle C [id number]\" waiting for MFR to high cervicalx6 reps,   STM with /up glides.     Neuromuscular Re-education  -Brief instruction in sleep posture with pillows at SL.   Therapeutic Activity     Therapeutic Exercises  Added C-SB to program and to try HP>CP for pain relief.   Gait training     Modality__________________     ROM Assessment  Neck         Total 25    Blank areas are intentional and mean the treatment did not include these items.       Venus Callaway, " PT  4/6/2017

## 2021-06-09 NOTE — TELEPHONE ENCOUNTER
LMTCB     CC Please get a list of medications that the pt is needing/wanting refilled and to what new pharmacy     -I will not be in to talk with her to discuss directly if she asks  so please get information so we can set up the medications

## 2021-06-09 NOTE — PROGRESS NOTES
[facility] Daily Progress     Patient Name: Krystle Polanco  Date: 3/2/2017  Visit #: 12 +3/12  PTA visit #:  0  PRECAUTIONS/RESTRICTIONS: Acute Pulmonary Embolism/Warfaren/DM, left shoulder arthropathy  Referral Diagnosis: Acute Pulmonary Embolism/Neck and Upper Back Pain and HA  Referring provider: Elke Mehta MD  Visit Diagnosis:     ICD-10-CM    1. Cervicalgia M54.2    2. Pain in thoracic spine M54.6    3. Decreased ROM of neck R29.898    4. Muscle spasms of neck M62.838    5. Abnormal posture R29.3          Assessment:     HEP/POC compliance is  good .  Patient demonstrates understanding/independence with home program.  Response to Intervention overall good but had a bad weekend possibly still related to recent flying.  Overall improvement rated at 80%.  Still lacking stamina, ability to drive long distances and lifting.    Meeting expectations.       Goal Status:   Pt. will demonstrate/verbalize independence in self-management of condition in : Met  Pt. will be independent with home exercise program in : Met  Pt. will report decreased intensity, frequency of : Progressing toward (Less intense HA)  Pt. will have improved quality of sleep: Progressing toward (able to get back to sleep w/greater ease but C- pap machine)  Comment:: increase sleep duration and walking with less neck and back discomfort, in 6 weeks.  Patient will transfer: Progressing toward (Still aware/need to be cautious with transfer&  bed mobility)  Patient will decrease : Met  by ___ points: 5  Pt will: be able to get back into a normal function/back into life/resume more normal activity-met but still lacking stamina, ability to lift and be in car for long distances.    Plan / Patient Education:     Continue with initial plan of care.  Progress with home program as tolerated.  Continue manual therapy to neck x3 and if no significant further benefits with DC on HEP.      Subjective:     Pain Ratin/10    Had a bad  "weekend, 2/25 and 2/26 but overall better thereafter.  Continued difficulty with neck rotation right.  Mild HA today.  Improvements:  Able to get back to sleep after being awakened at 4 AM;stamina and getting back to usual life activity.  Bed transfers and mobility are better but still bothersome.  C/C:  Decreased stamina, lifting, long drives.    NDI Outcome Measures:  Initial 74%  Current 18%    Patient estimates 80% overall improvement     Objective:     Neck rotation right mod loss with end range pain, left min loss.  No pain with neck flex now.    Palpation: Moderate tenderness tightness bilateral neck/upper trap, left>right.    Cervical ROM           Date: 12/16/2016  *=Pain 2/7/2017  *=pain     *Indicate scale AROM AROM AROM   Cervical Flexion Min loss, NE Min loss, pain      Cervical Extension Mod loss, NE Min-mod loss, NE        Right Left Right Left Right Left   Cervical Sidebending Mod loss, ** Mod loss* Mod loss, *  Mod loss *        Cervical Rotation Mod loss** Min loss * Min loss, * on right   Min loss, * on left       Cervical Protraction         Cervical Retraction         Thoracic Flexion         Thoracic Extension         Thoracic Sidebending               Thoracic Rotation                    Decreased pain/greater relaxation after manual therapy but not quite as beneficial as with other sessions.  Tolerated gentle C-distraction without UE paresthesia.      Treatment Today     TREATMENT MINUTES COMMENTS   Evaluation     Self-care/ Home management     Manual therapy 30 -Supine:  SO release, gentle C [id number]\" waiting for MFR to high cervicalx6 reps, MFR yoke and   STM with /up glides.  INstructed patient in use of tennis balls for SO release and trial of occipivot but poor fit.   Neuromuscular Re-education  -Brief instruction in sleep posture with pillows at SL.   Therapeutic Activity     Therapeutic Exercises  Added C-SB to program and to try HP>CP for pain relief.   Gait training   "   Modality__________________     ROM Assessment  Neck         Total 30    Blank areas are intentional and mean the treatment did not include these items.       Venus Callaway, PT  3/2/2017

## 2021-06-09 NOTE — TELEPHONE ENCOUNTER
PT called - reviewed meds and updated med list - will send message when she knows what needs to be refilled

## 2021-06-09 NOTE — PROGRESS NOTES
[facility] Follow Up Note    Assessment/Plan:  1. Type 2 diabetes mellitus without complication  With improvement of glycohemoglobin is 6.6.  She is monitoring diet.  Exercise scant.  Recommendations: Continue current therapies  - Glycosylated Hemoglobin A1c    2. Hyperlipidemia associated with type 2 diabetes mellitus  We'll update labs.  - Lipid Wilson FASTING    3. Osteopenia  We'll update vitamin D.  - Vitamin D, Total (25-Hydroxy)  - DXA Bone Density Scan; Future    4. Medication monitoring encounter  We'll check labs for multiple high risk meds  - Comprehensive Metabolic Panel  - HM2(CBC w/o Differential)    5. Essential hypertension  With suboptimal control.  Recommendation: We'll increase losartan to 50 mg per day.  Recommend home blood pressure monitoring.  Follow-up in 3 months  - losartan (COZAAR) 25 MG tablet; TAKE 2  TABLET BY MOUTH DAILY  Dispense: 180 tablet; Refill: 3    6.  Anticoagulation therapy  Since of lack of well being on warfarin.  She had this similar side effect in the past.  Recommendation: Could consider a switch to our questions.  Of note, she will require lifelong anticoagulation.  Therefore, it will be up to her to let me know if she would like to make a change    Time spent about 25 minutes in review of chronic medical problems and counseling with regard to diet, diabetes and blood pressure      Elke Mehta MD    Chief Complaint:  Chief Complaint   Patient presents with     Follow-up     Diabetes       History of Present Illness:  Krystle is a 76 y.o. female who is here today for follow-up with regard to her chronic medical problems.  Of note, she is here for an update on diabetes as her last glycohemoglobin had increased significantly.  As a result, she has been working to watch her diet.  No real medication changes were made.  She denies polyuria or polydipsia.  She does report, that since the initiation of the warfarin, she has not felt well from a  gastrointestinal point of view.  She is wondering if the warfarin exacerbates her chronic IBS symptoms.  She does not have abdominal pain.  She just has a low level lack of well-being.  However, she has not lost weight.  Of note, she denies polyuria or polydipsia.    She is not doing home blood pressure monitoring.  She does describe a fullness in her head.  She continues to have knots in her neck and is working with physical therapy.    Additional new information is such that she is working with Dr. Gutierrez from [facility].  She states that her pulmonologist reviewed her chest CT and nodules.  She does not believe any further workup is needed.    Review of Systems:  A comprehensive review of systems was performed and was otherwise negative    PFSH:  Social History: She had her  had just purchased a [employer]  History   Smoking Status     Never Smoker   Smokeless Tobacco     Not on file       Past History: As noted in the problem list.  She has had a recent diagnosis of unprovoked pulmonary embolism  Current Outpatient Prescriptions   Medication Sig Dispense Refill     acetaminophen (TYLENOL) 500 MG tablet Take 500 mg by mouth every 6 (six) hours as needed for pain.        atorvastatin (LIPITOR) 80 MG tablet Take 1 tablet (80 mg total) by mouth bedtime. 90 tablet 3     azelastine (ASTELIN) 137 mcg nasal spray 2 sprays daily (Patient taking differently: 2 sprays into each nostril daily as needed for rhinitis. 2 sprays daily) 90 mL 3     beclomethasone (QVAR) 80 mcg/actuation inhaler Inhale 2 puffs 2 (two) times a day as needed. 3 Inhaler 12     blood sugar diagnostic (ACCU-CHEK MOISE) Strp Use 1 strip As Directed 3 (three) times a week.        CALCIUM CARBONATE (CALCIUM 500 ORAL) Take by mouth 2 (two) times a day.       cholecalciferol, vitamin D3, 1,000 unit tablet Take 1,000 Units by mouth 2 (two) times a day.       cranberry extract 300 mg Tab Take 1 tablet by mouth daily.        dicyclomine  (BENTYL) 10 MG capsule Take 10-20 mg by mouth every 6 (six) hours as needed.       estradiol (ESTRACE) 0.01 % (0.1 mg/gram) vaginal cream Insert 2 g into the vagina 4 (four) times a week. 42.5 g 3     fexofenadine (ALLEGRA) 60 MG tablet Take 60 mg by mouth daily.       LACTOBACILLUS ACIDOPHILUS (PROBIOTIC ORAL) Take 1 tablet by mouth 2 (two) times a day.        lancing device Misc Use As Directed 4 (four) times a week.        loperamide-simethicone (IMODIUM MULTI-SYMPTOM RELIEF) 2-125 mg Tab Take 1-2 tablets by mouth 2 (two) times a day as needed.        losartan (COZAAR) 25 MG tablet TAKE 2  TABLET BY MOUTH DAILY 180 tablet 3     LUTEIN ORAL Take 1 tablet by mouth daily as needed.        metFORMIN (GLUCOPHAGE) 500 MG tablet Take 2 tablets (1,000 mg total) by mouth 2 times a day at 6:00 am and 4:00 pm. 180 tablet 5     montelukast (SINGULAIR) 10 mg tablet Take 1 tablet (10 mg total) by mouth daily with supper. 90 tablet 3     nitrofurantoin (MACRODANTIN) 50 MG capsule Take 1 capsule (50 mg total) by mouth daily. 90 capsule 3     omeprazole (PRILOSEC) 20 MG capsule TAKE ONE CAPSULE BY MOUTH EVERY DAY 90 capsule 3     PROAIR HFA 90 mcg/actuation inhaler INHALE TWO PUFFS BY MOUTH FOUR TIMES A DAY AS NEEDED FOR WHEEZING 25.5 g 0     psyllium (METAMUCIL) 3.4 gram packet Take 1 packet by mouth every evening.       SIMETHICONE (GAS-X ORAL) Take by mouth 2 (two) times a day.       sucralfate (CARAFATE) 1 gram tablet Take 1 tablet (1 g total) by mouth bedtime. 90 tablet 3     tiotropium bromide (SPIRIVA RESPIMAT) 2.5 mcg/actuation Mist Inhale daily as needed.        UBIDECARENONE (COENZYME Q10 ORAL) Take 1 tablet by mouth daily.        warfarin (COUMADIN) 5 MG tablet One tab daily.  Adjust to INR of 2 to 3 90 tablet 3     No current facility-administered medications for this visit.        Family History: Nothing new    Physical Exam:  General Appearance:   She appears at baseline and is in no acute distress  Vitals:     "03/14/17 1042 03/14/17 1047 03/14/17 1105   BP: 156/74 152/74 142/78   Pulse: 80     Weight: 221 lb 6.4 oz (100.4 kg)     Height: 5' 2\" (1.575 m)       Wt Readings from Last 3 Encounters:   03/14/17 221 lb 6.4 oz (100.4 kg)   01/27/17 219 lb 4.8 oz (99.5 kg)   12/28/16 216 lb 11.2 oz (98.3 kg)     Body mass index is 40.49 kg/(m^2).        Data Review:    Analysis and Summary of Old Records (2): Reviewed care everywhere for Dr. Gutierrez's records      Records Requested (1): 0      Other History Summarized (from other people in the room) (2): 0    Radiology Tests Summarized (XRAY/CT/MRI/DXA) (1): 0    Labs Reviewed (1): Reviewed labs with patient    Medicine Tests Reviewed (EKG/ECHO/COLONOSCOPY/EGD) (1): 0    Independent Review of EKG or X-RAY (2): 0        "

## 2021-06-09 NOTE — TELEPHONE ENCOUNTER
ANTICOAGULATION  MANAGEMENT    Assessment     Today's INR result of 2.0 is Therapeutic (goal INR of 2.0-3.0)        Warfarin taken as previously instructed    No new diet changes affecting INR    No new medication/supplements affecting INR    Continues to tolerate warfarin with no reported s/s of bleeding or thromboembolism     Previous INR was Supratherapeutic    Plan:     Left a detailed message for Krystle regarding INR result and instructed:     Warfarin Dosing Instructions:  Continue current warfarin dose 1.25 mg daily on mon/wed/fri; and 2.5 mg daily rest of week  (0 % change)    Instructed patient to follow up no later than: 1-2 weeks    .    Instructed to call the [facility] for any changes, questions or concerns. (#[phone])   ?   Mikki Morley RN    Subjective/Objective:      Krystle Polanco, a 80 y.o. female is on warfarin.     Krystle reports:     Home warfarin dose: as updated on anticoagulation calendar per template     Missed doses: No     Medication changes:  No     S/S of bleeding or thromboembolism:  No     New Injury or illness:  No     Changes in diet or alcohol consumption:  No     Upcoming surgery, procedure or cardioversion:  No    Anticoagulation Episode Summary     Current INR goal:   2.0-3.0   TTR:   63.1 % (1 y)   Next INR check:   7/27/2020   INR from last check:   2.00 (7/13/2020)   Weekly max warfarin dose:      Target end date:   Indefinite   INR check location:      Preferred lab:      Send INR reminders to:   [facility]    Indications    Pulmonary embolism (H) [I26.99]           Comments:            Anticoagulation Care Providers     Provider Role Specialty Phone number    Elke Mehta MD  Internal Medicine [phone]

## 2021-06-09 NOTE — TELEPHONE ENCOUNTER
"Please call her later or tomorrow.  They are switching pharmacies and want prescriptions sent.  She states her \"prescriptions are a mass \".  We did not have time to talk about this today as her  had an emergency office visit scheduled and she was driving in the car during our appointment.  Thanks for helping  "

## 2021-06-09 NOTE — PROGRESS NOTES
"Patient would like the video invitation sent by: Text to cell phone: [phone]  Doximity call-patient driving in car.    ASSESSMENT:  1. Type 2 diabetes mellitus without complication, without long-term current use of insulin (H)  Glycohemoglobin under 7.0.  This is at goal.  Will continue current medication regimen with splitting of medicines differently from last visit.  She will continue to try to work hard to follow a diabetic diet    2. BMI 40.0-44.9, adult (H)  Increased amount of stress in life.  Bowels are irritable.  Unable to lose weight.    3. Irritable bowel syndrome, unspecified type  She is going to give her IBS more time.  Colestipol helps, however is \"very dry\".  She is going to resume usual dose    4. Thyroid nodule  Stable ultrasound reviewed with patient      Follow-up 2 to 3 months    Preventive Health Care: Needs wellness visit    PLAN:  There are no Patient Instructions on file for this visit.    No orders of the defined types were placed in this encounter.    No follow-ups on file.    CHIEF COMPLAINT:  Chief Complaint   Patient presents with     Follow-up       HISTORY OF PRESENT ILLNESS:  Krystle is a 80 y.o. female contacting the clinic today via video for follow up of her type 2 diabetes and usual medical problems.  Of note, she and her  were notified that there was an opening for a spinal tap.  Therefore, they were on their way to [facility] during this office visit.  Therefore, it was somewhat abbreviated.    With regard to diabetes, she states her sugars are doing better.  They are, for the most part, under 140.  Her glycohemoglobin is back at 6.9.  She is very pleased.  At last visit, we did not adjust the dose of her medication, but did adjust the timing.  She states she is doing better.    She continues to struggle with IBS since the quarantine.  She does not feel that she is eating any differently.  However, there is increased stress and decreased mobility.  She is also " "taking less colestipol as she finds it \"drying.  She states she did not use it last night and today has an increase in irritable bowel symptoms.  Additionally, she is very worried about her  and his current medical issues.    Additionally, we reviewed her thyroid ultrasound.    REVIEW OF SYSTEMS:     All other systems are negative.    PFSH:   with adult children-non-smoker    TOBACCO USE:  Social History     Tobacco Use   Smoking Status Never Smoker   Smokeless Tobacco Never Used       VITALS:  Stated Blood Pressure    Stated Pulse     Stated Weight      PHYSICAL EXAM:  (observations via Video)  Appears at baseline      ADDITIONAL HISTORY SUMMARIZED (2):   CARE EVERYWHERE/ EXTRA INFORMATION (1):   RADIOLOGY TESTS (1):   LABS (1): Reviewed labs  CARDIOLOGY/MEDICINE TESTS (1):   INDEPENDENT REVIEW (2 each):     Total data points: 3    Video Start time: 2:43 PM  Video End time: 2:54 PM    The visit lasted a total of 11 minutes face to face    CA intake time  2 minutes      The patient has been notified of following:     \"This video visit will be conducted via a call between you and your physician/provider. We have found that certain health care needs can be provided without the need for an in-person physical exam.  This service lets us provide the care you need with a video conversation.  If a prescription is necessary we can send it directly to your pharmacy.  If lab work is needed we can place an order for that and you can then stop by our lab to have the test done at a later time.    Video visits are billed at different rates depending on your insurance coverage. Please reach out to your insurance provider with any questions.    If during the course of the call the physician/provider feels a video visit is not appropriate, you will not be charged for this service.\"    Patient has given verbal consent to a Video visit? Yes    Patient would like to receive their AVS by AVS Preference: " [employer].        Video-Visit Details    Type of service:  Video Visit    Originating Location (pt. Location): Home    Distant Location (provider location):  [facility] INTERNAL MEDICINE     Mode of Communication:  Video Conference via Isidro Mehta MD

## 2021-06-10 NOTE — PROGRESS NOTES
[facility] Daily Progress     Patient Name: Krystle Polanco  Date: 5/9/2017  Visit #: 12 +8/12  PTA visit #:  0  PRECAUTIONS/RESTRICTIONS: Acute Pulmonary Embolism/Warfaren/DM, left shoulder arthropathy  Referral Diagnosis: Acute Pulmonary Embolism/Neck and Upper Back Pain and HA  Referring provider: Elke Mehta MD  Visit Diagnosis:     ICD-10-CM    1. Cervicalgia M54.2    2. Muscle spasms of neck M62.838    3. Decreased ROM of neck R29.898    4. Rib pain on left side R07.81    5. Abnormal posture R29.3    6. Pain in thoracic spine M54.6          Assessment:     HEP/POC compliance is  good .  Patient demonstrates understanding/independence with home program.  Response to Intervention good resoluvtion left rib pain after last visit with just minimal soreness at lateral ribs at bra line.   Has more rasp vocals today and difficulty breathing which she attributes to dust in preparation for move.  Meeting expectations .       Goal Status: Ongoing  Pt. will demonstrate/verbalize independence in self-management of condition in : Met  Pt. will be independent with home exercise program in : Met  Pt. will report decreased intensity, frequency of : Progressing toward (Less intense HA)  Pt. will have improved quality of sleep: Progressing toward (able to get back to sleep w/greater ease but C- pap machine)  Patient will transfer: Progressing toward (Still aware/need to be cautious with transfer&  bed mobility)  Patient will decrease : Met  Pt will: be able to get back into a normal function/back into life/resume more normal activity-met but still lacking stamina, ability to lift and be in car for long distances.-progress toward goal (gaining stamina)    Plan / Patient Education:     Continue with initial plan of care.  Progress with home program as tolerated.   Continue up to 5 more visits at 2x/week over the next month as she is preparing to move and feels she will be over extending that may aggravate  symptoms.  Continue manual therapy.    Subjective:     Pain Ratin  Significant improvement in left lateral rib pain.  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    Objective:     Palpation: Moderate tenderness tightness especially right C7-T1-2   Neck AROM:  Flex nil loss, NE, Ext mod loss, NE; Rot right mod loss with right neck pain, rot left min loss, NE feels normal; SB right mod loss , SB left mod-major loss with tightness.  Thoracic AROM:  Flex nil loss, NE; Rot right min loss with right lateral rib and right neck pain, Rot left with left rib pain, Ext nil loss, with good stretch  Cervical ROM           Date: 2016  *=Pain 2017  *=pain 3/16/2017    *Indicate scale AROM AROM AROM   Cervical Flexion Min loss, NE Min loss, pain   Min loss, NE   Cervical Extension Mod loss, NE Min-mod loss, NE  Min loss , NE     Right Left Right Left Right Left   Cervical Sidebending Mod loss, ** Mod loss* Mod loss, *  Mod loss *  MOd loss,pain   MOd loss   Cervical Rotation Mod loss** Min loss * Min loss, * on right   Min loss, * on left  Min-mod loss, end range pain Min loss, NE   Cervical Protraction         Cervical Retraction         Thoracic Flexion         Thoracic Extension         Thoracic Sidebending               Thoracic Rotation                  Tolerated treatment well with decreased tightness and pain with greater neck motion.    Treatment Today     TREATMENT MINUTES COMMENTS   Evaluation     Self-care/ Home management     Manual therapy 23 -Right SL:  MFR elongation left lateral ribs at bra line ; prone with soft blue mask:  MFR:  bilat UT/neck, STM neck/thoracic especially right C7-T1-2   Neuromuscular Re-education     Therapeutic Activity     Therapeutic Exercises  Added thoracic seated rotation and extension. Neck and thoracic ROM   Gait training      Modality__________________     ROM Assessment  Neck         Total 23    Blank areas are intentional and mean the treatment did not include these items.       Venus Callaway, PT  5/9/2017

## 2021-06-10 NOTE — PROGRESS NOTES
[facility] Daily Progress     Patient Name: Krystle Polanco  Date: 5/11/2017  Visit #: 12 +9/12  PTA visit #:  0  PRECAUTIONS/RESTRICTIONS: Acute Pulmonary Embolism/Warfaren/DM, left shoulder arthropathy  Referral Diagnosis: Acute Pulmonary Embolism/Neck and Upper Back Pain and HA  Referring provider: Elke Mehta MD  Visit Diagnosis:     ICD-10-CM    1. Cervicalgia M54.2    2. Muscle spasms of neck M62.838    3. Decreased ROM of neck R29.898    4. Rib pain on left side R07.81    5. Abnormal posture R29.3    6. Pain in thoracic spine M54.6          Assessment:     HEP/POC compliance is  good .  Patient demonstrates understanding/independence with home program.  Response to Intervention good resolution left rib pain after last visit.   Tolerating prep for move fairly well.  Meeting expectations .       Goal Status: Ongoing  Pt. will demonstrate/verbalize independence in self-management of condition in : Met  Pt. will be independent with home exercise program in : Met  Pt. will report decreased intensity, frequency of : Progressing toward (Less intense HA)  Pt. will have improved quality of sleep: Progressing toward (able to get back to sleep w/greater ease but C- pap machine)  Patient will transfer: Progressing toward (Still aware/need to be cautious with transfer&  bed mobility)  Patient will decrease : Met  Pt will: be able to get back into a normal function/back into life/resume more normal activity-met but still lacking stamina, ability to lift and be in car for long distances.-progress toward goal (gaining stamina)    Plan / Patient Education:     Continue with initial plan of care.  Progress with home program as tolerated.   Continue up to 3 more visits at 2x/week over the next month as she is preparing to move and feels she will be over extending that may aggravate symptoms then DC on HEP. Continue manual therapy at supine with trigger point release right T1-2, SO release, C-distraction,  up/down glides.    Subjective:     Pain Ratin  No left rib pain any longer.  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    Objective:     Palpation: Moderate tenderness tightness especially right C7-T1-2   Neck AROM:  Flex nil loss, NE, Ext mod loss, NE; Rot right mod loss with right neck pain, rot left min loss, NE feels normal; SB right mod loss , SB left mod-major loss with tightness.  Thoracic AROM:  Flex nil loss, NE; Rot right min loss with right lateral rib and right neck pain, Rot left with left rib pain, Ext nil loss, with good stretch  Cervical ROM           Date: 2016  *=Pain 2017  *=pain 3/16/2017    *Indicate scale AROM AROM AROM   Cervical Flexion Min loss, NE Min loss, pain   Min loss, NE   Cervical Extension Mod loss, NE Min-mod loss, NE  Min loss , NE     Right Left Right Left Right Left   Cervical Sidebending Mod loss, ** Mod loss* Mod loss, *  Mod loss *  MOd loss,pain   MOd loss   Cervical Rotation Mod loss** Min loss * Min loss, * on right   Min loss, * on left  Min-mod loss, end range pain Min loss, NE   Cervical Protraction         Cervical Retraction         Thoracic Flexion         Thoracic Extension         Thoracic Sidebending               Thoracic Rotation                  Tolerated treatment well but persistent tightness/tigger point that radiates pain into right posterior head/neck at T1-2 spine level.  Treatment Today     TREATMENT MINUTES COMMENTS   Evaluation     Self-care/ Home management     Manual therapy 23 - prone with soft blue mask:  MFR:  bilat UT/neck, STM neck/thoracic especially right C7-T1-2   Neuromuscular Re-education     Therapeutic Activity     Therapeutic Exercises 2 Encouraged more neck rotaton to right at supine.   Gait training     Modality__________________     ROM Assessment  Neck         Total 25    Blank areas  are intentional and mean the treatment did not include these items.       Venus Callaway, PT  5/11/2017

## 2021-06-10 NOTE — PROGRESS NOTES
[facility] Follow Up Note    Assessment/Plan:  1. Influenza B  5-6 day history of lack of well-being manifested by cough of yellowish sputum, diarrhea, fever chills and myalgias.  Diagnosis of influenza B confirmed  Recommendation: It is too late for Tamiflu.  Recommend fluids and rest.  She should not expose herself to other people until she is more than 24 hours without fever.      2.  Pleuritic chest pain / Bronchitis  (history of pulmonary embolus on Coumadin)  We will check chest x-ray.  Influenza B positive.  Due to persistence and duration of cough and illness and underlying lung disease,  Will proceed with doxycycline 100 twice daily for 7 days.  Low-dose prednisone for bronchospasm.    - Influenza A/B Rapid Test  - XR Chest PA and Lateral  - INR        Elke Mehta MD    Chief Complaint:  Chief Complaint   Patient presents with     Shortness of Breath       History of Present Illness:  Krystle is a 76 y.o. female an underlying history of COPD and asthma and type 2 diabetes.  She presents here today for evaluation of a respiratory tract illness that began mid last week.  Her symptoms consist of cough of yellow phlegm, sinus congestion, muscle aches and pains and low-grade fever.  She also describes a headache.  She is seen here today because of a right-sided pleuritic chest pain as well.  She also has some dyspnea on exertion along with wheezing and a productive cough.  Of note, her  has COPD.  He was started on doxycycline by his pulmonologist.  She would like to be checked out as well.    Of note, she states she has been very busy.  She and her  are packing in anticipation of their up and coming move.  She has many mixed emotions regarding this.    Review of Systems:  A comprehensive review of systems was performed and was otherwise negative    PFSH:  Social History: She is .  She and her  are currently in the process of moving from their large family home to a  condominium which is also large  History   Smoking Status     Never Smoker   Smokeless Tobacco     Not on file       Past History: Type 2 diabetes, underlying lung disease, sleep apnea  Current Outpatient Prescriptions   Medication Sig Dispense Refill     acetaminophen (TYLENOL) 500 MG tablet Take 500 mg by mouth every 6 (six) hours as needed for pain.        atorvastatin (LIPITOR) 80 MG tablet Take 1 tablet (80 mg total) by mouth bedtime. 90 tablet 3     azelastine (ASTELIN) 137 mcg nasal spray 2 sprays daily (Patient taking differently: 2 sprays into each nostril daily as needed for rhinitis. 2 sprays daily) 90 mL 3     beclomethasone (QVAR) 80 mcg/actuation inhaler Inhale 2 puffs 2 (two) times a day as needed. 3 Inhaler 12     blood sugar diagnostic (ACCU-CHEK MOISE) Strp Use 1 strip As Directed 3 (three) times a week.        CALCIUM CARBONATE (CALCIUM 500 ORAL) Take by mouth 2 (two) times a day.       cholecalciferol, vitamin D3, 1,000 unit tablet Take 1,000 Units by mouth 2 (two) times a day.       cranberry extract 300 mg Tab Take 1 tablet by mouth daily.        dicyclomine (BENTYL) 10 MG capsule Take 10-20 mg by mouth every 6 (six) hours as needed.       estradiol (ESTRACE) 0.01 % (0.1 mg/gram) vaginal cream Insert 2 g into the vagina 4 (four) times a week. 42.5 g 3     fexofenadine (ALLEGRA) 60 MG tablet Take 60 mg by mouth daily.       LACTOBACILLUS ACIDOPHILUS (PROBIOTIC ORAL) Take 1 tablet by mouth 2 (two) times a day.        lancing device Misc Use As Directed 4 (four) times a week.        loperamide-simethicone (IMODIUM MULTI-SYMPTOM RELIEF) 2-125 mg Tab Take 1-2 tablets by mouth 2 (two) times a day as needed.        losartan (COZAAR) 25 MG tablet TAKE 2  TABLET BY MOUTH DAILY 180 tablet 3     LUTEIN ORAL Take 1 tablet by mouth daily as needed.        metFORMIN (GLUCOPHAGE) 500 MG tablet Take 2 tablets (1,000 mg total) by mouth 2 times a day at 6:00 am and 4:00 pm. 180 tablet 5     montelukast  (SINGULAIR) 10 mg tablet Take 1 tablet (10 mg total) by mouth daily with supper. 90 tablet 3     nitrofurantoin (MACRODANTIN) 50 MG capsule Take 1 capsule (50 mg total) by mouth daily. 90 capsule 3     omeprazole (PRILOSEC) 20 MG capsule TAKE ONE CAPSULE BY MOUTH EVERY DAY 90 capsule 3     PROAIR HFA 90 mcg/actuation inhaler INHALE TWO PUFFS BY MOUTH FOUR TIMES A DAY AS NEEDED FOR WHEEZING 25.5 g 0     psyllium (METAMUCIL) 3.4 gram packet Take 1 packet by mouth every evening.       SIMETHICONE (GAS-X ORAL) Take by mouth 2 (two) times a day.       sucralfate (CARAFATE) 1 gram tablet Take 1 tablet (1 g total) by mouth bedtime. 90 tablet 3     tiotropium bromide (SPIRIVA RESPIMAT) 2.5 mcg/actuation Mist Inhale daily as needed.        UBIDECARENONE (COENZYME Q10 ORAL) Take 1 tablet by mouth daily.        warfarin (COUMADIN) 5 MG tablet One tab daily.  Adjust to INR of 2 to 3 90 tablet 3     doxycycline (VIBRA-TABS) 100 MG tablet Take 1 tablet (100 mg total) by mouth 2 (two) times a day for 10 days. 20 tablet 0     predniSONE (DELTASONE) 10 MG tablet Take 1 tablet (10 mg total) by mouth daily for 5 days. 5 tablet 0     No current facility-administered medications for this visit.        Family History: Noncontributory    Physical Exam:  General Appearance:   She appears mildly ill.  She is tachycardic.  Oxygen levels are as below  Vitals:    04/18/17 1038 04/18/17 1044   BP: 136/72    Patient Site: Left Arm    Patient Position: Sitting    Cuff Size: Adult Large    Pulse: (!) 120    Temp: 98.2  F (36.8  C)    TempSrc: Tympanic    SpO2: 95% 90%   Weight: 216 lb (98 kg)      Wt Readings from Last 3 Encounters:   04/18/17 216 lb (98 kg)   03/14/17 221 lb 6.4 oz (100.4 kg)   01/27/17 219 lb 4.8 oz (99.5 kg)     Body mass index is 39.51 kg/(m^2).    Head neck exam is performed.  Mild sinus tenderness to palpation is noted.  Throat is mildly erythematous with mucus noted in the posterior pharynx  Neck is supple with no  lymphadenopathy  Lungs reveal scattered rhonchi and wheezes especially upon expiration  Cardiac exam reveals tachycardia  Extremities are negative for rash    Data Review:    Analysis and Summary of Old Records (2): 0      Records Requested (1): 0      Other History Summarized (from other people in the room) (2): 0    Radiology Tests Summarized (XRAY/CT/MRI/DXA) (1): 0    Labs Reviewed (1): 1 to be ordered and reviewed    Medicine Tests Reviewed (EKG/ECHO/COLONOSCOPY/EGD) (1): 0    Independent Review of EKG or X-RAY (2): X-ray reviewed

## 2021-06-10 NOTE — TELEPHONE ENCOUNTER
ANTICOAGULATION  MANAGEMENT    Assessment     Today's INR result of 2.6 is Therapeutic (goal INR of 2.0-3.0)        Warfarin taken as previously instructed    No new diet changes affecting INR    No new medication/supplements affecting INR    Continues to tolerate warfarin with no reported s/s of bleeding or thromboembolism     Previous INR was Therapeutic    Plan:     Left detailed message for Krystle regarding INR result and instructed:     Warfarin Dosing Instructions:  Continue current warfarin dose 1.25 mg daily on sun/wed/fri; and 2.5 mg daily rest of week  (0 % change)    Instructed patient to follow up no later than: one month      Instructed to call the [facility] for any changes, questions or concerns. (#[phone])   ?   Mikki Morley RN    Subjective/Objective:      Krystle Polanco, a 80 y.o. female is on warfarin.     Krystle reports:     Home warfarin dose: as updated on anticoagulation calendar per template     Missed doses: No     Medication changes:  No     S/S of bleeding or thromboembolism:  No     New Injury or illness:  No     Changes in diet or alcohol consumption:  No     Upcoming surgery, procedure or cardioversion:  No    Anticoagulation Episode Summary     Current INR goal:   2.0-3.0   TTR:   63.1 % (1 y)   Next INR check:   9/3/2020   INR from last check:   2.60 (8/6/2020)   Weekly max warfarin dose:      Target end date:   Indefinite   INR check location:      Preferred lab:      Send INR reminders to:   [facility]    Indications    Pulmonary embolism (H) [I26.99]           Comments:            Anticoagulation Care Providers     Provider Role Specialty Phone number    Elke Mehta MD  Internal Medicine [phone]

## 2021-06-10 NOTE — PROGRESS NOTES
[facility] Daily Progress     Patient Name: Krystle Polanco  Date: 5/30/2017  Visit #: 12 +12/12  PTA visit #:  0  PRECAUTIONS/RESTRICTIONS: Acute Pulmonary Embolism/Warfaren/DM, left shoulder arthropathy  Referral Diagnosis: Acute Pulmonary Embolism/Neck and Upper Back Pain and HA  Referring provider: Elke Mehta MD  Visit Diagnosis:     ICD-10-CM    1. Cervicalgia M54.2    2. Decreased ROM of neck R29.898    3. Muscle spasms of neck M62.838    4. Abnormal posture R29.3          Assessment:     HEP/POC compliance is  good and tolerating more physical exertion/lifting with residence move and tolerating it without flare-up..  Patient demonstrates understanding/independence with home program.  Response to Intervention good with flare-up over the past week despite more lifting and heavy work.   Meeting expectations, but plateau in progress over the past 2-3 months.  NDI Outcome Measure has demonstrated a statistically significant change since initial visit but has seen a  plateau over the past 2-3 months.    Goal Status:   Pt. will demonstrate/verbalize independence in self-management of condition in : Met  Pt. will be independent with home exercise program in : Met  Pt. will report decreased intensity, frequency of : Progressing toward (Less intense HA)  Pt. will have improved quality of sleep: Progressing toward (able to get back to sleep w/greater ease but C- pap machine)  Patient will transfer: Progressing toward (Still aware/need to be cautious with transfer&  bed mobility)  Patient will decrease : Met  Pt will: be able to get back into a normal function/back into life/resume more normal activity-met but still lacking stamina, ability to lift and be in car for long distances.-progress toward goal (gaining stamina)    Plan / Patient Education:     No further PT scheduled.  She has a f/u with her primary MD on 6/14/2017   DC on HEP if no contact from patient in 30 days.  If further PT deemed  necessary will continue manual therapy at supine with trigger point release right T1-2, SO release, C-distraction, up/down glides rather than prone STM, MFR, TPR as prone positioning made neck stiff today.    Subjective:     Pain Ratin  Greater ease/more motion and less pain with neck rotation to right today.  Resolved left rib pain.  Tolerated more heavy tasks with the move the past week without a flare-up.  NDI Outcome Measure:  Initial 74%  Current 20%    Objective:     Palpation: Minimal tenderness tightness right cervical paraspinals but tender trigger points right UT and interscapular region with moderate tightness bilat interscapular tightness.  Neck AROM (on 2017):  Flex nil loss, NE, Ext mod loss, NE; Rot right min loss with minimal right neck pain, rot left min loss, NE feels normal; SB right min-mod loss , SB left min-mod-major loss   Thoracic AROM (2017):  Flex nil loss, NE; Rot right min loss with right lateral rib and right neck pain, Rot left with left rib pain, Ext nil loss, with good stretch  Cervical ROM           Date: 2016  *=Pain 2017  *=pain 3/16/2017    *Indicate scale AROM AROM AROM   Cervical Flexion Min loss, NE Min loss, pain   Min loss, NE   Cervical Extension Mod loss, NE Min-mod loss, NE  Min loss , NE     Right Left Right Left Right Left   Cervical Sidebending Mod loss, ** Mod loss* Mod loss, *  Mod loss *  MOd loss,pain   MOd loss   Cervical Rotation Mod loss** Min loss * Min loss, * on right   Min loss, * on left  Min-mod loss, end range pain Min loss, NE   Cervical Protraction         Cervical Retraction         Thoracic Flexion         Thoracic Extension         Thoracic Sidebending               Thoracic Rotation                  Tolerated treatment well with slight loss and more pain with rotation right after treatment but after vertical cervical traction had better motion and less pain.    Refer to flow sheets for complete HEP.    Treatment Today      TREATMENT MINUTES COMMENTS   Evaluation     Self-care/ Home management     Manual therapy 23 Prone with soft blue mask:  bilat UT MFR, trigger point pressure release right UT and interscapular region followed by [facility]   Neuromuscular Re-education     Therapeutic Activity     Therapeutic Exercises  Assisted neck rotation at supine   Gait training     Modality__________________     ROM Assessment  Neck         Total 23    Blank areas are intentional and mean the treatment did not include these items.       Venus Callaway, PT  5/30/2017       [facility] Discharge Summary  Patient Name: Krystle Polanco  Date: 7/7/2017  Referral Diagnosis: Acute Pulmonary Embolism/Neck and Upper Back Pain and HA  Referring provider: Elke Mehta MD  Visit Diagnosis:   1. Cervicalgia     2. Decreased ROM of neck     3. Muscle spasms of neck     4. Abnormal posture         Goal Status:   Pt. will demonstrate/verbalize independence in self-management of condition in : Met  Pt. will be independent with home exercise program in : Met  Pt. will report decreased intensity, frequency of : Progressing toward (Less intense HA)  Pt. will have improved quality of sleep: Progressing toward (able to get back to sleep w/greater ease but C- pap machine)  Patient will transfer: Progressing toward (Still aware/need to be cautious with transfer&  bed mobility)  Patient will decrease : Met  Pt will: be able to get back into a normal function/back into life/resume more normal activity-met but still lacking stamina, ability to lift and be in car for long distances.-progress toward goal (gaining stamina)      Patient was seen for 24 visits from 12/16/2016 to 5/30/2017 with 9 missed appointments.  The patient met/making adequate progress toward goals and has demonstrated understanding of and independence in the home program for self-care, and progression to next steps.  She will initiate contact if questions or concerns arise.  The  patient was instructed to follow up with physician's clinic.  Patient received a home program postural strength, neck stretches, shoulder ROM, upper back strengthening  The patient was issued theraband and instructed in proper usage.    Therapy will be discontinued at this time.  The patient will need a new referral to resume.    Thank you for your referral.  Venus Clalaway  7/7/2017  11:55 AM

## 2021-06-10 NOTE — PROGRESS NOTES
[facility] Daily Progress     Patient Name: Krystle Polanco  Date: 5/16/2017  Visit #: 12 +10/12  PTA visit #:  0  PRECAUTIONS/RESTRICTIONS: Acute Pulmonary Embolism/Warfaren/DM, left shoulder arthropathy  Referral Diagnosis: Acute Pulmonary Embolism/Neck and Upper Back Pain and HA  Referring provider: Elke Mehta MD  Visit Diagnosis:     ICD-10-CM    1. Cervicalgia M54.2    2. Muscle spasms of neck M62.838    3. Decreased ROM of neck R29.898    4. Rib pain on left side R07.81    5. Abnormal posture R29.3    6. Pain in thoracic spine M54.6          Assessment:     HEP/POC compliance is  good .  Patient demonstrates understanding/independence with home program.  Response to Intervention good resolution left rib pain after last visit.   Tolerating prep for move fairly well.  Meeting expectations .       Goal Status: Ongoing  Pt. will demonstrate/verbalize independence in self-management of condition in : Met  Pt. will be independent with home exercise program in : Met  Pt. will report decreased intensity, frequency of : Progressing toward (Less intense HA)  Pt. will have improved quality of sleep: Progressing toward (able to get back to sleep w/greater ease but C- pap machine)  Patient will transfer: Progressing toward (Still aware/need to be cautious with transfer&  bed mobility)  Patient will decrease : Met  Pt will: be able to get back into a normal function/back into life/resume more normal activity-met but still lacking stamina, ability to lift and be in car for long distances.-progress toward goal (gaining stamina)    Plan / Patient Education:     Continue with initial plan of care.  Progress with home program as tolerated.   Continue up to 2 more visits at 2x/week over the next month as she is preparing to move and feels she will be over extending that may aggravate symptoms then DC on HEP. Continue manual therapy at supine with trigger point release right T1-2, SO release,  "C-distraction, up/down glides.    Subjective:     Pain Ratin  Gets better later in the day.  No left rib pain any longer.  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    Objective:     Palpation: Moderate tenderness tightness right cervical paraspinals  Neck AROM (on 2017):  Flex nil loss, NE, Ext mod loss, NE; Rot right mod loss with right neck pain, rot left min loss, NE feels normal; SB right mod loss , SB left mod-major loss with tightness.  Thoracic AROM (2017):  Flex nil loss, NE; Rot right min loss with right lateral rib and right neck pain, Rot left with left rib pain, Ext nil loss, with good stretch  Cervical ROM           Date: 2016  *=Pain 2017  *=pain 3/16/2017    *Indicate scale AROM AROM AROM   Cervical Flexion Min loss, NE Min loss, pain   Min loss, NE   Cervical Extension Mod loss, NE Min-mod loss, NE  Min loss , NE     Right Left Right Left Right Left   Cervical Sidebending Mod loss, ** Mod loss* Mod loss, *  Mod loss *  MOd loss,pain   MOd loss   Cervical Rotation Mod loss** Min loss * Min loss, * on right   Min loss, * on left  Min-mod loss, end range pain Min loss, NE   Cervical Protraction         Cervical Retraction         Thoracic Flexion         Thoracic Extension         Thoracic Sidebending               Thoracic Rotation                  Tolerated treatment well feeling relief with manual distraction achieving MFR.  Treatment Today     TREATMENT MINUTES COMMENTS   Evaluation     Self-care/ Home management     Manual therapy 23 -Supine: SO release, gentle C [id number]\" waiting for MFR to high cervicalx6 reps, STM with /up glides.    Neuromuscular Re-education     Therapeutic Activity     Therapeutic Exercises 2 Encouraged more neck rotaton to right at supine performing CT Junction Self " mobs   Gait training     Modality__________________     ROM Assessment  Neck         Total 25    Blank areas are intentional and mean the treatment did not include these items.       Venus Callaway, PT  5/16/2017

## 2021-06-10 NOTE — PROGRESS NOTES
[facility] Daily Progress     Patient Name: Krystle Polanco  Date: 5/2/2017  Visit #: 12 +7/12  PTA visit #:  0  PRECAUTIONS/RESTRICTIONS: Acute Pulmonary Embolism/Warfaren/DM, left shoulder arthropathy  Referral Diagnosis: Acute Pulmonary Embolism/Neck and Upper Back Pain and HA  Referring provider: Elke Mehta MD  Visit Diagnosis:     ICD-10-CM    1. Cervicalgia M54.2    2. Muscle spasms of neck M62.838    3. Decreased ROM of neck R29.898    4. Rib pain on left side R07.81    5. Abnormal posture R29.3    6. Pain in thoracic spine M54.6          Assessment:     HEP/POC compliance is  good despite being ill with flu.  Patient demonstrates understanding/independence with home program.  Response to Intervention regression with complaint of left rib pain since 4/17/2017 for now apparent reason.   Regression with NDI Outcome since last completed on 3/16/2017 at which time her score was 22% and now is 34%.  Meeting expectations overall since 12/2016.       Goal Status: Ongoing  Pt. will demonstrate/verbalize independence in self-management of condition in : Met  Pt. will be independent with home exercise program in : Met  Pt. will report decreased intensity, frequency of : Progressing toward (Less intense HA)  Pt. will have improved quality of sleep: Progressing toward (able to get back to sleep w/greater ease but C- pap machine)  Patient will transfer: Progressing toward (Still aware/need to be cautious with transfer&  bed mobility)  Patient will decrease : Met  Pt will: be able to get back into a normal function/back into life/resume more normal activity-met but still lacking stamina, ability to lift and be in car for long distances.-progress toward goal (gaining stamina)    Plan / Patient Education:     Continue with initial plan of care.  Progress with home program as tolerated.   Continue up to 5 more visits at 2x/week over the next month as she is preparing to move and feels she will be over  extending that may aggravate symptoms.  Continue manual therapy.    Subjective:     Pain Ratin    Became ill with flu on 2017 and a return of left rib pleurisy which was present early on after pulmonary embolism.  She has been able to continue with HEP despite being ill.  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  As patient is preparing to move residence she is concerned she may have increased neck pain.  NDI:  Initial 74%  Last on 3/16/2017:  22%  Current 34%    Objective:       Palpation: Moderate tenderness tightness bilateral neck/upper trap,right>>left.  Neck AROM:  Flex nil loss, NE, Ext mod loss, NE; Rot right mod loss with right neck pain, rot left min loss, NE feels normal; SB right mod loss , SB left mod-major loss with tightness.  Thoracic AROM:  Flex nil loss, NE; Rot right min loss with right lateral rib and right neck pain, Rot left with left rib pain, Ext nil loss, with good stretch  Cervical ROM           Date: 2016  *=Pain 2017  *=pain 3/16/2017    *Indicate scale AROM AROM AROM   Cervical Flexion Min loss, NE Min loss, pain   Min loss, NE   Cervical Extension Mod loss, NE Min-mod loss, NE  Min loss , NE     Right Left Right Left Right Left   Cervical Sidebending Mod loss, ** Mod loss* Mod loss, *  Mod loss *  MOd loss,pain   MOd loss   Cervical Rotation Mod loss** Min loss * Min loss, * on right   Min loss, * on left  Min-mod loss, end range pain Min loss, NE   Cervical Protraction         Cervical Retraction         Thoracic Flexion         Thoracic Extension         Thoracic Sidebending               Thoracic Rotation                  Todays exercises:  Added seated thoracic Rotation and extension.  Tolerated treatment well with left rib pain resolved after AP release, prior to that she had a cramping sensation in that location during manual cervical distraction.    Treatment Today     TREATMENT MINUTES  "COMMENTS   Evaluation     Self-care/ Home management     Manual therapy 15 -Right SL:  MFR elongation left lateral ribs from scapula to iliac crest.  Supine:  gentle C [id number]\"x2 due to c/o cramping at left lateral ribs.  Following AP release to left rib cage, perform distraction 3x30\" without cramping sensation in ribs.   Up/down glides x2 minutes   Neuromuscular Re-education     Therapeutic Activity     Therapeutic Exercises 10 Added thoracic seated rotation and extension. Neck and thoracic ROM   Gait training     Modality__________________     ROM Assessment  Neck         Total 25    Blank areas are intentional and mean the treatment did not include these items.       Venus Callaway, PT  5/2/2017  "

## 2021-06-10 NOTE — TELEPHONE ENCOUNTER
Wellness Screening Tool  Symptom Screening:  Do you have one of the following NEW symptoms:    Fever (subjective or >100.0)?  No    A new cough?  No    Shortness of breath?  No     Chills? No     New loss of taste or smell? No     Generalized body aches? No     New persistent headache? No     New sore throat? No     Nausea, vomiting, or diarrhea?  No    Within the past 3 weeks, have you been exposed to someone with a known positive illness below:    COVID-19 (known or suspected)?  No    Chicken pox?  No    Mealses?  No    Pertussis?  No    Patient notified of visitor policy- They may have one person accompany them to their appointment, but they will need to wear a mask and will be screened upon arrival for symptoms: Yes  Pt informed to wear a mask: Yes  Pt notified if they develop any symptoms listed above, prior to their appointment, they are to call the clinic directly at [phone] for further instructions.  Yes  Patient's appointment status: Patient will be seen in clinic as scheduled on 8/20/20

## 2021-06-10 NOTE — PROGRESS NOTES
[facility] Daily Progress     Patient Name: Krystle Polanco  Date: 5/25/2017  Visit #: 12 +11/12  PTA visit #:  0  PRECAUTIONS/RESTRICTIONS: Acute Pulmonary Embolism/[place]/DM, left shoulder arthropathy  Referral Diagnosis: Acute Pulmonary Embolism/Neck and Upper Back Pain and HA  Referring provider: Elke Mehta MD  Visit Diagnosis:     ICD-10-CM    1. Cervicalgia M54.2    2. Decreased ROM of neck R29.898    3. Muscle spasms of neck M62.838    4. Abnormal posture R29.3    5. Chronic left shoulder pain M25.512     G89.29          Assessment:     HEP/POC compliance is  limited due to preparing to move residence but doing a lot of packing..  Patient demonstrates understanding/independence with home program.  Response to Intervention good resolution left rib pain.  Left shoulder is becoming more painful from moving, likely.   Tolerated move fairly well.  Meeting expectations .       Goal Status: Ongoing  Pt. will demonstrate/verbalize independence in self-management of condition in : Met  Pt. will be independent with home exercise program in : Met  Pt. will report decreased intensity, frequency of : Progressing toward (Less intense HA)  Pt. will have improved quality of sleep: Progressing toward (able to get back to sleep w/greater ease but C- pap machine)  Patient will transfer: Progressing toward (Still aware/need to be cautious with transfer&  bed mobility)  Patient will decrease : Met  Pt will: be able to get back into a normal function/back into life/resume more normal activity-met but still lacking stamina, ability to lift and be in car for long distances.-progress toward goal (gaining stamina)    Plan / Patient Education:     Continue with initial plan of care.  Progress with home program as tolerated.   Reassess cerv ROM next.Complete NDI next. Continue up to1 more visits then DC on HEP. Continue manual therapy at supine with trigger point release right T1-2, SO release, C-distraction,  "up/down glides.    Subjective:     Pain Ratin  Gets better later in the day.  No left rib pain any longer.  Both treatment styles have been helpful  Variable discomfort in neck with decreased motion but overall and tolerated her residence packing activities.    Objective:     Palpation: Moderate tenderness tightness right cervical paraspinals  Neck AROM (on 2017):  Flex nil loss, NE, Ext mod loss, NE; Rot right mod loss with right neck pain, rot left min loss, NE feels normal; SB right mod loss , SB left mod-major loss with tightness.  Thoracic AROM (2017):  Flex nil loss, NE; Rot right min loss with right lateral rib and right neck pain, Rot left with left rib pain, Ext nil loss, with good stretch  Cervical ROM           Date: 2016  *=Pain 2017  *=pain 3/16/2017    *Indicate scale AROM AROM AROM   Cervical Flexion Min loss, NE Min loss, pain   Min loss, NE   Cervical Extension Mod loss, NE Min-mod loss, NE  Min loss , NE     Right Left Right Left Right Left   Cervical Sidebending Mod loss, ** Mod loss* Mod loss, *  Mod loss *  MOd loss,pain   MOd loss   Cervical Rotation Mod loss** Min loss * Min loss, * on right   Min loss, * on left  Min-mod loss, end range pain Min loss, NE   Cervical Protraction         Cervical Retraction         Thoracic Flexion         Thoracic Extension         Thoracic Sidebending               Thoracic Rotation                  Tolerated treatment well with greater neck rotation after treatment.  Treatment Today     TREATMENT MINUTES COMMENTS   Evaluation     Self-care/ Home management     Manual therapy 23 -Supine: SO release, gentle C [id number]\" waiting for MFR to high cervicalx6 reps, STM with /up glides.    Neuromuscular Re-education     Therapeutic Activity     Therapeutic Exercises 2 Assisted neck rotation at supine   Gait training     Modality__________________     ROM Assessment  Neck         Total 25    Blank areas are intentional and mean the " treatment did not include these items.       Venus Callaway, PT  5/25/2017

## 2021-06-11 NOTE — PROGRESS NOTES
[facility] Follow Up Note    Assessment/Plan:  1. Type 2 diabetes mellitus without complication  Glycohemoglobin is at 6.6.  It is stable.  Recommendations: Continue current therapies.  She is at goal with regard to glycohemoglobin, blood pressure and is currently on a statin  - Glycosylated Hemoglobin A1c  - Basic Metabolic Panel    2. Pulmonary embolism  INR at goal today  - INR    3. Irritable bowel syndrome (IBS)  Ongoing symptoms of intermittent diarrhea alternating with constipation.  She is experiencing more diarrhea recently and is using Imodium up to 5 times per week.  Suspect stress of moving is contributing.  Nonetheless this is chronic.  Recommend referral to [facility].  Would like to get colonoscopy done ahead of this study  - Ambulatory referral to Gastroenterology    4. Diarrhea  Painless, no blood or weight loss.  Recommendation: As above, colonoscopy followed by a GI consult  - Ambulatory referral for Colonoscopy        Elke Mehta MD    Chief Complaint:  Chief Complaint   Patient presents with     Follow-up       History of Present Illness:  Krystle is a 77 y.o. female who is here today for follow-up of her usual medical problems.  Of note, she is doing quite well.  She and her  are transitioning from their large home in [place] to a one level home in [place].  She states they are currently sleeping at their home and [place].  It is been a somewhat difficult transition.  Given their age, they are doing a great deal of physical work.  Nonetheless, she states that both she and her  are feeling well.  She states she has had a couple of labile blood sugars.  She had one fasting blood sugar at 101.  Another was up to 140.  She states that she is trying to be consistent with her diet, although does admit that they are sometimes eating out and not doing home-cooked food.  She states they are fatigued in the evening from their physical labor.  She  denies polyuria or polydipsia.    Of concern, is ongoing issues with loose stools.  She has a long-standing history of IBS.  Seems to be more active recently.  I did place a referral back in the fall for a consult at [facility].  She was unable to attend due to the busyness of her life.  She is now open to this.  She denies any bladder pain.  She is concerned, however, as she is using Imodium about 5 days per week.  She denies any constitutional symptoms with this she denies night sweats, weight loss, blood in the stool etc.  Last colonoscopy was in 2013.  Stents of diverticuli were seen at that time.    With regard to head neck discomfort, this is been present since her diagnosis of pulmonary embolism.  She is actually had significant improvement with physical therapy.    Review of Systems:  A comprehensive review of systems was performed and was otherwise negative    PFSH:  Social History: She is  with adult children.  She is in  in .  History   Smoking Status     Never Smoker   Smokeless Tobacco     Not on file       Past History: As noted in the snapshot  Current Outpatient Prescriptions   Medication Sig Dispense Refill     acetaminophen (TYLENOL) 500 MG tablet Take 500 mg by mouth every 6 (six) hours as needed for pain.        atorvastatin (LIPITOR) 80 MG tablet Take 1 tablet (80 mg total) by mouth bedtime. 90 tablet 3     azelastine (ASTELIN) 137 mcg nasal spray 2 sprays daily (Patient taking differently: 2 sprays into each nostril daily as needed for rhinitis. 2 sprays daily) 90 mL 3     beclomethasone (QVAR) 80 mcg/actuation inhaler Inhale 2 puffs 2 (two) times a day as needed. 3 Inhaler 12     blood sugar diagnostic (ACCU-CHEK MOISE) Strp Use 1 strip As Directed 3 (three) times a week.        CALCIUM CARBONATE (CALCIUM 500 ORAL) Take by mouth 2 (two) times a day.       cholecalciferol, vitamin D3, 1,000 unit tablet Take 1,000 Units by mouth 2 (two) times a  day.       cranberry extract 300 mg Tab Take 1 tablet by mouth daily.        dicyclomine (BENTYL) 10 MG capsule Take 10-20 mg by mouth every 6 (six) hours as needed.       fexofenadine (ALLEGRA) 60 MG tablet Take 60 mg by mouth daily.       LACTOBACILLUS ACIDOPHILUS (PROBIOTIC ORAL) Take 1 tablet by mouth 2 (two) times a day.        lancing device Misc Use As Directed 4 (four) times a week.        loperamide-simethicone (IMODIUM MULTI-SYMPTOM RELIEF) 2-125 mg Tab Take 1-2 tablets by mouth 2 (two) times a day as needed.        losartan (COZAAR) 25 MG tablet TAKE 2  TABLET BY MOUTH DAILY 180 tablet 3     LUTEIN ORAL Take 1 tablet by mouth daily as needed.        metFORMIN (GLUCOPHAGE) 500 MG tablet Take 2 tablets (1,000 mg total) by mouth 2 times a day at 6:00 am and 4:00 pm. 180 tablet 5     montelukast (SINGULAIR) 10 mg tablet Take 1 tablet (10 mg total) by mouth daily with supper. 90 tablet 3     nitrofurantoin (MACRODANTIN) 50 MG capsule Take 1 capsule (50 mg total) by mouth daily. 90 capsule 3     omeprazole (PRILOSEC) 20 MG capsule TAKE ONE CAPSULE BY MOUTH EVERY DAY 90 capsule 3     PROAIR HFA 90 mcg/actuation inhaler INHALE TWO PUFFS BY MOUTH FOUR TIMES A DAY AS NEEDED FOR WHEEZING 25.5 g 0     psyllium (METAMUCIL) 3.4 gram packet Take 1 packet by mouth every evening.       SIMETHICONE (GAS-X ORAL) Take by mouth 2 (two) times a day.       sucralfate (CARAFATE) 1 gram tablet Take 1 tablet (1 g total) by mouth bedtime. 90 tablet 3     tiotropium bromide (SPIRIVA RESPIMAT) 2.5 mcg/actuation Mist Inhale daily as needed.        UBIDECARENONE (COENZYME Q10 ORAL) Take 1 tablet by mouth daily.        warfarin (COUMADIN) 5 MG tablet One tab daily.  Adjust to INR of 2 to 3 90 tablet 3     No current facility-administered medications for this visit.        Family History: Nothing new    Physical Exam:  General Appearance:   She appears at baseline and is in no acute distress.  Vitals:    06/14/17 1022 06/14/17 1027   BP:  138/82    Patient Site: Left Arm    Patient Position: Sitting    Cuff Size: Adult Large    Pulse: 72    SpO2: 92% 96%   Weight: 215 lb (97.5 kg)      Wt Readings from Last 3 Encounters:   06/14/17 215 lb (97.5 kg)   04/18/17 216 lb (98 kg)   03/14/17 221 lb 6.4 oz (100.4 kg)     Body mass index is 39.32 kg/(m^2).        Data Review:    Analysis and Summary of Old Records (2): Reviewed physical therapy notes with regard to her recent head neck pain    Records Requested (1): 0      Other History Summarized (from other people in the room) (2): 0    Radiology Tests Summarized (XRAY/CT/MRI/DXA) (1): 0    Labs Reviewed (1): Viewed glycohemoglobin    Medicine Tests Reviewed (EKG/ECHO/COLONOSCOPY/EGD) (1): Viewed colonoscopy which shows extensive sigmoid diverticuli    Independent Review of EKG or X-RAY (2): 0

## 2021-06-11 NOTE — TELEPHONE ENCOUNTER
ANTICOAGULATION  MANAGEMENT    Assessment     Today's INR result of 2.4 is Therapeutic (goal INR of 2.0-3.0)        Warfarin taken as previously instructed    No new diet changes affecting INR    No new medication/supplements affecting INR    Continues to tolerate warfarin with no reported s/s of bleeding or thromboembolism     Previous INR was Therapeutic    Plan:     Left detailed message for Krystle regarding INR result and instructed:     Warfarin Dosing Instructions:  Continue current warfarin dose 1.25 mg daily on mon/wed/fri; and 2.5 mg daily rest of week  (0 % change)    Instructed patient to follow up no later than: one month with ov 10/6    Instructed to call the [facility] for any changes, questions or concerns. (#[phone])   ?   Mikki Morley RN    Subjective/Objective:      Krystle Polanco, a 80 y.o. female is on warfarin.     Krystle reports:     Home warfarin dose: as updated on anticoagulation calendar per template     Missed doses: No     Medication changes:  No     S/S of bleeding or thromboembolism:  No     New Injury or illness:  Reports slight vertigo x 3 weeks     Changes in diet or alcohol consumption:  No     Upcoming surgery, procedure or cardioversion:  No    Anticoagulation Episode Summary     Current INR goal:   2.0-3.0   TTR:   63.1 % (1 y)   Next INR check:   10/6/2020   INR from last check:   2.40 (9/11/2020)   Weekly max warfarin dose:      Target end date:   Indefinite   INR check location:      Preferred lab:      Send INR reminders to:   [facility]    Indications    Pulmonary embolism (H) [I26.99]           Comments:            Anticoagulation Care Providers     Provider Role Specialty Phone number    Elke Mehta MD  Internal Medicine [phone]

## 2021-06-12 NOTE — PROGRESS NOTES
[facility] Daily Progress     Patient Name: Krystle Polanco  Date: 11/5/2020  Visit #: 5/12 through 1/19/21  PTA visit #:    PRECAUTIONS: osteopenia, DM II  Referral Diagnosis:   H81.12 (ICD-10-CM) - Benign paroxysmal positional vertigo, left   M54.2 (ICD-10-CM) - Neck pain      Referring provider: Elke Mehta MD  Visit Diagnosis:     ICD-10-CM    1. BPPV (benign paroxysmal positional vertigo), left  H81.12    2. Acute neck pain  M54.2    3. Decreased ROM of neck  R29.898    4. Abnormal posture  R29.3          Assessment:     Since recent reoccurence, does note vertigo sx to be improving with CRT.  Reports decreased neck pain with upper quarter postural ex thus far, along with decreased neck pain and stiffness post cervical distraction today.    Patient is benefitting from skilled physical therapy and is making steady progress toward functional goals.  Patient is appropriate to continue with skilled physical therapy intervention, as indicated by initial plan of care.    Goal Status: PROGRESSING  Pt. will be independent with home exercise program in : 6 weeks  Pt. will bend: to dress;Comment  Comment:: without vertigo in 4 weeks.  Patient will twist/turn : in bed;for bed mobilitiy;Comment  Comment:: without vertigo in 4 weeks.  Patient will transfer: sit/stand;supine/sit;for in/out of bed;for in/out of chair;Comment  Comment: without vertigo in 4 weeks.  Patient Turn Head: for driving;without vertigo;wiith no pain;Comment  Comment: in 8 weeks.  Patient will look up / down: without vertigo;for reading;with no pain;Comment  Comment: in 8 weeks.      Plan / Patient Education:     If vertigo sx persist, recheck L/R roll tests (with goggles if available) and complete CRT as appropriate. If positional testing negative, recommence VORx1.  Recheck cervical AROM and complete MT as appropriate (c-distraction, 1st rib and upper trap stretching).    Subjective:     Pain Rating: Did not rate  While did not  feel good Tuesday after last session, reports decreased dizziness yesterday into today.  She does not think she has had any true vertigo, but also has been moving very slow, very carefully. Still with some nausea.  Did not do any of the self CRT.    Objective:     (+) L and R horizontal roll tests for transient vertigo sx, although no visible nystagmus noted in room light. Pt notes greater intensity of sx on L side, most consistent with L HC canalithiasis BPPV. Taken through 270* roll to the R x3 reps with decreased intensity of vertigo sx with each rep. Reviewed instructions previously provided to perform at home 2-3x/day as  Vertigo sx persist.  Encouraged to hold on VORx1 ex for now, but to continue with upper quarter postural ex per below, along with newly added chin tucks.    Exercises:  Exercise #1: VOR x1: Held for now  Comment #1: Scap retraction: verbal review  Exercise #2: Doorway pec stretch: verbal review  Comment #2: Chin tucks: 15x3 sec    Treatment Today     TREATMENT MINUTES COMMENTS   Evaluation     Self-care/ Home management     Manual therapy 10 Supine cervical distraction   Neuromuscular Re-education 4 Postural ex per flow sheet   Therapeutic Activity     Therapeutic Exercises     Gait training     Modality__________________     CRT 14 Per Above         Total 28    Blank areas are intentional and mean the treatment did not include these items.       Brent Girard  11/5/2020

## 2021-06-12 NOTE — PROGRESS NOTES
[facility] Follow Up Note    Assessment/Plan:  1. Ventricular ectopy  Holter monitor and echo reviewed.  Normal ejection fraction.  Normal EF on MRI stress test as well.  Holter monitor reveals approximately 35% PVC burden.  Recommendations: Per electrophysiology.  Patient is contemplating an ablation but would like to put this off for the next 6 months as they have been very medically busy.    2. Dyspnea on exertion  Since spring-dyspnea on exertion.  Probably multifactorial.  Could be contributed to by him PVC burden, versus elevated body mass index and deconditioning with COVID, pandemic and many medical issues, history of pulmonary embolism-on anticoagulation, etc.  Also with moderate persistent asthma- seen by pulmonary.  She has not tried any rescue inhalers for her shortness of breath.    She is scheduled for PFTs.  Lung exam quiet.  Recommendations:  Albuterol inhaler 2 puffs twice daily and monitor response.  Follow-up with pulmonary and arrange PFTs as recommended  - HM2(CBC w/o Differential)  - Comprehensive Metabolic Panel  - BNP(B-type Natriuretic Peptide)  - Pulse Oximetry  - XR Chest 2 Views-x-ray reviewed and within normal limits.    3. Pulmonary embolism, other, unspecified chronicity, unspecified whether acute cor pulmonale present (H)  INR today    4. Type 2 diabetes mellitus without complication, without long-term current use of insulin (H)  Hemoglobin A1c today  - HM2(CBC w/o Differential)  - Glycosylated Hemoglobin A1c  - LDL Cholesterol, Direct    5. Encounter for immunization  Flu shot today    6. Neck pain  With associated vertigo.  Of note, she has been working with a chiropractor.  After he assisted her getting off the table, she developed significant positional vertigo.  Reproduction of symptoms with Epley maneuver.  Recommendation: Because of persistent neck pain and now vertigo as a result of chiropractics, will check MRI of the C-spine.  Will refer to PT OT for visit  vestibular therapy and for evaluation of neck pain  - MR Cervical Spine Without Contrast; Future  - Ambulatory referral to PT/OT    7. Vertigo  As above, she has over-the-counter meclizine.  - MR Cervical Spine Without Contrast; Future  - Ambulatory referral to PT/OT    8. Vitamin D deficiency  We will check labs    9. Night sweats  Couple of months of night sweats in the setting of dyspnea on exertion-history of PE/obesity and multiple medical problems  - XR Chest 2 Views    10. Pulmonary embolism (H)  As above  - INR          Elke Mehta MD    Chief Complaint:  Chief Complaint   Patient presents with     Follow-up     Diabetes       History of Present Illness:  Krystle is a 80 y.o. female who is here today for follow-up of her usual medical problems.  Of note, she has been very busy with her  who has had multiple medical issues recently and has had 2-3 surgeries over the past several months.    Of note, she states that since spring, she is experience dyspnea on exertion.  She does see a pulmonologist as well as a cardiologist.  No changes were made by them.  She does have a significant PVC burden as is outlined by her electrophysiologist.  The Holter monitor is reviewed and showed about 35% of conducted beats are ventricular ectopy.  She has a normal EF on echocardiogram and an earlier MRI stress test that showed an EF of 65%.  She has dyspnea with ambulation.  She states that she can walk a very short distance before she becomes significantly short of breath.    Of note, her history is also significant for persistent asthma.  She is using long-acting inhalers but does not use her Ventolin.  She has not tried Ventolin with this shortness of breath.    She admits that she has been busy.  They have not been eating well lately.  She is not sure if she has gained weight.      She has history of pulmonary embolism-significant burden-she is on warfarin.  She states her INRs have been up and down recently due  to changes in diet.  She will have an INR today.    In addition to the above, she reports night sweats.        Review of Systems:  A comprehensive review of systems was performed and was otherwise negative    PFSH:  Social History: He is retired .  She has grown children.  Social History     Tobacco Use   Smoking Status Never Smoker   Smokeless Tobacco Never Used     He has complaints of night sweats.  This is been going on for a few months.    In addition to the above, she is experiencing some vertigo.  This started after visiting with a chiropractor for persistent neck pain.  Although she does not have any upper extremity weakness, she does describe vertigo.  This happened since the chiropractic visit.  Her chiropractor thought that perhaps he assisted her in going from supine to upright too quickly.  She states that with what ever manipulation was done for her neck pain, since that time, she has had positional vertigo.  It is worse in the a.m. and seems to get extinguished throughout the day.  She has had this remotely about 30 years ago.  She has a prescription for meclizine that she uses sporadically.      Past History:   Past Medical History:   Diagnosis Date     Adenomatous goiter 2004     Arthropathy of shoulder region unknown     Asthma 2009     Atrial flutter (H) 2017     Bartholin gland cyst unknown     Diabetes mellitus type II, controlled (H) 2004     Esophageal reflux 1980     Essential hypertension 1995     Gastroparesis 1999     Herpes simplex type 1 infection unknown     IBS (irritable bowel syndrome) 1971     Leukocytosis unknown     Osteopenia 2009     Vitamin D deficiency 2014       Current Outpatient Medications   Medication Sig Dispense Refill     acetaminophen (TYLENOL) 500 MG tablet Take 500 mg by mouth every 4 (four) hours as needed for pain.        albuterol (PROAIR HFA) 90 mcg/actuation inhaler INHALE TWO PUFFS BY MOUTH FOUR TIMES A DAY AS NEEDED FOR WHEEZING 25.5 g 0      amLODIPine (NORVASC) 5 MG tablet Take 1 tablet (5 mg total) by mouth daily. 90 tablet 3     atorvastatin (LIPITOR) 80 MG tablet Take 1 tablet (80 mg total) by mouth at bedtime. 90 tablet 3     azelastine (ASTELIN) 137 mcg (0.1 %) nasal spray 2 sprays into each nostril 2 (two) times a day. Use in each nostril as directed 90 mL 3     beclomethasone (QVAR REDIHALER) 40 mcg/actuation HFAB inhaler Inhale 2 puffs 2 (two) times a day. 31.8 g 3     blood sugar diagnostic, disc Strp Use as directed with testing blood sugars once daily.  Dispense Contour Next brand per pt's insurance coverage 100 strip 3     CALCIUM CARBONATE (CALCIUM 500 ORAL) Take by mouth 2 (two) times a day.       cholecalciferol, vitamin D3, 1,000 unit tablet Take 1,000 Units by mouth 2 (two) times a day.       clindamycin (CLEOCIN) 150 MG capsule TAKE 4 CAPSULES  BY MOUTH 1 HOUR PRIOR TO DENTAL APPOINTMENT.       colestipoL (COLESTID) 1 gram tablet Take 1 tablet (1 g total) by mouth 2 (two) times a day. 180 tablet 3     cranberry extract 300 mg Tab Take 1 tablet by mouth daily.        dicyclomine (BENTYL) 10 MG capsule Take 10 mg by mouth 2 (two) times a day as needed.       estradiol (ESTRACE) 0.01 % (0.1 mg/gram) vaginal cream Insert 2 g into the vagina every other day.       LACTOBACILLUS ACIDOPHILUS (PROBIOTIC ORAL) Take 1 tablet by mouth 2 (two) times a day.        lancing device Misc Use As Directed 4 (four) times a week.        losartan (COZAAR) 100 MG tablet Take 1 tablet (100 mg total) by mouth daily. 90 tablet 3     LUTEIN ORAL Take 1 tablet by mouth daily.              metFORMIN (GLUCOPHAGE XR) 500 MG 24 hr tablet 2 tabs in am/ one tab in pm 270 tablet 3     metroNIDAZOLE (METROGEL) 0.75 % gel Apply 1 application topically 2 (two) times a day.       montelukast (SINGULAIR) 10 mg tablet Take 1 tablet (10 mg total) by mouth daily. 90 tablet 3     nitrofurantoin (MACRODANTIN) 50 MG capsule Take 1 capsule (50 mg total) by mouth daily. 90 capsule  3     nystatin (MYCOSTATIN) ointment Apply small amount to corners of your mouth several times daily       omeprazole (PRILOSEC) 20 MG capsule Take 1 capsule (20 mg total) by mouth daily. 90 capsule 3     psyllium (METAMUCIL) 3.4 gram packet Take 1 packet by mouth every evening.       SIMETHICONE (GAS-X ORAL) Take by mouth 2 (two) times a day.       sotaloL (BETAPACE) 80 MG tablet Take 0.5 tablets (40 mg total) by mouth 2 (two) times a day. 90 tablet 3     tiotropium (SPIRIVA WITH HANDIHALER) 18 mcg inhalation capsule Place 1 capsule (2 puffs total) into inhaler and inhale daily. Place 1 capsule into the inhaler device. Close and press button to crush the capsule. Inhale the contents of the crushed capsule in 2 breaths. 30 capsule 2     UBIDECARENONE (COENZYME Q10 ORAL) Take 1 tablet by mouth daily.        warfarin ANTICOAGULANT (COUMADIN/JANTOVEN) 2.5 MG tablet Take 1/2 to 1 tablets (1.25 mg to 2.5 mg) by mouth daily. Adjust dose based on INR results as directed. 100 tablet 1     No current facility-administered medications for this visit.        Family History:     Physical Exam:  General Appearance:   She is pleasant and well-appearing and in no acute distress  Vitals:    10/02/20 1354 10/02/20 1505   BP: 116/62    Patient Site: Right Arm    Patient Position: Sitting    Cuff Size: Adult Large    Pulse: 80    SpO2: 95% 94%   Weight: (!) 223 lb (101.2 kg)      Wt Readings from Last 3 Encounters:   10/02/20 (!) 223 lb (101.2 kg)   08/20/20 (!) 224 lb 11.2 oz (101.9 kg)   07/20/20 (!) 228 lb (103.4 kg)     Body mass index is 40.46 kg/m .    Epley maneuver is performed and definitely causes repetition and exacerbation of vertigo  Pupils are equal and reactive to light.  No overt nystagmus is noted but she has a vertigo with movement of the eyeballs laterally.  Lungs are clear with no wheezing noted  Cardiac exam reveals a occasional irregular systole  Abdomen is obese.  No significant tenderness is noted  Extremities  are without significant edema.    Pulse oximetry with activity is as noted above    Data Review:    Analysis and Summary of Old Records (2): Reviewed Dr. Gutierrez's notes as well as the cardiology notes    Records Requested (1):       Other History Summarized (from other people in the room) (2):     Radiology Tests Summarized (XRAY/CT/MRI/DXA) (1): Ordered chest x-ray and reviewed x-ray    Labs Reviewed (1): Ordered labs    Medicine Tests Reviewed (EKG/ECHO/COLONOSCOPY/EGD) (1):     Independent Review of EKG or X-RAY (2): Dependent review of x-ray

## 2021-06-12 NOTE — PROGRESS NOTES
[facility] Daily Progress     Patient Name: Krystle Polanco  Date: 10/30/2020  Visit #: 3/12 through 1/19/21  PTA visit #:    PRECAUTIONS: osteopenia, DM II  Referral Diagnosis:   H81.12 (ICD-10-CM) - Benign paroxysmal positional vertigo, left   M54.2 (ICD-10-CM) - Neck pain      Referring provider: Elke Mehta MD  Visit Diagnosis:     ICD-10-CM    1. BPPV (benign paroxysmal positional vertigo), left  H81.12    2. Acute neck pain  M54.2    3. Decreased ROM of neck  R29.898    4. Abnormal posture  R29.3          Assessment:     BPPV appears resolved at this point.  Vestibular adaptation and postural ex initiated today with good tolerance, although notes slight increase in nausea and dizziness with VORx1, as to be expected. Discussed to work into mild/mod sx, allowing them to calm down completely prior to next rep.    Patient is benefitting from skilled physical therapy and is making steady progress toward functional goals.  Patient is appropriate to continue with skilled physical therapy intervention, as indicated by initial plan of care.    Goal Status:  Pt. will be independent with home exercise program in : 6 weeks  Pt. will bend: to dress;Comment  Comment:: without vertigo in 4 weeks.  Patient will twist/turn : in bed;for bed mobilitiy;Comment  Comment:: without vertigo in 4 weeks.  Patient will transfer: sit/stand;supine/sit;for in/out of bed;for in/out of chair;Comment  Comment: without vertigo in 4 weeks.  Patient Turn [place]: for driving;without vertigo;wiith no pain;Comment  Comment: in 8 weeks.  Patient will look up / down: without vertigo;for reading;with no pain;Comment  Comment: in 8 weeks.      Plan / Patient Education:     Review newly added ex.  Recheck cervical AROM and complete MT as appropriate (c-distraction, 1st rib and upper trap stretching).    Subjective:     Pain Rating: Did not rate  Reports her neck was very sore after last visit; applied heat and ice which was  helpful.  No longer having any true vertigo, but still with vague dizziness and nausea. She notes it helps to move slowly in general, but understands the importance of mvmt to recovery.    Objective:     L [place]-Hallpike negative for nystagmus or vertigo sx today, but does report mild dizziness lasting 2 seconds when brought back. Taken through 1 rep of Epley to ensure crystal clearance, but otherwise, it appears BPPV may be resolved at this point.  Initiated VORx1 for vestibular adaptation, along with scap retraction and doorway pec stretch, both of which patient reports decreased neck pain with.    Exercises:  Exercise #1: VOR x1: 2x30 sec L/R and up/down in seated  Comment #1: Scap retraction: 10x3 sec  Exercise #2: Doorway pec stretch: 3x20 sec  Comment #2: Chin tucks- add next visit    Treatment Today     TREATMENT MINUTES COMMENTS   Evaluation     Self-care/ Home management     Manual therapy     Neuromuscular Re-education 23 Vestibular, Postural ex per flow sheet   Therapeutic Activity     Therapeutic Exercises     Gait training     Modality__________________     CRT 3 Per Above         Total 26    Blank areas are intentional and mean the treatment did not include these items.       Brent Girard  10/30/2020

## 2021-06-12 NOTE — TELEPHONE ENCOUNTER
----- Message from Elke Mehta MD sent at 10/13/2020  7:32 AM CDT -----  Please let Omer know that her insurance does not want to pay for the MRI of her cervical spine.  Lets hold on this and see how the occupational/physical therapy does not helping her with her pain and vertigo.  If this does not work, we can try to file for an appeal.

## 2021-06-12 NOTE — PROGRESS NOTES
[facility] Follow Up Note    Assessment/Plan:  1.  Vertigo/lightheadedness  Continues to experience exacerbation of vertigo/lightheadedness that occurred following a chiropractic manipulation of the neck.  Her insurance denied a neck MRI.  Neck pain is improving with physical therapy.  Modest improvement of vertigo with vestibular therapy.  Notes from PT are reviewed.  Recommendation: Have advised that we give this a couple more weeks to see if her vertigo settles down.  She has a remote history of the same.  Will prescribe meclizine 25 mg at bedtime and half tab 1-2 times daily.  If no improvement, consider MRI of the head.    2. Benign Essential Hypertension  Stable on current medications.  Of note, initial blood pressure was reading low.  A recheck by me was at 138/58    3.  2 diabetes with hyperglycemia  Patient states that she is trying to eat more carefully.  Glycohemoglobin mildly elevated at 7.0.  Recommendations: She has had many difficulties with medications recently.  Will encourage healthy diet and continue to monitor.    Additionally, recommend a renewal of her diabetic shoes.  Diabetic foot exam is done today.  She has decreased sensation in the left.  Skin integrity appears intact.  Pulses are intact.  No callus formation.  Foot care per podiatry.     4. Paroxysmal atrial fibrillation (H)  Stable-on warfarin for pulmonary embolism.  Currently in sinus rhythm    5. PVC's (premature ventricular contractions)  High burden of PVCs noted.  He is being considered for ablation    6. Neck pain  He is currently receiving physical therapy.  She was initially seen by the chiropractor.  The manipulation on the table resulted in a trigger of her vertigo.  Her neck pain is settling.        8. BMI 40.0-44.9, adult (H)  Encourage physical activity    9. Pulmonary embolism (H)  On warfarin  - INR      Follow-up in 1 to 2 months/via email in 1 to 2 weeks    Elke Mehta MD    Chief Complaint:  Chief  Complaint   Patient presents with     Follow-up       History of Present Illness:  Krystle is a 80 y.o. female here today for follow-up of many problems.  Of note, she states she is not doing so well.  She continues to be plagued by vertigo.  She had an episode remotely several years ago.  More recently, she had a recurrence of vertigo.  It occurred following a manipulation by chiropractor who is treating her for neck pain.  She states when he helped her up from a supine position, it triggered the event.  She has recurrence of symptoms with certain positions.  Symptoms are repeated and reproduced with [place]-Hallpike maneuver.  She has since been referred to PT/OT.  Her physical therapist is working on both vestibular therapy and neck pain.  His notes are reviewed.  It looks as though she is improving with regard to neck pain.  Her vertigo is slow to improve.  Of note, she does not have associated hearing loss.  She does not have a headache or any word finding difficulties.  She gets some relief with Dramamine.  She states today was a bad day.    Additionally, she would like to have a new prescription for diabetic shoes.  She is a diabetic who is seen here at least quarterly for diabetic exams.  She gets regular foot care with podiatrist.  She has some mild neuropathy on the left.  She has some minor toe deformities.  She would be greatly helped with this.    Also, we reviewed her current issues with arrhythmia.  She has a high burden of PVCs and has had paroxysmal atrial fibrillation.  Of note, she is already on Coumadin for pulmonary embolism.  She was evaluated and it was recommended that she contemplate an ablation.  She has put this on hold because of the pandemic.  However, we are wondering if some of her lightheadedness would be improved if her arrhythmia was taking care of.  She is going to contemplate this.  The notes from cardiology are reviewed once again with her.    Of note, her labs from last visit are  reviewed as well.  Her glycohemoglobin is up to 7.0.  Is compliant with medications.  They have had a very stressful year with her 's illnesses.  She has not been able to be as active as she wants to.      Review of Systems:  A comprehensive review of systems was performed and was otherwise negative    PFSH:  Social History: She is  to Jack.  They have adult children who are very engaged in their life.  Social History     Tobacco Use   Smoking Status Never Smoker   Smokeless Tobacco Never Used       Past History:   Past Medical History:   Diagnosis Date     Adenomatous goiter 2004     Arthropathy of shoulder region unknown     Asthma 2009     Atrial flutter (H) 2017     Bartholin gland cyst unknown     Diabetes mellitus type II, controlled (H) 2004     Esophageal reflux 1980     Essential hypertension 1995     Gastroparesis 1999     Herpes simplex type 1 infection unknown     IBS (irritable bowel syndrome) 1971     Leukocytosis unknown     Osteopenia 2009     Vitamin D deficiency 2014       Current Outpatient Medications   Medication Sig Dispense Refill     acetaminophen (TYLENOL) 500 MG tablet Take 500 mg by mouth every 4 (four) hours as needed for pain.        albuterol (PROAIR HFA) 90 mcg/actuation inhaler INHALE TWO PUFFS BY MOUTH FOUR TIMES A DAY AS NEEDED FOR WHEEZING 25.5 g 3     amLODIPine (NORVASC) 5 MG tablet Take 1 tablet (5 mg total) by mouth daily. 90 tablet 3     atorvastatin (LIPITOR) 80 MG tablet Take 1 tablet (80 mg total) by mouth at bedtime. 90 tablet 3     azelastine (ASTELIN) 137 mcg (0.1 %) nasal spray 2 sprays into each nostril 2 (two) times a day. Use in each nostril as directed 90 mL 3     beclomethasone (QVAR REDIHALER) 40 mcg/actuation HFAB inhaler Inhale 2 puffs 2 (two) times a day. 31.8 g 3     blood sugar diagnostic, disc Strp Use as directed with testing blood sugars once daily.  Dispense Contour Next brand per pt's insurance coverage 100 strip 3     CALCIUM CARBONATE  (CALCIUM 500 ORAL) Take by mouth 2 (two) times a day.       cholecalciferol, vitamin D3, 1,000 unit tablet Take 1,000 Units by mouth 2 (two) times a day.       clindamycin (CLEOCIN) 150 MG capsule TAKE 4 CAPSULES  BY MOUTH 1 HOUR PRIOR TO DENTAL APPOINTMENT. 30 capsule 3     colestipoL (COLESTID) 1 gram tablet Take 1 tablet (1 g total) by mouth 2 (two) times a day. 180 tablet 3     cranberry extract 300 mg Tab Take 1 tablet by mouth daily.        dicyclomine (BENTYL) 10 MG capsule Take 1 capsule (10 mg total) by mouth 2 (two) times a day as needed. 90 capsule 3     estradioL (ESTRACE) 0.01 % (0.1 mg/gram) vaginal cream Insert 2 g into the vagina every other day. 42.5 g 0     LACTOBACILLUS ACIDOPHILUS (PROBIOTIC ORAL) Take 1 tablet by mouth 2 (two) times a day.        lancing device Misc Use as directed 4 times a week. 1 each 3     losartan (COZAAR) 100 MG tablet Take 1 tablet (100 mg total) by mouth daily. 90 tablet 3     LUTEIN ORAL Take 1 tablet by mouth daily.              metFORMIN (GLUCOPHAGE XR) 500 MG 24 hr tablet Take 2 tablets (1,000 mg total) by mouth 2 (two) times a day. 270 tablet 4     metroNIDAZOLE (METROGEL) 0.75 % gel Apply 1 application topically 2 (two) times a day.       montelukast (SINGULAIR) 10 mg tablet Take 1 tablet (10 mg total) by mouth daily. 90 tablet 3     nitrofurantoin (MACRODANTIN) 50 MG capsule Take 1 capsule (50 mg total) by mouth daily. 90 capsule 3     omeprazole (PRILOSEC) 20 MG capsule Take 1 capsule (20 mg total) by mouth daily before breakfast. 90 capsule 3     psyllium (METAMUCIL) 3.4 gram packet Take 1 packet by mouth every evening.       SIMETHICONE (GAS-X ORAL) Take by mouth 2 (two) times a day.       sotaloL (BETAPACE) 80 MG tablet Take 0.5 tablets (40 mg total) by mouth 2 (two) times a day. 90 tablet 3     tiotropium (SPIRIVA WITH HANDIHALER) 18 mcg inhalation capsule Place 1 capsule (2 puffs total) into inhaler and inhale daily. 30 capsule 2     UBIDECARENONE (COENZYME  Q10 ORAL) Take 1 tablet by mouth daily.        warfarin ANTICOAGULANT (COUMADIN/JANTOVEN) 2.5 MG tablet Take 1/2 to 1 tablets (1.25 mg to 2.5 mg) by mouth daily. Adjust dose based on INR results as directed. 100 tablet 1     meclizine (ANTIVERT) 25 mg tablet Take 1 tablet (25 mg total) by mouth 3 (three) times a day as needed for dizziness or nausea. 30 tablet 1     No current facility-administered medications for this visit.        Family History:     Physical Exam:  General Appearance:   Appears at baseline.  Her weight is similar to the last.  Her heart rate is approximately 60 bpm  Vitals:    11/06/20 1308 11/06/20 1358   BP: 102/74 138/58   Patient Site: Right Arm    Patient Position: Sitting    Cuff Size: Adult Large    Pulse: (!) 45    SpO2: 97%    Weight: 221 lb (100.2 kg)      Wt Readings from Last 3 Encounters:   11/06/20 221 lb (100.2 kg)   10/02/20 (!) 223 lb (101.2 kg)   08/20/20 (!) 224 lb 11.2 oz (101.9 kg)     Body mass index is 40.1 kg/m .    No focal neurologic deficits are noted.  Neurologic exam was not repeated today  Lungs are clear to auscultation and percussion  Cardiac exam reveals a steady heart rate with no extrasystoles during the exam.  Feet are examined.  Pulses are intact bilaterally.  Light touch is intact.,  The exception of a slight decrease in sensation on the left.  There are some arthritic deformities of the feet.     Data Review:    Analysis and Summary of Old Records (2): Reviewed physical therapy records    Records Requested (1):       Other History Summarized (from other people in the room) (2):     Radiology Tests Summarized (XRAY/CT/MRI/DXA) (1):     Labs Reviewed (1): Reviewed labs    Medicine Tests Reviewed (EKG/ECHO/COLONOSCOPY/EGD) (1):     Independent Review of EKG or X-RAY (2):

## 2021-06-12 NOTE — PATIENT INSTRUCTIONS - HE
Can perform repositioning in bed with a pillow under your head:  Lie on your left side x30-60 seconds.  Roll onto your back and lie x30-60 seconds.  Roll onto your right side and lie x30-60 seconds.  Tip your nose down towards the bed and slightly tuck your chin. Hold 30-60 seconds.  Do 2-3x/day until vertigo resolves.

## 2021-06-12 NOTE — TELEPHONE ENCOUNTER
ANTICOAGULATION  MANAGEMENT    Assessment     Today's INR result of 3.0 is Therapeutic (goal INR of 2.0-3.0)        Warfarin taken as previously instructed    No new diet changes affecting INR    No new medication/supplements affecting INR    Continues to tolerate warfarin with no reported s/s of bleeding or thromboembolism     Previous INR was Therapeutic    Plan:     Spoke on phone with Krystle regarding INR result and instructed:     Warfarin Dosing Instructions:  Continue current warfarin dose 1.25 mg daily on sun/wed; and 2.5 mg daily rest of week  (0 % change)    Instructed patient to follow up no later than: one month    Krystle verbalizes understanding and agrees to warfarin dosing plan.    Instructed to call the [facility] for any changes, questions or concerns. (#[phone])   ?   Mikki Morley RN    Subjective/Objective:      Krystle Polanco, a 80 y.o. female is on warfarin.     Krystle reports:     Home warfarin dose: as updated on anticoagulation calendar per template     Missed doses: No     Medication changes:  No     S/S of bleeding or thromboembolism:  No     New Injury or illness:  No     Changes in diet or alcohol consumption:  Yes:      Upcoming surgery, procedure or cardioversion:  No    Anticoagulation Episode Summary     Current INR goal:  2.0-3.0   TTR:  63.1 % (1 y)   Next INR check:  10/30/2020   INR from last check:  3.00 (10/2/2020)   Weekly max warfarin dose:     Target end date:  Indefinite   INR check location:     Preferred lab:     Send INR reminders to:  [facility]    Indications    Pulmonary embolism (H) [I26.99]           Comments:           Anticoagulation Care Providers     Provider Role Specialty Phone number    Elke Mehta MD  Internal Medicine [phone]

## 2021-06-12 NOTE — TELEPHONE ENCOUNTER
Spoke with pt and went over pcp message.  Pt understanding.  Pt agreeable to increasing metforminXR to 2 tabs twice daily.  Pt also agreeable to referral for dietician, but doesn't want to be called about that for several weeks.    Pt also reports having lots of issues with her prescriptions going to the wrong pharmacy. Spent time with pt, going over her med list and verifying prescriptions.  Pt would like all scripts filled and sent to [facility].  Orders pended.

## 2021-06-12 NOTE — TELEPHONE ENCOUNTER
Krystle, your diabetes is back up.  Do you think you could handle being on the metforminXR -2 tabs twice daily?   Dietician?    Also, your oxygen levels and xray were fine.  Your BNP, which reflects fluid status around the heart is up a bit.(minor elevation)  I suspect this is from all of the PVCS. This may be why you feel short of breath.

## 2021-06-12 NOTE — TELEPHONE ENCOUNTER
ANTICOAGULATION  MANAGEMENT    Assessment     Today's INR result of 2.6 is Therapeutic (goal INR of 2.0-3.0)        Warfarin taken as previously instructed    No new diet changes affecting INR    No new medication/supplements affecting INR     Continues to tolerate warfarin with no reported s/s of bleeding or thromboembolism     Previous INR was Therapeutic     Vertigo since august    Plan:     Spoke on phone with Krystle regarding INR result and instructed:     Warfarin Dosing Instructions:  Continue current warfarin dose 1.25 mg daily on sun/wed; and 2.5 mg daily rest of week  (0 % change)    Instructed patient to follow up no later than: one month    Krystle verbalizes understanding and agrees to warfarin dosing plan.    Instructed to call the [facility] for any changes, questions or concerns. (#[phone])   ?   Mikki Morley RN    Subjective/Objective:      Krystle Polanco, a 80 y.o. female is on warfarin.     Krystle reports:     Home warfarin dose: verbally confirmed home dose with Kyrstle and updated on anticoagulation calendar     Missed doses: No     Medication changes:  No     S/S of bleeding or thromboembolism:  No     New Injury or illness:  No     Changes in diet or alcohol consumption:  No     Upcoming surgery, procedure or cardioversion:  No    Anticoagulation Episode Summary     Current INR goal:  2.0-3.0   TTR:  69.3 % (1 y)   Next INR check:  12/4/2020   INR from last check:  2.60 (11/6/2020)   Weekly max warfarin dose:     Target end date:  Indefinite   INR check location:     Preferred lab:     Send INR reminders to:  [facility]    Indications    Pulmonary embolism (H) [I26.99]           Comments:           Anticoagulation Care Providers     Provider Role Specialty Phone number    Elke Mehta MD  Internal Medicine [phone]

## 2021-06-12 NOTE — TELEPHONE ENCOUNTER
----- Message from Radha Da Silva sent at 10/13/2020  7:20 AM CDT -----  Regarding: Peer to Peer call needed for MRI prior authorization  Peer to Peer Request    Patient Name:Krystle Polanco  :1940  MRN:[id number]    Dr. Mehta,     The authorization for procedure MR CERVICAL SPINE WO CONTRAST on date of service 10-16-20 has been denied. We were unsuccessful in obtaining approval through clinical review. A yzyn-or-izoc review can be done by calling the insurance/third party authorization vendor with the following information:    Insurance:[employer]  Auth Vendor: [employer]  Phone:[phone]. Select option 1  Due:ASAP    Patient ID:[id number]  Case/Ref #:[id number]    Please complete this as soon as you are able and let me know when it is done.    Thank you,    Radha PATTERSON  [facility] [place]

## 2021-06-12 NOTE — PROGRESS NOTES
[facility] Daily Progress     Patient Name: Krystle Polanco  Date: 11/3/2020  Visit #: 4/12 through 1/19/21  PTA visit #:    PRECAUTIONS: osteopenia, DM II  Referral Diagnosis:   H81.12 (ICD-10-CM) - Benign paroxysmal positional vertigo, left   M54.2 (ICD-10-CM) - Neck pain      Referring provider: Elke Mehta MD  Visit Diagnosis:     ICD-10-CM    1. BPPV (benign paroxysmal positional vertigo), left  H81.12    2. Acute neck pain  M54.2    3. Decreased ROM of neck  R29.898    4. Abnormal posture  R29.3          Assessment:     Pt reports significant improvement in sx the last 4 days, until waking up this morning to intense vertigo. Signs/sx appear most consistent with L HC canalithiasis BPPV, taken through 270* roll to the R.  Does report decreased neck pain with upper quarter postural ex thus far.    Patient is benefitting from skilled physical therapy and is making steady progress toward functional goals.  Patient is appropriate to continue with skilled physical therapy intervention, as indicated by initial plan of care.    Goal Status:  Pt. will be independent with home exercise program in : 6 weeks  Pt. will bend: to dress;Comment  Comment:: without vertigo in 4 weeks.  Patient will twist/turn : in bed;for bed mobilitiy;Comment  Comment:: without vertigo in 4 weeks.  Patient will transfer: sit/stand;supine/sit;for in/out of bed;for in/out of chair;Comment  Comment: without vertigo in 4 weeks.  Patient Turn Head: for driving;without vertigo;wiith no pain;Comment  Comment: in 8 weeks.  Patient will look up / down: without vertigo;for reading;with no pain;Comment  Comment: in 8 weeks.      Plan / Patient Education:     If vertigo sx persist, recheck L/R roll tests and complete CRT as appropriate. If positional testing negative, recommence VORx1.  Add chin tucks to HEP.  Recheck cervical AROM and complete MT as appropriate (c-distraction, 1st rib and upper trap stretching).    Subjective:      Pain Rating: Did not rate  Reports she was feeling really good since Friday, with even no nausea, until this AM when she went to get out of bed and had intense spinning again. She has been very nauseous since then, and reports she almost fell when standing up. Did take a dramamine to help with nausea.    Objective:     (+) L Angela-Hallpike for transient vertigo sx, although no visible nystagmus noted in room light. Taken through single rep of Epley maneuver for L PC BPPV per previous visits, although when head down and turned R, appeared to have R horizontal beating nystagmus.  (+) L and R horizontal roll tests for transient vertigo sx, although no visible nystagmus noted in room light. Pt notes greater intensity of sx on L side, most consistent with L HC canalithiasis BPPV, possibly the result of canal conversion or separate episode. Taken through 270* roll to the R, with instructions provided to perform at home 2-3x/day as sx persist.  Encouraged to hold on VORx1 ex for now, but to continue with upper quarter postural ex per below.    Exercises:  Exercise #1: VOR x1: Held for now  Comment #1: Scap retraction: 10x3 sec  Exercise #2: Doorway pec stretch: 2x20 sec  Comment #2: Chin tucks- add next visit    Treatment Today     TREATMENT MINUTES COMMENTS   Evaluation     Self-care/ Home management     Manual therapy     Neuromuscular Re-education 8 Postural ex per flow sheet   Therapeutic Activity     Therapeutic Exercises     Gait training     Modality__________________     CRT 22 Per Above         Total 30    Blank areas are intentional and mean the treatment did not include these items.       Brent Girard  11/3/2020

## 2021-06-12 NOTE — PROGRESS NOTES
[facility] Daily Progress     Patient Name: Krystle Polanco  Date: 10/27/2020  Visit #: 2/12 through 1/19/21  PTA visit #:    PRECAUTIONS: osteopenia, DM II  Referral Diagnosis:   H81.12 (ICD-10-CM) - Benign paroxysmal positional vertigo, left   M54.2 (ICD-10-CM) - Neck pain      Referring provider: Elke Mehta MD  Visit Diagnosis:     ICD-10-CM    1. BPPV (benign paroxysmal positional vertigo), left  H81.12    2. Acute neck pain  M54.2    3. Decreased ROM of neck  R29.898    4. Abnormal posture  R29.3          Assessment:     Signs/sx remain consistent with L PC canalithiasis BPPV, responding well to CRT.  Based on initial testing and subjective report, it appears likely the patient has had a past UVL that has since decompensated with this bout of BPPV. Plan to initiate VORx1 adaptation training once BPPV clears.    Patient is benefitting from skilled physical therapy and is making steady progress toward functional goals.  Patient is appropriate to continue with skilled physical therapy intervention, as indicated by initial plan of care.    Goal Status:  Pt. will be independent with home exercise program in : 6 weeks  Pt. will bend: to dress;Comment  Comment:: without vertigo in 4 weeks.  Patient will twist/turn : in bed;for bed mobilitiy;Comment  Comment:: without vertigo in 4 weeks.  Patient will transfer: sit/stand;supine/sit;for in/out of bed;for in/out of chair;Comment  Comment: without vertigo in 4 weeks.  Patient Turn Head: for driving;without vertigo;wiith no pain;Comment  Comment: in 8 weeks.  Patient will look up / down: without vertigo;for reading;with no pain;Comment  Comment: in 8 weeks.      Plan / Patient Education:     If sx persist, recheck positional testing and complete CRT as appropriate.  If negative, begin VORx1, along with chin tucks and scap retraction.    Subjective:     Pain Rating: Did not rate  Reports vertigo is much better. No longer getting the same nauseous  feeling. Still has to go slow with head mvmts, but dizziness is much better since last visit.    Objective:     Negative R Notus-Hallpike, L and R horizontal roll tests.  Positive L Notus-Hallpike for L torsional upbeating nystagmus lasting approx. 10 sec with concurrent vertigo sx. Taken through Epley CRT x4 reps today, with decreased intensity and duration of sx and nystagmus with each set. No vertigo or nystagmus noted on final rep, although patient still notes general dizziness, as to be expected.    Treatment Today     TREATMENT MINUTES COMMENTS   Evaluation     Self-care/ Home management     Manual therapy     Neuromuscular Re-education     Therapeutic Activity     Therapeutic Exercises     Gait training     Modality__________________     CRT 37 Per Above         Total 37    Blank areas are intentional and mean the treatment did not include these items.       Brent Girard  10/27/2020

## 2021-06-13 NOTE — PROGRESS NOTES
[facility] Daily Progress     Patient Name: Krystle Polanco  Date: 2020  Visit #:  through 21  PTA visit #:    PRECAUTIONS: osteopenia, DM II  Referral Diagnosis:   H81.12 (ICD-10-CM) - Benign paroxysmal positional vertigo, left   M54.2 (ICD-10-CM) - Neck pain      Referring provider: Elke Mehta MD  Visit Diagnosis:     ICD-10-CM    1. BPPV (benign paroxysmal positional vertigo), left  H81.12    2. Acute neck pain  M54.2    3. Decreased ROM of neck  R29.898    4. Abnormal posture  R29.3          Assessment:     BPPV appears to be well resolved for the past two weeks now. Vertigo/dizziness goals MET.  Overall, finding HEP and MT to be helpful in reducing neck pain and improving ROM. Reports decreased neck pain and stiffness post treatment session.    HEP/POC compliance is  good .  Patient is benefitting from skilled physical therapy and is making steady progress toward functional goals.  Patient is appropriate to continue with skilled physical therapy intervention, as indicated by initial plan of care.    Goal Status:   Pt. will be independent with home exercise program in : 6 weeks  Pt. will bend: to dress;Comment  Comment:: without vertigo in 4 weeks. MET  Patient will twist/turn : in bed;for bed mobilitiy;Comment  Comment:: without vertigo in 4 weeks. MET  Patient will transfer: sit/stand;supine/sit;for in/out of bed;for in/out of chair;Comment  Comment: without vertigo in 4 weeks. MET  Patient Turn Head: for driving;without vertigo;wiith no pain;Comment  Comment: in 8 weeks.  Patient will look up / down: without vertigo;for reading;with no pain;Comment  Comment: in 8 weeks.    Plan / Patient Education:     Continue MT to neck and upper back for improved upright posture for greater L/R ROT ROM.    Subjective:     Pain Ratin-2/10 most of the time, but up to 5/10 once over the last week, neck pain.  Still without any vertigo attacks and no longer with any dizziness.  Neck  pain is intermittent, but described more so as stiff/tight. Overall, reports this to be much better from the start of care.    Objective:     Cervical AROM:   Flexion Mild/mod loss   Extension Mild loss   L/R ROT Mild/mod loss with mild ERP to the L   L/R SB Mod loss with ERP    Exercises:  Exercise #1: VOR x1: discontinued, as no longer having dizziness  Comment #1: Scap retraction: verbal review  Exercise #2: Doorway pec stretch: verbal review  Comment #2: Chin tucks: 15x3 sec seated  Exercise #3: SL trunk ROT: 10x B  Comment #3: L/R upper trap stretch: 3x10 sec B    Treatment Today     TREATMENT MINUTES COMMENTS   Evaluation     Self-care/ Home management     Manual therapy 10 Supine cervical distraction, L and R upper trap stretches   Neuromuscular Re-education     Therapeutic Activity     Therapeutic Exercises 15 Ex per flow sheet   Gait training     Modality__________________     CRT           Total 25    Blank areas are intentional and mean the treatment did not include these items.       Brent Girard  12/1/2020

## 2021-06-13 NOTE — PROGRESS NOTES
[facility] Daily Progress     Patient Name: Krystle Polanco  Date: 2020  Visit #:  through 21  PTA visit #:    PRECAUTIONS: osteopenia, DM II  Referral Diagnosis:   H81.12 (ICD-10-CM) - Benign paroxysmal positional vertigo, left   M54.2 (ICD-10-CM) - Neck pain      Referring provider: Elke Mehta MD  Visit Diagnosis:     ICD-10-CM    1. BPPV (benign paroxysmal positional vertigo), left  H81.12    2. Acute neck pain  M54.2    3. Decreased ROM of neck  R29.898    4. Abnormal posture  R29.3          Assessment:     BPPV appears to be well resolved for the past two weeks now. Vertigo/dizziness goals nearing to met.  Overall, finding HEP and MT to be helpful in reducing neck pain and improving ROM. HEP further progressed today with good tolerance for all without increased pain.    Patient is benefitting from skilled physical therapy and is making steady progress toward functional goals.  Patient is appropriate to continue with skilled physical therapy intervention, as indicated by initial plan of care.    Goal Status:   Pt. will be independent with home exercise program in : 6 weeks  Pt. will bend: to dress;Comment  Comment:: without vertigo in 4 weeks. MET  Patient will twist/turn : in bed;for bed mobilitiy;Comment  Comment:: without vertigo in 4 weeks. MET  Patient will transfer: sit/stand;supine/sit;for in/out of bed;for in/out of chair;Comment  Comment: without vertigo in 4 weeks.  Patient Turn Head: for driving;without vertigo;wiith no pain;Comment  Comment: in 8 weeks.  Patient will look up / down: without vertigo;for reading;with no pain;Comment  Comment: in 8 weeks.    Plan / Patient Education:     Review seated neck retraction, SL trunk ROT, and L/R upper trap stretch (forgot to give picture).  Continue MT per below.    Subjective:     Pain Ratin-5/10 over the last week.  Still without any vertigo attacks. Describes slight dizziness/light-headedness when she first gets up  to sit EOB in the morning, but then goes away. Otherwise, no dizziness with looking up/down, turning her head, or bending over. Still is a little careful with bending forwards.    Objective:     Progressed VORx1 to standing position.  Cervical AROM:   Flexion Mod loss   Extension Mild loss with ERP   L/R ROT Mild/mod loss with mild ERP   L/R SB Mod loss with ERP    Exercises:  Exercise #1: VOR x1: x60 sec L/R and up/down in standing, wide OSMANY  Comment #1: Scap retraction: verbal review  Exercise #2: Doorway pec stretch: verbal review  Comment #2: Chin tucks: 15x3 sec seated (can do supine as well)  Exercise #3: SL trunk ROT: 10x B  Comment #3: L/R upper trap stretch: 3x10 sec B    Treatment Today     TREATMENT MINUTES COMMENTS   Evaluation     Self-care/ Home management     Manual therapy 10 Supine cervical distraction, L and R upper trap stretches   Neuromuscular Re-education 5 Vestibular ex per flow sheet   Therapeutic Activity     Therapeutic Exercises 18 Ex per flow sheet   Gait training     Modality__________________     CRT           Total 33    Blank areas are intentional and mean the treatment did not include these items.       Brent Girard  11/24/2020

## 2021-06-13 NOTE — PROGRESS NOTES
[facility] Daily Progress     Patient Name: Krystle Polanco  Date: 11/10/2020  Visit #: 6/12 through 1/19/21  PTA visit #:    PRECAUTIONS: osteopenia, DM II  Referral Diagnosis:   H81.12 (ICD-10-CM) - Benign paroxysmal positional vertigo, left   M54.2 (ICD-10-CM) - Neck pain      Referring provider: Elke Mehta MD  Visit Diagnosis:     ICD-10-CM    1. BPPV (benign paroxysmal positional vertigo), left  H81.12    2. Acute neck pain  M54.2    3. Decreased ROM of neck  R29.898    4. Abnormal posture  R29.3          Assessment:     BPPV positional testing negative today. It appears patient was able to self-treat with home 270* roll CRT.  Reports decreased neck pain with upper quarter postural ex thus far, along with decreased neck pain and stiffness post cervical distraction today.    Patient is benefitting from skilled physical therapy and is making steady progress toward functional goals.  Patient is appropriate to continue with skilled physical therapy intervention, as indicated by initial plan of care.    Goal Status: PROGRESSING  Pt. will be independent with home exercise program in : 6 weeks  Pt. will bend: to dress;Comment  Comment:: without vertigo in 4 weeks.  Patient will twist/turn : in bed;for bed mobilitiy;Comment  Comment:: without vertigo in 4 weeks.  Patient will transfer: sit/stand;supine/sit;for in/out of bed;for in/out of chair;Comment  Comment: without vertigo in 4 weeks.  Patient Turn Head: for driving;without vertigo;wiith no pain;Comment  Comment: in 8 weeks.  Patient will look up / down: without vertigo;for reading;with no pain;Comment  Comment: in 8 weeks.      Plan / Patient Education:     If vertigo sx persist, recheck L/R roll tests (with goggles if available) and complete CRT as appropriate. If positional testing negative, recommence VORx1.  Recheck cervical AROM and complete MT as appropriate (c-distraction, 1st rib and upper trap stretching).    Subjective:     Pain  Rating: Did not rate  Was doing good after last session, working out in the yard raking and picking up leaves, but woke up Friday AM with intense vertigo when trying to get out of bed. Had to use a walker just to get to the bathroom, but once back in bed, did a couple of the 270* rolls to the R as instructed, and has done several more over the weekend. The rest of the day was a wash, but by Sat AM, was without any vertigo or dizziness including up to to today, just very cautious.  Was prescribed meclizine from PCP; will be picking up later today.    Objective:     (-) L and R horizontal roll tests for nystagmus or vertigo sx with infrared goggles. Will hold on VORx1 for now to ensure full BPPV resolution.  Encouraged to hold meclizine use unless having vertigo attack, as it is a vestibular suppressant and will make dizziness worse with long-time, continued use. Pt verbalizes understanding and agreement.      Exercises:  Exercise #1: VOR x1: Held for now  Comment #1: Scap retraction: verbal review  Exercise #2: Doorway pec stretch: verbal review  Comment #2: Chin tucks: 15x3 sec    Treatment Today     TREATMENT MINUTES COMMENTS   Evaluation     Self-care/ Home management     Manual therapy 15 Supine cervical distraction   Neuromuscular Re-education 13 Postural ex per flow sheet   Therapeutic Activity     Therapeutic Exercises     Gait training     Modality__________________     CRT           Total 28    Blank areas are intentional and mean the treatment did not include these items.       Brent Girard  11/10/2020

## 2021-06-13 NOTE — PROGRESS NOTES
[facility] Daily Progress     Patient Name: Krystle Polanco  Date: 12/15/2020  Visit #:  through 21  PTA visit #:    PRECAUTIONS: osteopenia, DM II  Referral Diagnosis:   H81.12 (ICD-10-CM) - Benign paroxysmal positional vertigo, left   M54.2 (ICD-10-CM) - Neck pain      Referring provider: Elke Mehta MD  Visit Diagnosis:     ICD-10-CM    1. BPPV (benign paroxysmal positional vertigo), left  H81.12    2. Acute neck pain  M54.2    3. Decreased ROM of neck  R29.898    4. Abnormal posture  R29.3          Assessment:     BPPV appears to be well resolved. Vertigo/dizziness goals MET.  Overall, finding HEP and MT to be helpful in reducing neck pain and improving ROM. Reports decreased neck pain and stiffness post MT today.    HEP/POC compliance is  good .  Patient is benefitting from skilled physical therapy and is making steady progress toward functional goals.  Patient is appropriate to continue with skilled physical therapy intervention, as indicated by initial plan of care.    Goal Status:   Pt. will be independent with home exercise program in : 6 weeks  Pt. will bend: to dress;Comment  Comment:: without vertigo in 4 weeks. MET  Patient will twist/turn : in bed;for bed mobilitiy;Comment  Comment:: without vertigo in 4 weeks. MET  Patient will transfer: sit/stand;supine/sit;for in/out of bed;for in/out of chair;Comment  Comment: without vertigo in 4 weeks. MET  Patient Turn Head: for driving;without vertigo;wiith no pain;Comment  Comment: in 8 weeks. NO LONGER WITH VERTIGO, BUT MILD LOSS OF ROM  Patient will look up / down: without vertigo;for reading;with no pain;Comment  Comment: in 8 weeks. NO LONGER WITH VERTIGO, BUT MILD LOSS OF ROM    Plan / Patient Education:     Review middle and lower trap isometrics next visit.  Recheck cervical AROM and continue MT as appropriate.    Subjective:     Pain Ratin-2/10 most of the time, neck pain  Still without any vertigo attacks and no  longer with any dizziness.  Neck pain is intermittent, but described more so as stiff/tight. Overall, reports this to be much better from the start of care. Waking up a little stiff over this past weekend, so went back to using the towel roll in the pillow.    Objective:     Cervical ROM  Date:      *Indicate scale AROM AROM AROM   Cervical Flexion 45     Cervical Extension 40      Right Left Right Left Right Left   Cervical Sidebending 20 25       Cervical Rotation 55 65       Cervical Protraction      Cervical Retraction      Thoracic Flexion      Thoracic Extension      Thoracic Sidebending         Thoracic Rotation             Exercises:  Exercise #1: VOR x1: discontinued, as no longer having dizziness  Comment #1: Scap retraction: HEP  Exercise #2: Doorway pec stretch: HEP  Comment #2: Chin tucks: HEP  Exercise #3: SL trunk ROT: HEP  Comment #3: L/R upper trap stretch: HEP  Exercise #4: Supine middle trap (hands behind head) and lower trap (anatomical position) isometrics: 10-15x3-5 sec hold each    Treatment Today     TREATMENT MINUTES COMMENTS   Evaluation     Self-care/ Home management     Manual therapy 15 Supine cervical distraction, L and R upper trap stretches, MET to increase L ROT/SB 5x5 sec hold, central PAs and L/R sideglides grade II>III   Neuromuscular Re-education     Therapeutic Activity     Therapeutic Exercises 16 Ex per flow sheet   Gait training     Modality__________________     CRT           Total 31    Blank areas are intentional and mean the treatment did not include these items.       Brent Girard  12/15/2020

## 2021-06-13 NOTE — PROGRESS NOTES
"[facility] Follow Up Note    Assessment/Plan:  1. Type 2 diabetes mellitus without complication, without long-term current use of insulin  Glycohemoglobin is at goal.  Continue current medications.  Follow-up with glycohemoglobin in 6 months    2. Chronic diarrhea  Secondary to irritable bowel syndrome.  Still with multiple bowel movements per day.  On Imodium.  Recommendation: Will refer to gastroenterology, would like to see a GI nutritionist work with her.  Additionally, will add Colestid at 1 g twice daily  - Ambulatory referral to Gastroenterology    3. Essential hypertension  Stable on current medications  - losartan (COZAAR) 25 MG tablet; TAKE 1  TABLET BY MOUTH DAILY  Dispense: 90 tablet; Refill: 3    4.  Unprovoked pulmonary embolus  On warfarin indefinitely    Follow-up in 6 month    Elke Mehta MD    Chief Complaint:  Chief Complaint   Patient presents with     Follow-up       History of Present Illness:  Krystle is a 77 y.o. female who is here today for follow-up with regard to her routine medical problems which consists of type 2 diabetes, irritable bowel syndrome with diarrhea and hypertension as well as hyperlipidemia.  Of note, she cites no real new complaints.  She states her life continues to be chaotic as they have moved from their family home in [place]  She states that they continue to empty boxes and go through their things.  She states that her life continues to be stressful.  She describes poor diet with \"camping\" in their living room.  She states they are not eating normally.  They are not exercising.    She continues to have issues with hyper defecation.  She does have a history of irritable bowel which has worsened intermittently.  She has gone through a stretch where it is been more active recently.  She denies any bloody stools.  She does have a history of polyps and a family history of colon cancer.  She is due for colonoscopy " in 2018.  She denies any current abdominal pain.    Of note, medications are reviewed.  She is taking one losartan per day and her home blood pressures have been within normal limits    She denies any chest pain, shortness of breath or bowel or bladder problems.  She does update me that she has hoarseness and is working with her ENT doctor on this.    Review of Systems:  A comprehensive review of systems was performed and was otherwise negative    PFSH:  Social History: He is retired  and she is recently moved as above.  History   Smoking Status     Never Smoker   Smokeless Tobacco     Not on file       Past History: Complex and noted in the snapshot  Current Outpatient Prescriptions   Medication Sig Dispense Refill     acetaminophen (TYLENOL) 500 MG tablet Take 500 mg by mouth every 6 (six) hours as needed for pain.        atorvastatin (LIPITOR) 80 MG tablet Take 1 tablet (80 mg total) by mouth bedtime. 90 tablet 3     azelastine (ASTELIN) 137 mcg nasal spray 2 sprays daily (Patient taking differently: 2 sprays into each nostril daily as needed for rhinitis. 2 sprays daily) 90 mL 3     beclomethasone (QVAR) 80 mcg/actuation inhaler Inhale 2 puffs 2 (two) times a day as needed. 3 Inhaler 12     blood sugar diagnostic (ACCU-CHEK MOISE) Strp Use 1 strip As Directed 3 (three) times a week.        CALCIUM CARBONATE (CALCIUM 500 ORAL) Take by mouth 2 (two) times a day.       cholecalciferol, vitamin D3, 1,000 unit tablet Take 1,000 Units by mouth 2 (two) times a day.       cranberry extract 300 mg Tab Take 1 tablet by mouth daily.        dicyclomine (BENTYL) 10 MG capsule Take 10-20 mg by mouth every 6 (six) hours as needed.       fexofenadine (ALLEGRA) 60 MG tablet Take 60 mg by mouth daily.       LACTOBACILLUS ACIDOPHILUS (PROBIOTIC ORAL) Take 1 tablet by mouth 2 (two) times a day.        lancing device Misc Use As Directed 4 (four) times a week.        loperamide-simethicone (IMODIUM MULTI-SYMPTOM  RELIEF) 2-125 mg Tab Take 1-2 tablets by mouth 2 (two) times a day as needed.        losartan (COZAAR) 25 MG tablet TAKE 1  TABLET BY MOUTH DAILY 90 tablet 3     LUTEIN ORAL Take 1 tablet by mouth daily as needed.        metFORMIN (GLUCOPHAGE) 500 MG tablet Take 2 tablets (1,000 mg total) by mouth 2 times a day at 6:00 am and 4:00 pm. 180 tablet 5     montelukast (SINGULAIR) 10 mg tablet Take 1 tablet (10 mg total) by mouth daily with supper. 90 tablet 3     nitrofurantoin (MACRODANTIN) 50 MG capsule Take 1 capsule (50 mg total) by mouth daily. 90 capsule 3     omeprazole (PRILOSEC) 20 MG capsule TAKE ONE CAPSULE BY MOUTH EVERY DAY 90 capsule 3     PROAIR HFA 90 mcg/actuation inhaler INHALE TWO PUFFS BY MOUTH FOUR TIMES A DAY AS NEEDED FOR WHEEZING 25.5 g 0     psyllium (METAMUCIL) 3.4 gram packet Take 1 packet by mouth every evening.       SIMETHICONE (GAS-X ORAL) Take by mouth 2 (two) times a day.       sucralfate (CARAFATE) 1 gram tablet Take 1 tablet (1 g total) by mouth bedtime. 90 tablet 3     tiotropium bromide (SPIRIVA RESPIMAT) 2.5 mcg/actuation Mist Inhale daily as needed.        UBIDECARENONE (COENZYME Q10 ORAL) Take 1 tablet by mouth daily.        warfarin (COUMADIN) 5 MG tablet One tab daily.  Adjust to INR of 2 to 3 90 tablet 3     colestipol (COLESTID) 1 gram tablet Take 1 tablet (1 g total) by mouth 2 (two) times a day. 60 tablet 1     No current facility-administered medications for this visit.        Family History: Nothing new    Physical Exam:  General Appearance:   Well-appearing and in no acute distress.  Weight is up 3 pounds  Vitals:    10/18/17 1000   BP: 134/62   Patient Site: Left Arm   Patient Position: Sitting   Cuff Size: Adult Large   Pulse: 76   SpO2: 97%   Weight: 218 lb (98.9 kg)     Wt Readings from Last 3 Encounters:   10/18/17 218 lb (98.9 kg)   06/14/17 215 lb (97.5 kg)   04/18/17 216 lb (98 kg)     Body mass index is 39.87 kg/(m^2).        Data Review:    Analysis and Summary  of Old Records (2): Reviewed ENT records    Records Requested (1): 0      Other History Summarized (from other people in the room) (2): 0    Radiology Tests Summarized (XRAY/CT/MRI/DXA) (1): 0    Labs Reviewed (1): Viewed labs with her    Medicine Tests Reviewed (EKG/ECHO/COLONOSCOPY/EGD) (1): Viewed colonoscopy with her    Independent Review of EKG or X-RAY (2): 0

## 2021-06-13 NOTE — PROGRESS NOTES
[facility] Daily Progress     Patient Name: Krystle Polanco  Date: 2020  Visit #: 10/12 through 21  PTA visit #:    PRECAUTIONS: osteopenia, DM II  Referral Diagnosis:   H81.12 (ICD-10-CM) - Benign paroxysmal positional vertigo, left   M54.2 (ICD-10-CM) - Neck pain      Referring provider: Elke Mehta MD  Visit Diagnosis:     ICD-10-CM    1. BPPV (benign paroxysmal positional vertigo), left  H81.12    2. Acute neck pain  M54.2    3. Decreased ROM of neck  R29.898    4. Abnormal posture  R29.3          Assessment:     BPPV appears to be well resolved. Vertigo/dizziness goals MET.  Overall, finding HEP and MT to be helpful in reducing neck pain and improving ROM. Reports decreased neck pain and stiffness post MT today.    HEP/POC compliance is  good .  Patient is benefitting from skilled physical therapy and is making steady progress toward functional goals.  Patient is appropriate to continue with skilled physical therapy intervention, as indicated by initial plan of care.    Goal Status:   Pt. will be independent with home exercise program in : 6 weeks  Pt. will bend: to dress;Comment  Comment:: without vertigo in 4 weeks. MET  Patient will twist/turn : in bed;for bed mobilitiy;Comment  Comment:: without vertigo in 4 weeks. MET  Patient will transfer: sit/stand;supine/sit;for in/out of bed;for in/out of chair;Comment  Comment: without vertigo in 4 weeks. MET  Patient Turn Head: for driving;without vertigo;wiith no pain;Comment  Comment: in 8 weeks. NO LONGER WITH VERTIGO, BUT MILD LOSS OF ROM  Patient will look up / down: without vertigo;for reading;with no pain;Comment  Comment: in 8 weeks. NO LONGER WITH VERTIGO, BUT MILD LOSS OF ROM    Plan / Patient Education:     Continue MT to neck and upper back for improved upright posture for greater L/R ROT ROM.  Recheck cervical AROM and need for further PT next visit.    Subjective:     Pain Ratin-2/10 most of the time, neck  pain  Still without any vertigo attacks and no longer with any dizziness.  Neck pain is intermittent, but described more so as stiff/tight. Overall, reports this to be much better from the start of care. Was doing really good for a few days, then woke up stiff over the weekend, likely related to just odd positioning while sleeping.    Objective:     Educated on use of cervical towel roll while sleeping.   Mild/mod loss of L>R ROT A/PROM continues to be noted.    Exercises:  Exercise #1: VOR x1: discontinued, as no longer having dizziness  Comment #1: Scap retraction: HEP  Exercise #2: Doorway pec stretch: HEP  Comment #2: Chin tucks: 15x3 sec seated  Exercise #3: SL trunk ROT: 10x B  Comment #3: L/R upper trap stretch: HEP    Treatment Today     TREATMENT MINUTES COMMENTS   Evaluation     Self-care/ Home management     Manual therapy 19 Supine cervical distraction, L and R upper trap stretches, MET to increase L ROT/SB 5x5 sec hold, central PAs and L/R sideglides grade II>III   Neuromuscular Re-education     Therapeutic Activity     Therapeutic Exercises 8 Ex per flow sheet   Gait training     Modality__________________     CRT           Total 27    Blank areas are intentional and mean the treatment did not include these items.       Brent Girard  12/8/2020

## 2021-06-13 NOTE — PATIENT INSTRUCTIONS - HE
Lie on your back in bed.  1. Arms down by your sides, palms up, and push the back of your shoulders into the bed. Hold 3-5 seconds. 10x. 1x/day.  2. Hands behind your head, and push your elbows down into the bed. Hold 3-5 seconds. 10x. 1x/day.

## 2021-06-13 NOTE — PROGRESS NOTES
[facility] Follow Up Note    Assessment/Plan:  1. Bronchitis  She is on day 4 of a Z-Atilio.  Symptoms slowly improving.  His secretions have changed from yellow-green to clear.  Still with mild shortness of breath.  Exam unremarkable  Recommendation: Complete Z-Atilio.  Mucinex to thin secretions.  Rest and avoidance of airborne irritants.  Warm liquids.      Elke Mheta MD    Chief Complaint:  Chief Complaint   Patient presents with     Cough     Follow-up       History of Present Illness:  Krystle is a 77 y.o. female who is here today for follow-up with regard to treatment of bronchitis.  Of note, she states both she and her  became ill about 4 days after their flu shot.  She describes a cough of purulent mucus, shortness of breath and wheezing.  She also describes extreme fatigue and lassitude.  She did contact the office earlier this week.  A prescription for a Z-Atilio was prescribed.  She is currently on day 4.  She does state that she is feeling much better.  She denies fever or chills.  She does still have some mucus present but it has changed to a clear color.  She also has a postnasal drip.  He is eating and drinking without difficulty.    Review of Systems:  A comprehensive review of systems was performed and was otherwise negative    PFSH:  Social History: She is .  She and her  have recently moved.  They are unloading boxes currently  History   Smoking Status     Never Smoker   Smokeless Tobacco     Not on file       Past History: Significant for epistaxis, sleep apnea, diabetes, history of pulmonary embolus  Current Outpatient Prescriptions   Medication Sig Dispense Refill     acetaminophen (TYLENOL) 500 MG tablet Take 500 mg by mouth every 6 (six) hours as needed for pain.        atorvastatin (LIPITOR) 80 MG tablet Take 1 tablet (80 mg total) by mouth bedtime. 90 tablet 3     azelastine (ASTELIN) 137 mcg nasal spray 2 sprays daily (Patient taking differently: 2 sprays  into each nostril daily as needed for rhinitis. 2 sprays daily) 90 mL 3     azithromycin (ZITHROMAX Z-MERRILL) 250 MG tablet Take 1 tablet (250 mg total) by mouth daily for 5 days. 2 tabs on day one then one tab daily 6 tablet 0     beclomethasone (QVAR) 80 mcg/actuation inhaler Inhale 2 puffs 2 (two) times a day as needed. 3 Inhaler 12     blood sugar diagnostic (ACCU-CHEK MOISE) Strp Use 1 strip As Directed 3 (three) times a week.        CALCIUM CARBONATE (CALCIUM 500 ORAL) Take by mouth 2 (two) times a day.       cholecalciferol, vitamin D3, 1,000 unit tablet Take 1,000 Units by mouth 2 (two) times a day.       colestipol (COLESTID) 1 gram tablet Take 1 tablet (1 g total) by mouth 2 (two) times a day. 60 tablet 1     cranberry extract 300 mg Tab Take 1 tablet by mouth daily.        dicyclomine (BENTYL) 10 MG capsule Take 10-20 mg by mouth every 6 (six) hours as needed.       fexofenadine (ALLEGRA) 60 MG tablet Take 60 mg by mouth daily.       LACTOBACILLUS ACIDOPHILUS (PROBIOTIC ORAL) Take 1 tablet by mouth 2 (two) times a day.        lancing device Misc Use As Directed 4 (four) times a week.        loperamide-simethicone (IMODIUM MULTI-SYMPTOM RELIEF) 2-125 mg Tab Take 1-2 tablets by mouth 2 (two) times a day as needed.        losartan (COZAAR) 25 MG tablet TAKE 1  TABLET BY MOUTH DAILY 90 tablet 3     LUTEIN ORAL Take 1 tablet by mouth daily as needed.        metFORMIN (GLUCOPHAGE) 500 MG tablet Take 2 tablets (1,000 mg total) by mouth 2 times a day at 6:00 am and 4:00 pm. 180 tablet 5     montelukast (SINGULAIR) 10 mg tablet Take 1 tablet (10 mg total) by mouth daily with supper. 90 tablet 3     nitrofurantoin (MACRODANTIN) 50 MG capsule Take 1 capsule (50 mg total) by mouth daily. 90 capsule 3     omeprazole (PRILOSEC) 20 MG capsule TAKE ONE CAPSULE BY MOUTH EVERY DAY 90 capsule 3     PROAIR HFA 90 mcg/actuation inhaler INHALE TWO PUFFS BY MOUTH FOUR TIMES A DAY AS NEEDED FOR WHEEZING 25.5 g 0     psyllium  (METAMUCIL) 3.4 gram packet Take 1 packet by mouth every evening.       SIMETHICONE (GAS-X ORAL) Take by mouth 2 (two) times a day.       sucralfate (CARAFATE) 1 gram tablet Take 1 tablet (1 g total) by mouth bedtime. 90 tablet 3     tiotropium bromide (SPIRIVA RESPIMAT) 2.5 mcg/actuation Mist Inhale daily as needed.        UBIDECARENONE (COENZYME Q10 ORAL) Take 1 tablet by mouth daily.        warfarin (COUMADIN) 5 MG tablet One tab daily.  Adjust to INR of 2 to 3 90 tablet 3     No current facility-administered medications for this visit.        Family History: Nothing new    Physical Exam:  General Appearance:   She appears well and in no acute distress.  Oxygen saturations are optimal  Vitals:    10/27/17 1027   BP: 166/72   Patient Site: Left Arm   Patient Position: Sitting   Cuff Size: Adult Large   Pulse: 72   SpO2: 98%     Wt Readings from Last 3 Encounters:   10/18/17 218 lb (98.9 kg)   06/14/17 215 lb (97.5 kg)   04/18/17 216 lb (98 kg)     There is no height or weight on file to calculate BMI.    Head neck exam is performed.  Pupils are equal reactive to light.  Ears nose and throat are intact  Lungs are essentially clear.  No wheezing noted  Cardiac exam reveals a regular rate and rhythm with no murmurs or gallops    Data Review:    Analysis and Summary of Old Records (2): Reviewed nurse triage notes    Records Requested (1): 0      Other History Summarized (from other people in the room) (2): 0    Radiology Tests Summarized (XRAY/CT/MRI/DXA) (1): 0    Labs Reviewed (1): 0    Medicine Tests Reviewed (EKG/ECHO/COLONOSCOPY/EGD) (1): 0    Independent Review of EKG or X-RAY (2): 0    0

## 2021-06-13 NOTE — TELEPHONE ENCOUNTER
Patient asking to speak to PCP regarding appointment she has scheduled tomorrow with heart specialist and that she has been having increased trouble with IBS since 12/3/20.    Sunni Tapia RN  [facility] Triage Nurse Advisor    Please close encounter when completed.

## 2021-06-13 NOTE — PROGRESS NOTES
[facility] Daily Progress     Patient Name: Krystle Polanco  Date: 11/17/2020  Visit #: 7/12 through 1/19/21  PTA visit #:    PRECAUTIONS: osteopenia, DM II  Referral Diagnosis:   H81.12 (ICD-10-CM) - Benign paroxysmal positional vertigo, left   M54.2 (ICD-10-CM) - Neck pain      Referring provider: Elke Mehta MD  Visit Diagnosis:     ICD-10-CM    1. BPPV (benign paroxysmal positional vertigo), left  H81.12    2. Acute neck pain  M54.2    3. Decreased ROM of neck  R29.898    4. Abnormal posture  R29.3          Assessment:     BPPV appears to be well resolved for at least the past week at this point. Vertigo/dizziness goals nearing to met.  Pt with good tolerance for MT, demonstrating improved AROM and reporting decreased pain/stiffness post treatment session.    Patient is benefitting from skilled physical therapy and is making steady progress toward functional goals.  Patient is appropriate to continue with skilled physical therapy intervention, as indicated by initial plan of care.    Goal Status: PROGRESSING  Pt. will be independent with home exercise program in : 6 weeks  Pt. will bend: to dress;Comment  Comment:: without vertigo in 4 weeks.  Patient will twist/turn : in bed;for bed mobilitiy;Comment  Comment:: without vertigo in 4 weeks.  Patient will transfer: sit/stand;supine/sit;for in/out of bed;for in/out of chair;Comment  Comment: without vertigo in 4 weeks.  Patient Turn Head: for driving;without vertigo;wiith no pain;Comment  Comment: in 8 weeks.  Patient will look up / down: without vertigo;for reading;with no pain;Comment  Comment: in 8 weeks.      Plan / Patient Education:     Follow-up on vertigo/dizziness sx, and may discontinue VORx1 if well resolved.  Continue MT per below. Progress to seated neck retraction, prone Is as tolerated.    Subjective:     Pain Rating: Stiff more than anything, L upper cervical spine  Dizziness is much better. Has not had any vertigo attacks  "since last visit. Just the mild occasional feeling of like she might get dizzy or nauseous, but only slightly. Has not needed to take any meclizine.  She is very happy with progress thus far.    Objective:     Initiated VORx1 in seated today. Denies dizziness with performance.\"  Cervical AROM:   Flexion 25*   Extension 40* with ERP   L/R ROT 52* with mild ERP   L/R SB 20*   *Post MT, patient with approx. 5-10* improvement in cervical AROM t/o minus L SB.    Exercises:  Exercise #1: VOR x1: x60 sec L/R and up/down in seated  Comment #1: Scap retraction: verbal review  Exercise #2: Doorway pec stretch: verbal review  Comment #2: Chin tucks: verbal review    Treatment Today     TREATMENT MINUTES COMMENTS   Evaluation     Self-care/ Home management     Manual therapy 20 Supine cervical distraction, L and R upper trap stretches, and MET to increase L/R ROT and SB 5x5 sec hold bilat   Neuromuscular Re-education 10 Vestibular, Postural ex per flow sheet   Therapeutic Activity     Therapeutic Exercises     Gait training     Modality__________________     CRT           Total 30    Blank areas are intentional and mean the treatment did not include these items.       Brent Girard  11/17/2020    "

## 2021-06-14 NOTE — TELEPHONE ENCOUNTER
Anticoagulation Annual Referral Renewal Review    Krystle Polanco's chart reviewed for annual renewal of referral to anticoagulation monitoring.        Criteria for anticoagulation nurse and/or pharmacist renewal met   Warfarin indication: Atrial Fibrillation Yes, per indication   Current with INR monitoring/compliant Yes Yes   Date of last office visit 11/6/20 Yes, had office visit within last year   Time in Therapeutic Range (TTR) 61 % Yes, TTR > 60%       Krystle Polanco met all criteria for anticoagulation management program initiated renewal.  New INR standing orders and anticoagulation referral renewal placed.      Mikki Morley RN  4:02 PM

## 2021-06-14 NOTE — TELEPHONE ENCOUNTER
ANTICOAGULATION  MANAGEMENT    Assessment     Today's INR result of 3.0 is Therapeutic (goal INR of 2.0-3.0)        Warfarin taken as previously instructed    No new diet changes affecting INR    No new medication/supplements affecting INR    Continues to tolerate warfarin with no reported s/s of bleeding or thromboembolism     Previous INR was Therapeutic    Plan:     Spoke on phone with Krystle regarding INR result and instructed:     Warfarin Dosing Instructions:  Continue current warfarin dose 2.5 mg daily     Instructed patient to follow up no later than: one week    Krystle verbalizes understanding and agrees to warfarin dosing plan.    Instructed to call the [facility] for any changes, questions or concerns. (#[phone])   ?   Mikki Morley RN    Subjective/Objective:      Krystle Polanco, a 80 y.o. female is on warfarin.     Krystle reports:     Home warfarin dose: as updated on anticoagulation calendar per template     Missed doses: No     Medication changes:  No     S/S of bleeding or thromboembolism:  No     New Injury or illness:  No     Changes in diet or alcohol consumption:  No     Upcoming surgery, procedure or cardioversion:  No    Anticoagulation Episode Summary     Current INR goal:  2.0-3.0   TTR:  61.9 % (1 y)   Next INR check:  1/5/2021   INR from last check:  3.00 (12/29/2020)   Weekly max warfarin dose:     Target end date:  Indefinite   INR check location:     Preferred lab:     Send INR reminders to:  [facility]    Indications    Pulmonary embolism (H) [I26.99]           Comments:           Anticoagulation Care Providers     Provider Role Specialty Phone number    Elke Mehta MD  Internal Medicine [phone]

## 2021-06-14 NOTE — PROGRESS NOTES
I called Krystle today and informed her about her echocardiogram results.   Basically, no change in her ejection fraction and therefore no strong indication to proceed with catheter ablation of frequent PVCs at this time.   She is feeling well without new symptoms.  Follow-up in EP clinic in 1 year.  Thank you very much.

## 2021-06-14 NOTE — TELEPHONE ENCOUNTER
ANTICOAGULATION  MANAGEMENT    Assessment     Today's INR result of 2.4 is Therapeutic (goal INR of 2.0-3.0)        Warfarin taken as previously instructed    No new diet changes affecting INR    No new medication/supplements affecting INR    Continues to tolerate warfarin with no reported s/s of bleeding or thromboembolism     Previous INR was Subtherapeutic    Plan:     Spoke on phone with Krystle regarding INR result and instructed:     Warfarin Dosing Instructions:  Continue current warfarin dose 2.5 mg daily    Instructed patient to follow up no later than: one week      Krystle verbalizes understanding and agrees to warfarin dosing plan.    Instructed to call the [facility] for any changes, questions or concerns. (#[phone])   ?   Mikki Morley RN    Subjective/Objective:      Krystle Polanco, a 80 y.o. female is on warfarin.     Krystle reports:     Home warfarin dose: as updated on anticoagulation calendar per template     Missed doses: No     Medication changes:  No     S/S of bleeding or thromboembolism:  No     New Injury or illness:  No     Changes in diet or alcohol consumption:  No     Upcoming surgery, procedure or cardioversion:  No    Anticoagulation Episode Summary     Current INR goal:  2.0-3.0   TTR:  61.3 % (1 y)   Next INR check:  1/26/2021   INR from last check:  2.40 (1/19/2021)   Weekly max warfarin dose:     Target end date:  Indefinite   INR check location:     Preferred lab:     Send INR reminders to:  [facility]    Indications    Pulmonary embolism (H) [I26.99]           Comments:           Anticoagulation Care Providers     Provider Role Specialty Phone number    Elke Mehta MD  Internal Medicine [phone]

## 2021-06-14 NOTE — TELEPHONE ENCOUNTER
ANTICOAGULATION  MANAGEMENT    Assessment     Today's INR result of 2.7 is Therapeutic (goal INR of 2.0-3.0)        Less warfarin taken than instructed which may be affecting INR Asked Omer to call to confirm dosing. (template says 1.25 mg s/w)    No new diet changes affecting INR    No new medication/supplements affecting INR    Continues to tolerate warfarin with no reported s/s of bleeding or thromboembolism     Previous INR was Therapeutic    Plan:     Left detailed message for Krystle regarding INR result and instructed:     Warfarin Dosing Instructions:  Continue current warfarin dose 2.5 mg daily or actual current dose (0 % change)    Instructed patient to follow up no later than: one week    Instructed to call the [facility] for any changes, questions or concerns. (#[phone])   ?   Mikki Morley RN    Subjective/Objective:      Krystle Polanco, a 80 y.o. female is on warfarin.     Krystle reports:     Home warfarin dose: as updated on anticoagulation calendar per template     Missed doses: No     Medication changes:  No     S/S of bleeding or thromboembolism:  No     New Injury or illness:  No     Changes in diet or alcohol consumption:  No     Upcoming surgery, procedure or cardioversion:  No    Anticoagulation Episode Summary     Current INR goal:  2.0-3.0   TTR:  62.0 % (1 y)   Next INR check:  1/19/2021   INR from last check:  2.70 (1/5/2021)   Weekly max warfarin dose:     Target end date:  Indefinite   INR check location:     Preferred lab:     Send INR reminders to:  [facility]    Indications    Pulmonary embolism (H) [I26.99]           Comments:           Anticoagulation Care Providers     Provider Role Specialty Phone number    Elke Mehta MD  Internal Medicine [phone]

## 2021-06-14 NOTE — PROGRESS NOTES
Discharge Summary  Patient Name: Krystle Polanco  Date: 1/14/2021  Referral Diagnosis: vertigo  Referring provider: Elke Mehta MD  Visit Diagnosis:   1. Benign paroxysmal positional vertigo, left  Ambulatory referral to PT/OT   2. Unsteadiness on feet     3. Decreased activities of daily living (ADL)     4. Neck pain  Ambulatory referral to PT/OT       Goal status: Unable to assess as patient transferred care to [place] location for PT.    Patient was seen for 1 visit. The patient will need a new referral to resume.    Thank you for your referral.  Opal Armando  1/14/2021  5:53 PM

## 2021-06-14 NOTE — PROGRESS NOTES
[facility] Daily Progress     Patient Name: Krystle Polanco  Date: 2021  Visit #:  through 3/6/21  PTA visit #:    PRECAUTIONS: osteopenia, DM II  Referral Diagnosis:   H81.12 (ICD-10-CM) - Benign paroxysmal positional vertigo, left   M54.2 (ICD-10-CM) - Neck pain      Referring provider: Elke Mehta MD  Visit Diagnosis:     ICD-10-CM    1. BPPV (benign paroxysmal positional vertigo), left  H81.12    2. Acute neck pain  M54.2    3. Decreased ROM of neck  R29.898    4. Abnormal posture  R29.3          Assessment:     BPPV appears to be well resolved. Vertigo/dizziness goals MET.  Overall, she is finding HEP and MT to be helpful in reducing neck pain and improving ROM. While she does continue to demonstrate at least mild stiffness t/o with cervical AROM, it is significantly improved from start of care, with patient noticing considerable improvement in pain levels. It is likely plateau in ROM progress has been reached. At this point, it is most reasonable for patient to transition to independent self-management via HEP.    Goal Status:   Pt. will be independent with home exercise program in : 6 weeks. MET  Pt. will bend: to dress;Comment  Comment:: without vertigo in 4 weeks. MET  Patient will twist/turn : in bed;for bed mobilitiy;Comment  Comment:: without vertigo in 4 weeks. MET  Patient will transfer: sit/stand;supine/sit;for in/out of bed;for in/out of chair;Comment  Comment: without vertigo in 4 weeks. MET  Patient Turn Head: for driving;without vertigo;wiith no pain;Comment  Comment: in 8 weeks. MET  Patient will look up / down: without vertigo;for reading;with no pain;Comment  Comment: in 8 weeks. MET    Plan / Patient Education:     Discharge to HEP.    Subjective:     Pain Ratin-0.5/10 neck pain  Still without any vertigo attacks and no longer with any dizziness.  Neck is continuing to do much better.    Objective:     NDI: 18%  Cervical ROM  Date:      *Indicate scale AROM  AROM AROM   Cervical Flexion 40     Cervical Extension 35      Right Left Right Left Right Left   Cervical Sidebending 20 20       Cervical Rotation 60 70       Cervical Protraction      Cervical Retraction      Thoracic Flexion      Thoracic Extension      Thoracic Sidebending         Thoracic Rotation             Exercises:  Exercise #1: VOR x1: discontinued, as no longer having dizziness  Comment #1: Scap retraction: HEP  Exercise #2: Doorway pec stretch: verbal review  Comment #2: Chin tucks: 15x3 sec upright  Exercise #3: SL trunk ROT: HEP  Comment #3: L/R upper trap stretch: 3x10 sec bilat  Exercise #4: TB rows and pull downs: 15x each with OTB    Treatment Today     TREATMENT MINUTES COMMENTS   Evaluation     Self-care/ Home management     Manual therapy 15 Supine cervical distraction, L and R upper trap stretches, central PAs grade II   Neuromuscular Re-education     Therapeutic Activity     Therapeutic Exercises 12 Ex per flow sheet   Gait training     Modality__________________     CRT           Total 27    Blank areas are intentional and mean the treatment did not include these items.       Brent Girard  1/19/2021     [facility] Discharge Summary  Patient Name: Krystle Polanco  Date: 2/4/2021  Referral Diagnosis:   H81.12 (ICD-10-CM) - Benign paroxysmal positional vertigo, left   M54.2 (ICD-10-CM) - Neck pain      Referring provider: Elke Mehta MD  Visit Diagnosis:   1. BPPV (benign paroxysmal positional vertigo), left     2. Acute neck pain     3. Decreased ROM of neck     4. Abnormal posture         Goals:  Pt. will be independent with home exercise program in : 6 weeks. MET  Pt. will bend: to dress;Comment  Comment:: without vertigo in 4 weeks. MET  Patient will twist/turn : in bed;for bed mobilitiy;Comment  Comment:: without vertigo in 4 weeks. MET  Patient will transfer: sit/stand;supine/sit;for in/out of bed;for in/out of chair;Comment  Comment: without vertigo in 4 weeks.  MET  Patient Turn Head: for driving;without vertigo;wiith no pain;Comment  Comment: in 8 weeks. MET  Patient will look up / down: without vertigo;for reading;with no pain;Comment  Comment: in 8 weeks. MET    Patient was seen for 14 visits from 10/21/20 to 1/19/21 with 0 missed appointments.  The patient met goals and has demonstrated understanding of and independence in the home program for self-care, and progression to next steps.  She will initiate contact if questions or concerns arise.  No further therapy is required at this time.    Therapy will be discontinued at this time.  The patient will need a new referral to resume.    Thank you for your referral.  Brent Girard  2/4/2021  10:43 AM

## 2021-06-14 NOTE — TELEPHONE ENCOUNTER
ANTICOAGULATION  MANAGEMENT    Assessment     Today's INR result of 2.9 is Therapeutic (goal INR of 2.0-3.0)        Warfarin taken as previously instructed    No new diet changes affecting INR    No new medication/supplements affecting INR has been taking creon > month    Continues to tolerate warfarin with no reported s/s of bleeding or thromboembolism     Previous INR was Therapeutic    Plan:     Spoke on phone with Krystle regarding INR result and instructed:     Warfarin Dosing Instructions:  Continue current warfarin dose 2.5 mg daily     Instructed patient to follow up no later than: two weeks with ov    Krystle verbalizes understanding and agrees to warfarin dosing plan.    Instructed to call the [facility] for any changes, questions or concerns. (#[phone])   ?   Mikki Morley RN    Subjective/Objective:      Krystle Polanco, a 80 y.o. female is on warfarin.     Krystle reports:     Home warfarin dose: as updated on anticoagulation calendar per template     Missed doses: No     Medication changes:  No     S/S of bleeding or thromboembolism:  No     New Injury or illness:  No     Changes in diet or alcohol consumption:  No     Upcoming surgery, procedure or cardioversion:  No    Anticoagulation Episode Summary     Current INR goal:  2.0-3.0   TTR:  62.4 % (1 y)   Next INR check:  2/9/2021   INR from last check:  2.90 (1/25/2021)   Weekly max warfarin dose:     Target end date:  Indefinite   INR check location:     Preferred lab:     Send INR reminders to:  [facility]    Indications    Pulmonary embolism (H) [I26.99]           Comments:           Anticoagulation Care Providers     Provider Role Specialty Phone number    Elke Mehta MD  Internal Medicine [phone]

## 2021-06-14 NOTE — PROGRESS NOTES
[facility] Daily Progress     Patient Name: Krystle Polanco  Date: 1/12/2021  Visit #: 13/18 through 3/6/21  PTA visit #:    PRECAUTIONS: osteopenia, DM II  Referral Diagnosis:   H81.12 (ICD-10-CM) - Benign paroxysmal positional vertigo, left   M54.2 (ICD-10-CM) - Neck pain      Referring provider: Elke Mehta MD  Visit Diagnosis:     ICD-10-CM    1. BPPV (benign paroxysmal positional vertigo), left  H81.12    2. Acute neck pain  M54.2    3. Decreased ROM of neck  R29.898    4. Abnormal posture  R29.3          Assessment:     BPPV appears to be well resolved. Vertigo/dizziness goals MET.  Overall, finding HEP and MT to be helpful in reducing neck pain and improving ROM. Reports decreased neck pain and stiffness post MT today.   Recommend several additional visits beyond original POC to address continued ROM/postural strength deficits.    HEP/POC compliance is  good .  Patient is benefitting from skilled physical therapy and is making steady progress toward functional goals.  Patient is appropriate to continue with skilled physical therapy intervention, as indicated by initial plan of care.    Goal Status:   Pt. will be independent with home exercise program in : 6 weeks. PROGRESSING  Pt. will bend: to dress;Comment  Comment:: without vertigo in 4 weeks. MET  Patient will twist/turn : in bed;for bed mobilitiy;Comment  Comment:: without vertigo in 4 weeks. MET  Patient will transfer: sit/stand;supine/sit;for in/out of bed;for in/out of chair;Comment  Comment: without vertigo in 4 weeks. MET  Patient Turn Head: for driving;without vertigo;wiith no pain;Comment  Comment: in 8 weeks. NO LONGER WITH VERTIGO, BUT MILD LOSS OF ROM  Patient will look up / down: without vertigo;for reading;with no pain;Comment  Comment: in 8 weeks. NO LONGER WITH VERTIGO, BUT MILD LOSS OF ROM    Plan / Patient Education:     Review HEP for correct performance, as patient continues to require mild cueing t/o.  Recheck  cervical AROM and continue MT as appropriate.    Subjective:     Pain Ratin.5/10 neck pain  Still without any vertigo attacks and no longer with any dizziness.  Neck is feeling much better compared to last week.    Objective:     Cervical ROM  Date:      *Indicate scale AROM AROM AROM   Cervical Flexion 40     Cervical Extension 35      Right Left Right Left Right Left   Cervical Sidebending 25 20       Cervical Rotation 60 65       Cervical Protraction      Cervical Retraction      Thoracic Flexion      Thoracic Extension      Thoracic Sidebending         Thoracic Rotation             Exercises:  Exercise #1: VOR x1: discontinued, as no longer having dizziness  Comment #1: Scap retraction: 5x3 sec  Exercise #2: Doorway pec stretch: 2x20 sec  Comment #2: Chin tucks: 15x3 sec upright  Exercise #3: SL trunk ROT: HEP  Comment #3: L/R upper trap stretch: 3x10 sec bilat  Exercise #4: TB rows and pull downs: 10x each with OTB    Treatment Today     TREATMENT MINUTES COMMENTS   Evaluation     Self-care/ Home management     Manual therapy 17 Supine cervical distraction, L and R upper trap stretches, central PAs grade II, MET to increase R ROT/SB 5x5 sec hold   Neuromuscular Re-education     Therapeutic Activity     Therapeutic Exercises 8 Ex per flow sheet   Gait training     Modality__________________     CRT           Total 25    Blank areas are intentional and mean the treatment did not include these items.       Brent Girard  2021

## 2021-06-15 PROBLEM — R91.8 PULMONARY NODULES: Status: ACTIVE | Noted: 2017-01-27

## 2021-06-15 NOTE — PROGRESS NOTES
[facility] Follow Up Note    Assessment/Plan:  1. Type 2 diabetes mellitus without complication, without long-term current use of insulin (H)  Reports labile blood sugars although they seem to between 119 and 134.  Goal is for fasting sugar to be less than 140.  Glycohemoglobin at 6.7 on current medications.  Recommendation: Continue to eat consistent meals at a consistent time.  Continue current medications.  Increase physical activity.  Recheck 3 months  - Glycosylated Hemoglobin A1c  - HM2(CBC w/o Differential)  - Basic Metabolic Panel    2. Irritable bowel syndrome with diarrhea  The discussion was had today with regard to her IBS with diarrhea.  Of note, she had had an extensive work-up in the past with regard to hyper defecation/etc.  Currently, she is on colestipol and Creon.  She states the Creon gave her intervals of time where things normalize.  She states she never realized how poorly she felt until she felt better on the Creon.  She has done well for the past week.  However, when symptoms recur, they are severe.  She states that she can sometimes have 10 bowel movements per day.  Last colonoscopy in the chart was in 2019.  She had multiple polyps.  This was reviewed with her.  She believes that she may have had one since 2019.  Recommendation: She would like to work with a gastroenterologist at [facility] with regard to this.  She should see a nutritionist.  Continue the Creon.  Question whether Viberzi would be helpful.    3. Benign Essential Hypertension  Stable    4. Pulmonary embolism (H)  On Coumadin  - INR    5.  Ventricular ectopy/high burden of PVCs  Last ejection fraction normal.  Followed by EP.  Holding off on ablation for now  - INR    6. Benign neoplasm of colon, unspecified part of colon  Multiple polyps-2019.  Follow-up 3 years from that date      3-month follow-up  Encouraged weight loss  Elke Mehta MD    Chief Complaint:  Chief Complaint   Patient presents  with     Follow-up     Diabetes       History of Present Illness:  Krystle is a 80 y.o. female who is here today for follow-up of her usual medical problems.  She is accompanied by her  Fitz.  They state that currently, her most pressing problem is that of her GI tract.  She has a longstanding history-since the birth of her last child's-of irritable bowel with diarrhea.  She has been tested in the past.  She did not test positive for bacterial overgrowth/etc.  She is managed largely as irritable bowel patient.  Her most recent colonoscopy was done in 2019.  At that time, she had multiple polyps with a strong family history of colon cancer.  She states she was told that she would not have any further follow-up due to her age.  However, the report states that she should follow-up in 3 years.  She does report that she would like to see a gastroenterologist because she has some days that are very severe.  Since we started her on Creon, she has had intermittent episodes of feeling completely normal.  She states that this helped her to see how abnormal her diet has been.  She states she can go a week or 2 at a time with the Creon with no symptoms.  Then, it can return with a vengeance.  She states when her IBS is very active, she can have upwards of 10 BMs per day.  She states she was told not to use too much Imodium.  She has not tried any dietary restrictions or changes.  She does report that they eat a lot of fruit.  She states they eat consistently.  She is surprised that she has not been able to lose weight.    Activity level is decreased    Her A1c is reviewed with her today.  She has blood sugars ranging between 118 and 135.  She worries about this range.  She states that when her IBS is active, her sugars are lower.    With regard to PVCs, she is following with electrophysiology department.  She has a very high burden of PVCs and there was some worry that she had shortness of breath as a result.  However, her  dyspnea has stabilized recently.  Her last ejection fraction was normal.  The Holter confirms a high volume of PVCs.  An ablation procedure was tabled for now.  She is followed up with her pulmonologist and those notes are also reviewed.  Her PFTs were favorable.  Her pulmonologist does not think that her previous dyspnea was secondary to a lung problem.    She has a remote history of multiple pulmonary emboli and is on chronic anticoagulation.    Review of Systems:  A comprehensive review of systems was performed and was otherwise negative    PFSH:  Social History: She is retired .  She has adult children  Social History     Tobacco Use   Smoking Status Never Smoker   Smokeless Tobacco Never Used       Past History:   Current Outpatient Medications   Medication Sig Dispense Refill     acetaminophen (TYLENOL) 500 MG tablet Take 500 mg by mouth every 4 (four) hours as needed for pain.        albuterol (PROAIR HFA) 90 mcg/actuation inhaler INHALE TWO PUFFS BY MOUTH FOUR TIMES A DAY AS NEEDED FOR WHEEZING 25.5 g 3     amLODIPine (NORVASC) 5 MG tablet Take 1 tablet (5 mg total) by mouth daily. 90 tablet 3     atorvastatin (LIPITOR) 80 MG tablet Take 1 tablet (80 mg total) by mouth at bedtime. 90 tablet 3     azelastine (ASTELIN) 137 mcg (0.1 %) nasal spray 2 sprays into each nostril 2 (two) times a day. Use in each nostril as directed 90 mL 3     beclomethasone (QVAR REDIHALER) 40 mcg/actuation HFAB inhaler Inhale 2 puffs 2 (two) times a day. 31.8 g 3     blood sugar diagnostic, disc Strp Use as directed with testing blood sugars once daily.  Dispense Contour Next brand per pt's insurance coverage 100 strip 3     CALCIUM CARBONATE (CALCIUM 500 ORAL) Take by mouth 2 (two) times a day.       cholecalciferol, vitamin D3, 1,000 unit tablet Take 1,000 Units by mouth 2 (two) times a day.       clindamycin (CLEOCIN) 150 MG capsule TAKE 4 CAPSULES  BY MOUTH 1 HOUR PRIOR TO DENTAL APPOINTMENT. 30 capsule 3      colestipoL (COLESTID) 1 gram tablet Take 1 tablet (1 g total) by mouth 2 (two) times a day. 180 tablet 3     cranberry extract 300 mg Tab Take 1 tablet by mouth daily.        dicyclomine (BENTYL) 10 MG capsule Take 1 capsule (10 mg total) by mouth 2 (two) times a day as needed. 90 capsule 3     estradioL (ESTRACE) 0.01 % (0.1 mg/gram) vaginal cream Insert 2 g into the vagina every other day. 42.5 g 0     LACTOBACILLUS ACIDOPHILUS (PROBIOTIC ORAL) Take 1 tablet by mouth 2 (two) times a day.        lancing device Misc Use as directed 4 times a week. 1 each 3     lipase-protease-amylase (CREON) 6,000-19,000 -30,000 unit CpDR capsule Take 1 capsule (6,000 units of lipase total) by mouth 3 (three) times a day with meals. 270 capsule 3     losartan (COZAAR) 100 MG tablet Take 1 tablet (100 mg total) by mouth daily. 90 tablet 3     LUTEIN ORAL Take 1 tablet by mouth daily.              meclizine (ANTIVERT) 25 mg tablet Take 1 tablet (25 mg total) by mouth 3 (three) times a day as needed for dizziness or nausea. 30 tablet 1     metFORMIN (GLUCOPHAGE XR) 500 MG 24 hr tablet Take 2 tablets (1,000 mg total) by mouth 2 (two) times a day. 270 tablet 4     metroNIDAZOLE (METROGEL) 0.75 % gel Apply 1 application topically as needed.        montelukast (SINGULAIR) 10 mg tablet Take 1 tablet (10 mg total) by mouth daily. 90 tablet 3     nitrofurantoin (MACRODANTIN) 50 MG capsule Take 1 capsule (50 mg total) by mouth daily. 90 capsule 3     omeprazole (PRILOSEC) 20 MG capsule Take 1 capsule (20 mg total) by mouth daily before breakfast. 90 capsule 3     psyllium (METAMUCIL) 3.4 gram packet Take 1 packet by mouth every evening.       SIMETHICONE (GAS-X ORAL) Take by mouth 2 (two) times a day.       sotaloL (BETAPACE) 80 MG tablet Take 0.5 tablets (40 mg total) by mouth 2 (two) times a day. 90 tablet 3     tiotropium (SPIRIVA WITH HANDIHALER) 18 mcg inhalation capsule Place 1 capsule (2 puffs total) into inhaler and inhale daily.  (Patient taking differently: Place 18 mcg into inhaler and inhale as needed. ) 30 capsule 2     UBIDECARENONE (COENZYME Q10 ORAL) Take 1 tablet by mouth daily.        warfarin ANTICOAGULANT (COUMADIN/JANTOVEN) 2.5 MG tablet Take 1/2 to 1 tablets (1.25 mg to 2.5 mg) by mouth daily. Adjust dose based on INR results as directed. 100 tablet 1     No current facility-administered medications for this visit.        Family History:     Physical Exam:  General Appearance:   She appears quite well in no acute distress  Vitals:    02/09/21 1008   BP: 132/64   Patient Site: Left Arm   Patient Position: Sitting   Cuff Size: Adult Large   Pulse: 72   Temp: 97.7  F (36.5  C)   TempSrc: Tympanic   SpO2: 97%   Weight: (!) 225 lb (102.1 kg)     Wt Readings from Last 3 Encounters:   02/09/21 (!) 225 lb (102.1 kg)   01/06/21 222 lb (100.7 kg)   12/09/20 222 lb (100.7 kg)     Body mass index is 40.82 kg/m .    Head neck exam is negative  Lungs are clear to auscultation and percussion  Cardiac exam reveals a regular rate and rhythm.  Very few PVCs are auscultated today.    Data Review:    Analysis and Summary of Old Records (2): Reviewed pulmonary, cardiology notes    Records Requested (1):       Other History Summarized (from other people in the room) (2): Jack contributes to the conversation    Radiology Tests Summarized (XRAY/CT/MRI/DXA) (1):     Labs Reviewed (1): Reviewed labs from today    Medicine Tests Reviewed (EKG/ECHO/COLONOSCOPY/EGD) (1): Reviewed colonoscopy imprinted this from 2019/reviewed echo and Holter monitor    Independent Review of EKG or X-RAY (2):

## 2021-06-15 NOTE — PROGRESS NOTES
[facility] Clinic Follow Up Note    Assessment/Plan:  1. Essential hypertension  For blood pressure follow-up.  Home blood pressure remains elevated despite increase in dose of losartan from 25-50.  Of note, she is taking it with the Colestid.  I wonder if absorption is decreased.  Recommendation: Will switch to Hyzaar at 50 mg/12.5.  She will take 1 in the morning.  She will wait 1 hour until she takes Colestid    2. Irregular heart rate/PVCs/bigeminy  Incidental irregularity noted on examination.  12-lead EKG was performed and shows sinus arrhythmia as is her norm, additionally, she is in bigeminy.  Asymptomatic.  Recommendation: We will proceed with electrolytes, magnesium, Holter monitor and echocardiogram.  Follow-up next week  - Electrocardiogram Perform and Read  - Holter Monitor; Future  - Echo Complete; Future  - Magnesium    3. Pulmonary embolism  Due for INR  - INR      Elke Mehta MD    Chief Complaint:  Chief Complaint   Patient presents with     Follow-up       History of Present Illness:  Krystle is a 77 y.o. female who is here today for a routine follow-up.  Of note, she was seen 2 weeks ago and had an elevated blood pressure.  Her blood pressure medication was increased .  She was on losartan 25 mg.  It was switched to 50 mg.  She is here for follow-up.  Of note, she also brings her blood pressure cuff for comparison of cuff pressures.  Of note, they correlate well.    She denies any problems with chest pain or shortness of breath.  She does state that she is dropped her Spiriva because of hoarseness.  As a result, her endurance may be slightly less.    Otherwise, she states she is feeling very well.  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  They have solved her healthcare situation.    She denies any palpitations, chest pain or other new problems.  She is working with Dr. Breaux with regard to her IBS    Review of Systems:  A comprehensive review of systems was performed  and was otherwise negative    PFSH:  Social History: She is .  She is retired tired.  She and her  are selling their [place] home and [facility]  History   Smoking Status     Never Smoker   Smokeless Tobacco     Never Used       Past History:   Current Outpatient Prescriptions   Medication Sig Dispense Refill     acetaminophen (TYLENOL) 500 MG tablet Take 500 mg by mouth every 6 (six) hours as needed for pain.        atorvastatin (LIPITOR) 80 MG tablet Take 1 tablet (80 mg total) by mouth at bedtime. 90 tablet 3     azelastine (ASTELIN) 137 mcg nasal spray 2 sprays daily (Patient taking differently: 2 sprays into each nostril daily as needed for rhinitis. 2 sprays daily) 90 mL 3     beclomethasone (QVAR) 80 mcg/actuation inhaler Inhale 2 puffs 2 (two) times a day as needed. 3 Inhaler 12     blood sugar diagnostic (ACCU-CHEK MOISE) Strp Use 1 strip As Directed 3 (three) times a week.        CALCIUM CARBONATE (CALCIUM 500 ORAL) Take by mouth 2 (two) times a day.       cholecalciferol, vitamin D3, 1,000 unit tablet Take 1,000 Units by mouth 2 (two) times a day.       colestipol (COLESTID) 1 gram tablet Take 1 tablet (1 g total) by mouth 2 (two) times a day. 60 tablet 1     cranberry extract 300 mg Tab Take 1 tablet by mouth daily.        dicyclomine (BENTYL) 10 MG capsule Take 1-2 capsules (10-20 mg total) by mouth every 6 (six) hours as needed. 120 capsule 0     fexofenadine (ALLEGRA) 60 MG tablet Take 60 mg by mouth daily.       LACTOBACILLUS ACIDOPHILUS (PROBIOTIC ORAL) Take 1 tablet by mouth 2 (two) times a day.        lancing device Misc Use As Directed 4 (four) times a week.        losartan (COZAAR) 50 MG tablet TAKE 1  TABLET BY MOUTH DAILY 90 tablet 2     LUTEIN ORAL Take 1 tablet by mouth daily as needed.        metFORMIN (GLUCOPHAGE) 500 MG tablet Take 2 tablets (1,000 mg total) by mouth 2 times a day at 6:00 am and 4:00 pm. 180 tablet 5     montelukast (SINGULAIR) 10  mg tablet Take 1 tablet (10 mg total) by mouth daily with supper. (Patient taking differently: Take 10 mg by mouth daily. ) 90 tablet 3     nitrofurantoin (MACRODANTIN) 50 MG capsule Take 1 capsule (50 mg total) by mouth daily. 90 capsule 3     omeprazole (PRILOSEC) 20 MG capsule TAKE ONE CAPSULE BY MOUTH EVERY DAY 90 capsule 3     PROAIR HFA 90 mcg/actuation inhaler INHALE TWO PUFFS BY MOUTH FOUR TIMES A DAY AS NEEDED FOR WHEEZING 25.5 g 0     psyllium (METAMUCIL) 3.4 gram packet Take 1 packet by mouth every evening.       SIMETHICONE (GAS-X ORAL) Take by mouth 2 (two) times a day.       sucralfate (CARAFATE) 1 gram tablet Take 1 tablet (1 g total) by mouth at bedtime. 90 tablet 3     tiotropium bromide (SPIRIVA RESPIMAT) 2.5 mcg/actuation Mist Inhale daily as needed.        UBIDECARENONE (COENZYME Q10 ORAL) Take 1 tablet by mouth daily.        warfarin (COUMADIN) 5 MG tablet One tab daily.  Adjust to INR of 2 to 3 90 tablet 3     losartan-hydrochlorothiazide (HYZAAR) 50-12.5 mg per tablet Take 1 tablet by mouth daily. 30 tablet 11     No current facility-administered medications for this visit.        Family History: Noncontributory    Physical Exam:  General Appearance:   She is pleasant and well groomed and appears at baseline.  Her weight is up about 4 pounds from last visit  Vitals:    02/07/18 1349 02/07/18 1356   BP: 172/88 164/84   Patient Site: Left Arm Left Arm   Patient Position: Sitting Sitting   Cuff Size: Adult Large Adult Large   Weight: (!) 224 lb 2 oz (101.7 kg)      Wt Readings from Last 3 Encounters:   02/07/18 (!) 224 lb 2 oz (101.7 kg)   01/12/18 220 lb 12.8 oz (100.2 kg)   10/18/17 218 lb (98.9 kg)     Body mass index is 40.99 kg/(m^2).    Head neck exam is negative  Lungs are clear  Cardiac exam reveals an irregular heart rate.  Of note, there appears to be a pause at about every third beat.  Lower extremities are negative for edema

## 2021-06-15 NOTE — PROGRESS NOTES
[facility] Follow Up Note    Assessment/Plan:  1. Type 2 diabetes mellitus without complication, without long-term current use of insulin  With glycohemoglobin at goal.  Continue current medications.  Of note, because of her active irritable bowel, I have offered to switch her metformin to XR.  She would like to hold for now and see if the colestipol will do the job.  Will discuss this again at next visit.  - HM2(CBC w/o Differential)  - Comprehensive Metabolic Panel  - Glycosylated Hemoglobin A1c    2. Benign Essential Hypertension  Recent blood pressure elevation.  Recommendation: Increase losartan to 50 mg.  Recheck blood pressure in 2-3 weeks    3. Adenomatous Goiter  We will update thyroid  - Thyroid Stimulating Hormone (TSH)    4. Hoarseness of voice  She has seen ENT and is currently working with a speech therapist.  Will continue to monitor    5. Irritable bowel syndrome with diarrhea  She has finally consulted with Dr. Breaux at [facility].  She concurs with the initiation of colestipol.  They are currently working to increase and optimize this.  She will use her Imodium only as needed.  The Bentyl will be used on a scheduled basis.    6. Special screening examination for neoplasm of breast  She is due for an update on mammogram                Elke Mehta MD    Chief Complaint:  Chief Complaint   Patient presents with     Follow-up       History of Present Illness:  Krystle is a 77 y.o. female who is here today for follow-up with regard to her routine medical problems.  She has many.  She does describe a relatively stressful year.  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  She is finding it very frustrating to provide acceptable and adequate care for her friend.  She feels that her medical care is slipping and Omer is quite concerned.    With regard to her own health, things have finally settled down such that she has been able to visit with Dr. Radha Breaux with regard to her  irritable bowel.  Omer reports that Dr. Breaux does concur that colestipol might be a good option for her.  She agrees with the initiation of this medication and that gradual titration.  Her consult is reviewed and discussed with Omer.    Additionally, she continues to work with the speech pathologist with regard to the hoarseness.  She is under the care of an ENT and pulmonologist.    With regard to blood pressure, this is been elevated on numerous occasions.  She had noted an elevated blood pressure to 190 systolic when at the gastroenterology office.  She has had other elevations of blood pressure when checking it outside of this office.  She denies any chest pain or shortness of breath.    She is having no further symptoms or sequelae from her pulmonary embolism.  She remains on Coumadin    Health maintenance is also  reviewed with her today    Review of Systems:  A comprehensive review of systems was performed and was otherwise negative    PFSH:  Social History: She is .  She and her  are selling their home in [place] and live in a large [facility].  They had a wonderful holiday with family.  They are guardians for a good friend with regard to medical care.  History   Smoking Status     Never Smoker   Smokeless Tobacco     Not on file       Past History: As outlined in the chart  Current Outpatient Prescriptions   Medication Sig Dispense Refill     acetaminophen (TYLENOL) 500 MG tablet Take 500 mg by mouth every 6 (six) hours as needed for pain.        atorvastatin (LIPITOR) 80 MG tablet Take 1 tablet (80 mg total) by mouth at bedtime. 90 tablet 3     azelastine (ASTELIN) 137 mcg nasal spray 2 sprays daily (Patient taking differently: 2 sprays into each nostril daily as needed for rhinitis. 2 sprays daily) 90 mL 3     beclomethasone (QVAR) 80 mcg/actuation inhaler Inhale 2 puffs 2 (two) times a day as needed. 3 Inhaler 12     blood sugar diagnostic (ACCU-CHEK MOISE) Strp Use 1 strip As  Directed 3 (three) times a week.        CALCIUM CARBONATE (CALCIUM 500 ORAL) Take by mouth 2 (two) times a day.       cholecalciferol, vitamin D3, 1,000 unit tablet Take 1,000 Units by mouth 2 (two) times a day.       colestipol (COLESTID) 1 gram tablet Take 1 tablet (1 g total) by mouth 2 (two) times a day. 60 tablet 1     cranberry extract 300 mg Tab Take 1 tablet by mouth daily.        dicyclomine (BENTYL) 10 MG capsule Take 10-20 mg by mouth every 6 (six) hours as needed.       fexofenadine (ALLEGRA) 60 MG tablet Take 60 mg by mouth daily.       LACTOBACILLUS ACIDOPHILUS (PROBIOTIC ORAL) Take 1 tablet by mouth 2 (two) times a day.        lancing device Misc Use As Directed 4 (four) times a week.        losartan (COZAAR) 50 MG tablet TAKE 1  TABLET BY MOUTH DAILY 90 tablet 2     LUTEIN ORAL Take 1 tablet by mouth daily as needed.        metFORMIN (GLUCOPHAGE) 500 MG tablet Take 2 tablets (1,000 mg total) by mouth 2 times a day at 6:00 am and 4:00 pm. 180 tablet 5     montelukast (SINGULAIR) 10 mg tablet Take 1 tablet (10 mg total) by mouth daily with supper. 90 tablet 3     nitrofurantoin (MACRODANTIN) 50 MG capsule Take 1 capsule (50 mg total) by mouth daily. 90 capsule 3     omeprazole (PRILOSEC) 20 MG capsule TAKE ONE CAPSULE BY MOUTH EVERY DAY 90 capsule 3     PROAIR HFA 90 mcg/actuation inhaler INHALE TWO PUFFS BY MOUTH FOUR TIMES A DAY AS NEEDED FOR WHEEZING 25.5 g 0     psyllium (METAMUCIL) 3.4 gram packet Take 1 packet by mouth every evening.       SIMETHICONE (GAS-X ORAL) Take by mouth 2 (two) times a day.       sucralfate (CARAFATE) 1 gram tablet Take 1 tablet (1 g total) by mouth at bedtime. 90 tablet 3     tiotropium bromide (SPIRIVA RESPIMAT) 2.5 mcg/actuation Mist Inhale daily as needed.        UBIDECARENONE (COENZYME Q10 ORAL) Take 1 tablet by mouth daily.        warfarin (COUMADIN) 5 MG tablet One tab daily.  Adjust to INR of 2 to 3 90 tablet 3     No current facility-administered medications for  this visit.        Family History: Nothing new    Physical Exam:  General Appearance:   Repeat blood pressure by me is 160/86  Vitals:    01/12/18 1224 01/12/18 1227   BP: 148/82 144/82   Patient Site: Left Arm Left Arm   Patient Position: Sitting Sitting   Cuff Size: Adult Large Adult Large   Pulse: 72 72   Weight: 220 lb 12.8 oz (100.2 kg)      Wt Readings from Last 3 Encounters:   01/12/18 220 lb 12.8 oz (100.2 kg)   10/18/17 218 lb (98.9 kg)   06/14/17 215 lb (97.5 kg)     Body mass index is 40.38 kg/(m^2).        Data Review:    Analysis and Summary of Old Records (2): Reviewed care everywhere    Records Requested (1): 0      Other History Summarized (from other people in the room) (2): 0    Radiology Tests Summarized (XRAY/CT/MRI/DXA) (1): 0    Labs Reviewed (1): Ordered and reviewed    Medicine Tests Reviewed (EKG/ECHO/COLONOSCOPY/EGD) (1): 0    Independent Review of EKG or X-RAY (2): 0

## 2021-06-15 NOTE — TELEPHONE ENCOUNTER
I would like to transition her from warfarin to Eliquis at 5 mg twice daily. The recommendation is to make the transition when the INR is less than 2.0. Are you able to assist with this? Once INR is less than 2, we can begin Eliquis at 5 mg twice daily. We will then be able to discontinue the warfarin.    If this is not in your scope, let me know.

## 2021-06-15 NOTE — TELEPHONE ENCOUNTER
I just talked with Krystle and she wants to do the switch and I can help her with this. Our plan is to check her inr tues and proceed from there. She's picking up the eliquis this weekend so if you will initiate the rx we're good. thanks

## 2021-06-16 PROBLEM — I49.3 PVC'S (PREMATURE VENTRICULAR CONTRACTIONS): Status: ACTIVE | Noted: 2018-08-08

## 2021-06-16 NOTE — TELEPHONE ENCOUNTER
Telephone Encounter by Mikki Morley RN at 3/27/2020  3:57 PM     Author: Mikki Morley RN Service: -- Author Type: Registered Nurse    Filed: 3/27/2020  5:28 PM Encounter Date: 3/27/2020 Status: Attested    : Mikki Morley RN (Registered Nurse) Cosigner: Elke Mehta MD at 3/27/2020  9:05 PM    Attestation signed by Elke Mehta MD at 3/27/2020  9:05 PM    ok                ANTICOAGULATION  MANAGEMENT    Assessment     Today's INR result of 1.7 is Subtherapeutic (goal INR of 2.0-3.0)        Warfarin taken as previously instructed    No new diet changes affecting INR    No new medication/supplements affecting INR    Continues to tolerate warfarin with no reported s/s of bleeding or thromboembolism     Previous INR was Supratherapeutic    Plan:     Spoke with Krystle regarding INR result and instructed:     Warfarin Dosing Instructions:  Change warfarin dose to 1.25 mg daily on wed; and 2.5 mg daily rest of week  (8.3 % change)    Instructed patient to follow up no later than: one week    Krystle verbalizes understanding and agrees to warfarin dosing plan.    Instructed to call the [facility] for any changes, questions or concerns. (#[phone])   ?   Mikki Morley RN    Subjective/Objective:      Krystle Polanco, a 79 y.o. female is on warfarin.     Krystle reports:     Home warfarin dose: as updated on anticoagulation calendar per template     Missed doses: No     Medication changes:  No     S/S of bleeding or thromboembolism:  No     New Injury or illness:  No     Changes in diet or alcohol consumption:  No     Upcoming surgery, procedure or cardioversion:  No    Anticoagulation Episode Summary     Current INR goal:   2.0-3.0   TTR:   76.2 % (1 y)   Next INR check:   4/3/2020   INR from last check:   1.70! (3/27/2020)   Weekly max warfarin dose:      Target end date:   Indefinite   INR check location:      Preferred lab:      Send INR reminders to:   [facility]    Indications     Pulmonary embolism (H) [I26.99]           Comments:            Anticoagulation Care Providers     Provider Role Specialty Phone number    Elke Mehta MD  Internal Medicine [phone]

## 2021-06-16 NOTE — PROGRESS NOTES
[facility] Follow Up Note    Assessment/Plan:  1. Hoarseness  As seen on March 9 by Dr. Carrillo for symptoms suggestive of a sinus infection.  She is currently on cefuroxime.  She is still symptomatic.  Would like to travel next week.  Recommendation: Reassurance provided.  Patient is on good medication.  She should continue antibiotics as directed by Dr. Carrillo.  Will add a very low dose of prednisone to see if this can help with inflammation in the sinus system and decrease eustachian tube dysfunction.  If she indeed decides to travel, would do Afrin 1-2 sprays prior to flights.  Continue with [place] pot or nasal irrigation and rest    2. Asthma  Stable.  No recent exacerbation    3. PE (pulmonary thromboembolism)  On warfarin.  Will be checking INR  - INR    4. Benign Essential Hypertension  Stable.  Please note that with separation of colestipol from regular medications, she is absorbing her blood pressure medications more easily and blood pressures have now improved    5.  Irritable bowel syndrome  She is working with [facility].  We had started colestipol and this is working well.  She is also on sucralfate and proton pump inhibitor.    6.  Medication management  Would like to talk about her pharmacy today.  She needs some pharmaceutical substitutions.  These are made today.      Follow-up for her next regular diabetic checkup  Elke Mehta MD    Chief Complaint:  Chief Complaint   Patient presents with     Cough     continued cough, no sore throat     Medication Management     incruse       History of Present Illness:  Krystle is a 77 y.o. female who is here today for follow-up with regard to treatment for a respiratory tract infection.  Please see details from Dr. Carrillo's note of last week.  Of note, Omer has a history of reactive airways disease and recurrent sinusitis.  Her sinuses have been under good control until recently.  She presented with fullness in  the eustachian tube and sinus system.  She was treated with medication and is currently in the middle of her antibiotic course.  She is here today because she is going to be traveling next week and hopes to get better faster.  She also has persistent hoarseness.  Of note, her current malady began with hoarseness.  It was present for approximately 10 days before she presented last week to the office for her initial evaluation.  Currently, she is without shortness of breath, fever or chills.  She is slowly improving.    Additional issues discussed today include that with regard to her medications.  She has  the colestipol from her antihypertensive agents.  Her blood pressures are under better control with this.    With regard to her irritable bowel syndrome, the colestipol has helped out significantly.  She will follow-up with [facility] on this.      Review of Systems:  A comprehensive review of systems was performed and was otherwise negative    PFSH:  Social History: She is  to Garcia.  They are contemplating traveling on standby to [place] next week.  Garcia is nervous about traveling given his severe COPD  History   Smoking Status     Never Smoker   Smokeless Tobacco     Never Used       Past History: As noted in the chart  Current Outpatient Prescriptions   Medication Sig Dispense Refill     acetaminophen (TYLENOL) 500 MG tablet Take 500 mg by mouth every 6 (six) hours as needed for pain.        atorvastatin (LIPITOR) 80 MG tablet Take 1 tablet (80 mg total) by mouth at bedtime. 90 tablet 3     azelastine (ASTELIN) 137 mcg nasal spray 2 sprays daily (Patient taking differently: 2 sprays into each nostril daily as needed for rhinitis. 2 sprays daily) 90 mL 3     beclomethasone (QVAR) 80 mcg/actuation inhaler Inhale 2 puffs 2 (two) times a day as needed. 3 Inhaler 12     blood sugar diagnostic (ACCU-CHEK MOISE) Strp Use 1 strip As Directed 3 (three) times a week.        CALCIUM  CARBONATE (CALCIUM 500 ORAL) Take by mouth 2 (two) times a day.       cefuroxime (CEFTIN) 500 MG tablet Take 1 tablet (500 mg total) by mouth 2 (two) times a day for 10 days. 20 tablet 0     cholecalciferol, vitamin D3, 1,000 unit tablet Take 1,000 Units by mouth 2 (two) times a day.       codeine-guaiFENesin (GUAIFENESIN AC)  mg/5 mL liquid Take 5 mL by mouth 3 (three) times a day as needed for cough. 240 mL 0     colestipol (COLESTID) 1 gram tablet Take 1 tablet (1 g total) by mouth 2 (two) times a day. 60 tablet 1     cranberry extract 300 mg Tab Take 1 tablet by mouth daily.        dicyclomine (BENTYL) 10 MG capsule Take 1-2 capsules (10-20 mg total) by mouth every 6 (six) hours as needed. 120 capsule 0     fexofenadine (ALLEGRA) 60 MG tablet Take 60 mg by mouth daily.       LACTOBACILLUS ACIDOPHILUS (PROBIOTIC ORAL) Take 1 tablet by mouth 2 (two) times a day.        lancing device Misc Use As Directed 4 (four) times a week.        losartan-hydrochlorothiazide (HYZAAR) 50-12.5 mg per tablet Take 1 tablet by mouth daily. 90 tablet 3     LUTEIN ORAL Take 1 tablet by mouth daily as needed.        metFORMIN (GLUCOPHAGE) 500 MG tablet Take 2 tablets (1,000 mg total) by mouth 2 times a day at 6:00 am and 4:00 pm. 180 tablet 5     montelukast (SINGULAIR) 10 mg tablet Take 1 tablet (10 mg total) by mouth daily. 90 tablet 3     nitrofurantoin (MACRODANTIN) 50 MG capsule Take 1 capsule (50 mg total) by mouth daily. 90 capsule 3     omeprazole (PRILOSEC) 20 MG capsule TAKE ONE CAPSULE BY MOUTH EVERY DAY 90 capsule 3     PROAIR HFA 90 mcg/actuation inhaler INHALE TWO PUFFS BY MOUTH FOUR TIMES A DAY AS NEEDED FOR WHEEZING 25.5 g 0     psyllium (METAMUCIL) 3.4 gram packet Take 1 packet by mouth every evening.       SIMETHICONE (GAS-X ORAL) Take by mouth 2 (two) times a day.       sucralfate (CARAFATE) 1 gram tablet Take 1 tablet (1 g total) by mouth at bedtime. 90 tablet 3     UBIDECARENONE (COENZYME Q10 ORAL) Take 1  tablet by mouth daily.        warfarin (COUMADIN) 5 MG tablet One tab daily.  Adjust to INR of 2 to 3 90 tablet 3     azithromycin (ZITHROMAX Z-MERRILL) 250 MG tablet Take 1 tablet (250 mg total) by mouth daily for 5 days. 2 tabs on day one then one tab daily 6 tablet 0     predniSONE (DELTASONE) 10 mg tablet Take 1 tablet (10 mg total) by mouth daily. 5 tablet 0     umeclidinium (INCRUSE ELLIPTA) 62.5 mcg/actuation DsDv inhaler Inhale 1 puff daily. 30 each 0     No current facility-administered medications for this visit.        Family History: Nothing new    Physical Exam:  General Appearance:   She appears at baseline.  She is pleasant and very conversational  Vitals:    03/16/18 1233   BP: 136/78   Patient Site: Left Arm   Patient Position: Sitting   Cuff Size: Adult Large   Pulse: 89   Temp: 98  F (36.7  C)   TempSrc: Oral   SpO2: 95%   Weight: 221 lb 5 oz (100.4 kg)     Wt Readings from Last 3 Encounters:   03/16/18 221 lb 5 oz (100.4 kg)   03/09/18 (!) 222 lb 6 oz (100.9 kg)   03/09/18 222 lb (100.7 kg)     Body mass index is 40.48 kg/(m^2).    Neck exam is performed.  Pupils are equal reactive to light.  Ears are examined.  Right tympanic membrane appears slightly full but no erythema is noted.  Left drum is within normal limits  Throat is clear.  Neck is supple  Lungs are clear to auscultation and percussion  Cardiac exam reveals regular rate and rhythm with no murmurs or gallops  Skin exam is negative for rashes    Data Review:    Analysis and Summary of Old Records (2): Reviewed Dr. Carrillo's note    Records Requested (1): 0      Other History Summarized (from other people in the room) (2): 0    Radiology Tests Summarized (XRAY/CT/MRI/DXA) (1): 0    Labs Reviewed (1): Reviewed the strep screens with patient    Medicine Tests Reviewed (EKG/ECHO/COLONOSCOPY/EGD) (1): 0    Independent Review of EKG or X-RAY (2): 0

## 2021-06-16 NOTE — PROGRESS NOTES
[facility] Follow Up Note    Krystle Polanco   77 y.o. female    Date of Visit: 3/9/2018    Chief Complaint   Patient presents with     Sore Throat     hard time talking, doesn't hurt, feels scratchy, sinuses also hurt, no fever     Subjective  Krystle comes in today as she has a sore throat, head pain especially behind her left eye, thick colored phlegm which is been brownish in color over the last couple days.  She has been sick for over a week and it seems to be getting worse.  She coughs, more so at night.  Been having sinus headaches.  She has been trying over-the-counter medications without relief.  She denies any fevers or myalgias.  Her voice has been affected.    ROS A comprehensive review of systems was performed and was otherwise negative    Social History:   Social History     Social History Narrative    She is .  She has 4 children and is a retired . She is an avid .        Medications were reconciled.  Allergies, social and family history, and the problem list were all reviewed and updated.      Exam  General Appearance: Pleasant and alert   Vitals:    03/09/18 1109   BP: 136/62   Patient Site: Left Arm   Patient Position: Sitting   Cuff Size: Adult Large   Pulse: 89   Temp: 97.7  F (36.5  C)   TempSrc: Tympanic   SpO2: 96%   Weight: (!) 222 lb 6 oz (100.9 kg)      Body mass index is 40.67 kg/(m^2).  Wt Readings from Last 3 Encounters:   03/09/18 (!) 222 lb 6 oz (100.9 kg)   03/09/18 222 lb (100.7 kg)   02/16/18 (!) 222 lb 5 oz (100.8 kg)     HEENT: Sclera are clear.  Fluid behind her tympanic membranes.  Lungs: Normal respirations  Cardiac: Regular rate and rhythm   Abdomen: Soft and nondistended  Extremities: No edema  Skin: No rashes  Neuro: Moves all extremities and has facial symmetry  Gait: Ambulates with a normal gait    Assessment/Plan  1. Acute sinusitis  She was started on Ceftin twice daily.  She declines a steroid.  Continue with saline wash as  needed and she was given Robitussin with codeine to use as needed.    2. Hyperlipidemia associated with type 2 diabetes mellitus  Prescriptions are renewed.    3. Sore throat  - Rapid Strep A Screen-Throat  - Group A Strep, RNA Direct Detection, Throat    4. Allergy history, penicillin  Discussed she can get penicillin testing to see if she has a true allergy.  - Ambulatory referral to Allergy          Laura Carrillo MD  Internal and Geriatric Medicine      Much or all of the text in this note was generated through the use of Dragon Dictate voice-to-text software. Errors in spelling or words which seem out of context are unintentional. Sound alike errors, in particular, may have escaped editing.

## 2021-06-16 NOTE — TELEPHONE ENCOUNTER
Telephone Encounter by Mikki Morley RN at 3/20/2020  4:48 PM     Author: Mikki Morley RN Service: -- Author Type: Registered Nurse    Filed: 3/20/2020  5:06 PM Encounter Date: 3/20/2020 Status: Attested    : Mikki Morley RN (Registered Nurse) Cosigner: Elke Mehta MD at 3/20/2020  5:28 PM    Attestation signed by Elke Mehta MD at 3/20/2020  5:28 PM    ok                ANTICOAGULATION  MANAGEMENT    Assessment     Today's INR result of 4.3 is Supratherapeutic (goal INR of 2.0-3.0)        Warfarin taken as previously instructed    No new diet changes affecting INR    No new medication/supplements affecting INR    Continues to tolerate warfarin with no reported s/s of bleeding or thromboembolism     Previous INR was Supratherapeutic     Sent for     Plan:     Spoke with Krystle regarding INR result and instructed:     Warfarin Dosing Instructions:  hold today and tomorrow then change warfarin dose to 0 mg daily on wed; and 2.5 mg daily rest of week     Instructed patient to follow up no later than: one week    Krystle verbalizes understanding and agrees to warfarin dosing plan.    Instructed to call the [facility] for any changes, questions or concerns. (#[phone])   ?   Mikki Morley RN    Subjective/Objective:      Krystle Polanco, a 79 y.o. female is on warfarin.     Krystle reports:     Home warfarin dose: as updated on anticoagulation calendar per template     Missed doses: No     Medication changes:  No     S/S of bleeding or thromboembolism:  No     New Injury or illness:  No     Changes in diet or alcohol consumption:  No     Upcoming surgery, procedure or cardioversion:  No    Anticoagulation Episode Summary     Current INR goal:   2.0-3.0   TTR:   77.3 % (1 y)   Next INR check:   3/27/2020   INR from last check:   4.30! (3/20/2020)   Weekly max warfarin dose:      Target end date:   Indefinite   INR check location:      Preferred lab:      Send INR reminders to:   [facility]    Indications    Pulmonary embolism (H) [I26.99]           Comments:            Anticoagulation Care Providers     Provider Role Specialty Phone number    Elke Mehta MD  Internal Medicine [phone]

## 2021-06-16 NOTE — TELEPHONE ENCOUNTER
Telephone Encounter by Mikki Morley RN at 3/13/2020  3:08 PM     Author: Mikki Morley RN Service: -- Author Type: Registered Nurse    Filed: 3/13/2020  3:30 PM Encounter Date: 3/13/2020 Status: Attested    : Mikki Morley RN (Registered Nurse) Cosigner: Elke Mehta MD at 3/13/2020  3:31 PM    Attestation signed by Elke Mehta MD at 3/13/2020  3:31 PM    Okay                ANTICOAGULATION  MANAGEMENT    Assessment     Today's INR result of 3.3 is Supratherapeutic (goal INR of 2.0-3.0)        Warfarin taken as previously instructed    No new diet changes affecting INR    No new medication/supplements affecting INR    Continues to tolerate warfarin with no reported s/s of bleeding or thromboembolism     Previous INR was Supratherapeutic    Plan:     Spoke with Krystle regarding INR result and instructed:     Warfarin Dosing Instructions:  Change warfarin dose to 0 mg daily on sunday; and 2.5 mg daily rest of week (14% change)    Instructed patient to follow up no later than: two weeks      Krystle verbalizes understanding and agrees to warfarin dosing plan.    Instructed to call the [facility] for any changes, questions or concerns. (#[phone])   ?   Mikki Morley RN    Subjective/Objective:      Krystle Polanco, a 79 y.o. female is on warfarin.     Krystle reports:     Home warfarin dose: as updated on anticoagulation calendar per template     Missed doses: No     Medication changes:  No     S/S of bleeding or thromboembolism:  No     New Injury or illness:  No     Changes in diet or alcohol consumption:  No     Upcoming surgery, procedure or cardioversion:  No    Anticoagulation Episode Summary     Current INR goal:   2.0-3.0   TTR:   78.0 % (1 y)   Next INR check:   3/27/2020   INR from last check:   3.30! (3/13/2020)   Weekly max warfarin dose:      Target end date:   Indefinite   INR check location:      Preferred lab:      Send INR reminders to:   [facility]    Indications     Pulmonary embolism (H) [I26.99]           Comments:            Anticoagulation Care Providers     Provider Role Specialty Phone number    Elke Mehta MD  Internal Medicine [phone]

## 2021-06-16 NOTE — PROGRESS NOTES
[facility] Follow Up Note    Assessment/Plan:    1. Irritable bowel syndrome with diarrhea  Continues to be a significant problem.  She is being followed by [facility] as well.  Encouraged her to reach out with them for an appointment.  She has had some improvement with colestipol.  He is on 1 g twice daily-with significant dryness.  Will cut back to half a gram twice daily.  Continue Creon  (I added Creon earlier this year with some improvement as well.  She is taking 1 Creon enzyme twice daily.)  Consider regularly scheduled Imodium i.e. once weekly then as needed.  Additionally, she is on a multitude of other medications that may be irritating.  Also, these enzymes and colestipol make it difficult to administer her other regularly scheduled medications.  Would be interested in having an [facility] consult to look at the timing of medications with regard to the use of colestipol and Creon.  Also, would like to look at expense/they are struggling with many tier 3 medications  - Amb Referral to Medication Management    2. Vitamin D deficiency  Vitamin D deficiency detected by dermatology.  She is on 4000 IUs daily.  Would not diagnose you this recently  - Vitamin D, Total (25-Hydroxy); Future  - DXA Bone Density Scan; Future    3. Type 2 diabetes mellitus without complication, without long-term current use of insulin (H)  Glycohemoglobin is stable.  She is on extended release Metformin.  Would like to ask [facility] pharmacy to assist with the switch to a GLP-1 agonist-injectable-once a week.  To see if we could simplify her medication regimen/less pills/better action/etc.  She has many tier 3 medicines and this may not be an affordable option  - Glycosylated Hemoglobin A1c; Future  - HM2(CBC w/o Differential); Future    4. Hyperlipidemia associated with type 2 diabetes mellitus (H)  Noted    5. Polypharmacy  As above-issues are with regard to initiation of GLP-1 agonist-to simplify medication regimen in view  of the fact that she is on colestipol/Creon/etc.  She also has significant gastrointestinal disturbances.  She is aware that an injectable GLP-1 agonist may cause some nausea and decreased appetite.  She is willing to make a change if she can afford it.  Also, would like to reinforce the medicine schedule in view of these medications that probably hamper absorption  - Amb Referral to Medication Management    6. BMI 40.0-44.9, adult (H)  Need to encourage regular daily exercise and healthy diet-this is difficult due to IBS    7. Menopause    - DXA Bone Density Scan; Future    8.  Pulmonary embolism history/paroxysmal atrial fibrillation  Is happy to be off warfarin and on Eliquis.  Improved hair texture with this    Sees Dr. Gutierrez for her asthma./ENT.  Also sees Dr. Cruz (derm)  and had precancerous skin lesions removed    Elke Mehta MD    Chief Complaint:  Chief Complaint   Patient presents with     Follow-up       History of Present Illness:  Krystle is a 80 y.o. female who is here today for follow-up of her usual medical problems which are many.  Her biggest complaint currently is IBS with diarrhea.  Of note, we have tried a variety of things.  Initially, initiated cholesterol all which helped out significantly.  Now that she has been on this, her symptoms still evident flow.  We added Creon I believe toward the end of last year.  The Creon initially worked quite well.  She is reduced it to twice daily.  However, she continues to have bouts of diarrhea that are extensive i.e. 4-8 BMs per day.  She has been evaluated by [facility] in the past and is willing to touch base with Dr. Leigh again.  Her last colonoscopy was in 2019.  Of note also, she was told to avoid Imodium.  However, this is helpful on occasion.  He states that if she takes 1 Imodium, it improves things for several days.  She denies severe abdominal pain, blood in the stool, etc.    She has type 2 diabetes.  It has been relatively well  controlled with Metformin.  However, is difficult to adjust the timing of her medications to ensure absorption given colestipol/enzymes, etc.  We have talked in the past about doing an injectable GLP-1 agonist.  However, they of many tier 3 medications.  It may not be affordable.    She has a growth in her nose that she will be having her ENT take a look at.  She states she was told that she has a lot of scar tissue from previous surgeries.    She is happy to be on Eliquis after having been on Coumadin for some time.  Overall, she feels better.  She is happy not to check INRs.  She states her hair texture has improved.    Since last visit, she visited Dr. Ross for an evaluation of hair loss.    Review of Systems:  A comprehensive review of systems was performed and was otherwise negative    PFSH:  Social History: Retired  / has grownup children  Social History     Tobacco Use   Smoking Status Never Smoker   Smokeless Tobacco Never Used       Past History:   Past Medical History:   Diagnosis Date     Adenomatous goiter 2004     Arthropathy of shoulder region unknown     Asthma 2009     Atrial flutter (H) 2017     Bartholin gland cyst unknown     Diabetes mellitus type II, controlled (H) 2004     Esophageal reflux 1980     Essential hypertension 1995     Gastroparesis 1999     Herpes simplex type 1 infection unknown     IBS (irritable bowel syndrome) 1971     Leukocytosis unknown     Osteopenia 2009     Vitamin D deficiency 2014       Current Outpatient Medications   Medication Sig Dispense Refill     acetaminophen (TYLENOL) 500 MG tablet Take 500 mg by mouth every 4 (four) hours as needed for pain.        albuterol (PROAIR HFA) 90 mcg/actuation inhaler INHALE TWO PUFFS BY MOUTH FOUR TIMES A DAY AS NEEDED FOR WHEEZING 25.5 g 3     amLODIPine (NORVASC) 5 MG tablet Take 1 tablet (5 mg total) by mouth daily. 90 tablet 3     apixaban ANTICOAGULANT (ELIQUIS) 5 mg Tab tablet Take 1 tablet (5 mg total) by  mouth 2 (two) times a day. 180 tablet 3     atorvastatin (LIPITOR) 80 MG tablet Take 1 tablet (80 mg total) by mouth at bedtime. 90 tablet 3     azelastine (ASTELIN) 137 mcg (0.1 %) nasal spray 2 sprays into each nostril 2 (two) times a day. Use in each nostril as directed 90 mL 3     beclomethasone (QVAR REDIHALER) 40 mcg/actuation HFAB inhaler Inhale 2 puffs 2 (two) times a day. 31.8 g 3     blood sugar diagnostic, disc Strp Use as directed with testing blood sugars once daily.  Dispense Contour Next brand per pt's insurance coverage 100 strip 3     CALCIUM CARBONATE (CALCIUM 500 ORAL) Take by mouth 2 (two) times a day.       cholecalciferol, vitamin D3, 1,000 unit tablet Take 2,000 Units by mouth 2 (two) times a day.        clindamycin (CLEOCIN) 150 MG capsule TAKE 4 CAPSULES  BY MOUTH 1 HOUR PRIOR TO DENTAL APPOINTMENT. 30 capsule 3     colestipoL (COLESTID) 1 gram tablet Take 1 tablet (1 g total) by mouth 2 (two) times a day. 180 tablet 3     cranberry extract 300 mg Tab Take 1 tablet by mouth daily.        dicyclomine (BENTYL) 10 MG capsule Take 1 capsule (10 mg total) by mouth 2 (two) times a day as needed. 90 capsule 3     estradioL (ESTRACE) 0.01 % (0.1 mg/gram) vaginal cream Insert 2 g into the vagina every other day. 42.5 g 0     LACTOBACILLUS ACIDOPHILUS (PROBIOTIC ORAL) Take 1 tablet by mouth 2 (two) times a day.        lancing device Misc Use as directed 4 times a week. 1 each 3     lipase-protease-amylase (CREON) 6,000-19,000 -30,000 unit CpDR capsule Take 1 capsule (6,000 units of lipase total) by mouth 3 (three) times a day with meals. 270 capsule 3     losartan (COZAAR) 100 MG tablet Take 1 tablet (100 mg total) by mouth daily. 90 tablet 3     LUTEIN ORAL Take 1 tablet by mouth daily.              metFORMIN (GLUCOPHAGE XR) 500 MG 24 hr tablet Take 2 tablets (1,000 mg total) by mouth 2 (two) times a day. 270 tablet 4     metroNIDAZOLE (METROGEL) 0.75 % gel Apply 1 application topically as needed.     "    montelukast (SINGULAIR) 10 mg tablet Take 1 tablet (10 mg total) by mouth daily. 90 tablet 3     nitrofurantoin (MACRODANTIN) 50 MG capsule Take 1 capsule (50 mg total) by mouth daily. 90 capsule 3     omeprazole (PRILOSEC) 20 MG capsule Take 1 capsule (20 mg total) by mouth daily before breakfast. 90 capsule 3     psyllium (METAMUCIL) 3.4 gram packet Take 1 packet by mouth every evening.       SIMETHICONE (GAS-X ORAL) Take by mouth 2 (two) times a day.       sotaloL (BETAPACE) 80 MG tablet Take 0.5 tablets (40 mg total) by mouth 2 (two) times a day. 90 tablet 3     tiotropium (SPIRIVA WITH HANDIHALER) 18 mcg inhalation capsule Place 1 capsule (2 puffs total) into inhaler and inhale daily. (Patient taking differently: Place 18 mcg into inhaler and inhale as needed. ) 30 capsule 2     UBIDECARENONE (COENZYME Q10 ORAL) Take 1 tablet by mouth daily.        zinc gluconate 50 mg tablet Take 50 mg by mouth daily.       No current facility-administered medications for this visit.        Family History:     Physical Exam:  General Appearance:   Baseline.  Vitals:    04/16/21 1013   BP: 124/70   Patient Site: Left Arm   Patient Position: Sitting   Cuff Size: Adult Large   Pulse: 80   Temp: 99  F (37.2  C)   TempSrc: Tympanic   SpO2: 98%   Weight: (!) 223 lb (101.2 kg)   Height: 5' 2.25\" (1.581 m)     Wt Readings from Last 3 Encounters:   04/16/21 (!) 223 lb (101.2 kg)   02/09/21 (!) 225 lb (102.1 kg)   01/06/21 222 lb (100.7 kg)     Body mass index is 40.46 kg/m .    No Exam today    "

## 2021-06-16 NOTE — TELEPHONE ENCOUNTER
Telephone Encounter by Belkys Gonzalez RN at 11/13/2019  2:47 PM     Author: Belkys Gonzalez RN Service: -- Author Type: Registered Nurse    Filed: 11/13/2019  2:58 PM Encounter Date: 11/13/2019 Status: Signed    : Belkys Gonzalez RN (Registered Nurse)       Pt was called, corresponding information/recommendations reviewed, verbalized understanding, has no further questions at this time, contact information was given for further concerns/questions, scheduling notified to contact pt, order(s) were placed, RX was sent to pt pharmacy and medication list updated   11/13/2019 2:47 PM  LOVE Hurst Stuart W, MD Jorgenson, Beth, RN; [facility] Rn Support Pool             Roya,  Thanks for your note.  Patient was previously seen by Dr. Dang.  She is due for a follow-up echo and Holter monitor to assess her PVC burden.  She would be a good patient for Dr. Saenz to consult on.  Thanks  Tanner    Previous Messages      ----- Message -----   From: Roya Russo RN   Sent: 11/4/2019   4:47 PM CST   To: Elke Mehta MD, Tanner Wong MD   Subject: 12 lead ECG rhythm strip                         Dear Dr. Mehta,   Our mutual patient, Krystle Polanco, participated in the [employer] Apple Watch study today.   Her ECG demonstrated a lot of PVCs. The regular 12 lead is available for your review in MUSE.   As part of the study, we also do a rhythm strip for 30-40 seconds.   I am unable to get a readable copy scanned in to Epic of this rhythm strip.   Ms. Polanco was able to complete the study, including exercising on a recumbent bike without any issue.     Ms. Polanco asked that we update you on the findings of the ECG and the rhythm strip.   I will e-mail the rhythm strips to you.     Please feel free to contact me if you have any further questions.     Best regards,   Roya

## 2021-06-16 NOTE — PROGRESS NOTES
[facility] Follow Up Note    Assessment/Plan:  1. Frequent PVCs-bigeminy at last visit  She is essentially asymptomatic.  However she has a complex past medical history.  Echocardiogram normal.  Recommendation: We will have her non-emergently touch base with electrophysiology to discuss the frequency of PVCs.  Of note, a small dose of magnesium was started given her lower magnesium on electrolyte examination.  Of note, she is aware that magnesium may loosen stools.  - Ambulatory referral to Cardiology    2. Hypertension  Modest increase in blood pressure medication after last visit.  Of note, she has recently started the new medication within the past week.  Additionally, she was asked to separate her antihypertensive medications from her colestipol.  I wonder about a malabsorption issue with this medication.  Recommendation: Continue with Hyzaar at 50-12 0.5 for now.  Recheck blood pressure in 6 weeks.    Other medical problems of diabetes, history of pulmonary embolism-on anticoagulation, irritable bowel with diarrhea have been stable    Elke Mehta MD    Chief Complaint:  Chief Complaint   Patient presents with     Follow-up       History of Present Illness:  Krystle is a 77 y.o. female who is here today accompanied by her  Fitz for follow-up.  Of note, Omer was noted to have an irregular heart rate at last office visit.  She has an underlying history of sinus arrhythmia.  Of note, she was very asymptomatic with this.  In fact, she states she has been feeling great lately.  A Holter monitor revealed significant numbers of PVCs.  Additionally, an echocardiogram was performed which showed normal left ventricular ejection fraction.  After discussion with she and her  today, we have decided to follow up for an EP consult on this.    With regard to elevated blood pressure, it seems that adjusting her blood pressure medications and taking them separate from her colestipol has improved  her readings.  Additionally, her new dose of Hyzaar has recently just arrived.  She is therefore not been on it for very long.    She denies any chest pain or shortness of breath.  She works with a pulmonologist at health [employer] for COPD as well as a GI doctor for her functional bowel syndrome.  She has a history of pulmonary emboli and is on long-term anticoagulation as well.    Review of Systems:  A comprehensive review of systems was performed and was otherwise negative    PFSH:  Social History: She is  with adult children  History   Smoking Status     Never Smoker   Smokeless Tobacco     Never Used       Past History: As noted in the snapshot  Current Outpatient Prescriptions   Medication Sig Dispense Refill     acetaminophen (TYLENOL) 500 MG tablet Take 500 mg by mouth every 6 (six) hours as needed for pain.        atorvastatin (LIPITOR) 80 MG tablet Take 1 tablet (80 mg total) by mouth at bedtime. 90 tablet 3     azelastine (ASTELIN) 137 mcg nasal spray 2 sprays daily (Patient taking differently: 2 sprays into each nostril daily as needed for rhinitis. 2 sprays daily) 90 mL 3     beclomethasone (QVAR) 80 mcg/actuation inhaler Inhale 2 puffs 2 (two) times a day as needed. 3 Inhaler 12     blood sugar diagnostic (ACCU-CHEK MOISE) Strp Use 1 strip As Directed 3 (three) times a week.        CALCIUM CARBONATE (CALCIUM 500 ORAL) Take by mouth 2 (two) times a day.       cholecalciferol, vitamin D3, 1,000 unit tablet Take 1,000 Units by mouth 2 (two) times a day.       colestipol (COLESTID) 1 gram tablet Take 1 tablet (1 g total) by mouth 2 (two) times a day. 60 tablet 1     cranberry extract 300 mg Tab Take 1 tablet by mouth daily.        dicyclomine (BENTYL) 10 MG capsule Take 1-2 capsules (10-20 mg total) by mouth every 6 (six) hours as needed. 120 capsule 0     fexofenadine (ALLEGRA) 60 MG tablet Take 60 mg by mouth daily.       LACTOBACILLUS ACIDOPHILUS (PROBIOTIC ORAL) Take 1 tablet by mouth 2 (two)  times a day.        lancing device Misc Use As Directed 4 (four) times a week.        losartan-hydrochlorothiazide (HYZAAR) 50-12.5 mg per tablet Take 1 tablet by mouth daily. 30 tablet 11     LUTEIN ORAL Take 1 tablet by mouth daily as needed.        metFORMIN (GLUCOPHAGE) 500 MG tablet Take 2 tablets (1,000 mg total) by mouth 2 times a day at 6:00 am and 4:00 pm. 180 tablet 5     montelukast (SINGULAIR) 10 mg tablet Take 1 tablet (10 mg total) by mouth daily with supper. (Patient taking differently: Take 10 mg by mouth daily. ) 90 tablet 3     nitrofurantoin (MACRODANTIN) 50 MG capsule Take 1 capsule (50 mg total) by mouth daily. 90 capsule 3     omeprazole (PRILOSEC) 20 MG capsule TAKE ONE CAPSULE BY MOUTH EVERY DAY 90 capsule 3     PROAIR HFA 90 mcg/actuation inhaler INHALE TWO PUFFS BY MOUTH FOUR TIMES A DAY AS NEEDED FOR WHEEZING 25.5 g 0     psyllium (METAMUCIL) 3.4 gram packet Take 1 packet by mouth every evening.       SIMETHICONE (GAS-X ORAL) Take by mouth 2 (two) times a day.       sucralfate (CARAFATE) 1 gram tablet Take 1 tablet (1 g total) by mouth at bedtime. 90 tablet 3     tiotropium bromide (SPIRIVA RESPIMAT) 2.5 mcg/actuation Mist Inhale daily as needed.        UBIDECARENONE (COENZYME Q10 ORAL) Take 1 tablet by mouth daily.        warfarin (COUMADIN) 5 MG tablet One tab daily.  Adjust to INR of 2 to 3 90 tablet 3     No current facility-administered medications for this visit.        Family History: Nothing new    Physical Exam:  General Appearance:   She appears well and in no acute distress  Vitals:    02/16/18 1030   BP: 136/88   Patient Site: Left Arm   Patient Position: Sitting   Cuff Size: Adult Large   Pulse: 62   Weight: (!) 222 lb 5 oz (100.8 kg)     Wt Readings from Last 3 Encounters:   02/16/18 (!) 222 lb 5 oz (100.8 kg)   02/15/18 (!) 224 lb 2 oz (101.7 kg)   02/07/18 (!) 224 lb 2 oz (101.7 kg)     Body mass index is 40.66 kg/(m^2).    Lungs are clear to auscultation and  percussion  Cardiac exam reveals an occasional extrasystole today.  She does not appear to be in bigeminy today based on examination.

## 2021-06-17 NOTE — PROGRESS NOTES
Medication Therapy Management (MTM) Encounter    Assessment:                                                      1. Irritable bowel syndrome with diarrhea  Significantly symptomatic.  Reviewed Creon and colestipol, particularly timing of these 2 medications in conjunction with her current medication regimen.  Timing is difficult due to potential decreased absorption of her other medications.  Recommend to adjust colestipol to early morning dosing before all other medications, with hopes of minimizing excessive bowel movements in the morning.  If this is able to prevent early morning loose stools, she may not need to take Imodium, which had been cautioned due to cycling between diarrhea and constipation.  [facility] in 2 weeks to evaluate potential benefit.    2. Type 2 diabetes mellitus without complication, without long-term current use of insulin (H)  At goal A1c less than 7% per ADA guidelines.  Due to tightly controlled blood sugars on current regimen, recommend to continue Metformin at max dose.  Reviewed the benefits versus risks of GLP-1 agonist, however due to history of gastroparesis and ongoing GI disturbances due to irritable bowel syndrome I believe that her risk of adverse effects are much greater than her benefits of this medication.  Additionally this medication will be expensive as brand-name, and will likely be cost prohibitive.  Not on aspirin due to chronic anticoagulation, stable on high intensity statin and max dose ARB for renal protection.    3. Severe persistent asthma, unspecified whether complicated  Stable, following closely with pulmonologist.  Reviewed significant dry mouth as a result of her CPAP machine, consider utilizing over-the-counter XyliMelts or Biotene saliva alternative.    4. Pulmonary embolism without acute cor pulmonale, unspecified chronicity, unspecified pulmonary embolism type (H)  Stable on Eliquis twice daily, however cost is prohibitive.  Recommend  refer to [place] prescription assistance team to evaluate whether she may qualify for assistance from the .    5. Benign Essential Hypertension  Echo blood pressure less than 130/80 per ACC/AHA hypertension guidelines.  At goal max dose ARB for renal protection, recommend continue current regimen.    6. PVC's (premature ventricular contractions)  Stable, follows with electrophysiology, .  Continue on sotalol as directed by cardiology.  Reviewed use of magnesium, consider potential contribution to loose stools, will discuss with PCP.    7. Menopause  Stable, continues to use vaginal estrogen cream but on a decreased frequency due to cost.  Reviewed potential use of compounded formulation at local compounding pharmacy.  Will discuss at follow-up.      Plan:                                                     Try colestipol right away in the AM an hour before all other meds   We should wait to consider medications like Rybelsus, this may increase GI adverse effects  Call [place] prescription assistance program:[phone]   Try xylimelts for dry mouth   Remember to rinse mouth after using qvar to decrease risk of thrush   When calcium is gone, recommend switching to calcium citrate (to help with absorption when on omeprazole)   We may find it cheaper (or contribute less to donut hole) if we have estrogen cream compounded     Follow Up  Return in about 2 weeks (around 5/17/2021) for Martha Youssef, PharmD, BCACP, via [employer].    Subjective & Objective                                                     Krystle Polanco is a 80 y.o. female coming in for an initial visit for Medication Therapy Management. She was referred to me from Elke Mehta MD    Reason for visit: Medication management initial    Medication Adherence/Access: Stable to current medication regimen, but does have concerns about polypharmacy, cost, and timing with some of her medications.  She is currently taking medications  at breakfast, noon, evening, bedtime.  She is dosing her colestipol at noon and bedtime, her Creon in the morning and evening.  The medications that she is concerned about cost, including Qvar, Eliquis, Creon, Estrace.     IBS: She is currently taking Creon and colestipol.  She has not been back to see her gastroenterologist but is not currently setting up an appointment to follow-up.  She was most recently put on Creon and hoping that this will help with her frequent bowel movements.  3 years ago she had been put on colestipol.  She is concerned about timing of her medications to ensure that they are working as best as possible.  On a normal day she would have 4-5 bowel movements, but this may go up to as frequent as 6-10 bowel movements.  If she has 4-6 bowel movements in the morning she will take an Imodium.  She had finds that Imodium makes her very tired, but it is effective.  Her gastroenterologist did not necessarily want her to take Imodium out of fear that this would start cycling between constipation and diarrhea, but it does significantly help decrease number of bowel movements.  After recent appointment with PCP, she was instructed to decrease dose of colestipol but she has not yet updated her pillboxes.  She plans to do this in 2 more days.  She has been having very dry mouth, and is eager to try a lower dose to see if this helps.  The dry mouth has worsened her vocal cord dysfunction.  She does find cholesterol Marcio has been effective to decrease diarrhea and excessive bowel movements, but she does not like how she feels otherwise on this medication. She has a hiatal hernia that has been there since she had her 4 kids and 2 C-sections, continues on omeprazole daily.  Also noting a history of gastroparesis.    Type 2 diabetes: She continues on Metformin 1000 mg twice daily, and has been so for some time.  There was consideration of whether she would do well on a GLP-1 agonist.  Of note in the past she  has been told that she has gastroparesis.  She monitors her fasting blood sugars and they are generally always in the 120-130 range.  She has increased her activity levels as well.  Lab Results   Component Value Date    HGBA1C 6.7 (H) 02/09/2021    HGBA1C 7.0 (H) 10/02/2020    HGBA1C 6.9 (H) 06/17/2020     Lab Results   Component Value Date    MICROALBUR <0.50 07/07/2015    LDLCALC 83 02/08/2019    CREATININE 0.74 02/09/2021     Severe persistent asthma: She believes that her worsening of breathing was due to inactivity over the last year.  She finds that her breathing does improve the more active she is.  She follows very closely with her pulmonologist, Dr. Mccormick.  Notes that while taking Qvar 1 puff twice daily, her breathing is well controlled.  She has almost never needing to use albuterol over the last several years.  She does not rinse her mouth out after use of Qvar, has not had thrush before.  She continues on montelukast daily.  She only uses Spiriva as needed for first sign of cold, cough, wheezing.  She does use her CPAP, and notes with the last increase in her settings she has experienced more mouth dryness.  She does use her humidifier.  She has not been using Biotene or any other replacement products for saliva.   Per Dr. Mccormick:   Continue with Qvar 1 puff twice per day, may increase to 2 puffs twice per day - rinse your mouth out well after   3.  Start  the Spiriva once per 1.25 once per day at first sign of a cold, increased cough, wheezing.  OK to stop once you feel better - has been years since using this   4.  Albuterol inhaler 2 to 4 puffs as needed for cough, and wheeze     Pulmonary embolism: after extensive workup unable to determine why.  She had previously been on warfarin and now on Eliquis, thinks this is also helped her stomach.  She did have a history of nosebleeds, she also follows with an ENT.  Since stopping nasal steroids she has not had nosebleeds.    Hypertension: Continues on  losartan daily, denies signs or symptoms of adverse effects.    Frequent PVCs: She now continues on sotalol 40 mg twice daily and magnesium 250 mg daily.    Menopause: She is using vaginal estrogen cream about once weekly, but this medication is expensive and would be interested in other alternatives if more affordable.    PMH: reviewed in EPIC   Allergies/ADRs: reviewed in EPIC   Alcohol: None   Tobacco:   Social History     Tobacco Use   Smoking Status Never Smoker   Smokeless Tobacco Never Used     Today's Vitals:  BP Readings from Last 3 Encounters:   04/16/21 124/70   02/09/21 132/64   01/06/21 122/74     ----------------    The patient was given a summary of these recommendations as an after visit summary    I spent 75 minutes with this patient today. An extra 15 minutes was spent creating the Medication Action Plan. All changes were made via collaborative practice agreement with Elke Mehta MD. A copy of the visit note was provided to the patient's provider.     Damian Youssef, PharmD, BCACP  Medication Management (MTM) Pharmacist  [facility]  Current Outpatient Medications   Medication Sig Dispense Refill     acetaminophen (TYLENOL) 500 MG tablet Take 1,000 mg by mouth every 6 (six) hours as needed for pain.       albuterol (PROAIR HFA) 90 mcg/actuation inhaler INHALE TWO PUFFS BY MOUTH FOUR TIMES A DAY AS NEEDED FOR WHEEZING 25.5 g 3     amLODIPine (NORVASC) 5 MG tablet Take 1 tablet (5 mg total) by mouth daily. 90 tablet 3     apixaban ANTICOAGULANT (ELIQUIS) 5 mg Tab tablet Take 1 tablet (5 mg total) by mouth 2 (two) times a day. 180 tablet 3     atorvastatin (LIPITOR) 80 MG tablet Take 1 tablet (80 mg total) by mouth at bedtime. 90 tablet 3     azelastine (ASTELIN) 137 mcg (0.1 %) nasal spray 2 sprays into each nostril 2 (two) times a day. Use in each nostril as directed 90 mL 3     beclomethasone (QVAR REDIHALER) 40 mcg/actuation HFAB inhaler Inhale 2 puffs 2 (two) times a  day. 31.8 g 3     blood sugar diagnostic, disc Strp Use as directed with testing blood sugars once daily.  Dispense Contour Next brand per pt's insurance coverage 100 strip 3     CALCIUM CARBONATE (CALCIUM 500 ORAL) Take 1 tablet by mouth 2 (two) times a day.       cholecalciferol, vitamin D3, 1,000 unit tablet Take 2,000 Units by mouth 2 (two) times a day.        clindamycin (CLEOCIN) 150 MG capsule TAKE 4 CAPSULES  BY MOUTH 1 HOUR PRIOR TO DENTAL APPOINTMENT. 30 capsule 3     colestipoL (COLESTID) 1 gram tablet Take 1 tablet (1 g total) by mouth 2 (two) times a day. 180 tablet 3     cranberry extract 300 mg Tab Take 1 tablet by mouth daily.        dicyclomine (BENTYL) 10 MG capsule Take 1 capsule (10 mg total) by mouth 2 (two) times a day as needed. 90 capsule 3     estradioL (ESTRACE) 0.01 % (0.1 mg/gram) vaginal cream Insert 2 g into the vagina every other day. 42.5 g 0     LACTOBACILLUS ACIDOPHILUS (PROBIOTIC ORAL) Take 1 tablet by mouth 2 (two) times a day.        lancing device Misc Use as directed 4 times a week. 1 each 3     loperamide (IMODIUM) 2 mg capsule Take 2 mg by mouth 4 (four) times a day as needed.       losartan (COZAAR) 100 MG tablet Take 1 tablet (100 mg total) by mouth daily. 90 tablet 3     metFORMIN (GLUCOPHAGE XR) 500 MG 24 hr tablet Take 2 tablets (1,000 mg total) by mouth 2 (two) times a day. 270 tablet 4     montelukast (SINGULAIR) 10 mg tablet Take 1 tablet (10 mg total) by mouth daily. 90 tablet 3     omeprazole (PRILOSEC) 20 MG capsule Take 1 capsule (20 mg total) by mouth daily before breakfast. 90 capsule 3     psyllium (METAMUCIL) 3.4 gram packet Take 1 packet by mouth every evening.       SIMETHICONE (GAS-X ORAL) Take 1 tablet by mouth 2 (two) times a day as needed.       sotaloL (BETAPACE) 80 MG tablet Take 0.5 tablets (40 mg total) by mouth 2 (two) times a day. 90 tablet 3     zinc gluconate 50 mg tablet Take 50 mg by mouth daily.       lipase-protease-amylase (CREON)  6,000-19,000 -30,000 unit CpDR capsule Take 1 capsule (6,000 units of lipase total) by mouth 2 (two) times a day with meals. 270 capsule 3     LUTEIN ORAL Take 1 tablet by mouth daily.              metroNIDAZOLE (METROGEL) 0.75 % gel Apply 1 application topically as needed.        nitrofurantoin (MACRODANTIN) 50 MG capsule Take 1 capsule (50 mg total) by mouth daily. 90 capsule 3     tiotropium (SPIRIVA WITH HANDIHALER) 18 mcg inhalation capsule Place 1 capsule (2 puffs total) into inhaler and inhale as needed (at first sign of a cold, increased cough, wheezing.  OK to stop once you feel better).       UBIDECARENONE (COENZYME Q10 ORAL) Take 1 tablet by mouth daily.        No current facility-administered medications for this visit.         Medication Therapy Recommendations  Asthma    Current Medication: beclomethasone (QVAR REDIHALER) 40 mcg/actuation HFAB inhaler   Rationale: Does not understand instructions - Adherence - Adherence   Recommendation: Provide Education - Educate doing some mouth after each use   Status: Patient Agreed - Adherence/Education         Irritable bowel syndrome    Current Medication: colestipoL (COLESTID) 1 gram tablet   Rationale: Does not understand instructions - Adherence - Adherence   Recommendation: Provide Education - Educate to take colestipol immediately in the morning before all other meds   Status: Patient Agreed - Adherence/Education          Current Medication: colestipoL (COLESTID) 1 gram tablet   Rationale: Undesirable effect - Adverse medication event - Safety   Recommendation: Provide Education - Xylimelts 550 mg Mabt - Recommend to try XyliMelts as needed for dry mouth   Status: Accepted - no CPA Needed          Current Medication: lipase-protease-amylase (CREON) 6,000-19,000 -30,000 unit CpDR capsule   Rationale: Cannot afford medication product - Cost - Adherence   Recommendation: Referral to Service  - Refer to [place] prescription assistance program   Status:  Patient Agreed - Adherence/Education         Menopause    Current Medication: CALCIUM CARBONATE (CALCIUM 500 ORAL)   Rationale: More effective medication available - Ineffective medication - Effectiveness   Recommendation: Change Medication - Recommend switch calcium carbonate to calcium citrate   Status: Accepted - no CPA Needed          Current Medication: estradioL (ESTRACE) 0.01 % (0.1 mg/gram) vaginal cream   Rationale: More cost-effective medication available - Cost - Adherence   Recommendation: Change Medication Formulation  - Recommend to switch estradiol vaginal cream to compounded formulation   Status: Contact Provider - Awaiting Response

## 2021-06-17 NOTE — PATIENT INSTRUCTIONS - HE
Plan:                                                     Try colestipol right away in the AM an hour before all other meds   We should wait to consider medications like Rybelsus, this may increase GI adverse effects  Call [place] prescription assistance program:[phone]   Try xylimelts for dry mouth   Remember to rinse mouth after using qvar to decrease risk of thrush   When calcium is gone, recommend switching to calcium citrate (to help with absorption when on omeprazole)   We may find it cheaper (or contribute less to donut hole) if we have this compounded     [facility]

## 2021-06-17 NOTE — TELEPHONE ENCOUNTER
Telephone Encounter by Mikki Morley RN at 12/14/2020 11:43 AM     Author: Mikki Morley RN Service: -- Author Type: Registered Nurse    Filed: 12/14/2020  2:31 PM Encounter Date: 12/14/2020 Status: Signed    : Mikki Morley RN (Registered Nurse)       ANTICOAGULATION  MANAGEMENT    Assessment     Today's INR result of 1.3 is Subtherapeutic (goal INR of 2.0-3.0)        Warfarin taken as previously instructed    No new diet changes affecting INR     No new medication/supplements affecting INR    Continues to tolerate warfarin with no reported s/s of bleeding or thromboembolism     Previous INR was Subtherapeutic     Abs flare settled now    Plan:     Left detailed message for Krystle regarding INR result and instructed:     Warfarin Dosing Instructions:  Change warfarin dose to 2.5 mg daily  (16 % change)    Instructed patient to follow up no later than: one week    Instructed to call the [facility] for any changes, questions or concerns. (#[phone])   ?   Mikki Morley RN    Subjective/Objective:      Krystle Polanco, a 80 y.o. female is on warfarin.     Krystle reports:     Home warfarin dose: as updated on anticoagulation calendar per template     Missed doses: No     Medication changes:  No     S/S of bleeding or thromboembolism:  No     New Injury or illness:  No     Changes in diet or alcohol consumption:  No     Upcoming surgery, procedure or cardioversion:  No    Anticoagulation Episode Summary     Current INR goal:  2.0-3.0   TTR:  63.6 % (1 y)   Next INR check:  12/21/2020   INR from last check:  1.30 (12/14/2020)   Weekly max warfarin dose:     Target end date:  Indefinite   INR check location:     Preferred lab:     Send INR reminders to:  [facility]    Indications    Pulmonary embolism (H) [I26.99]           Comments:           Anticoagulation Care Providers     Provider Role Specialty Phone number    Elke Mehta MD  Internal Medicine [phone]

## 2021-06-17 NOTE — PROGRESS NOTES
[facility] Follow Up Note    Assessment/Plan:  1. Benign Essential Hypertension  With increased blood pressure today.  Of note, her blood pressure had improved when she was able to separate her dosing of losartan with colestipol.  She is just recently returned from international travel and medication management was difficult.  Today, her blood pressure is elevated to 160/62.  Recommendation: Because of other concerns of dryness and hoarseness, will discontinue Hyzaar-50/12.5.  Will order losartan at 100 mg.  The diuretic will be discontinued to help with dryness of the mouth and hoarseness etc.    2. Hoarseness  Have reviewed pulmonary's note.  She has been adjusting her inhalers which may be a contribute in factor to hoarseness.  She denies any recent reflux.  Recommendation: We will decrease diuretic.  Encourage hydration and warm liquids.  ENT evaluation to check vocal cords    3. Type 2 diabetes mellitus without complication, without long-term current use of insulin  She will be due for diabetic labs however, blood sugars were elevated on her trip abroad.  Will wait an additional month.    4.  Fall with left shoulder injury  She had a fall in a field.  She was walking down a muddy hill and slipped.  She fell onto her left shoulder  She has followed up with her orthopedic surgeon.  He is monitoring.  He believes that she may have some hairline fractures that will heal    5.  Medication monitoring   20 minutes spent today reviewing her medication list and discussing all the changes and the change in pharmacy with her today.    follow-up in the next month or 2 for a diabetic check  The following high BMI interventions were performed this visit: encouragement to exercise  Elke Mehta MD    Chief Complaint:  Chief Complaint   Patient presents with     Follow-up     Fall     Hypertension       History of Present Illness:  Krystle is a 77 y.o. female who is here today for follow-up with regard to  medications.  She states for the past 3 months she has had difficulty with her pharmacy and with regard to the formulary of her new insurance plan.  We have reviewed her medications today and made appropriate adjustments.  She is working with her pulmonologist and has had dramatic changes in her inhalers.    Since her last visit, she has traveled abroad with her family.  This was a good trip but she sustained a fall while visiting a [place].  She states they were walking around in a [place] when she slipped on a muddy hill.  She fell on her left shoulder and buttock area.  Her left shoulder was injured and she has already followed up with her orthopedic surgeon.  She does not appear to need any surgery.  This was all in the area where she had previously sustained a fracture.    Her blood sugars have been somewhat elevated with the travel.  She is due for blood check but will hold until things stabilize.    With regard to blood pressure, it was elevated at her pulmonary office.  It is been somewhat elevated.  Am wondering if she is missed some doses of medication due to trouble with the pharmacy.    Finally, she is dealing with ongoing hoarseness.  Initially, it was thought to be secondary to her steroid inhalers.  She is working with pulmonary on this.  Her ENT will be evaluating her vocal c warts.  She is not experiencing any heartburn.  She does complain of significantly dry mouth.  She is wondering if her diuretic can be discontinued    Review of Systems:  A comprehensive review of systems was performed and was otherwise negative    PFSH:  Social History: She is .  She and her  enjoy travel.  They just traveled abroad.  She is a retired   History   Smoking Status     Never Smoker   Smokeless Tobacco     Never Used       Past History: See snapshot  Current Outpatient Prescriptions   Medication Sig Dispense Refill     acetaminophen (TYLENOL) 500 MG tablet Take 500 mg by mouth every 4  (four) hours as needed for pain.        atorvastatin (LIPITOR) 80 MG tablet Take 1 tablet (80 mg total) by mouth at bedtime. 90 tablet 3     azelastine (ASTELIN) 137 mcg nasal spray 2 sprays daily (Patient taking differently: 2 sprays into each nostril daily as needed for rhinitis. 2 sprays daily) 90 mL 3     blood sugar diagnostic (ACCU-CHEK MOISE) Strp Use 1 strip As Directed 3 (three) times a week.        CALCIUM CARBONATE (CALCIUM 500 ORAL) Take by mouth 2 (two) times a day.       cholecalciferol, vitamin D3, 1,000 unit tablet Take 1,000 Units by mouth 2 (two) times a day.       colestipol (COLESTID) 1 gram tablet Take 1 tablet (1 g total) by mouth 2 (two) times a day. 60 tablet 1     cranberry extract 300 mg Tab Take 1 tablet by mouth daily.        dicyclomine (BENTYL) 10 MG capsule Take 1-2 capsules (10-20 mg total) by mouth every 6 (six) hours as needed. 120 capsule 0     LACTOBACILLUS ACIDOPHILUS (PROBIOTIC ORAL) Take 1 tablet by mouth 2 (two) times a day.        lancing device Misc Use As Directed 4 (four) times a week.        LUTEIN ORAL Take 1 tablet by mouth daily as needed.        metFORMIN (GLUCOPHAGE) 500 MG tablet Take 2 tablets (1,000 mg total) by mouth 2 times a day at 6:00 am and 4:00 pm. 180 tablet 5     mometasone 200 mcg/actuation HFAA Inhale 2 puffs 2 (two) times a day.       montelukast (SINGULAIR) 10 mg tablet Take 1 tablet (10 mg total) by mouth daily. 90 tablet 3     nitrofurantoin (MACRODANTIN) 50 MG capsule Take 1 capsule (50 mg total) by mouth daily. 90 capsule 3     omeprazole (PRILOSEC) 20 MG capsule TAKE ONE CAPSULE BY MOUTH EVERY DAY 90 capsule 3     PROAIR HFA 90 mcg/actuation inhaler INHALE TWO PUFFS BY MOUTH FOUR TIMES A DAY AS NEEDED FOR WHEEZING 25.5 g 0     psyllium (METAMUCIL) 3.4 gram packet Take 1 packet by mouth every evening.       SIMETHICONE (GAS-X ORAL) Take by mouth 2 (two) times a day.       sucralfate (CARAFATE) 1 gram tablet Take 1 tablet (1 g total) by mouth at  bedtime. 90 tablet 3     UBIDECARENONE (COENZYME Q10 ORAL) Take 1 tablet by mouth daily.        warfarin (COUMADIN) 5 MG tablet One tab daily.  Adjust to INR of 2 to 3 90 tablet 3     losartan (COZAAR) 100 MG tablet Take 1 tablet (100 mg total) by mouth daily. 90 tablet 3     tiotropium (SPIRIVA WITH HANDIHALER) 18 mcg inhalation capsule Place 1 capsule (2 puffs total) into inhaler and inhale daily. Place 1 capsule into the inhaler device. Close and press button to crush the capsule. Inhale the contents of the crushed capsule in 2 breaths. 30 capsule 2     No current facility-administered medications for this visit.        Family History: Noncontributory    Physical Exam:  General Appearance:   Pleasant and well-appearing and in no acute distress.  Of note, her blood pressure is elevated today at 150/62  Vitals:    04/06/18 1203   BP: 134/62   Patient Site: Left Arm   Patient Position: Sitting   Cuff Size: Adult Large   Pulse: 60   Weight: 222 lb (100.7 kg)     Wt Readings from Last 3 Encounters:   04/06/18 222 lb (100.7 kg)   03/16/18 221 lb 5 oz (100.4 kg)   03/09/18 (!) 222 lb 6 oz (100.9 kg)     Body mass index is 40.6 kg/(m^2).        Data Review:    Analysis and Summary of Old Records (2): Viewed care everywhere and Dr. Gutierrez's notes    Records Requested (1): 0      Other History Summarized (from other people in the room) (2): 0    Radiology Tests Summarized (XRAY/CT/MRI/DXA) (1): 0    Labs Reviewed (1): 0    Medicine Tests Reviewed (EKG/ECHO/COLONOSCOPY/EGD) (1): 0    Independent Review of EKG or X-RAY (2): 0

## 2021-06-17 NOTE — TELEPHONE ENCOUNTER
Telephone Encounter by Mikki Morley RN at 2/16/2021 12:38 PM     Author: Mikki Morley RN Service: -- Author Type: Registered Nurse    Filed: 2/16/2021 12:40 PM Encounter Date: 2/16/2021 Status: Attested    : Mikki Morley RN (Registered Nurse) Cosigner: Elke Mehta MD at 2/16/2021  1:21 PM    Attestation signed by Elke Mehta MD at 2/16/2021  1:21 PM    ok                ANTICOAGULATION  MANAGEMENT PROGRAM    Krystle Polanco is being discharged from the [facility] Anticoagulation Management Program (AC).    Reason for discharge: [facility]    AC referral closed, anticoagulation episode resolved and INR standing order discontinued.     If Krystle needs warfarin management in the future, please send a new referral.    Mikki Morley RN

## 2021-06-17 NOTE — TELEPHONE ENCOUNTER
Telephone Encounter by Mikki Morley RN at 2/9/2021 12:33 PM     Author: Mikki Morley RN Service: -- Author Type: Registered Nurse    Filed: 2/9/2021 12:52 PM Encounter Date: 2/9/2021 Status: Signed    : Mikki Morley RN (Registered Nurse)       ANTICOAGULATION  MANAGEMENT    Assessment     Today's INR result of 3.4 is Supratherapeutic (goal INR of 2.0-3.0)        Warfarin taken as previously instructed    No new diet changes affecting INR    No new medication/supplements affecting INR    Continues to tolerate warfarin with no reported s/s of bleeding or thromboembolism     Previous INR was Therapeutic    Plan:     Left dtailed maessage for Krystle regarding INR result and instructed:      Warfarin Dosing Instructions:  skip today then change warfarin dose to 1.25 mg daily on wed; and 2.5 mg daily rest of week  (7.1 % change)    Instructed patient to follow up no later than: two weeks        Instructed to call the [facility] for any changes, questions or concerns. (#[phone])   ?   Mikki Morley RN    Subjective/Objective:      Krystle Polanco, a 80 y.o. female is on warfarin. Krystle Dalton reports:     Home warfarin dose: as updated on anticoagulation calendar per template     Missed doses: No     Medication changes:  No     S/S of bleeding or thromboembolism:  No     New Injury or illness:  No     Changes in diet or alcohol consumption:  No     Upcoming surgery, procedure or cardioversion:  No    Anticoagulation Episode Summary     Current INR goal:  2.0-3.0   TTR:  63.2 % (1 y)   Next INR check:  2/23/2021   INR from last check:  3.40 (2/9/2021)   Weekly max warfarin dose:     Target end date:  Indefinite   INR check location:     Preferred lab:     Send INR reminders to:  [facility]    Indications    Pulmonary embolism (H) [I26.99]           Comments:           Anticoagulation Care Providers     Provider Role Specialty Phone number    Elke Mehta MD  Internal Medicine  [phone]

## 2021-06-17 NOTE — TELEPHONE ENCOUNTER
Telephone Encounter by Katalina Gunderson RN at 12/7/2020 12:09 PM     Author: Katalina Gunderson RN Service: -- Author Type: Registered Nurse    Filed: 12/7/2020 12:40 PM Encounter Date: 12/7/2020 Status: Signed    : Katalina Gunderson RN (Registered Nurse)       ANTICOAGULATION  MANAGEMENT    Assessment     Today's INR result of 1.3 is Subtherapeutic (goal INR of 2.0-3.0)        Warfarin taken as previously instructed    Decreased greens/vitamin K intake may be affecting INR    No new medication/supplements affecting INR    Continues to tolerate warfarin with no reported s/s of bleeding or thromboembolism     Previous INR was Therapeutic    Plan:     Spoke on phone with Krystle regarding INR result and instructed:     Warfarin Dosing Instructions:  Take 5 mg today then continue current warfarin dose 1.25 mg daily on Sundays and Wednesdays; and 2.5 mg daily rest of week  (0 % change)    Instructed patient to follow up no later than: 5-7 days    Education provided: importance of therapeutic range, target INR goal and significance of current INR result, importance of following up for INR monitoring at instructed interval and importance of taking warfarin as instructed    Krystle verbalizes understanding and agrees to warfarin dosing plan.    Instructed to call the [facility] for any changes, questions or concerns. (#[phone])   ?   Katalina Gunderson RN    Subjective/Objective:      Krystle Polanco, a 80 y.o. female is on warfarin.     Krystle reports:     Home warfarin dose: verbally confirmed home dose with Krystle and updated on anticoagulation calendar     Missed doses: No     Medication changes:  No     S/S of bleeding or thromboembolism:  No     New Injury or illness:  Yes: Her Chrons has been acting up past few days.     Changes in diet or alcohol consumption:  Yes: not eating well due to her flare     Upcoming surgery, procedure or cardioversion:  Yes-they are discussing cardioversion.  [facility] this week    Anticoagulation Episode Summary     Current INR goal:  2.0-3.0   TTR:  65.6 % (1 y)   Next INR check:  12/14/2020   INR from last check:  1.40 (12/7/2020)   Weekly max warfarin dose:     Target end date:  Indefinite   INR check location:     Preferred lab:     Send INR reminders to:  [facility]    Indications    Pulmonary embolism (H) [I26.99]           Comments:           Anticoagulation Care Providers     Provider Role Specialty Phone number    Elke Mehta MD  Internal Medicine [phone]

## 2021-06-17 NOTE — PROGRESS NOTES
[facility] Follow Up Note    Assessment/Plan:  1. Adenomatous Goiter-multiple nodules.  The largest one has slightly increased in size  This was reviewed with her today.  A message was left last week.  Of note, this nodule is biopsied back in 2011.  At that time it was smaller.  A discussion was had today with regard to whether we should go back and do biopsies on the lesions greater than 2 cm.  Of note, she is following with her ear nose and throat specialist.  She will discuss this with him at the next appointment.  If biopsies are not taken, we could do an interim ultrasound of the thyroid in 3-6 months.    2. Type 2 diabetes mellitus without complication, without long-term current use of insulin  Elevated glycohemoglobin.  Recommendation: We are going to change Metformin to 1000 mg of extended release to be taken once in the evening.  She is having difficulty accommodating this into her busy medication schedule, especially with regard to colestipol which has to be taken separately    3. Benign Essential Hypertension  Elevated blood pressures noted since losartan/hydrochlorothiazide was changed to losartan alone.  Of note, further exploration of her lifestyle was had.  She and her  are eating out quite a bit.  They are eating salty deli meats etc.  Have given counseled with regard to the need to reduce or restrict sodium.  If this does not take place, consideration for chlorthalidone although she does have dry mouth which improved with removal of diuretic.  Could consider adding a calcium blocker.    4.  Rotator cuff tear  Full-thickness tear of the supraspinatus tendon.  Suspect she will need a surgical correction      Follow-up in the next couple of months    Elke Mehta MD    Chief Complaint:  Chief Complaint   Patient presents with     Follow-up       History of Present Illness:  Krystle is a 77 y.o. female who is here today for follow-up with regard to a multitude of issues.  Of  note, she is dealing with irritable bowel syndrome and intermittent diarrhea.  She has been working with [facility] and myself.  We had added colestipol early on.  This is helping with her got tremendously, but making absorption of her medications much more difficult.  Since initiation of this medication, she has required increasing doses of blood pressure medication and her diabetes is been less well controlled.  Additionally, during this time she has moved from her big family home in [place] to a [place] size [facility].  She and her  are still busy unpacking boxes.  She describes a variety of physical activities and social activities that keep them from leading a more healthy lifestyle.  They recently returned from [place].    Also, she has recently had a fall.  She has a torn rotator cuff.  She will be seeing Dr. Shah to discuss treatment for this as well.    With regard to blood pressure medications, her losartan/hydrochlorothiazide was changed to losartan alone.  This was done because of significant dry mouth.  She states that this did result in improvement of her dry mouth, however, she is noting elevated blood pressures.  Today, her blood pressure was initially 110/76, I rechecked by me was 126/78.    Review of Systems:  A comprehensive review of systems was performed and was otherwise negative    PFSH:  Social History: She is  with adult children  History   Smoking Status     Never Smoker   Smokeless Tobacco     Never Used       Past History:   Past Medical History:   Diagnosis Date     Adenomatous goiter      Arthropathy of shoulder region      Asthma      Bartholin gland cyst      Diabetes mellitus type II, controlled      Esophageal reflux      Essential hypertension      Gastroparesis      Herpes simplex type 1 infection      IBS (irritable bowel syndrome)      Leukocytosis      Osteopenia      Vitamin D deficiency        Current Outpatient Prescriptions   Medication Sig  Dispense Refill     acetaminophen (TYLENOL) 500 MG tablet Take 500 mg by mouth every 4 (four) hours as needed for pain.        atorvastatin (LIPITOR) 80 MG tablet Take 1 tablet (80 mg total) by mouth at bedtime. 90 tablet 3     azelastine (ASTELIN) 137 mcg nasal spray 2 sprays daily (Patient taking differently: 2 sprays into each nostril daily as needed for rhinitis. 2 sprays daily) 90 mL 3     blood sugar diagnostic (ACCU-CHEK MOISE) Strp Use 1 strip As Directed 3 (three) times a week.        CALCIUM CARBONATE (CALCIUM 500 ORAL) Take by mouth 2 (two) times a day.       cholecalciferol, vitamin D3, 1,000 unit tablet Take 1,000 Units by mouth 2 (two) times a day.       colestipol (COLESTID) 1 gram tablet Take 1 tablet (1 g total) by mouth 2 (two) times a day. 60 tablet 1     cranberry extract 300 mg Tab Take 1 tablet by mouth daily.        dicyclomine (BENTYL) 10 MG capsule Take 1-2 capsules (10-20 mg total) by mouth every 6 (six) hours as needed. 120 capsule 0     LACTOBACILLUS ACIDOPHILUS (PROBIOTIC ORAL) Take 1 tablet by mouth 2 (two) times a day.        lancing device Misc Use As Directed 4 (four) times a week.        losartan (COZAAR) 100 MG tablet Take 1 tablet (100 mg total) by mouth daily. 90 tablet 3     LUTEIN ORAL Take 1 tablet by mouth daily as needed.        mometasone 200 mcg/actuation HFAA Inhale 2 puffs 2 (two) times a day.       montelukast (SINGULAIR) 10 mg tablet Take 1 tablet (10 mg total) by mouth daily. 90 tablet 3     nitrofurantoin (MACRODANTIN) 50 MG capsule Take 1 capsule (50 mg total) by mouth daily. 90 capsule 3     omeprazole (PRILOSEC) 20 MG capsule TAKE ONE CAPSULE BY MOUTH EVERY DAY 90 capsule 3     PROAIR HFA 90 mcg/actuation inhaler INHALE TWO PUFFS BY MOUTH FOUR TIMES A DAY AS NEEDED FOR WHEEZING 25.5 g 0     psyllium (METAMUCIL) 3.4 gram packet Take 1 packet by mouth every evening.       SIMETHICONE (GAS-X ORAL) Take by mouth 2 (two) times a day.       sucralfate (CARAFATE) 1 gram  tablet Take 1 tablet (1 g total) by mouth at bedtime. 90 tablet 3     tiotropium (SPIRIVA WITH HANDIHALER) 18 mcg inhalation capsule Place 1 capsule (2 puffs total) into inhaler and inhale daily. Place 1 capsule into the inhaler device. Close and press button to crush the capsule. Inhale the contents of the crushed capsule in 2 breaths. 30 capsule 2     UBIDECARENONE (COENZYME Q10 ORAL) Take 1 tablet by mouth daily.        warfarin (COUMADIN) 5 MG tablet One tab daily.  Adjust to INR of 2 to 3 90 tablet 3     metFORMIN (GLUCOPHAGE XR) 500 MG 24 hr tablet Take 2 tablets (1,000 mg total) by mouth daily with supper. Or after  Evening meal 180 tablet 3     No current facility-administered medications for this visit.        Family History: Noncontributory    Physical Exam:  General Appearance:   She is pleasant and well-appearing and in no acute distress blood pressure is 126/78 left arm  Vitals:    05/08/18 1133   BP: 110/76   Patient Site: Left Arm   Patient Position: Sitting   Cuff Size: Adult Large   Pulse: 78   SpO2: 97%   Weight: 220 lb (99.8 kg)     Wt Readings from Last 3 Encounters:   05/08/18 220 lb (99.8 kg)   04/06/18 222 lb (100.7 kg)   03/16/18 221 lb 5 oz (100.4 kg)     Body mass index is 40.24 kg/(m^2).

## 2021-06-17 NOTE — TELEPHONE ENCOUNTER
Telephone Encounter by Mikki Morley RN at 4/3/2020  5:26 PM     Author: Mikki Morley RN Service: -- Author Type: Registered Nurse    Filed: 4/3/2020  5:33 PM Encounter Date: 4/3/2020 Status: Attested    : Mikki Morley RN (Registered Nurse) Cosigner: Elke Mehta MD at 4/3/2020  7:12 PM    Attestation signed by Elke Mehta MD at 4/3/2020  7:12 PM    ok                ANTICOAGULATION  MANAGEMENT    Assessment     Today's INR result of 2.0 is Therapeutic (goal INR of 2.0-3.0)        Warfarin taken as previously instructed    No new diet changes affecting INR    No new medication/supplements affecting INR    Continues to tolerate warfarin with no reported s/s of bleeding or thromboembolism     Previous INR was Subtherapeutic    Plan:     Left a detailed message for Krystle regarding INR result and instructed:     Warfarin Dosing Instructions:  Continue current warfarin dose 1.25 mg daily on wed; and 2.5 mg daily rest of week  (0 % change)    Instructed patient to follow up no later than: two weeks    Instructed to call the [facility] for any changes, questions or concerns. (#[phone])   ?   Mikki Morley RN    Subjective/Objective:      Krystle Polanco, a 79 y.o. female is on warfarin.     Krystle reports:     Home warfarin dose: as updated on anticoagulation calendar per template     Missed doses: No     Medication changes:  No     S/S of bleeding or thromboembolism:  No     New Injury or illness:  No     Changes in diet or alcohol consumption:  No     Upcoming surgery, procedure or cardioversion:  No    Anticoagulation Episode Summary     Current INR goal:   2.0-3.0   TTR:   74.3 % (1 y)   Next INR check:   4/17/2020   INR from last check:   2.00 (4/3/2020)   Weekly max warfarin dose:      Target end date:   Indefinite   INR check location:      Preferred lab:      Send INR reminders to:   [facility]    Indications    Pulmonary embolism (H) [I26.99]           Comments:             Anticoagulation Care Providers     Provider Role Specialty Phone number    Elke Mehta MD  Internal Medicine [phone]

## 2021-06-17 NOTE — TELEPHONE ENCOUNTER
Telephone Encounter by Mikki Morley RN at 1/12/2021  2:51 PM     Author: Mikki Morley RN Service: -- Author Type: Registered Nurse    Filed: 1/12/2021  3:27 PM Encounter Date: 1/12/2021 Status: Signed    : Mikki Morley RN (Registered Nurse)       ANTICOAGULATION  MANAGEMENT    Assessment     Today's INR result of 1.9 is Subtherapeutic (goal INR of 2.0-3.0)        Less warfarin taken than instructed which may be affecting INR    No new diet changes affecting INR    No new medication/supplements affecting INR    Continues to tolerate warfarin with no reported s/s of bleeding or thromboembolism     Previous INR was Therapeutic    Plan:     Left detailed message for Krystle regarding INR result and instructed:     Warfarin Dosing Instructions:  Change warfarin dose to 2.5 mg daily   Instructed patient to follow up no later than: one week with rehab        Instructed to call the [facility] for any changes, questions or concerns. (#[phone])   ?   Mikki Morley RN    Subjective/Objective:      Krystle Polanco, a 80 y.o. female is on warfarin.     Krystle reports:     Home warfarin dose: as updated on anticoagulation calendar per template     Missed doses: No     Medication changes:  No     S/S of bleeding or thromboembolism:  No     New Injury or illness:  No     Changes in diet or alcohol consumption:  No     Upcoming surgery, procedure or cardioversion:  No    Anticoagulation Episode Summary     Current INR goal:  2.0-3.0   TTR:  61.7 % (1 y)   Next INR check:  1/26/2021   INR from last check:  1.90 (1/12/2021)   Weekly max warfarin dose:     Target end date:  Indefinite   INR check location:     Preferred lab:     Send INR reminders to:  [facility]    Indications    Pulmonary embolism (H) [I26.99]           Comments:           Anticoagulation Care Providers     Provider Role Specialty Phone number    Elke Mehta MD  Internal Medicine [phone]

## 2021-06-17 NOTE — TELEPHONE ENCOUNTER
Telephone Encounter by Mikki Morley RN at 2/16/2021 12:24 PM     Author: Mikki Morley RN Service: -- Author Type: Registered Nurse    Filed: 2/16/2021 12:37 PM Encounter Date: 2/16/2021 Status: Addendum    : Mikki Morley RN (Registered Nurse)    Related Notes: Original Note by Mikki Morley RN (Registered Nurse) filed at 2/16/2021 12:36 PM       ANTICOAGULATION  MANAGEMENT    Assessment     Today's INR result of 1.4 is Subtherapeutic (goal INR of 2.0-3.0)        Missed dose(s) may be affecting INR forgot Friday and yesterday    No new diet changes affecting INR    No new medication/supplements affecting INR switching to eliquis today. Did discuss the importance of not missing doses with eliquis    Continues to tolerate warfarin with no reported s/s of bleeding or thromboembolism     Previous INR was Supratherapeutic    Plan:     Spoke on phone with Krystle regarding INR result and instructed:      Warfarin Dosing Instructions:   Discontinue warfarin, start eliquis today    Instructed patient to follow up no later than: not needed    Krystle verbalizes understanding and agrees to warfarin dosing plan.    Instructed to call the [facility] for any changes, questions or concerns. (#[phone])   ?   Mikki Morley RN    Subjective/Objective:      Krystle Polanco, a 80 y.o. female is on warfarin. Krystle Dalton reports:     Home warfarin dose: verbally confirmed home dose with Omer and updated on anticoagulation calendar     Missed doses: No     Medication changes:  No     S/S of bleeding or thromboembolism:  No     New Injury or illness:  No     Changes in diet or alcohol consumption:  No     Upcoming surgery, procedure or cardioversion:  No    Anticoagulation Episode Summary     Current INR goal:  2.0-3.0   TTR:  64.2 % (1 y)   Next INR check:  2/23/2021   INR from last check:  1.40 (2/16/2021)   Weekly max warfarin dose:     Target end date:  Indefinite   INR check location:     Preferred lab:      Send INR reminders to:  [facility]    Indications    Pulmonary embolism (H) [I26.99]           Comments:           Anticoagulation Care Providers     Provider Role Specialty Phone number    Elke Mehta MD  Internal Medicine [phone]

## 2021-06-18 NOTE — PATIENT INSTRUCTIONS - HE
Patient Instructions by Brent Girard PT at 11/24/2020  9:30 AM     Author: Brent Girard PT Service: -- Author Type: Physical Therapist    Filed: 11/24/2020  9:53 AM Encounter Date: 11/24/2020 Status: Signed    : Brent Girard PT (Physical Therapist)        RETRACTION / CHIN TUCK    Slowly draw your head back so that your ears line up with your shoulders.    Hold 3 seconds. 10x.  1-2x/day.      SIDELYING TRUNK ROTATION - MODIFIED    While lying on your side with your knees bent,  slowly twist your upper body to the side and rotated your spine.    *Make sure to rotate your head up towards the ceiling.    Hold 2 seconds. 5-10x each side.  1-2x/day.

## 2021-06-18 NOTE — PATIENT INSTRUCTIONS - HE
"Patient Instructions by Brent Girard PT at 10/30/2020  8:30 AM     Author: Brent Girard PT Service: -- Author Type: Physical Therapist    Filed: 10/30/2020  8:49 AM Encounter Date: 10/30/2020 Status: Signed    : Brent Girard PT (Physical Therapist)        SCAPULAR RETRACTIONS    Draw your shoulder blades back and down.    Hold 3 seconds. 10x.  2-3x/day.      Pec corner stretch    Find a comfortable position for your hands and lightly lean forward into the corner until you feel a good stretch in your pecs. Hold 20-30 sec, x2 reps.    2x/day.          GAZE X1    Keep eyes fixed on single stationary target placed on wall.    Tilt head down 20 degrees and move head side to side QUICKLY for up to one minute.    Repeat while moving head up and down QUICKLY for up to one minute.    Perform in   Seated / Standing / Standing on a pillow / One foot in front of the other / Standing on one leg    Repeat sequence __2___ times.   Do ___2-3_______ times/day.      *Tips:  Allow any symptom increase to fully calm down before going on to the next repetition.    Perform exercise with small head movements (30-45* each direction).    Speed is most important, but target should stay in focus. Occasional blurring or \"jumping\" of the target is okay.    If you use glasses regularly, wear these to perform this exercise.    For safety, if standing, perform close to a counter.    **These exercises may increase dizziness or nausea. If this increase lasts longer than 5-10 minutes post exercise, you may be moving the head too quickly or performing the exercise for too long.                "

## 2021-06-18 NOTE — PATIENT INSTRUCTIONS - HE
Patient Instructions by Brent Girard PT at 1/5/2021  2:00 PM     Author: Brent Girard PT Service: -- Author Type: Physical Therapist    Filed: 1/5/2021  2:37 PM Encounter Date: 1/5/2021 Status: Signed    : Brent Girard PT (Physical Therapist)        ELASTIC BAND ROWS     Holding elastic band with both hands, draw back the band as you bend your elbows. Keep your elbows near the side of your body.    Hold 3 seconds.  10x.    1x/day.    ELASTIC BAND EXTENSION BILATERAL SHOULDER    While holding an elastic band with both arms in front of you with your elbows straight, pull the band downwards and back towards your side.    Hold 1-2 seconds.  5-10 reps as tolerated.    1x/day.

## 2021-06-18 NOTE — PATIENT INSTRUCTIONS - HE
Patient Instructions by Brent Girard PT at 12/1/2020  9:30 AM     Author: Brent Girard PT Service: -- Author Type: Physical Therapist    Filed: 12/1/2020  9:49 AM Encounter Date: 12/1/2020 Status: Signed    : Brent Girard PT (Physical Therapist)        LEVATOR SCAPULAE STRETCH    Place the arm on the affected side behind your back and use your other hand to draw your head downward and towards the opposite side.     You should be looking towards your opposite pocket of the affected side.    Hold 10 seconds, 2x each side.  2x/day.

## 2021-06-18 NOTE — PROGRESS NOTES
[facility] Follow Up Note    Assessment/Plan:  1. Type 2 diabetes mellitus without complication, without long-term current use of insulin (H)  Blood sugars are doing much better now that she is on extended release Metformin.  Recommendation: Continue current medications, monitor diet and recheck glycohemoglobin in 6-8 weeks    2. IBS (irritable bowel syndrome)  Slightly more active currently.  6-8 BMs per day.  Recommendations: Reduce magnesium dose by 50%.  Consider rare but occasional use of Imodium.  Abdominal exam negative    3. Benign Essential Hypertension  Stable on current medications    4. PE (pulmonary thromboembolism) (H)  We will update INR  - INR    Wife.  Dysphonia  Results from ENT consult reviewed.  She may have a minor surgery to correct this    Addendum: Did discuss thyroid nodules.  Her ENT did not believe that any further workup was needed.    Elke Mehta MD    Chief Complaint:  Chief Complaint   Patient presents with     Follow-up       History of Present Illness:  Krystle is a 78 y.o. female who is here today for follow-up with regard to her usual issues.  Of note, she thought she might be due for a diabetic check but it is a bit early.  She does report that since initiation of extended release metformin, her blood sugars have been nicely normal in the  range.  She denies polyuria or polydipsia.    She does report that she is having difficulty with her IBS.  She was doing great 1 week ago.  This week, she is having 7-8 BMs per day.  She did state that her gastroenterologist recommended against using regular Imodium as it may upset the normal cycle of IBS.  However, she is able to use this as needed.  She is on magnesium as she had some low magnesium when diagnosed with PVCs.  She is taking this twice daily.    She states her home blood pressures have been better.    Since her last visit she has been to see both her pulmonologist and ENT.  She continues to have  persistent dysphonia and may have a minor vocal cord procedure.    Review of Systems:  A comprehensive review of systems was performed and was otherwise negative    PFSH:  Social History: She is  with adult children.  She and her  have [facility].  They are currently in the process of trying to get their home in [place] ready for sale.  History   Smoking Status     Never Smoker   Smokeless Tobacco     Never Used       Past History: As noted in the chart  Current Outpatient Prescriptions   Medication Sig Dispense Refill     acetaminophen (TYLENOL) 500 MG tablet Take 500 mg by mouth every 4 (four) hours as needed for pain.        atorvastatin (LIPITOR) 80 MG tablet Take 1 tablet (80 mg total) by mouth at bedtime. 90 tablet 3     azelastine (ASTELIN) 137 mcg nasal spray 2 sprays daily (Patient taking differently: 2 sprays into each nostril daily as needed for rhinitis. 2 sprays daily) 90 mL 3     blood sugar diagnostic (ACCU-CHEK MOISE) Strp Use 1 strip As Directed 3 (three) times a week.        CALCIUM CARBONATE (CALCIUM 500 ORAL) Take by mouth 2 (two) times a day.       cholecalciferol, vitamin D3, 1,000 unit tablet Take 1,000 Units by mouth 2 (two) times a day.       colestipol (COLESTID) 1 gram tablet Take 1 tablet (1 g total) by mouth 2 (two) times a day. 60 tablet 1     cranberry extract 300 mg Tab Take 1 tablet by mouth daily.        dicyclomine (BENTYL) 10 MG capsule Take 1-2 capsules (10-20 mg total) by mouth every 6 (six) hours as needed. 120 capsule 0     LACTOBACILLUS ACIDOPHILUS (PROBIOTIC ORAL) Take 1 tablet by mouth 2 (two) times a day.        lancing device Misc Use As Directed 4 (four) times a week.        losartan (COZAAR) 100 MG tablet Take 1 tablet (100 mg total) by mouth daily. 90 tablet 3     LUTEIN ORAL Take 1 tablet by mouth daily as needed.        metFORMIN (GLUCOPHAGE XR) 500 MG 24 hr tablet Take 2 tablets (1,000 mg total) by mouth daily with supper. Or after   Evening meal 180 tablet 3     mometasone 200 mcg/actuation HFAA Inhale 2 puffs 2 (two) times a day.       montelukast (SINGULAIR) 10 mg tablet Take 1 tablet (10 mg total) by mouth daily. 90 tablet 3     nitrofurantoin (MACRODANTIN) 50 MG capsule Take 1 capsule (50 mg total) by mouth daily. 90 capsule 3     omeprazole (PRILOSEC) 20 MG capsule TAKE ONE CAPSULE BY MOUTH EVERY DAY 90 capsule 3     PROAIR HFA 90 mcg/actuation inhaler INHALE TWO PUFFS BY MOUTH FOUR TIMES A DAY AS NEEDED FOR WHEEZING 25.5 g 0     psyllium (METAMUCIL) 3.4 gram packet Take 1 packet by mouth every evening.       SIMETHICONE (GAS-X ORAL) Take by mouth 2 (two) times a day.       sucralfate (CARAFATE) 1 gram tablet Take 1 tablet (1 g total) by mouth at bedtime. 90 tablet 3     tiotropium (SPIRIVA WITH HANDIHALER) 18 mcg inhalation capsule Place 1 capsule (2 puffs total) into inhaler and inhale daily. Place 1 capsule into the inhaler device. Close and press button to crush the capsule. Inhale the contents of the crushed capsule in 2 breaths. 30 capsule 2     UBIDECARENONE (COENZYME Q10 ORAL) Take 1 tablet by mouth daily.        warfarin (COUMADIN) 5 MG tablet One tab daily.  Adjust to INR of 2 to 3 90 tablet 3     No current facility-administered medications for this visit.        Family History: Nothing new    Physical Exam:  General Appearance:   She is very pleasant and in no acute distress  Vitals:    06/20/18 1238   BP: 136/68   Patient Site: Left Arm   Patient Position: Sitting   Cuff Size: Adult Large   Pulse: (!) 102   SpO2: 95%   Weight: (!) 222 lb 14.4 oz (101.1 kg)     Wt Readings from Last 3 Encounters:   06/20/18 (!) 222 lb 14.4 oz (101.1 kg)   05/08/18 220 lb (99.8 kg)   04/06/18 222 lb (100.7 kg)     Body mass index is 40.77 kg/(m^2).    Head neck exam is negative  Lungs are clear to auscultation and percussion  Heart exam reveals an occasional skipped beat but in general is regular  Abdomen is soft and nontender    Data  Review:    Analysis and Summary of Old Records (2): Reviewed ENT and pulmonary records    Records Requested (1): 0      Other History Summarized (from other people in the room) (2): 0    Radiology Tests Summarized (XRAY/CT/MRI/DXA) (1): 0    Labs Reviewed (1): Reviewed last set of labs    Medicine Tests Reviewed (EKG/ECHO/COLONOSCOPY/EGD) (1): 0    Independent Review of EKG or X-RAY (2): 0

## 2021-06-18 NOTE — PATIENT INSTRUCTIONS - HE
Patient Instructions by Brent Girard PT at 1/12/2021 10:30 AM     Author: Brent Girard PT Service: -- Author Type: Physical Therapist    Filed: 1/12/2021 10:56 AM Encounter Date: 1/12/2021 Status: Signed    : Brent Girard PT (Physical Therapist)        ELASTIC BAND ROWS     Holding elastic band with both hands, draw back the band as you bend your elbows. Keep your elbows near the side of your body.    Hold 2-3 seconds.  15x.    1x/day.    ELASTIC BAND EXTENSION BILATERAL SHOULDER    While holding an elastic band with both arms in front of you with your elbows straight, pull the band downwards and back towards your side.    Hold 1-2 seconds.  10-15x.    1x/day.

## 2021-06-18 NOTE — PATIENT INSTRUCTIONS - HE
Patient Instructions by Brent Girard PT at 11/5/2020  9:00 AM     Author: Brent Girard PT Service: -- Author Type: Physical Therapist    Filed: 11/5/2020  9:25 AM Encounter Date: 11/5/2020 Status: Signed    : Brent Girard PT (Physical Therapist)        CHIN TUCK - SUPINE    While lying on your back, tuck your chin towards your chest and press the back of your head into the table.    Maintain contact of head with the surface you are lying on the entire time.    Hold 3 seconds. 15x.  2x/day.
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
## 2021-06-20 ENCOUNTER — HEALTH MAINTENANCE LETTER (OUTPATIENT)
Age: 81
End: 2021-06-20

## 2021-06-20 NOTE — LETTER
Letter by Elke Mehta MD at      Author: Elke Mehta MD Service: -- Author Type: --    Filed:  Encounter Date: 10/5/2020 Status: (Other)         [facility] [id number]             October 5, 2020         Dear Ms. RandolphSherri,    Below are the results from your recent visit:    Resulted Orders   HM2(CBC w/o Differential)   Result Value Ref Range    WBC 10.9 4.0 - 11.0 thou/uL    RBC 4.90 3.80 - 5.40 mill/uL    Hemoglobin 14.9 12.0 - 16.0 g/dL    Hematocrit 44.0 35.0 - 47.0 %    MCV 90 80 - 100 fL    MCH 30.5 27.0 - 34.0 pg    MCHC 34.0 32.0 - 36.0 g/dL    RDW 12.5 11.0 - 14.5 %    Platelets 247 140 - 440 thou/uL    MPV 9.0 7.0 - 10.0 fL   Glycosylated Hemoglobin A1c   Result Value Ref Range    Hemoglobin A1c 7.0 (H) <=5.6 %      Comment:      Normal <5.7% Prediabete 5.7-6.4% Diabletes 6.5% or higher - adopted from ADA consensus guidelines   Comprehensive Metabolic Panel   Result Value Ref Range    Sodium 140 136 - 145 mmol/L    Potassium 4.3 3.5 - 5.0 mmol/L    Chloride 105 98 - 107 mmol/L    CO2 25 22 - 31 mmol/L    Anion Gap, Calculation 10 5 - 18 mmol/L    Glucose 112 70 - 125 mg/dL    BUN 18 8 - 28 mg/dL    Creatinine 0.77 0.60 - 1.10 mg/dL    GFR MDRD Af Amer >60 >60 mL/min/1.73m2    GFR MDRD Non Af Amer >60 >60 mL/min/1.73m2    Bilirubin, Total 0.5 0.0 - 1.0 mg/dL    Calcium 9.4 8.5 - 10.5 mg/dL    Protein, Total 6.9 6.0 - 8.0 g/dL    Albumin 3.6 3.5 - 5.0 g/dL    Alkaline Phosphatase 77 45 - 120 U/L    AST 15 0 - 40 U/L    ALT 20 0 - 45 U/L    Narrative    Fasting Glucose reference range is 70-99 mg/dL per  American Diabetes Association (ADA) guidelines.   BNP(B-type Natriuretic Peptide)   Result Value Ref Range     (H) 0 - 147 pg/mL   LDL Cholesterol, Direct   Result Value Ref Range    Direct LDL 97 <=129 mg/dl       As per our discussion, Your diabetes is up,  We should increase the metformin if able.  Also, the BNP, is up, which reflects fluid congestion in  the heart.  It is very mild,  I believe this is from your PVCs.    Please call with questions or contact us using [email or web address].    Sincerely,        Electronically signed by Elke Mehta MD

## 2021-06-20 NOTE — PROGRESS NOTES
[facility] Follow Up Note    Assessment/Plan:  1. Hair loss  Noted significant thinning of the hair with loss and clumps.  Dermatologist not concerned.  She is worried for thyroid dysfunction.  Recommendations: Have discussed telogen effluvium and precipitating causes.  Also, medications may cause hair loss.  Finally, thyroid dysfunction can contribute.  Recommendation: We will check TSH and sedimentation rate.  For therapeutics, would recommend Rogaine and biotin supplements.  - Erythrocyte Sedimentation Rate    2. Goiter  Thyroid ultrasound-May 2018.  Goitrous thyroid noted.  One dominant nodule is noted that has been previously biopsied.  No major change noted since 2015.  Her ENT was unconcerned.  Will recheck thyroid functions today.  Could consider rebiopsy if patient notes major changes  - Thyroid Cascade  - Erythrocyte Sedimentation Rate    3. Ingrown nail of fourth toe of left foot  Recommend podiatry follow-up.  In the interim, soak foot in Epsom salts or disc soap.  Keep moisture on area with bacitracin or Vaseline.  Toe separator for area between fourth and fifth toe.  She will contact me if she does not get acceptable diabetic care with her podiatrist    4.  Type 2 diabetes with hyperglycemia- also with foot deformities including bunion/ingrown toenail and some diminished sensation.  Recommendation: Diabetic shoes    Elke Mehta MD    Chief Complaint:  Chief Complaint   Patient presents with     Consult     discuss thyroid issues     Nail Problem     ingrown toe     Routine Health Maintenance     Due for colon, order placed on 9/18/18       History of Present Illness:  Krystle is a 78 y.o. female who is here today to discuss some issues that are not related to her diabetes or hypertension today.  First, she has an ingrown toenail.  She did see her podiatrist in [place].  He recommended that her  trim the toenail regularly.  He did not do it himself.  She continues to be  plagued by pain in that area every 2 weeks.  She also notes encroachment of the fifth toe onto the fourth toe.  This reproduces pain.  She is wondering what my thoughts are.    Also, she is very worried regarding hair loss.  She has noted diffuse hair loss occurring over the past several months.  She does describe a very extremely stressful year.  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  She and her  still have not sold their home while living in a [place], etc.  He is also having surgery in the up and coming weeks.  Recent new medications have included warfarin for pulmonary embolus.  She is aware that medications may contribute to hair loss.  She did discuss this with her dermatologist who just recommended Rogaine.    With regard to blood pressure, it is running in the 130s.  Blood sugars are running slightly higher although last hemoglobin A1c was optimal.    Review of Systems:  A comprehensive review of systems was performed and was otherwise negative    PFSH:  Social History: He is retired professor.  She and her  have moved from their [place] home to a large [facility].  He have not yet sold their [place] home.  This is stressful.  History   Smoking Status     Never Smoker   Smokeless Tobacco     Never Used       Past History: Complex for multiple medical issues  Current Outpatient Prescriptions   Medication Sig Dispense Refill     acetaminophen (TYLENOL) 500 MG tablet Take 500 mg by mouth every 4 (four) hours as needed for pain.        atorvastatin (LIPITOR) 80 MG tablet Take 1 tablet (80 mg total) by mouth at bedtime. 90 tablet 3     azelastine (ASTELIN) 137 mcg (0.1 %) nasal spray USE 2 SPRAYS NASALLY DAILY 90 mL 3     beclomethasone dipropionate (QVAR INHL) Inhale 2 puffs 2 (two) times a day.       blood sugar diagnostic (ACCU-CHEK MOISE) Strp Use 1 strip As Directed 3 (three) times a week.        blood-glucose meter Misc Use Daily  1 each 0     CALCIUM CARBONATE (CALCIUM 500 ORAL) Take by mouth 2 (two) times a day.       cholecalciferol, vitamin D3, 1,000 unit tablet Take 1,000 Units by mouth 2 (two) times a day.       colestipol (COLESTID) 1 gram tablet TAKE ONE TABLET BY MOUTH TWICE A  tablet 1     cranberry extract 300 mg Tab Take 1 tablet by mouth daily.        dicyclomine (BENTYL) 10 MG capsule TAKE ONE TO TWO CAPSULES BY MOUTH EVERY 6 HOURS AS NEEDED 120 capsule 0     LACTOBACILLUS ACIDOPHILUS (PROBIOTIC ORAL) Take 1 tablet by mouth 2 (two) times a day.        lancing device Misc Use As Directed 4 (four) times a week.        losartan (COZAAR) 100 MG tablet Take 1 tablet (100 mg total) by mouth daily. 90 tablet 3     LUTEIN ORAL Take 1 tablet by mouth daily as needed.        metFORMIN (GLUCOPHAGE XR) 500 MG 24 hr tablet Take 2 tablets (1,000 mg total) by mouth daily with supper. Or after  Evening meal 180 tablet 3     montelukast (SINGULAIR) 10 mg tablet Take 1 tablet (10 mg total) by mouth daily. 90 tablet 3     nitrofurantoin (MACRODANTIN) 50 MG capsule Take 1 capsule (50 mg total) by mouth daily. 90 capsule 3     omeprazole (PRILOSEC) 20 MG capsule TAKE ONE CAPSULE BY MOUTH EVERY DAY 90 capsule 3     PROAIR HFA 90 mcg/actuation inhaler INHALE TWO PUFFS BY MOUTH FOUR TIMES A DAY AS NEEDED FOR WHEEZING 25.5 g 0     psyllium (METAMUCIL) 3.4 gram packet Take 1 packet by mouth every evening.       QVAR REDIHALER 40 mcg/actuation HFAB inhaler INHALE TWO PUFFS BY MOUTH TWO TIMES A DAY AS NEEDED 10.6 g 2     SIMETHICONE (GAS-X ORAL) Take by mouth 2 (two) times a day.       tiotropium (SPIRIVA WITH HANDIHALER) 18 mcg inhalation capsule Place 1 capsule (2 puffs total) into inhaler and inhale daily. Place 1 capsule into the inhaler device. Close and press button to crush the capsule. Inhale the contents of the crushed capsule in 2 breaths. 30 capsule 2     UBIDECARENONE (COENZYME Q10 ORAL) Take 1 tablet by mouth daily.        warfarin  (COUMADIN) 5 MG tablet One tab daily.  Adjust to INR of 2 to 3 90 tablet 3     No current facility-administered medications for this visit.        Family History: Nothing new    Physical Exam:  General Appearance:   Well-appearing, pleasant and in no acute distress  Vitals:    09/27/18 1141   BP: 136/60   Patient Site: Left Arm   Patient Position: Sitting   Cuff Size: Adult Large   Pulse: 62   SpO2: 97%   Weight: 222 lb (100.7 kg)     Wt Readings from Last 3 Encounters:   09/27/18 222 lb (100.7 kg)   08/08/18 (!) 226 lb 8 oz (102.7 kg)   06/20/18 (!) 222 lb 14.4 oz (101.1 kg)     Body mass index is 40.6 kg/(m^2).      Time spent today about 30 minutes with more than half that time spent in counseling and discussion of hair loss and telogen effluvium

## 2021-06-21 NOTE — LETTER
Letter by Damian Youssef, PharmD at      Author: Damian Youssef, PharmD Service: -- Author Type: --    Filed:  Encounter Date: 5/3/2021 Status: (Other)           2021    [place] [address]        Dear Krystle Polanco     Thank you for talking with me on May 3, 2021 about your health and medications. [employer] MTM (Medication Therapy Management) program helps you understand your medications and use them safely.      This letter includes an action plan (Medication Action Plan) and medication list (Personal Medication List). The action plan has steps you should take to help you get the best results from your medications. The medication list will help you keep track of your medications and how to use them the right way.       Have your action plan and medication list with you when you talk with your doctors, pharmacists, and other healthcare providers in your care team.     Ask your doctors, pharmacists, and other healthcare providers to update the action plan and medication list at every visit.     Take your medication list with you if you go to the hospital or emergency room.     Give a copy of the action plan and medication list to your family or caregivers.     If you want to talk about this letter or any of the papers with it, please call   [phone].   We look forward to working with you, your doctors, and other healthcare providers to help you stay healthy through the [employer] [place] MTM program.    Sincerely,    Damian Youssef    Enclosed: Medication Action Plan and Personal Medication List  _  Medication Action Plan For Krystle Polanco, : 1940     This action plan will help you get the best results from your medications if you:     1. Read What we talked about.   2. Take the steps listed in the What I need to do boxes.   3. Fill in What I did and when I did it.   4. Fill in My follow-up plan and Questions I want to ask.     Have this  action plan with you when you talk with your doctors, pharmacists, and other healthcare providers in your care team. Share this with your family or caregivers too.    Date Prepared: 5/14/2021      What we talked about: Asthma                                                  What I need to do: Educate to rinse out mouth after each use of Qvar to minimize risk of thrush       What I did and when I did it:                                              What we talked about: Irritable bowel syndrome                                                  What I need to do: Educate to adjust the dose timing of colestipol       What I did and when I did it:                                              What we talked about: Irritable bowel syndrome                                                  What I need to do: Educate to utilize over-the-counter XyliMelts as needed for dry mouth       What I did and when I did it:                                              What we talked about: Irritable bowel syndrome                                                  What I need to do: Due to high cost of Creon, refer to [employer] prescription assistance program       What I did and when I did it:                                              What we talked about: Postmenopausal                                                  What I need to do: Educate to switch calcium carbonate to calcium citrate when you purchase your next bottle to ensure adequate absorption with chronic omeprazole       What I did and when I did it:                                              What we talked about: Postmenopausal                                                  What I need to do: If needed we can consider having estrogen compounded to minimize cost       What I did and when I did it:                                                My follow-up plan:                 Questions I want to ask:              If you have any questions about your action plan,  call Damian Youssef, Phone: [phone] , Monday-Friday 8-4:30pm.           PERSONAL MEDICATION LIST FOR STEPHEN Lr 1940     This medication list was made for you after we talked. We also used information from your doctor's chart.      Use blank rows to add new medications. Then fill in the dates you started using them.    Cross out medications when you no longer use them. Then write the date and why you stopped using them.    Ask your doctors, pharmacists, and other healthcare providers to update this list at every visit. Keep this list up-to-date with:       Prescription medications    Over the counter drugs     Herbals    Vitamins    Minerals      If you go to the hospital or emergency room, take this list with you. Share this with your family or caregivers too.     DATE PREPARED: 2021    Allergies or side effects: conray [iothalamate meglumine]; alendronate sodium; amoxicillin; diatrizoate meglumine; fosamax [alendronate]; house dust; mold/mildew; other allergy (see comments); penicillins; prednisone; seafood; sulfa (sulfonamide antibiotics)      Medication: acetaminophen (TYLENOL) 500 MG tablet      How I use it: Take 1,000 mg by mouth every 6 (six) hours as needed for pain.      Why I use it:  Pain    Prescriber: Elke Mehta MD      Date I started using it:     Date I stopped using it:       Why I stopped using it:          Medication: albuterol (PROAIR HFA) 90 mcg/actuation inhaler      How I use it: INHALE TWO PUFFS BY MOUTH FOUR TIMES A DAY AS NEEDED FOR WHEEZING      Why I use it: Moderate persistent asthma, unspecified whether complicated    Prescriber: Elke Mehta MD      Date I started using it:     Date I stopped using it:       Why I stopped using it:          Medication: amLODIPine (NORVASC) 5 MG tablet      How I use it: Take 1 tablet (5 mg total) by mouth daily.      Why I use it: Essential Hypertension, Benign    Prescriber: Elke Mehta MD      Date I  started using it:     Date I stopped using it:       Why I stopped using it:          Medication: apixaban ANTICOAGULANT (ELIQUIS) 5 mg Tab tablet      How I use it: Take 1 tablet (5 mg total) by mouth 2 (two) times a day.      Why I use it: Pulmonary embolism without acute cor pulmonale, unspecified chronicity, unspecified pulmonary embolism type (H)    Prescriber: Elke Metha MD      Date I started using it:     Date I stopped using it:       Why I stopped using it:          Medication: atorvastatin (LIPITOR) 80 MG tablet      How I use it: Take 1 tablet (80 mg total) by mouth at bedtime.      Why I use it: Hypercholesterolemia    Prescriber: Elke Mehta MD      Date I started using it:     Date I stopped using it:       Why I stopped using it:             Medication: azelastine (ASTELIN) 137 mcg (0.1 %) nasal spray      How I use it: 2 sprays into each nostril 2 (two) times a day. Use in each nostril as directed      Why I use it: Severe persistent asthma, unspecified whether complicated    Prescriber: Elke Mehta MD      Date I started using it:     Date I stopped using it:       Why I stopped using it:          Medication: beclomethasone (QVAR REDIHALER) 40 mcg/actuation HFAB inhaler      How I use it: Inhale 2 puffs 2 (two) times a day.      Why I use it: Asthma in adult, mild intermittent, uncomplicated    Prescriber: Elke Mehta MD      Date I started using it:     Date I stopped using it:       Why I stopped using it:          Medication: blood sugar diagnostic, disc Strp      How I use it: Use as directed with testing blood sugars once daily.  Dispense Contour Next brand per pt's insurance coverage      Why I use it: Type 2 diabetes mellitus without complication, without long-term current use of insulin (H)    Prescriber: Elke Mehta MD      Date I started using it:     Date I stopped using it:       Why I stopped using it:          Medication: CALCIUM CARBONATE (CALCIUM  500 ORAL)      How I use it: Take 1 tablet by mouth 2 (two) times a day.      Why I use it:  Postmenopausal    Prescriber: Elke Mehta MD      Date I started using it:     Date I stopped using it:       Why I stopped using it:          Medication: cholecalciferol, vitamin D3, 1,000 unit tablet      How I use it: Take 2,000 Units by mouth 2 (two) times a day.       Why I use it:  Postmenopausal    Prescriber: Elke Mehta MD      Date I started using it:     Date I stopped using it:       Why I stopped using it:          Medication: clindamycin (CLEOCIN) 150 MG capsule      How I use it: TAKE 4 CAPSULES  BY MOUTH 1 HOUR PRIOR TO DENTAL APPOINTMENT.      Why I use it: Medication Monitoring Encounter    Prescriber: Elke Mehta MD      Date I started using it:     Date I stopped using it:       Why I stopped using it:          Medication: colestipoL (COLESTID) 1 gram tablet      How I use it: Take 1 tablet (1 g total) by mouth 2 (two) times a day.      Why I use it: Irritable bowel syndrome, unspecified type    Prescriber: Elke Mehta MD      Date I started using it:     Date I stopped using it:       Why I stopped using it:          Medication: cranberry extract 300 mg Tab      How I use it: Take 1 tablet by mouth daily.       Why I use it:  UTI prophylaxis    Prescriber: Elke Mehta MD      Date I started using it:     Date I stopped using it:       Why I stopped using it:          Medication: dicyclomine (BENTYL) 10 MG capsule      How I use it: Take 1 capsule (10 mg total) by mouth 2 (two) times a day as needed.      Why I use it: Irritable bowel syndrome, unspecified type    Prescriber: Elke Mehta MD      Date I started using it:     Date I stopped using it:       Why I stopped using it:          Medication: estradioL (ESTRACE) 0.01 % (0.1 mg/gram) vaginal cream      How I use it: Insert 2 g into the vagina every other day.      Why I use it: Atrophic Vaginitis     Prescriber: Elke Mehta MD      Date I started using it:     Date I stopped using it:       Why I stopped using it:          Medication: LACTOBACILLUS ACIDOPHILUS (PROBIOTIC ORAL)      How I use it: Take 1 tablet by mouth 2 (two) times a day.       Why I use it:  Irritable bowel syndrome    Prescriber: Elke Mehta MD      Date I started using it:     Date I stopped using it:       Why I stopped using it:          Medication: lancing device Misc      How I use it: Use as directed 4 times a week.      Why I use it: Type 2 diabetes mellitus without complication, without long-term current use of insulin (H)    Prescriber: Elke Mehta MD      Date I started using it:     Date I stopped using it:       Why I stopped using it:             Medication: lipase-protease-amylase (CREON) 6,000-19,000 -30,000 unit CpDR capsule      How I use it: Take 1 capsule (6,000 units of lipase total) by mouth 2 (two) times a day with meals.      Why I use it: Irritable Bowel Syndrome with Diarrhea    Prescriber: Elke Mehta MD      Date I started using it:     Date I stopped using it:       Why I stopped using it:          Medication: loperamide (IMODIUM) 2 mg capsule      How I use it: Take 2 mg by mouth 4 (four) times a day as needed.      Why I use it: Irritable Bowel Syndrome with Diarrhea    Prescriber: Elke Mehta MD      Date I started using it:     Date I stopped using it:       Why I stopped using it:          Medication: losartan (COZAAR) 100 MG tablet      How I use it: Take 1 tablet (100 mg total) by mouth daily.      Why I use it: Essential Hypertension, Benign    Prescriber: Elke Mehta MD      Date I started using it:     Date I stopped using it:       Why I stopped using it:          Medication: LUTEIN ORAL      How I use it: Take 1 tablet by mouth daily. With Zeaxanthin      Why I use it:  General health    Prescriber:  Self      Date I started using it:     Date I stopped using it:        Why I stopped using it:          Medication: magnesium oxide 250 mg magnesium Tab      How I use it: Take 1 tablet by mouth daily.      Why I use it:  PVCs    Prescriber: Elke Mehta MD      Date I started using it:     Date I stopped using it:       Why I stopped using it:          Medication: metFORMIN (GLUCOPHAGE XR) 500 MG 24 hr tablet      How I use it: Take 2 tablets (1,000 mg total) by mouth 2 (two) times a day.      Why I use it: Type 2 diabetes mellitus without complication, without long-term current use of insulin (H)    Prescriber: Elke Mehta MD      Date I started using it:     Date I stopped using it:       Why I stopped using it:          Medication: metroNIDAZOLE (METROGEL) 0.75 % gel      How I use it: Apply 1 application topically as needed.       Why I use it:  Rosacea    Prescriber: Elke Mehta MD      Date I started using it:     Date I stopped using it:       Why I stopped using it:          Medication: montelukast (SINGULAIR) 10 mg tablet      How I use it: Take 1 tablet (10 mg total) by mouth daily.      Why I use it: Non-seasonal allergic rhinitis, unspecified trigger    Prescriber: Elke Mehta MD      Date I started using it:     Date I stopped using it:       Why I stopped using it:          Medication: multivitamin therapeutic tablet      How I use it: Take 1 tablet by mouth daily.      Why I use it:  General health    Prescriber:  Self      Date I started using it:     Date I stopped using it:       Why I stopped using it:          Medication: nitrofurantoin (MACRODANTIN) 50 MG capsule      How I use it: Take 1 capsule (50 mg total) by mouth daily.      Why I use it: Urinary tract infection without hematuria, site unspecified    Prescriber: Elke Mehta MD      Date I started using it:     Date I stopped using it:       Why I stopped using it:          Medication: omeprazole (PRILOSEC) 20 MG capsule      How I use it: Take 1 capsule (20 mg total)  by mouth daily before breakfast.      Why I use it: Esophageal Reflux    Prescriber: Elke Mehta MD      Date I started using it:     Date I stopped using it:       Why I stopped using it:          Medication: psyllium (METAMUCIL) 3.4 gram packet      How I use it: Take 1 packet by mouth every evening.      Why I use it:  Irritable bowel syndrome    Prescriber: Elke Mehta MD      Date I started using it:     Date I stopped using it:       Why I stopped using it:          Medication: SIMETHICONE (GAS-X ORAL)      How I use it: Take 1 tablet by mouth 2 (two) times a day as needed.      Why I use it:  Irritable bowel syndrome    Prescriber: Elke Mehta MD      Date I started using it:     Date I stopped using it:       Why I stopped using it:             Medication: sotaloL (BETAPACE) 80 MG tablet      How I use it: Take 0.5 tablets (40 mg total) by mouth 2 (two) times a day.      Why I use it: PAF (Paroxysmal Atrial Fibrillation) (H); PVC's (Premature Ventricular Contractions)    Prescriber: Elke Mehta MD      Date I started using it:     Date I stopped using it:       Why I stopped using it:             Medication: tiotropium (SPIRIVA WITH HANDIHALER) 18 mcg inhalation capsule      How I use it: Place 1 capsule (2 puffs total) into inhaler and inhale as needed (at first sign of a cold, increased cough, wheezing.  OK to stop once you feel better).      Why I use it: Severe persistent asthma, unspecified whether complicated    Prescriber: Elke Mehta MD      Date I started using it:     Date I stopped using it:       Why I stopped using it:          Medication: UBIDECARENONE (COENZYME Q10 ORAL)      How I use it: Take 1 tablet by mouth daily.       Why I use it:  General health    Prescriber:  Self      Date I started using it:     Date I stopped using it:       Why I stopped using it:          Medication: zinc gluconate 50 mg tablet      How I use it: Take 50 mg by mouth daily.       Why I use it:  Severe persistent asthma    Prescriber: Elke Mehta MD      Date I started using it:     Date I stopped using it:       Why I stopped using it:          Medication:       How I use it:       Why I use it:     Prescriber:       Date I started using it:     Date I stopped using it:       Why I stopped using it:          Medication:       How I use it:       Why I use it:     Prescriber:       Date I started using it:     Date I stopped using it:       Why I stopped using it:          Medication:       How I use it:       Why I use it:     Prescriber:       Date I started using it:     Date I stopped using it:       Why I stopped using it:          Other Information:           If you have any questions about your medication list, call Damian Youssef, Phone: [phone] , Monday-Friday 8-4:30pm.    According to the Paperwork Reduction Act of 1995, no persons are required to respond to a collection of information unless it displays a valid OMB control number. The valid OMB number for this information collection is 1218-0825. The time required to complete this information collection is estimated to average 40 minutes per response, including the time to review instructions, searching existing data resources, gather the data needed, and complete and review the information collection. If you have any comments concerning the accuracy of the time estimate(s) or suggestions for improving this form, please write to: CMS, Attn: [employer] Reports Clearance Officer, [address] [place] [address], [place], [place] [address].

## 2021-06-21 NOTE — PROGRESS NOTES
[facility] Follow Up Note    Assessment/Plan:  1. Type 2 diabetes mellitus without complication, without long-term current use of insulin (H)  Glycohemoglobin at 6.9.  We will continue current medications.  Hopefully with the sale of their house and more normalization of their schedule, they can eat better.  Of note, her diet is rich in carbs.  This is also secondary to irritable bowel syndrome as well.  - Glycosylated Hemoglobin A1c  - Basic Metabolic Panel  - Pneumococcal polysaccharide vaccine 23-valent 1 yo or older, subq/IM    2. PVC's (premature ventricular contractions)  She had seen Dr. Isidro Anderson with electrophysiology who is no longer with the heart clinic.  She is not symptomatic with the PVCs.  She had a normal echocardiogram which was stable from the one prior.  I will reorder the study so that they return to me.  She is able to have these after the first of the year  - Holter Monitor; Future  - Echo Complete; Future    3. Right knee pain  She has seen [facility] for this.  She will let me know she needs physical therapy        Elke Mehta MD    Chief Complaint:  Chief Complaint   Patient presents with     Follow-up     Diabetes       History of Present Illness:  Krystle is a 78 y.o. female who is here today for follow-up with regard to her usual medical problems.  First, with regard to type 2 diabetes, she is worried about her sugars.  She feels that they may be more elevated.  Since she has been taking the metformin in the morning, her fasting sugars have been a bit higher.  They seem to improve as the day progresses.  She does admit that her diet is less than optimal.  She has irritable bowel syndrome and has been very busy with a great deal of stress, parties and activities, etc.  Also, she states that they finally sold her home.  Therefore, once this is closed, she will have more time to eat better foods.  She does state that her diet is rich in carbohydrates.   She does not exercise.    Since last visit, she has been to [facility] for evaluation of a weak right knee.  She is status post knee replacement approximately 18 years ago by Dr. Kaur.  She did follow up with him.  The hardware is intact.  She is feeling somewhat better.  However, she is not sure if it will continue to progress.  She will let me know if she desires physical therapy.    She has not had any epistaxis or bleeding with regard to nasal issues.    With regard to her PVCs, she remains asymptomatic.  She will follow through on her echocardiogram and Holter after the first of the year when her life settles    Review of Systems:  A comprehensive review of systems was performed and was otherwise negative    PFSH:  Social History: She is  with adult children.  She is very busy in her life.  History   Smoking Status     Never Smoker   Smokeless Tobacco     Never Used       Past History:   Past Medical History:   Diagnosis Date     Adenomatous goiter      Arthropathy of shoulder region      Asthma      Bartholin gland cyst      Diabetes mellitus type II, controlled (H)      Esophageal reflux      Essential hypertension      Gastroparesis      Herpes simplex type 1 infection      IBS (irritable bowel syndrome)      Leukocytosis      Osteopenia      Vitamin D deficiency        Current Outpatient Prescriptions   Medication Sig Dispense Refill     acetaminophen (TYLENOL) 500 MG tablet Take 500 mg by mouth every 4 (four) hours as needed for pain.        atorvastatin (LIPITOR) 80 MG tablet Take 1 tablet (80 mg total) by mouth at bedtime. 90 tablet 3     azelastine (ASTELIN) 137 mcg (0.1 %) nasal spray USE 2 SPRAYS NASALLY DAILY 90 mL 3     beclomethasone dipropionate (QVAR INHL) Inhale 2 puffs 2 (two) times a day.       blood glucose test (ACCU-CHEK MOISE) strips Use 1 each As Directed daily. 100 strip 3     blood-glucose meter Misc Use Daily 1 each 0     CALCIUM CARBONATE (CALCIUM 500 ORAL) Take  by mouth 2 (two) times a day.       cholecalciferol, vitamin D3, 1,000 unit tablet Take 1,000 Units by mouth 2 (two) times a day.       colestipol (COLESTID) 1 gram tablet TAKE ONE TABLET BY MOUTH TWICE A  tablet 1     cranberry extract 300 mg Tab Take 1 tablet by mouth daily.        dicyclomine (BENTYL) 10 MG capsule TAKE ONE TO TWO CAPSULES BY MOUTH EVERY 6 HOURS AS NEEDED 120 capsule 0     LACTOBACILLUS ACIDOPHILUS (PROBIOTIC ORAL) Take 1 tablet by mouth 2 (two) times a day.        lancing device Misc Use As Directed 4 (four) times a week.        losartan (COZAAR) 100 MG tablet Take 1 tablet (100 mg total) by mouth daily. 90 tablet 3     LUTEIN ORAL Take 1 tablet by mouth daily as needed.        metFORMIN (GLUCOPHAGE XR) 500 MG 24 hr tablet Take 2 tablets (1,000 mg total) by mouth daily with supper. Am meal 180 tablet 3     montelukast (SINGULAIR) 10 mg tablet Take 1 tablet (10 mg total) by mouth daily. 90 tablet 3     nitrofurantoin (MACRODANTIN) 50 MG capsule Take 1 capsule (50 mg total) by mouth daily. 90 capsule 3     omeprazole (PRILOSEC) 20 MG capsule TAKE ONE CAPSULE BY MOUTH EVERY DAY 90 capsule 3     PROAIR HFA 90 mcg/actuation inhaler INHALE TWO PUFFS BY MOUTH FOUR TIMES A DAY AS NEEDED FOR WHEEZING 25.5 g 0     psyllium (METAMUCIL) 3.4 gram packet Take 1 packet by mouth every evening.       QVAR REDIHALER 40 mcg/actuation HFAB inhaler INHALE TWO PUFFS BY MOUTH TWO TIMES A DAY AS NEEDED 10.6 g 2     SIMETHICONE (GAS-X ORAL) Take by mouth 2 (two) times a day.       tiotropium (SPIRIVA WITH HANDIHALER) 18 mcg inhalation capsule Place 1 capsule (2 puffs total) into inhaler and inhale daily. Place 1 capsule into the inhaler device. Close and press button to crush the capsule. Inhale the contents of the crushed capsule in 2 breaths. 30 capsule 2     UBIDECARENONE (COENZYME Q10 ORAL) Take 1 tablet by mouth daily.        warfarin (COUMADIN) 5 MG tablet One tab daily.  Adjust to INR of 2 to 3 90 tablet 3      No current facility-administered medications for this visit.        Family History: Nothing new    Physical Exam:  General Appearance:   Pleasant and well-appearing and in no acute distress  Vitals:    11/09/18 1043   BP: 136/70   Patient Site: Left Arm   Patient Position: Sitting   Cuff Size: Adult Large   Pulse: (!) 50   SpO2: 98%   Weight: (!) 224 lb 11.2 oz (101.9 kg)     Wt Readings from Last 3 Encounters:   11/09/18 (!) 224 lb 11.2 oz (101.9 kg)   09/27/18 222 lb (100.7 kg)   08/08/18 (!) 226 lb 8 oz (102.7 kg)     Body mass index is 41.1 kg/(m^2).

## 2021-06-22 NOTE — TELEPHONE ENCOUNTER
ANTICOAGULATION  MANAGEMENT    Assessment     Today's INR result of 3.5 is Supratherapeutic (goal INR of 2.0-3.0)        Warfarin taken as previously instructed    No new diet changes affecting INR    No new medication/supplements affecting INR    Continues to tolerate warfarin with no reported s/s of bleeding or thromboembolism     Previous INR was Therapeutic     Krystle wrote on her lab form a visit with GI is 1/8. Asked her to call if this will be a procedure so we can make sure we are ready with appropriate inr.    Plan:     Left a detailed message for Krystle regarding INR result and instructed:     Warfarin Dosing Instructions:  hold today then continue current warfarin dose 2.5 mg daily   (0 % change)    Instructed patient to follow up no later than: two weeks        Instructed to call the [facility] for any changes, questions or concerns. (#[phone])   ?   Mikki Morley RN    Subjective/Objective:      Krystle Polanco, a 78 y.o. female is on warfarin.     Krystle reports:     Home warfarin dose: as updated on anticoagulation calendar per template     Missed doses: No     Medication changes:  No     S/S of bleeding or thromboembolism:  No     New Injury or illness:  No     Changes in diet or alcohol consumption:  No     Upcoming surgery, procedure or cardioversion:  Yes: asked Krystle to call back with details    Anticoagulation Episode Summary     Current INR goal:   2.0-3.0   TTR:   80.2 % (2 y)   Next INR check:   1/16/2019   INR from last check:   3.50! (1/2/2019)   Weekly max warfarin dose:      Target end date:   12/5/2017   INR check location:      Preferred lab:      Send INR reminders to:   ANTICOAGULATION POOL A (WBY,WBE,MID,RSC)    Indications    Pulmonary embolism (H) [I26.99]           Comments:            Anticoagulation Care Providers     Provider Role Specialty Phone number    Elke Mehta MD  Internal Medicine [phone]

## 2021-06-22 NOTE — TELEPHONE ENCOUNTER
Who is calling:  patient  Reason for Call:  Patient calling back, states she missed her call from the [facility] staff and that the message was for her to call back.    Date of last appointment with primary care: 11/09/18  Has the patient been recently seen:  No  Okay to leave a detailed message: Yes

## 2021-06-22 NOTE — TELEPHONE ENCOUNTER
Who is calling:  Patient   Reason for Call:  Update after procedure  Date of last appointment with primary care:   Has the patient been recently seen:    Okay to leave a detailed message: Yes    Patient had her procedure yesterday. She will be starting Warfarin on Friday 1/11 on her normal dosage.

## 2021-06-22 NOTE — TELEPHONE ENCOUNTER
Who is calling:  Patient  Reason for Call:  Patient is asking that TRAN Kaye, call her back after 4:30 pm with dosing instructions for INR drawn today.  Date of last appointment with primary care: na   Has the patient been recently seen:  Yes  Okay to leave a detailed message: Yes

## 2021-06-22 NOTE — TELEPHONE ENCOUNTER
ANTICOAGULATION  MANAGEMENT    Assessment     Today's INR result of 3.5 is Supratherapeutic (goal INR of 2.0-3.0)        Warfarin taken as previously instructed    No new diet changes affecting INR    No new medication/supplements affecting INR    Continues to tolerate warfarin with no reported s/s of bleeding or thromboembolism     Previous INR was Therapeutic    Plan:     Spoke with Krystle regarding INR result and instructed:     Warfarin Dosing Instructions:  Hold today, 2.5 mg tomorrow, then begins 4 day hold for colonoscopy 1/8. After procedure, with GI permission will resume usual 2.5 mg daily      Instructed patient to follow up no later than: 1-2 weeks after colonoscopy    Krystle verbalizes understanding and agrees to warfarin dosing plan.    Instructed to call the [facility] for any changes, questions or concerns. (#[phone])   ?   Mikki Morley RN    Subjective/Objective:      Krystle Polanco, a 78 y.o. female is on warfarin.     Krystle reports:     Home warfarin dose: verbally confirmed home dose with Krystle and updated on anticoagulation calendar     Missed doses: No     Medication changes:  No     S/S of bleeding or thromboembolism:  No     New Injury or illness:  No     Changes in diet or alcohol consumption:  No     Upcoming surgery, procedure or cardioversion:  No    Anticoagulation Episode Summary     Current INR goal:   2.0-3.0   TTR:   80.2 % (2 y)   Next INR check:   1/22/2019   INR from last check:      Weekly max warfarin dose:      Target end date:   Indefinite   INR check location:      Preferred lab:      Send INR reminders to:   ANTICOAGULATION POOL A (WBY,WBE,MID,RSC)    Indications    Pulmonary embolism (H) [I26.99]           Comments:            Anticoagulation Care Providers     Provider Role Specialty Phone number    Elke Mehta MD  Internal Medicine [phone]

## 2021-06-23 NOTE — TELEPHONE ENCOUNTER
Anticoagulation Annual Referral Renewal Review    Krystle Polanco's chart reviewed for annual renewal of referral to anticoagulation monitoring.        Criteria for anticoagulation nurse and/or pharmacist renewal met   Warfarin indication: PE Yes , DVT/PE with previous provider documentation patient to be on extended anticoagulation   1/27/17   Current with INR monitoring/compliant Yes Yes   Date of last office visit 11/9/18 Yes, had office visit within last year   Time in Therapeutic Range (TTR) 100 % Yes, TTR > 60%       Krystle Polanco met all criteria for anticoagulation management program initiated renewal.  New INR standing orders and anticoagulation referral renewal placed.      Mikki Morley RN  5:51 PM

## 2021-06-23 NOTE — TELEPHONE ENCOUNTER
ANTICOAGULATION  MANAGEMENT    Assessment     Today's INR result of 2.6 is Therapeutic (goal INR of 2.0-3.0)        Warfarin taken as previously instructed    No new diet changes affecting INR    No new medication/supplements affecting INR    Continues to tolerate warfarin with no reported s/s of bleeding or thromboembolism     Previous INR was Therapeutic    Plan:     Spoke with Krystle regarding INR result and instructed:     Warfarin Dosing Instructions:  Continue current warfarin dose 2.5 mg daily  (0 % change)    Instructed patient to follow up no later than: one month      Krystle verbalizes understanding and agrees to warfarin dosing plan.    Instructed to call the [facility] for any changes, questions or concerns. (#[phone])   ?   Mikki Morley RN    Subjective/Objective:      Krystle Polanco, a 78 y.o. female is on warfarin.     Krystle reports:     Home warfarin dose: as updated on anticoagulation calendar per template     Missed doses: No     Medication changes:  No     S/S of bleeding or thromboembolism:  No     New Injury or illness:  No     Changes in diet or alcohol consumption:  No     Upcoming surgery, procedure or cardioversion:  No    Anticoagulation Episode Summary     Current INR goal:   2.0-3.0   TTR:   80.0 % (2.2 y)   Next INR check:   3/8/2019   INR from last check:   2.60 (2/8/2019)   Weekly max warfarin dose:      Target end date:   Indefinite   INR check location:      Preferred lab:      Send INR reminders to:   ANTICOAGULATION POOL A (WBY,WBE,MID,RSC)    Indications    Pulmonary embolism (H) [I26.99]           Comments:            Anticoagulation Care Providers     Provider Role Specialty Phone number    Elke Mehta MD  Internal Medicine [phone]

## 2021-06-23 NOTE — TELEPHONE ENCOUNTER
FYI - Status Update  Who is Calling: Patient  Update: Patient states she is out of medication. Questioning if refill can processed today.  Okay to leave a detailed message?:  No return call needed

## 2021-06-23 NOTE — PROGRESS NOTES
[facility] Follow Up Note    Assessment/Plan:  1. Type 2 diabetes mellitus without complication, without long-term current use of insulin (H)  With glycohemoglobin at 7.1.  Of note, she does admit to poor compliance with diabetic diet over the holidays.  She will get back on track.  Recommendations: Resume diabetic diet.  Will recheck in 3 months  - Glycosylated Hemoglobin A1c  - Comprehensive Metabolic Panel  - LDL Cholesterol, Direct  - Lipid [place] FASTING    2. Benign Essential Hypertension  Stable on current medications    3. Myalgias/nonspecific lack of well-being  Of note, this improved when she was off warfarin for her colonoscopy.  She is wondering if there are alternatives to anticoagulation.  Recommendation: Would like to see a check on insurance to see if she could qualify for novel oral anticoagulant such as Eliquis or Xarelto.  Because of her history of nosebleeds, I would prefer Eliquis as it has a shorter half-life.  We will ask pharmacy to assist with this.    4. Adenomatous Goiter  Stable    5. Other pulmonary embolism without acute cor pulmonale, unspecified chronicity (H)  She will need continuous anticoagulation  - INR    6. Encounter for long-term (current) use of medications  Lengthy discussion regarding medications today.  She is feeling some achiness    7. PVC's (premature ventricular contractions)  With normal echocardiogram.  Heart rate in the 50s.  Recommendation: We will continue to monitor      Follow-up in 3 months    Elke Mehta MD    Chief Complaint:  Chief Complaint   Patient presents with     Follow-up       History of Present Illness:  Krystle is a 78 y.o. female who is here today for a discussion with regard to prescription medications.  Of note, she states that when preparing for her colonoscopy, she was off warfarin.  She states she felt notably better as soon as she came off the medication.  She states that both she and her  have been feeling very  achy.  She wonders whether it is from this medication.  She cites a general lack of well-being.  In the past, we have discussed switching her from warfarin to a novel oral anticoagulant.  She is wondering today if this would be an option for her.  Additionally, she has some questions regarding statin use.  She is taking co-Q10 but wonders if the myalgias could also be from her statin.  However, she did not stop her statin prior to her colon cancer screening prep.    She describes a great deal of stress with her 's skin biopsies and her guardianship for a friend who has mental retardation.  She states her friend is in the hospital and very ill.  This causes her a great deal of stress.    With regard to diabetes, she has not been checking her sugars.  She states she has not been adhering to her diet because of all of the stressful events as of late.    She has some questions with regard to PVCs and lower heart rate.  She is wondering what the name of her extra heartbeats is.  She states that it caused a great deal of difficulty with her colon cancer screening.    Review of Systems:  A comprehensive review of systems was performed and was otherwise negative    PFSH:  Social History: She is  with adult children.  She is a retired professor.  She and her  have sold their [place] home and are living in a large [facility].  Social History     Tobacco Use   Smoking Status Never Smoker   Smokeless Tobacco Never Used       Past History: Her IBS is intermittently active  Past Medical History:   Diagnosis Date     Adenomatous goiter      Arthropathy of shoulder region      Asthma      Bartholin gland cyst      Diabetes mellitus type II, controlled (H)      Esophageal reflux      Essential hypertension      Gastroparesis      Herpes simplex type 1 infection      IBS (irritable bowel syndrome)      Leukocytosis      Osteopenia      Vitamin D deficiency        Current Outpatient Medications   Medication  Sig Dispense Refill     acetaminophen (TYLENOL) 500 MG tablet Take 500 mg by mouth every 4 (four) hours as needed for pain.        atorvastatin (LIPITOR) 80 MG tablet Take 1 tablet (80 mg total) by mouth at bedtime. 90 tablet 3     azelastine (ASTELIN) 137 mcg (0.1 %) nasal spray 2 sprays into each nostril 2 (two) times a day Use in each nostril as directed. 90 mL 3     beclomethasone dipropionate (QVAR INHL) Inhale 2 puffs 2 (two) times a day.       blood glucose test (ACCU-CHEK MOISE) strips Use 1 each As Directed daily. 100 strip 3     blood-glucose meter Misc Use Daily 1 each 0     CALCIUM CARBONATE (CALCIUM 500 ORAL) Take by mouth 2 (two) times a day.       cholecalciferol, vitamin D3, 1,000 unit tablet Take 1,000 Units by mouth 2 (two) times a day.       colestipol (COLESTID) 1 gram tablet TAKE ONE TABLET BY MOUTH TWICE A  tablet 1     cranberry extract 300 mg Tab Take 1 tablet by mouth daily.        dicyclomine (BENTYL) 10 MG capsule TAKE ONE TO TWO CAPSULES BY MOUTH EVERY 6 HOURS AS NEEDED 120 capsule 0     estradiol (ESTRACE) 0.01 % (0.1 mg/gram) vaginal cream Insert 2 g into the vagina 4 (four) times a week. 42.5 g 3     LACTOBACILLUS ACIDOPHILUS (PROBIOTIC ORAL) Take 1 tablet by mouth 2 (two) times a day.        lancing device Misc Use As Directed 4 (four) times a week.        losartan (COZAAR) 100 MG tablet Take 1 tablet (100 mg total) by mouth daily. 90 tablet 3     LUTEIN ORAL Take 1 tablet by mouth daily as needed.        metFORMIN (GLUCOPHAGE XR) 500 MG 24 hr tablet Take 2 tablets (1,000 mg total) by mouth daily with supper. Am meal 180 tablet 3     montelukast (SINGULAIR) 10 mg tablet Take 1 tablet (10 mg total) by mouth daily. 90 tablet 3     nitrofurantoin (MACRODANTIN) 50 MG capsule TAKE ONE CAPSULE BY MOUTH EVERY DAY 90 capsule 3     omeprazole (PRILOSEC) 20 MG capsule Take 1 capsule (20 mg total) by mouth daily. 90 capsule 3     PROAIR HFA 90 mcg/actuation inhaler INHALE TWO PUFFS BY  MOUTH FOUR TIMES A DAY AS NEEDED FOR WHEEZING 25.5 g 0     psyllium (METAMUCIL) 3.4 gram packet Take 1 packet by mouth every evening.       QVAR REDIHALER 40 mcg/actuation HFAB inhaler INHALE TWO PUFFS BY MOUTH TWICE A DAY AS NEEDED 10.6 g 2     SIMETHICONE (GAS-X ORAL) Take by mouth 2 (two) times a day.       tiotropium (SPIRIVA WITH HANDIHALER) 18 mcg inhalation capsule Place 1 capsule (2 puffs total) into inhaler and inhale daily. Place 1 capsule into the inhaler device. Close and press button to crush the capsule. Inhale the contents of the crushed capsule in 2 breaths. 30 capsule 2     UBIDECARENONE (COENZYME Q10 ORAL) Take 1 tablet by mouth daily.        warfarin (COUMADIN) 5 MG tablet One tab daily.  Adjust to INR of 2 to 3 90 tablet 3     No current facility-administered medications for this visit.        Family History: Nothing new    Physical Exam:  General Appearance:   Pleasant and well-appearing and in no acute distress  Vitals:    02/08/19 1103 02/08/19 1106   BP: 138/60 130/62   Patient Site:  Left Arm   Pulse: (!) 49    SpO2: 97%    Weight: (!) 225 lb 11.2 oz (102.4 kg)      Wt Readings from Last 3 Encounters:   02/08/19 (!) 225 lb 11.2 oz (102.4 kg)   12/10/18 (!) 224 lb (101.6 kg)   11/09/18 (!) 224 lb 11.2 oz (101.9 kg)     Body mass index is 41.28 kg/m .    Her heart rate is recalculated by me with a stopwatch.  It is 59 bpm

## 2021-06-23 NOTE — TELEPHONE ENCOUNTER
RN cannot approve Refill Request    RN can NOT refill this medication med is not covered by policy/route to provider     . Last office visit: 11/9/2018 Elke Mehta MD Last Physical: 1/14/2015 Last MTM visit: Visit date not found Last visit same specialty: 11/9/2018 Elke Mehta MD.  Next visit within 3 mo: Visit date not found  Next physical within 3 mo: Visit date not found      Rachel Martini, [facility] Triage/Med Refill 1/30/2019    Requested Prescriptions   Pending Prescriptions Disp Refills     colestipol (COLESTID) 1 gram tablet [Pharmacy Med Name: COLESTIPOL HCL 1GM TABS] 180 tablet 1     Sig: TAKE ONE TABLET BY MOUTH TWICE A DAY    There is no refill protocol information for this order

## 2021-06-23 NOTE — TELEPHONE ENCOUNTER
ANTICOAGULATION  MANAGEMENT    Assessment     Today's INR result of 2.4 is Therapeutic (goal INR of 2.0-3.0)        Warfarin taken as previously instructed    No new diet changes affecting INR    No new medication/supplements affecting INR    Continues to tolerate warfarin with no reported s/s of bleeding or thromboembolism     Previous INR was Therapeutic    Plan:     Left a detailed message for Krystle regarding INR result and instructed:     Warfarin Dosing Instructions:  Continue current warfarin dose 2.5 mg daily  (0 % change)    Instructed patient to follow up no later than: one week with ov   (#[phone])   ?   Mikki Morley RN    Subjective/Objective:      Krystle Polanco, a 78 y.o. female is on warfarin.     Krystle reports:     Home warfarin dose: should be 2.5 mg daily, asked Krystle to call if different     Missed doses: No     Medication changes:  No     S/S of bleeding or thromboembolism:  No     New Injury or illness:  No     Changes in diet or alcohol consumption:  No     Upcoming surgery, procedure or cardioversion:  No    Anticoagulation Episode Summary     Current INR goal:   2.0-3.0   TTR:   79.5 % (2.1 y)   Next INR check:   1/30/2019   INR from last check:   2.40 (1/23/2019)   Weekly max warfarin dose:      Target end date:   Indefinite   INR check location:      Preferred lab:      Send INR reminders to:   ANTICOAGULATION POOL A (WBY,WBE,MID,RSC)    Indications    Pulmonary embolism (H) [I26.99]           Comments:            Anticoagulation Care Providers     Provider Role Specialty Phone number    Elke Mehta MD  Internal Medicine [phone]

## 2021-06-23 NOTE — TELEPHONE ENCOUNTER
ANTICOAGULATION  MANAGEMENT PROGRAM    Krystle Polanco is overdue for INR check.  Reminder call made.    Left message for Krystle reminding them to have INR checked at upcoming office visit on 2/8    Mikki Morley RN

## 2021-06-24 NOTE — TELEPHONE ENCOUNTER
Spoke with pt and relayed PCP message.  Pt understanding and stated she has an appt with Dr. Kaur today.  Lab results faxed to [facility] Attn:  Dr. Kaur at [phone].

## 2021-06-24 NOTE — TELEPHONE ENCOUNTER
New Appointment Needed  What is the reason for the visit:    Same Date/Next Day Appt Request  What is the reason for your visit?: Right leg issues, discuss lab results and a bad cold.    Provider Preference: PCP only  How soon do you need to be seen?: This week, have scheduled appointment for 3/12 at 10:20 but would like to seen before that.   Waitlist offered?: Yes  Okay to leave a detailed message:  Yes

## 2021-06-24 NOTE — PROGRESS NOTES
[facility] Follow Up Note    Assessment/Plan:  1. Cough/fever/sinus inflammation  Of note, influenza swab is negative.  She has already started on a Z-Atilio this weekend.  Recommendations: No further antibiotic is needed as she is completed a full course of a azithromycin.  Additionally, she should push fluids.  Robitussin with codeine provided for cough.  She should avoid contact with public until her symptoms have resolved  - Influenza A/B Rapid Test- Nasal Swab    2. Type 2 diabetes mellitus without complication, without long-term current use of insulin (H)  Glycohemoglobin, blood sugar and triglycerides all elevated.  Results summarized and reviewed with her.  She has been ingesting a great deal of carbs, especially during her illness.  Recommendations: Recheck these labs in 3 months.  Encourage weight loss and carb reduction.    3.  Right knee pain  Have discussed the results of her orthopedic surgery consult.  She is scheduled for a bone scan with an iodinated contrast.  Remote history of possible contrast reaction.  Her orthopedist is ordered a sed rate and CRP.  CRP was elevated a few days ago.  They recommended that it be repeated.  Do not recommend that this be done today as she is significantly ill with her respiratory tract virus.  She should recheck these labs in 1 week.  Of note, at all times, INR was therapeutic.      Follow-up in 3 months    Elke Mehta MD    Chief Complaint:  Chief Complaint   Patient presents with     Follow-up     right leg     Cough     X 4 days of productive cough, chills and had fever       History of Present Illness:  Krystle is a 78 y.o. female who is here today with an influenza-like illness.  This began over the weekend.  She states her  had a similar illness and improved with his doxycycline and prednisone combination for COPD exacerbations.  She has had a cough-productive of whitish phlegm.  She has had chills and fever earlier in the week that  "have since abated.  She has some shortness of breath but denies wheezing.  She describes some achiness.  Since initiation of the Z-Atilio, she has felt a bit better.    Additionally, she is undergoing evaluation for right knee pain.  She awoke with a \"woody \"like leg.  She went to [facility] and was seen by orthopedic surgery.  They believe that the hardware in her knee replacement is off.  She may need to do surgery.  He did order some labs to see if she had a coexisting infection.  Her white count was similar to what it typically runs.  Additionally, her CRP was elevated.  Also, during these labs, she was developing a respiratory tract infection.    She does report that she is having a scan as ordered by orthopedic surgery.  It will have an iodine preparation contrast that according to the staff causes very little reaction.  There is a remote history of contrast reaction with details that are quite clear.    Review of Systems:  A comprehensive review of systems was performed and was otherwise negative    PFSH:  Social History: She is  with adult children  Social History     Tobacco Use   Smoking Status Never Smoker   Smokeless Tobacco Never Used       Past History:   Past Medical History:   Diagnosis Date     Adenomatous goiter      Arthropathy of shoulder region      Asthma      Bartholin gland cyst      Diabetes mellitus type II, controlled (H)      Esophageal reflux      Essential hypertension      Gastroparesis      Herpes simplex type 1 infection      IBS (irritable bowel syndrome)      Leukocytosis      Osteopenia      Vitamin D deficiency        Current Outpatient Medications   Medication Sig Dispense Refill     acetaminophen (TYLENOL) 500 MG tablet Take 500 mg by mouth every 4 (four) hours as needed for pain.        atorvastatin (LIPITOR) 80 MG tablet Take 1 tablet (80 mg total) by mouth at bedtime. 90 tablet 3     azelastine (ASTELIN) 137 mcg (0.1 %) nasal spray 2 sprays into each nostril 2 (two) " times a day Use in each nostril as directed. 90 mL 3     beclomethasone dipropionate (QVAR INHL) Inhale 2 puffs 2 (two) times a day.       blood glucose test (ACCU-CHEK MOISE) strips Use 1 each As Directed daily. 100 strip 3     blood-glucose meter Misc Use Daily 1 each 0     CALCIUM CARBONATE (CALCIUM 500 ORAL) Take by mouth 2 (two) times a day.       cholecalciferol, vitamin D3, 1,000 unit tablet Take 1,000 Units by mouth 2 (two) times a day.       colestipol (COLESTID) 1 gram tablet Take 1 tablet (1 g total) by mouth 2 (two) times a day. 180 tablet 1     cranberry extract 300 mg Tab Take 1 tablet by mouth daily.        dicyclomine (BENTYL) 10 MG capsule TAKE ONE TO TWO CAPSULES BY MOUTH EVERY 6 HOURS AS NEEDED 120 capsule 0     estradiol (ESTRACE) 0.01 % (0.1 mg/gram) vaginal cream Insert 2 g into the vagina 4 (four) times a week. 42.5 g 3     LACTOBACILLUS ACIDOPHILUS (PROBIOTIC ORAL) Take 1 tablet by mouth 2 (two) times a day.        lancing device Misc Use As Directed 4 (four) times a week.        losartan (COZAAR) 100 MG tablet Take 1 tablet (100 mg total) by mouth daily. 90 tablet 3     LUTEIN ORAL Take 1 tablet by mouth daily as needed.        metFORMIN (GLUCOPHAGE XR) 500 MG 24 hr tablet Take 2 tablets (1,000 mg total) by mouth daily with supper. Am meal 180 tablet 3     montelukast (SINGULAIR) 10 mg tablet Take 1 tablet (10 mg total) by mouth daily. 90 tablet 3     nitrofurantoin (MACRODANTIN) 50 MG capsule Take 1 capsule (50 mg total) by mouth daily. 90 capsule 3     omeprazole (PRILOSEC) 20 MG capsule Take 1 capsule (20 mg total) by mouth daily. 90 capsule 3     PROAIR HFA 90 mcg/actuation inhaler INHALE TWO PUFFS BY MOUTH FOUR TIMES A DAY AS NEEDED FOR WHEEZING 25.5 g 0     psyllium (METAMUCIL) 3.4 gram packet Take 1 packet by mouth every evening.       QVAR REDIHALER 40 mcg/actuation HFAB inhaler INHALE TWO PUFFS BY MOUTH TWICE A DAY AS NEEDED 10.6 g 2     SIMETHICONE (GAS-X ORAL) Take by mouth 2  (two) times a day.       tiotropium (SPIRIVA WITH HANDIHALER) 18 mcg inhalation capsule Place 1 capsule (2 puffs total) into inhaler and inhale daily. Place 1 capsule into the inhaler device. Close and press button to crush the capsule. Inhale the contents of the crushed capsule in 2 breaths. 30 capsule 2     UBIDECARENONE (COENZYME Q10 ORAL) Take 1 tablet by mouth daily.        warfarin (COUMADIN/JANTOVEN) 5 MG tablet One tab daily.  Adjust to INR of 2 to 3 90 tablet 3     codeine-guaiFENesin (GUAIFENESIN AC)  mg/5 mL liquid Take 10 mL by mouth 3 (three) times a day as needed for cough. 240 mL 1     predniSONE (DELTASONE) 50 MG tablet Take 50 mg by mouth daily for 1 day Take one tablet night before and one hour before proceedure. 2 tablet 0     No current facility-administered medications for this visit.        Family History: Noncontributory    Physical Exam:  General Appearance:   She appears mildly ill  Vitals:    03/06/19 0949   BP: 124/78   Patient Site: Left Arm   Patient Position: Sitting   Cuff Size: Adult Large   Pulse: 68   SpO2: 97%   Weight: 221 lb (100.2 kg)     Wt Readings from Last 3 Encounters:   03/06/19 221 lb (100.2 kg)   02/08/19 (!) 225 lb 11.2 oz (102.4 kg)   12/10/18 (!) 224 lb (101.6 kg)     Body mass index is 40.42 kg/m .    Head and neck exam is negative.  Throat is clear.  Chest exam reveals scattered rhonchi that clear with cough  Cardiac exam reveals regular rate and rhythm with no murmurs or gallops  Skin exam is negative  Lower extremities are examined.  There is no erythema over the incision for her right knee replacement.  Calf is not tender.  There is some fullness in the popliteal fossa on the right.    Data Review:    Analysis and Summary of Old Records (2):       Records Requested (1): Would like to see orthopedic surgery labs    Other History Summarized (from other people in the room) (2):     Radiology Tests Summarized (XRAY/CT/MRI/DXA) (1):    Labs Reviewed (1): Yes  from March 1 and from February 8    Medicine Tests Reviewed (EKG/ECHO/COLONOSCOPY/EGD) (1):     Independent Review of EKG or X-RAY (2):

## 2021-06-24 NOTE — TELEPHONE ENCOUNTER
Please contact [facility] [place] [facility] and discontinue the Robitussin with codeine and the prednisone that were ordered.  These needed to be sent to the acute care pharmacy and I have corrected that.  Thanks

## 2021-06-24 NOTE — TELEPHONE ENCOUNTER
ANTICOAGULATION  MANAGEMENT    Assessment     Today's INR result of 3.3 is Supratherapeutic (goal INR of 2.0-3.0)        Warfarin taken as previously instructed    No new diet changes affecting INR had been under stress and not eating as usual    No new medication/supplements affecting INR    Continues to tolerate warfarin with no reported s/s of bleeding or thromboembolism     Previous INR was Therapeutic     Has a knee or hip problem with inability to walk    Plan:     Spoke with Krystle regarding INR result and instructed:     Warfarin Dosing Instructions:  skip today then continue current warfarin dose 2.5 mg daily   (0 % change)    Instructed patient to follow up no later than: 1-2 weeks pending ortho workup    Krystle verbalizes understanding and agrees to warfarin dosing plan.    Instructed to call the [facility] for any changes, questions or concerns. (#[phone])   ?   Mikki Morley RN    Subjective/Objective:      Krystle Polanco, a 78 y.o. female is on warfarin.     Krystle reports:     Home warfarin dose: verbally confirmed home dose with Krystle and updated on anticoagulation calendar     Missed doses: No     Medication changes: no     S/S of bleeding or thromboembolism:  No     New Injury or illness:  No     Changes in diet or alcohol consumption:  No     Upcoming surgery, procedure or cardioversion:  No    Anticoagulation Episode Summary     Current INR goal:   2.0-3.0   TTR:   79.4 % (2.2 y)   Next INR check:   3/15/2019   INR from last check:   3.30! (3/1/2019)   Weekly max warfarin dose:      Target end date:   Indefinite   INR check location:      Preferred lab:      Send INR reminders to:   ANTICOAGULATION POOL A (WBY,WBE,MID,RSC)    Indications    Pulmonary embolism (H) [I26.99]           Comments:            Anticoagulation Care Providers     Provider Role Specialty Phone number    Elke Mehta MD  Internal Medicine [phone]

## 2021-06-24 NOTE — TELEPHONE ENCOUNTER
I did not order the tests, so she needs to talk with her orthopedist and we need to make sure he gets the results.  He ordered 2 different inflammation marker tests, one was normal and one was elevated.  These are nonspecific and can occur with injury, infection, etc

## 2021-06-24 NOTE — TELEPHONE ENCOUNTER
FYI - Status Update  Who is Calling: Patient  Update: Patient request to update Elke Mehta MD that I was unable to walk yesterday, went to orthopedist and he requested I have labs drawn in primary care clinic today with a written order from them. Patient also mentioned Xrays were done to rule out the right knee replacement is not the cause.   Okay to leave a detailed message?:  Yes

## 2021-06-24 NOTE — TELEPHONE ENCOUNTER
Spoke with pt and assisted in rescheduling her appt for 3/6/19 with PCP.  Pt understanding that it's a 20 min appt.

## 2021-06-25 NOTE — PROGRESS NOTES
Progress Notes by Venus Callaway PT at 2/7/2017 10:00 AM     Author: Venus Callaway PT Service: -- Author Type: Physical Therapist    Filed: 2/7/2017  3:08 PM Encounter Date: 2/7/2017 Status: Attested Addendum    : Venus Callaway PT (Physical Therapist)    Related Notes: Original Note by Venus Callaway PT (Physical Therapist) filed at 2/7/2017 12:58 PM    Cosigner: Elke Mehta MD at 2/7/2017  4:50 PM    Attestation signed by Elke Mehta MD at 2/7/2017  4:50 PM    Elke Mehta MD                [facility] Re-Certification Request    February 7, 2017      Patient: Krystle Polanco  MR Number: [id number]  YOB: 1940  Date of Visit: 2/7/2017      Dear Dr. Elke Mehta:    As you may recall, we have been seeing Krystle Polanco for Physical Therapy of neck pain.    For therapy to continue, Medicare and/or Medicaid requires periodic physician review of the treatment plan. Please review the summary of the patient's progress and our plan for continued therapy, and verify  that you agree therapy should continue by entering certification dates at the bottom of this note and co-signing this note.    Plan of Care:  [facility] PLAN OF CARE 2/7/2017   Authorization / Certification Start Date 2/7/2017   Authorization / Certification End Date 5/1/2017   Authorization / Certification Number of Visits 12   Communication with    Patient Related Instruction Nature of Condition;Treatment plan and rationale;Self Care instruction;Basis of treatment;Posture;Precautions;Next steps;Expected outcome   Times per Week (No Data)   Number of Weeks 12   Number of Visits 12   Discharge Planning Independent HEP and self-management of symptoms   Therapeutic Exercise ROM;Stretching;Strengthening   Neuromuscular Reeducation posture;core   Manual Therapy soft tissue mobilization   Modalities electrical stimulation;TENS   Equipment theraband;TENS unit         Goal  Pt. will  demonstrate/verbalize independence in self-management of condition in : Met  Pt. will be independent with home exercise program in : Met  Pt. will report decreased intensity, frequency of : Progressing toward (has 4 HA/week)  Pt. will have improved quality of sleep: Progressing toward (Still awakens at 4 AM but is able to get back to sleep)  Comment:: increase sleep duration and walking with less neck and back discomfort, in 6 weeks.  Patient will transfer: Progressing toward  Patient will decrease : Met  by ___ points: 5  Pt will: be able to get back into a normal function/back into life/resume more normal activity-met      If you have any questions or concerns, please don't hesitate to call.    Sincerely,      Venus Callaway, PT        Physician recommendation:     ___ Follow therapist's recommendation        ___ Modify therapy      *Physician co-signature indicates they certify the need for these services furnished within this plan and while under their care.          [facility] Daily Progress     Patient Name: Krystle Polanco  Date: 2/7/2017  Visit #: 12 +1/12  PTA visit #:  0  PRECAUTIONS/RESTRICTIONS: Acute Pulmonary Embolism/Warfaren/DM, left shoulder arthropathy  Referral Diagnosis: Acute Pulmonary Embolism/Neck and Upper Back Pain and HA  Referring provider: Elke Mehta MD  Visit Diagnosis:     ICD-10-CM    1. Cervicalgia M54.2    2. Pain in thoracic spine M54.6    3. Decreased ROM of neck R29.898    4. Abnormal posture R29.3    5. Muscle spasms of neck M62.838          Assessment:     HEP/POC compliance is  good .  Patient demonstrates understanding/independence with home program.  Response to Intervention Continued temporary relief with MFR, symptoms worse right>>left.  Response to PT status quo the past 2-3 weeks.  Patient is appropriate to continue with skilled physical therapy intervention, as indicated by initial plan of care.  Meeting expectations.   NDI Outcome Measure  demonstrated a statistically significant change.    Goal Status: on-going  Pt. will demonstrate/verbalize independence in self-management of condition in : Met  Pt. will be independent with home exercise program in : Met  Pt. will report decreased intensity, frequency of : Progressing toward (has 4 HA/week)  Pt. will have improved quality of sleep: Progressing toward (Still awakens at 4 AM but is able to get back to sleep)  Comment:: increase sleep duration and walking with less neck and back discomfort, in 6 weeks.  Patient will transfer: Progressing toward  Patient will decrease : Met  by ___ points: 5  Pt will: be able to get back into a normal function/back into life/resume more normal activity-met    Plan / Patient Education:     Continue with initial plan of care.  Progress with home program as tolerated.  Continue manual therapy to neck.      Subjective:     Pain Ratin-3/10   Relief last 3-4 days after treatments with right side tightening up again after the 4th day.  Left side is generally  Improving.  Today both right and left neck and upper back is bothersome today.  Denies dizziness.  Driving is limited to 30 minutes before neck pain increases to severe.  Improvements:  Able to get back to sleep after being awakened at 4 AM;  more stamina and getting back to usual life activity. Bed transfers are ok/less difficult  Continued HA and discomfort with bed mobility when turning over.    NDI Outcome Measures:  Initial 74%  Current 32%    Patient estimates 75% overall improvement.    Objective:     Palpation: Moderate tenderness tightness bilateral neck/upper trap..  Decreased pain/greater relaxation after manual therapy.  Cervical ROM           Date: 2016  *=Pain 2017  *=pain     *Indicate scale AROM AROM AROM   Cervical Flexion Min loss, NE Min loss, pain      Cervical Extension Mod loss, NE Min-mod loss, NE        Right Left Right Left Right Left   Cervical Sidebending Mod loss, ** Mod loss* Mod  "loss, *  Mod loss *        Cervical Rotation Mod loss** Min loss * Min loss, * on right   Min loss, * on left       Cervical Protraction         Cervical Retraction         Thoracic Flexion         Thoracic Extension         Thoracic Sidebending               Thoracic Rotation                    Decreased pain/greater relaxation after manual therapy but reported bilat UE tingle with very gentle cervical distraction which hasn't happened before.  No pain upon C-rot left after treatment but continued pain with C-rot right and SB left>right.  Tolerated treatment well.      Treatment Today     TREATMENT MINUTES COMMENTS   Evaluation     Self-care/ Home management     Manual therapy 20 -Supine:  SO release, gentle C distraction but stopped after 30\" due to bilat UE tingle/numb.  STM with /up glides.   Neuromuscular Re-education  -Brief instruction in sleep posture with pillows at SL.   Therapeutic Activity     Therapeutic Exercises 5 Added C-SB to program and to try HP>CP for pain relief.   Gait training     Modality__________________     ROM Assessment 10 Neck         Total 35    Blank areas are intentional and mean the treatment did not include these items.       Venus Callaway, PT  2/7/2017       "

## 2021-06-25 NOTE — TELEPHONE ENCOUNTER
ANTICOAGULATION  MANAGEMENT    Assessment     Today's INR result of 3.0  is Therapeutic (goal INR of 2.0-3.0)        Warfarin taken as previously instructed    No new diet changes affecting INR    No new medication/supplements affecting INR    Continues to tolerate warfarin with no reported s/s of bleeding or thromboembolism     Previous INR was Therapeutic    Plan:     Spoke with Krystle regarding INR result and instructed:     Warfarin Dosing Instructions:  Continue current warfarin dose 2.5 mg daily (0 % change)    Instructed patient to follow up no later than: two weeks with ov    Krystle verbalizes understanding and agrees to warfarin dosing plan.    Instructed to call the [facility] for any changes, questions or concerns. (#[phone])   ?   Mikki Morley RN    Subjective/Objective:      Krystle Polanco, a 78 y.o. female is on warfarin.     Krystle reports:     Home warfarin dose: as updated on anticoagulation calendar per template     Missed doses: No     Medication changes:  No     S/S of bleeding or thromboembolism:  No     New Injury or illness:  No     Changes in diet or alcohol consumption:  No     Upcoming surgery, procedure or cardioversion:  No    Anticoagulation Episode Summary     Current INR goal:   2.0-3.0   TTR:   77.7 % (2.3 y)   Next INR check:   4/2/2019   INR from last check:   3.00 (3/18/2019)   Weekly max warfarin dose:      Target end date:   Indefinite   INR check location:      Preferred lab:      Send INR reminders to:   ANTICOAGULATION POOL A (WBY,WBE,MID,RSC)    Indications    Pulmonary embolism (H) [I26.99]           Comments:            Anticoagulation Care Providers     Provider Role Specialty Phone number    Elke Mehta MD  Internal Medicine [phone]

## 2021-06-25 NOTE — TELEPHONE ENCOUNTER
Patient has lab appt on 3/18/19 for repeat of CBC, CRP, and ESR done at last visit per Dr Dewey at [facility]. Spoke with patient, said PCP knows about tests needing to be repeated due to illness at time of previous testing that may have resulted in inaccurate test results. Cannot re-use order scanned in chart since that was a one time order. Please advise.    Thanks

## 2021-06-26 NOTE — PROGRESS NOTES
Progress Notes by Maryam Dang MD at 3/9/2018  8:10 AM     Author: Maryam Dang MD Service: -- Author Type: Physician    Filed: 3/9/2018  9:21 AM Encounter Date: 3/9/2018 Status: Signed    : Maryam Dang MD (Physician)                 Arrhythmia Clinic    Thank you, Dr. Elke Mehta MD, for asking the [facility] Arrhythmia team to evaluate Krystle Polanco in consultation for PVCs      Assessment:     77 year old female with history of bilateral PEs, HTN, DM2 who presents for evaluation of frequent PVCs     Plan:     PVCs - largely unifocal likely arising from the LV outflow tract.  These are quite frequent, comprising approximately 16% of all heart beats.  However, they are not associated with symptoms or LV dysfunction.  I reviewed the pathophysiology of PVCs with Krystle and her  today in detail.  I specifically discussed goals of treatment were to alleviate symptoms and to prevent LV dysfunction.  As she is asymptomatic with less than 20,000 PVCs daily and with normal LV function, I reviewed treatment options which include watchful waiting, AAD therapy or ablation.    After discussion, Krystle is reassured and would like to take a watchful waiting approach.  Therefore, We will plan to see her in follow up in 6 months with an echo and holter prior.  She is aware to call with any questions, concerns, or other issues.     Current History:   Krystle Polanco is a 77 y.o. female with a history of HTN, DM2, bilateral PEs last year on warfarin who presents for evaluation of frequent PVCs.  Patient was noted to have an irregular heart beat at a recent visit with Dr. Mehta and ECG demonstrated frequent PVCs of an outflow tract morphology.  She therefore underwent holter monitor evaluation which showed frequent monomorphic PVCs.  Echocardiography demonstrated normal LV function and structure.  Krystle denies symptoms of palpitations, chest pain,  presyncope or syncope, sob, carl, le edema, or orthopnea.  She presents today for further evaluation.    Past Medical History:     Past Medical History:   Diagnosis Date   ? Adenomatous goiter    ? Arthropathy of shoulder region    ? Asthma    ? Bartholin gland cyst    ? Diabetes mellitus type II, controlled    ? Esophageal reflux    ? Essential hypertension    ? Gastroparesis    ? Herpes simplex type 1 infection    ? IBS (irritable bowel syndrome)    ? Leukocytosis    ? Osteopenia    ? Vitamin D deficiency        Past Surgical History:     Past Surgical History:   Procedure Laterality Date   ? BREAST BIOPSY Left 1/22/15    Papilloma   ? HYSTERECTOMY     ? OOPHORECTOMY      1 removed, 1 remains   ? MN  DELIVERY ONLY      Description:  Section;  Recorded: 2008;  Comments: x2   ? MN DILATION/CURETTAGE,DIAGNOSTIC      Description: Dilation And Curettage;  Recorded: 2007;   ? MN LIGATE FALLOPIAN TUBE      Description: Tubal Ligation;  Recorded: 2007;   ? MN NASAL/SINUS ENDOSCOPY,BX/RMV POLYP/DEBRID      Description: Nasal Endoscopy Polypectomy;  Recorded: 2007;   ? MN TOTAL ABDOM HYSTERECTOMY      Description: Hysterectomy;  Recorded: 2007;   ? MN TOTAL ABDOM HYSTERECTOMY      Description: Total Abdominal Hysterectomy;  Recorded: 2008;   ? MN TOTAL ABDOM HYSTERECTOMY      Description: Total Abdominal Hysterectomy;  Recorded: 2014;   ? MN TOTAL KNEE ARTHROPLASTY      Description: Total Knee Arthroplasty;  Recorded: 2008;   ? MN XFER SINGLE SUPERFICI LOW LEG TENDON      Description: Tibialis Posterior Tendon Transfer Right Foot;  Recorded: 2008;       Family History:     Family History   Problem Relation Age of Onset   ? Breast cancer Sister 56   ? Depression Mother    ? Dementia Mother        Social History:    reports that she has never smoked. She has never used smokeless tobacco. She reports that she does not drink alcohol or use illicit  drugs.    Meds:     Current Outpatient Prescriptions:   ?  acetaminophen (TYLENOL) 500 MG tablet, Take 500 mg by mouth every 6 (six) hours as needed for pain. , Disp: , Rfl:   ?  atorvastatin (LIPITOR) 80 MG tablet, Take 1 tablet (80 mg total) by mouth at bedtime., Disp: 90 tablet, Rfl: 3  ?  azelastine (ASTELIN) 137 mcg nasal spray, 2 sprays daily (Patient taking differently: 2 sprays into each nostril daily as needed for rhinitis. 2 sprays daily), Disp: 90 mL, Rfl: 3  ?  beclomethasone (QVAR) 80 mcg/actuation inhaler, Inhale 2 puffs 2 (two) times a day as needed., Disp: 3 Inhaler, Rfl: 12  ?  blood sugar diagnostic (ACCU-CHEK MOISE) Strp, Use 1 strip As Directed 3 (three) times a week. , Disp: , Rfl:   ?  CALCIUM CARBONATE (CALCIUM 500 ORAL), Take by mouth 2 (two) times a day., Disp: , Rfl:   ?  cholecalciferol, vitamin D3, 1,000 unit tablet, Take 1,000 Units by mouth 2 (two) times a day., Disp: , Rfl:   ?  colestipol (COLESTID) 1 gram tablet, Take 1 tablet (1 g total) by mouth 2 (two) times a day., Disp: 60 tablet, Rfl: 1  ?  cranberry extract 300 mg Tab, Take 1 tablet by mouth daily. , Disp: , Rfl:   ?  dicyclomine (BENTYL) 10 MG capsule, Take 1-2 capsules (10-20 mg total) by mouth every 6 (six) hours as needed., Disp: 120 capsule, Rfl: 0  ?  fexofenadine (ALLEGRA) 60 MG tablet, Take 60 mg by mouth daily., Disp: , Rfl:   ?  LACTOBACILLUS ACIDOPHILUS (PROBIOTIC ORAL), Take 1 tablet by mouth 2 (two) times a day. , Disp: , Rfl:   ?  lancing device Misc, Use As Directed 4 (four) times a week. , Disp: , Rfl:   ?  losartan-hydrochlorothiazide (HYZAAR) 50-12.5 mg per tablet, Take 1 tablet by mouth daily., Disp: 90 tablet, Rfl: 3  ?  LUTEIN ORAL, Take 1 tablet by mouth daily as needed. , Disp: , Rfl:   ?  metFORMIN (GLUCOPHAGE) 500 MG tablet, Take 2 tablets (1,000 mg total) by mouth 2 times a day at 6:00 am and 4:00 pm., Disp: 180 tablet, Rfl: 5  ?  montelukast (SINGULAIR) 10 mg tablet, Take 1 tablet (10 mg total) by  "mouth daily with supper. (Patient taking differently: Take 10 mg by mouth daily. ), Disp: 90 tablet, Rfl: 3  ?  nitrofurantoin (MACRODANTIN) 50 MG capsule, Take 1 capsule (50 mg total) by mouth daily., Disp: 90 capsule, Rfl: 3  ?  omeprazole (PRILOSEC) 20 MG capsule, TAKE ONE CAPSULE BY MOUTH EVERY DAY, Disp: 90 capsule, Rfl: 3  ?  PROAIR HFA 90 mcg/actuation inhaler, INHALE TWO PUFFS BY MOUTH FOUR TIMES A DAY AS NEEDED FOR WHEEZING, Disp: 25.5 g, Rfl: 0  ?  psyllium (METAMUCIL) 3.4 gram packet, Take 1 packet by mouth every evening., Disp: , Rfl:   ?  SIMETHICONE (GAS-X ORAL), Take by mouth 2 (two) times a day., Disp: , Rfl:   ?  sucralfate (CARAFATE) 1 gram tablet, Take 1 tablet (1 g total) by mouth at bedtime., Disp: 90 tablet, Rfl: 3  ?  tiotropium bromide (SPIRIVA RESPIMAT) 2.5 mcg/actuation Mist, Inhale daily as needed. , Disp: , Rfl:   ?  UBIDECARENONE (COENZYME Q10 ORAL), Take 1 tablet by mouth daily. , Disp: , Rfl:   ?  warfarin (COUMADIN) 5 MG tablet, One tab daily.  Adjust to INR of 2 to 3, Disp: 90 tablet, Rfl: 3    Allergies:   Conray [iothalamate meglumine]; Alendronate sodium; Amoxicillin; Fosamax [alendronate]; House dust; Mold/mildew; Other allergy (see comments); Penicillins; Prednisone; Seafood; and Sulfa (sulfonamide antibiotics)    Review of Systems:   A full 12 point review of systems without pertinent positives except as per HPI or below.        Objective:      Physical Exam  Weight: Weight: 222 lb (100.7 kg)  Body mass index is 40.6 kg/(m^2).  /64 (Patient Site: Left Arm, Patient Position: Sitting, Cuff Size: Adult Large)  Pulse 76 Comment: Irregular  Resp 16  Ht 5' 2\" (1.575 m)  Wt 222 lb (100.7 kg)  LMP 02/12/1985  BMI 40.6 kg/m2    General Appearance:   Nad, alert and oriented x 3   HEENT:  Mmm, perrl   Neck: No goiter noted   Chest: No deformities noted   Lungs:   Clear bilaterally   Cardiovascular:   RRR   Abdomen:  Soft and non-tender   Extremities: No clubbing or edema "   Skin: No rashes                 Cardiographics  ECG performed today personally reviewed - normal sinus rhythm  ECG 2/18 personally reviewed - sinus rhythm with frequent PVCs  Holter 2018 personally reviewed as above  Echo 2018 personally reviewed as above    Imaging  None    Lab Review   Lab Results   Component Value Date     02/07/2018    K 4.2 02/07/2018     02/07/2018    CO2 24 02/07/2018    BUN 16 02/07/2018    CREATININE 0.75 02/07/2018    CALCIUM 9.7 02/07/2018     Lab Results   Component Value Date    WBC 11.6 (H) 01/12/2018    WBC 10.8 01/14/2015    HGB 13.8 01/12/2018    HCT 41.6 01/12/2018    MCV 83 01/12/2018     01/12/2018     Lab Results   Component Value Date    CHOL 196 03/14/2017    TRIG 257 (H) 03/14/2017    HDL 53 03/14/2017    LDLDIRECT 108 10/17/2017         Maryam Dang MD  [facility] Cardiology, Electrophysiology

## 2021-06-28 NOTE — PROGRESS NOTES
Progress Notes by Rivera Saenz MD at 12/4/2019  9:10 AM     Author: Rivera Saenz MD Service: -- Author Type: Physician    Filed: 12/4/2019 11:18 AM Encounter Date: 12/4/2019 Status: Signed    : Rivera Saenz MD (Physician)         Thank you, Dr. Mehta, for asking the [facility] team to see Ms. Krystle Polanco to evaluate frequent PVCs and newly diagnosed AF.      Assessment/Recommendations   Assessment:    1. Asymptomatic PAF with RVR.  Chadvasc 5.   2. Frequent asymptomatic PVCs, normal LV systolic function.   3. Progressive dyspnea on exertion, multifactorial, could be worsened by #2, myocardial ischemia to be ruled out.   4. Body mass index is 42.13 kg/m .  5. Long-standing hypertension, currently controlled.    Plan:  1. I met with Krystle and her  today and reviewed the nature, prognosis and management options of her frequent PVCs.  She confirmed absence of PVCs-related symptoms, reviewed her echo showing stable LV systolic function ejection fraction around 60%.   Her only new symptoms is related to increased dyspnea with exertion which in her case could be angina equivalent, therefore further ischemia work-up is indicated.  Because of the new AF with RVR, she would benefit from a trial of sotalol starting with 40 mg twice daily, follow-up ECG on Monday and possible up titration to 80 mg twice daily if her blood pressure and QTC allow.   Discussed the role of mapping and catheter ablation which becomes indicated mainly if her LV systolic function declines.  Will obtain a Lexiscan cardiac MRI to rule out myocardial ischemia and assess LVEF and cardiac structure.     2. Regarding her AF, she is currently anticoagulated, hopefully AF will be suppressed by uptitrated dose of sotalol.  3. Reviewed and discussed the importance of weight loss and lifestyle changes.   4. Return to clinic in 3 months with a Holter monitor prior to her visit to reassess PVCs burden and AF.    All  her questions and concerns were addressed to her satisfaction.  She expressed understanding agreement with proposed plan of care.      Thank you for allowing me to part spent in the care of Ms. Polanco.        History of Present Illness/Subjective    Ms. Krystle Polanco is a 79 y.o. female with past medical history significant for:  1. Frequent LVOT PVCs burden between 25 to 35% by review of previous Holter's.  Asymptomatic.  2. New PAF by recent Holter monitor, total duration of 50 minutes, asymptomatic  3. Normal LV systolic function, ejection fraction 60%.  4. Hypertension.  5. History of bilateral pulmonary thromboemboli.  6. Long-term anticoagulation using Coumadin for #4.  7. Type 2 diabetes mellitus, insulin requiring.  8. Body mass index is 42.13 kg/m .    Krystle is referred to EP clinic for consultation and evaluation of frequent PVCs and recently documented PAF.    Since last seen in EP clinic in November 2018, she has remained unaware of the presence of PVCs, denies palpitations.   However, she has been getting progressively short of breath with usual exertion such as walking a few blocks or climbing 2-3 flights of stairs.  She denies any associated chest pain.   She is not aware of the presence of AF associated with RVR recently documented by Holter monitor.  She is using Coumadin for secondary prevention of PTE without bleeding complications.       Review of her ECGs demonstrates frequent PVCs with LVOT site of origin, possible LV summit.    Review of her echocardiogram demonstrates normal LV systolic function ejection fraction of 60% moderately dilated left atrium.     Holter monitor reviewed, PVC burden decreased to 25% from 35% about a year ago, new onset of PAF.        Physical Examination Review of Systems   Vitals:    12/04/19 0906   BP: 130/60   Pulse: 60   Resp: 16     Body mass index is 42.13 kg/m .  Wt Readings from Last 3 Encounters:   12/04/19 (!) 232 lb 3.2 oz (105.3 kg)   11/26/19 (!)  228 lb (103.4 kg)   19 (!) 229 lb (103.9 kg)     [unfilled]  General Appearance:   no distress, normal body habitus   ENT/Mouth: membranes moist, no oral lesions or bleeding gums.      EYES:  no scleral icterus, normal conjunctivae   Neck: no carotid bruits or thyromegaly   Chest/Lungs:   lungs are clear to auscultation, no rales or wheezing, no sternal scar, equal chest wall expansion    Cardiovascular:   Regular. Normal first and second heart sounds with no murmurs, rubs, or gallops; the carotid, radial and posterior tibial pulses are intact, Jugular venous pressure normal, no edema bilaterally    Abdomen:  no organomegaly, masses, bruits, or tenderness; bowel sounds are present   Extremities: no cyanosis or clubbing   Skin: no xanthelasma, warm.    Neurologic: normal  bilateral, no tremors     Psychiatric: alert and oriented x3, calm     General: WNL  Eyes: WNL  Ears/Nose/Throat: WNL  Lungs: WNL  Heart: WNL  Stomach: WNL  Bladder: WNL  Muscle/Joints: WNL  Skin: WNL  Nervous System: WNL  Mental Health: WNL     Blood: WNL     Medical History  Surgical History Family History Social History   Past Medical History:   Diagnosis Date   ? Adenomatous goiter 2004   ? Arthropathy of shoulder region unknown   ? Asthma 2009   ? Atrial flutter (H)    ? Bartholin gland cyst unknown   ? Diabetes mellitus type II, controlled (H)    ? Esophageal reflux    ? Essential hypertension    ? Gastroparesis    ? Herpes simplex type 1 infection unknown   ? IBS (irritable bowel syndrome)    ? Leukocytosis unknown   ? Osteopenia 2009   ? Vitamin D deficiency     Past Surgical History:   Procedure Laterality Date   ? BREAST BIOPSY Left 1/22/15    Papilloma   ? HYSTERECTOMY     ? OOPHORECTOMY      1 removed, 1 remains   ? MA  DELIVERY ONLY  1971    Description:  Section;  Recorded: 2008;  Comments: x2   ? MA DILATION/CURETTAGE,DIAGNOSTIC  Unknown    Description: Dilation And  Curettage;  Recorded: 11/06/2007;   ? ME NASAL/SINUS ENDOSCOPY,BX/RMV POLYP/DEBRID  1975    Description: Nasal Endoscopy Polypectomy;  Recorded: 11/06/2007;   ? ME TOTAL ABDOM HYSTERECTOMY  1983    Description: Total Abdominal Hysterectomy;  Recorded: 07/09/2008;   ? ME TOTAL KNEE ARTHROPLASTY Bilateral 2008    Description: Total Knee Arthroplasty;  Recorded: 02/11/2008;   ? ME XFER SINGLE SUPERFICI LOW LEG TENDON  UNknown    Description: Tibialis Posterior Tendon Transfer Right Foot;  Recorded: 02/12/2008;    Family History   Problem Relation Age of Onset   ? Breast cancer Sister 56   ? Depression Mother    ? Dementia Mother     Social History     Socioeconomic History   ? Marital status:      Spouse name: Not on file   ? Number of children: Not on file   ? Years of education: Not on file   ? Highest education level: Not on file   Occupational History   ? Not on file   Social Needs   ? Financial resource strain: Not on file   ? Food insecurity:     Worry: Not on file     Inability: Not on file   ? Transportation needs:     Medical: Not on file     Non-medical: Not on file   Tobacco Use   ? Smoking status: Never Smoker   ? Smokeless tobacco: Never Used   Substance and Sexual Activity   ? Alcohol use: No   ? Drug use: No   ? Sexual activity: Not on file   Lifestyle   ? Physical activity:     Days per week: Not on file     Minutes per session: Not on file   ? Stress: Not on file   Relationships   ? Social connections:     Talks on phone: Not on file     Gets together: Not on file     Attends [employer] service: Not on file     Active member of club or organization: Not on file     Attends meetings of clubs or organizations: Not on file     Relationship status: Not on file   ? Intimate partner violence:     Fear of current or ex partner: Not on file     Emotionally abused: Not on file     Physically abused: Not on file     Forced sexual activity: Not on file   Other Topics Concern   ? Not on file   Social History  Narrative    She is .  She has 4 children and is a retired . She is an avid .           Medications  Allergies   Current Outpatient Medications   Medication Sig Dispense Refill   ? acetaminophen (TYLENOL) 500 MG tablet Take 500 mg by mouth every 4 (four) hours as needed for pain.      ? amLODIPine (NORVASC) 5 MG tablet Take 1 tablet (5 mg total) by mouth daily. 90 tablet 11   ? atorvastatin (LIPITOR) 80 MG tablet Take 1 tablet (80 mg total) by mouth at bedtime. 90 tablet 3   ? azelastine (ASTELIN) 137 mcg (0.1 %) nasal spray 2 sprays into each nostril 2 (two) times a day Use in each nostril as directed. 90 mL 3   ? blood glucose test (ACCU-CHEK MOISE) strips Use 1 each As Directed daily. 100 strip 3   ? blood-glucose meter Misc Use Daily 1 each 0   ? CALCIUM CARBONATE (CALCIUM 500 ORAL) Take by mouth 2 (two) times a day.     ? cholecalciferol, vitamin D3, 1,000 unit tablet Take 1,000 Units by mouth 2 (two) times a day.     ? colestipol (COLESTID) 1 gram tablet Take 1 tablet (1 g total) by mouth 2 (two) times a day. 180 tablet 1   ? cranberry extract 300 mg Tab Take 1 tablet by mouth daily.      ? cyclobenzaprine (FLEXERIL) 10 MG tablet Take 1 tablet (10 mg total) by mouth 2 (two) times a day as needed for muscle spasms. 10 tablet 0   ? dicyclomine (BENTYL) 10 MG capsule TAKE ONE TO TWO CAPSULES BY MOUTH EVERY 6 HOURS AS NEEDED 120 capsule 0   ? LACTOBACILLUS ACIDOPHILUS (PROBIOTIC ORAL) Take 1 tablet by mouth 2 (two) times a day.      ? lancing device Misc Use As Directed 4 (four) times a week.      ? losartan (COZAAR) 100 MG tablet Take 1 tablet (100 mg total) by mouth daily. 90 tablet 3   ? LUTEIN ORAL Take 1 tablet by mouth daily.            ? metFORMIN (GLUCOPHAGE XR) 500 MG 24 hr tablet Take 2 tablets (1,000 mg total) by mouth daily with supper. Am meal (Patient taking differently: Take 1,000 mg by mouth daily with breakfast. Am meal      ) 180 tablet 3   ? montelukast  "(SINGULAIR) 10 mg tablet Take 1 tablet (10 mg total) by mouth daily. 90 tablet 3   ? nitrofurantoin (MACRODANTIN) 50 MG capsule Take 1 capsule (50 mg total) by mouth daily. 90 capsule 3   ? omeprazole (PRILOSEC) 20 MG capsule Take 1 capsule (20 mg total) by mouth daily. 90 capsule 3   ? PROAIR HFA 90 mcg/actuation inhaler INHALE TWO PUFFS BY MOUTH FOUR TIMES A DAY AS NEEDED FOR WHEEZING 25.5 g 0   ? psyllium (METAMUCIL) 3.4 gram packet Take 1 packet by mouth every evening.     ? QVAR REDIHALER 40 mcg/actuation HFAB inhaler INHALE TWO PUFFS BY MOUTH TWICE A DAY AS NEEDED (Patient taking differently: INHALE TWO PUFFS BY MOUTH TWICE A DAY) 10.6 g 2   ? SIMETHICONE (GAS-X ORAL) Take by mouth 2 (two) times a day.     ? tiotropium (SPIRIVA WITH HANDIHALER) 18 mcg inhalation capsule Place 1 capsule (2 puffs total) into inhaler and inhale daily. Place 1 capsule into the inhaler device. Close and press button to crush the capsule. Inhale the contents of the crushed capsule in 2 breaths. 30 capsule 2   ? UBIDECARENONE (COENZYME Q10 ORAL) Take 1 tablet by mouth daily.      ? warfarin (COUMADIN/JANTOVEN) 5 MG tablet One tab daily.  Adjust to INR of 2 to 3 90 tablet 3   ? oxyCODONE (ROXICODONE) 5 MG immediate release tablet Take 1 tablet (5 mg total) by mouth every 6 (six) hours as needed for pain. 8 tablet 0   ? sotalol (BETAPACE) 80 MG tablet Take 0.5 tablets (40 mg total) by mouth 2 (two) times a day. 60 tablet 1     No current facility-administered medications for this visit.     Allergies   Allergen Reactions   ? Conray [Iothalamate Meglumine] Anaphylaxis     30+ yr's ago / x-ray exam / was given \"dye\" iodine contrast / had Anaphylaxis reaction  [facility] - 11/30/16 0736    ? Alendronate Sodium Nausea Only   ? Amoxicillin    ? Diatrizoate Meglumine Other (See Comments)   ? Fosamax [Alendronate]      Stomach cramps     ? House Dust    ? Mold/Mildew    ? Other Allergy (See Comments)      Contrast Media Ready-Box MISC, 11/06/2007.; " Contrast Media Ready-Box MISC, 11/06/2007.     ? Penicillins    ? Prednisone Nausea Only     Other reaction(s): Abdominal Pain  She can have this as a shot not oral   ? Seafood    ? Sulfa (Sulfonamide Antibiotics)          Lab Results    Chemistry/lipid CBC Cardiac Enzymes/BNP/TSH/INR   Lab Results   Component Value Date    CHOL 191 02/08/2019    HDL 56 02/08/2019    LDLCALC 83 02/08/2019    TRIG 261 (H) 02/08/2019    CREATININE 0.77 11/26/2019    BUN 17 11/26/2019    K 4.2 11/26/2019     11/26/2019     11/26/2019    CO2 27 11/26/2019    Lab Results   Component Value Date    WBC 13.3 (H) 11/26/2019    HGB 15.2 11/26/2019    HCT 46.0 11/26/2019    MCV 86 11/26/2019     11/26/2019    Lab Results   Component Value Date    CKTOTAL 88 12/14/2016    TROPONINI <0.01 11/26/2019    TSH 0.73 09/27/2018    INR 2.28 (H) 11/26/2019

## 2021-06-28 NOTE — PROGRESS NOTES
Progress Notes by Rivera Saenz MD at 1/20/2020 10:50 AM     Author: Rivera Saenz MD Service: -- Author Type: Physician    Filed: 1/20/2020  4:43 PM Encounter Date: 1/20/2020 Status: Signed    : Rivera Saenz MD (Physician)         Thank you, Dr. Mehta, for asking the [facility] team to see Ms. Krystle Polanco to evaluate frequent PVCs and newly diagnosed AF.      Assessment/Recommendations   Assessment:    1. Asymptomatic PAF with RVR.  Chadvasc 5.   2. Frequent asymptomatic LVOT PVCs, normal LV systolic function.   3. Progressive dyspnea on exertion, multifactorial, could be worsened by #2, myocardial ischemia to be ruled out.   Body mass index is 41.55 kg/m .  4. Long-standing hypertension, currently controlled.  5. Long term use of sotalol for #1 and 2.    6. Long term anticoagulation for history of recurrent bilateral thromboemboli.    Because of the symptomatic improvement after initiation of sotalol, will continue this medication, recheck Holter monitor and limited echo with Definity in 6 months prior to her clinic visit.   In Krystle's case, catheter-based intervention targeting her PVCs would be directed by symptoms (currently absent) and/or development of LV systolic dysfunction.  A limited echo with Definity would help guiding future management of PVCs.  Low threshold to proceed with mapping and catheter ablation if she shows any signs of LV dysfunction.   Ms. Polanco's questions and concerns were addressed to her satisfaction.  She is in agreement with the proposed plan of care.  Follow-up in EP clinic in 6 months after completion of Holter and limited echo.     History of Present Illness/Subjective    Ms. Krystle Polanco is a 79 y.o. female with past medical history significant for:  1. Frequent LVOT PVCs burden between 25 to 35% by review of previous Holter's.  Asymptomatic.  2. New PAF by recent Holter monitor, total duration of 50 minutes, asymptomatic  3. Normal  LV systolic function, ejection fraction 60%.  Negative Lexiscan cardiac MRI, normal LV systolic function ejection fraction of 60%  4. Hypertension.  5. History of bilateral pulmonary thromboemboli.  6. Long-term anticoagulation using Coumadin for #4.  7. Type 2 diabetes mellitus, insulin requiring.  Body mass index is 41.55 kg/m .    Omer returns to follow-up in EP clinic accompanied by her .    Overall, she has experienced significant improvement after initiation of sotalol.  Unfortunately, she was unable to uptitrate this medication to 80 mg twice daily due to significant symptoms of fatigue and lightheadedness.   She reports that her previous symptoms of chest heaviness and shortness of breath have lessened significantly.   She continues to be not aware of the presence of PVCs.  Denies any palpitations, chest pain, lightheadedness or stroke-like symptoms.   She remains significantly occupied by her  healthcare needs.   She is trying to be more physically active.   No limitations to performing her activities of daily living.     Physical Examination Review of Systems   Vitals:    01/20/20 1053   BP: 108/72   Pulse: 68   Resp: 14   SpO2: 95%     Body mass index is 41.55 kg/m .  Wt Readings from Last 3 Encounters:   01/20/20 (!) 229 lb (103.9 kg)   12/13/19 (!) 230 lb (104.3 kg)   12/09/19 (!) 230 lb 14.4 oz (104.7 kg)     [unfilled]  General Appearance:   no distress, normal body habitus   ENT/Mouth: membranes moist, no oral lesions or bleeding gums.      EYES:  no scleral icterus, normal conjunctivae   Neck: no carotid bruits or thyromegaly   Chest/Lungs:   lungs are clear to auscultation, no rales or wheezing, no sternal scar, equal chest wall expansion    Cardiovascular:   Regular, frequent ectopy. Normal first and second heart sounds with no murmurs, rubs, or gallops; the carotid, radial and posterior tibial pulses are intact, Jugular venous pressure normal, no edema bilaterally    Abdomen:  no  organomegaly, masses, bruits, or tenderness; bowel sounds are present   Extremities: no cyanosis or clubbing   Skin: no xanthelasma, warm.    Neurologic: normal  bilateral, no tremors     Psychiatric: alert and oriented x3, calm     General: WNL  Eyes: WNL  Ears/Nose/Throat: WNL  Lungs: WNL  Heart: WNL  Stomach: WNL  Bladder: WNL  Muscle/Joints: WNL  Skin: WNL  Nervous System: WNL  Mental Health: WNL     Blood: WNL     Medical History  Surgical History Family History Social History   Past Medical History:   Diagnosis Date   ? Adenomatous goiter 2004   ? Arthropathy of shoulder region unknown   ? Asthma 2009   ? Atrial flutter (H) 2017   ? Bartholin gland cyst unknown   ? Diabetes mellitus type II, controlled (H)    ? Esophageal reflux    ? Essential hypertension    ? Gastroparesis    ? Herpes simplex type 1 infection unknown   ? IBS (irritable bowel syndrome)    ? Leukocytosis unknown   ? Osteopenia    ? Vitamin D deficiency     Past Surgical History:   Procedure Laterality Date   ? BREAST BIOPSY Left 1/22/15    Papilloma   ? HYSTERECTOMY     ? OOPHORECTOMY  1975    1 removed, 1 remains   ? OR  DELIVERY ONLY  1971    Description:  Section;  Recorded: 2008;  Comments: x2   ? OR DILATION/CURETTAGE,DIAGNOSTIC  Unknown    Description: Dilation And Curettage;  Recorded: 2007;   ? OR NASAL/SINUS ENDOSCOPY,BX/RMV POLYP/DEBRID      Description: Nasal Endoscopy Polypectomy;  Recorded: 2007;   ? OR TOTAL ABDOM HYSTERECTOMY      Description: Total Abdominal Hysterectomy;  Recorded: 2008;   ? OR TOTAL KNEE ARTHROPLASTY Bilateral     Description: Total Knee Arthroplasty;  Recorded: 2008;   ? OR XFER SINGLE SUPERFICI LOW LEG TENDON  UNknown    Description: Tibialis Posterior Tendon Transfer Right Foot;  Recorded: 2008;    Family History   Problem Relation Age of Onset   ? Breast cancer Sister 56   ? Depression Mother    ? Dementia  Mother     Social History     Socioeconomic History   ? Marital status:      Spouse name: Not on file   ? Number of children: Not on file   ? Years of education: Not on file   ? Highest education level: Not on file   Occupational History   ? Not on file   Social Needs   ? Financial resource strain: Not on file   ? Food insecurity:     Worry: Not on file     Inability: Not on file   ? Transportation needs:     Medical: Not on file     Non-medical: Not on file   Tobacco Use   ? Smoking status: Never Smoker   ? Smokeless tobacco: Never Used   Substance and Sexual Activity   ? Alcohol use: No   ? Drug use: No   ? Sexual activity: Not on file   Lifestyle   ? Physical activity:     Days per week: Not on file     Minutes per session: Not on file   ? Stress: Not on file   Relationships   ? Social connections:     Talks on phone: Not on file     Gets together: Not on file     Attends Rastafari service: Not on file     Active member of club or organization: Not on file     Attends meetings of clubs or organizations: Not on file     Relationship status: Not on file   ? Intimate partner violence:     Fear of current or ex partner: Not on file     Emotionally abused: Not on file     Physically abused: Not on file     Forced sexual activity: Not on file   Other Topics Concern   ? Not on file   Social History Narrative    She is .  She has 4 children and is a retired . She is an avid .           Medications  Allergies   Current Outpatient Medications   Medication Sig Dispense Refill   ? acetaminophen (TYLENOL) 500 MG tablet Take 500 mg by mouth every 4 (four) hours as needed for pain.      ? albuterol (PROAIR HFA) 90 mcg/actuation inhaler INHALE TWO PUFFS BY MOUTH FOUR TIMES A DAY AS NEEDED FOR WHEEZING 25.5 g 0   ? amLODIPine (NORVASC) 5 MG tablet Take 1 tablet (5 mg total) by mouth daily. 90 tablet 11   ? atorvastatin (LIPITOR) 80 MG tablet Take 1 tablet (80 mg total) by mouth at bedtime.  90 tablet 3   ? beclomethasone (QVAR REDIHALER) 40 mcg/actuation HFAB inhaler Inhale 2 puffs 2 (two) times a day. 31.8 g 3   ? blood glucose meter (GLUCOMETER) Use 1 each As Directed daily. Dispense [employer] glucometer brand per patient's insurance. 1 each 0   ? blood sugar diagnostic, disc Strp Use as directed with testing blood sugars once daily.  Dispense [employer] brand per pt's insurance coverage 100 strip 3   ? blood-glucose meter Misc Use Daily 1 each 0   ? CALCIUM CARBONATE (CALCIUM 500 ORAL) Take by mouth 2 (two) times a day.     ? cholecalciferol, vitamin D3, 1,000 unit tablet Take 1,000 Units by mouth 2 (two) times a day.     ? colestipol (COLESTID) 1 gram tablet TAKE ONE TABLET BY MOUTH TWICE A  tablet 3   ? cranberry extract 300 mg Tab Take 1 tablet by mouth daily.      ? dicyclomine (BENTYL) 10 MG capsule TAKE ONE TO TWO CAPSULES BY MOUTH EVERY 6 HOURS AS NEEDED 120 capsule 0   ? LACTOBACILLUS ACIDOPHILUS (PROBIOTIC ORAL) Take 1 tablet by mouth 2 (two) times a day.      ? lancing device Misc Use As Directed 4 (four) times a week.      ? losartan (COZAAR) 100 MG tablet Take 1 tablet (100 mg total) by mouth daily. 90 tablet 3   ? LUTEIN ORAL Take 1 tablet by mouth daily.            ? metFORMIN (GLUCOPHAGE XR) 500 MG 24 hr tablet Take 2 tablets (1,000 mg total) by mouth daily with breakfast. 180 tablet 3   ? montelukast (SINGULAIR) 10 mg tablet Take 1 tablet (10 mg total) by mouth daily. 90 tablet 3   ? nitrofurantoin (MACRODANTIN) 50 MG capsule Take 1 capsule (50 mg total) by mouth daily. 90 capsule 3   ? omeprazole (PRILOSEC) 20 MG capsule TAKE ONE CAPSULE BY MOUTH EVERY DAY 90 capsule 3   ? psyllium (METAMUCIL) 3.4 gram packet Take 1 packet by mouth every evening.     ? SIMETHICONE (GAS-X ORAL) Take by mouth 2 (two) times a day.     ? sotalol (BETAPACE) 80 MG tablet Take 0.5 tablets (40 mg total) by mouth 2 (two) times a day. 90 tablet 2   ? tiotropium (SPIRIVA WITH HANDIHALER) 18 mcg inhalation  "capsule Place 1 capsule (2 puffs total) into inhaler and inhale daily. Place 1 capsule into the inhaler device. Close and press button to crush the capsule. Inhale the contents of the crushed capsule in 2 breaths. 30 capsule 2   ? UBIDECARENONE (COENZYME Q10 ORAL) Take 1 tablet by mouth daily.      ? warfarin ANTICOAGULANT (COUMADIN/JANTOVEN) 5 MG tablet One tab daily.  Adjust to INR of 2 to 3 90 tablet 3   ? azelastine (ASTELIN) 137 mcg (0.1 %) nasal spray 2 sprays into each nostril 2 (two) times a day Use in each nostril as directed. 90 mL 3   ? blood glucose test (ACCU-CHEK MOISE) strips Use 1 each As Directed daily. 100 strip 3   ? cyclobenzaprine (FLEXERIL) 10 MG tablet Take 1 tablet (10 mg total) by mouth 2 (two) times a day as needed for muscle spasms. 20 tablet 0     No current facility-administered medications for this visit.     Allergies   Allergen Reactions   ? Conray [Iothalamate Meglumine] Anaphylaxis     30+ yr's ago / x-ray exam / was given \"dye\" iodine contrast / had Anaphylaxis reaction  JDI - 11/30/16 1509    ? Alendronate Sodium Nausea Only   ? Amoxicillin    ? Diatrizoate Meglumine Other (See Comments)   ? Fosamax [Alendronate]      Stomach cramps     ? House Dust    ? Mold/Mildew    ? Other Allergy (See Comments)      Contrast Media Ready-Box MISC, 11/06/2007.; Contrast Media Ready-Box MISC, 11/06/2007.     ? Penicillins    ? Prednisone Nausea Only     Other reaction(s): Abdominal Pain  She can have this as a shot not oral   ? Seafood    ? Sulfa (Sulfonamide Antibiotics)          Lab Results    Chemistry/lipid CBC Cardiac Enzymes/BNP/TSH/INR   Lab Results   Component Value Date    CHOL 191 02/08/2019    HDL 56 02/08/2019    LDLCALC 83 02/08/2019    TRIG 261 (H) 02/08/2019    CREATININE 0.77 11/26/2019    BUN 17 11/26/2019    K 4.2 11/26/2019     11/26/2019     11/26/2019    CO2 27 11/26/2019    Lab Results   Component Value Date    WBC 10.8 12/04/2019    HGB 14.6 12/04/2019    HCT " 43.6 12/04/2019    MCV 87 12/04/2019     12/04/2019    Lab Results   Component Value Date    CKTOTAL 88 12/14/2016    TROPONINI <0.01 11/26/2019    TSH 0.66 12/04/2019    INR 2.70 (H) 01/08/2020

## 2021-06-28 NOTE — PROGRESS NOTES
Progress Notes by Cristiane Sosa at 11/7/2019 11:11 AM     Author: Cristiane Sosa Service: -- Author Type: Patient Access    Filed: 11/7/2019 11:11 AM Encounter Date: 11/7/2019 Status: Signed    : Cristiane Sosa (Patient Access)         Zestar Study Device Return    Krystle ARIAS Sherri returned all the devices for the Zestar study.  Krystle M Sherri denies medication changes or adverse events since last visit.    Krytsle ARIAS [place] has now completed their participation in the Zestar study.   Thank you for your gift of participation.    Cristiane Sosa

## 2021-06-28 NOTE — PROGRESS NOTES
Progress Notes by Marie Morfin at 11/4/2019 12:30 PM     Author: Marie Morfin Service: -- Author Type: Patient Access    Filed: 11/8/2019 10:47 AM Encounter Date: 11/4/2019 Status: Addendum    : Marie Morfin (Patient Access)    Related Notes: Original Note by Marie Morfin (Patient Access) filed at 11/4/2019  4:15 PM              Zestar Study Consent Visit    Study description: ECG and PPG Study: Zestar Study      Note time seated: 12:45 pm    Krystle Polanco a 79 y.o. female , was seen in [facility] today to discuss participation in the Zestar study.   The consent discussion began on 30 Oct 2019.  Please refer to phone call note from Clau Coates for more details.  The consent form was reviewed with the patient.     The review of the study included:    Study purpose     Conflict of interest    Device description      Study visits    Risks of participation    Benefits (if any)    Alternatives    Voluntary participation    Confidentiality     Compensation/costs of participation    Study stipends    Injury and legal rights    The subject was provided time to review the consent form and consider participation. her questions were answered to her satisfaction.   The patient has voluntarily agreed to participate in the above noted study.     The consent form version 25 Sep 2019 and HIPAA form version 11 Jun 2019 was signed 11/04/19 at 1:20 pm    The subject was provided with a copy of the consent form and HIPAA. A copy of the signed forms was forwarded to medical records.    No study procedures were done prior to Krystle Polanco providing informed consent.     Marie Morfin    Subject Restrictions During Study -Confirmed with Subject prior to any study procedures completed    Restrictions on jewelry, recreational drugs, caffeine, and exercise few days prior and during study.   1. Subjects should not consume excessive amount of caffeine (6 or more 8-oz cups of coffee, or more than 570 mg of caffeine from  energy drinks, pills or similar substance) during their participation in the study.   2. Subjects should not consume excessive amount of alcohol for the duration of their participation in the study. A typical moderate amount is allowed during stage 3.   3. Subjects should not take any recreational drugs (including, but not limited to methamphetamines, cocaine, opioids, cannabis, LSD) for the duration of their participation in the study.   4. Subjects should not wear underwire bra or jewelry during the in-lab study (to not interfere with electrode placement and ECG data recordings).   5. Subject will not be permitted to have their cell phone or any electronic recording device on or with them during the in-lab test session(s).   6. Subjects under 22 years old will not be permitted to take ECG recordings through the ECG jonathan on the wrist-worn devices.     For study stage 3 only   1. Subjects should only do high intensity exercise (e.g. sprinting, heavy lifting, etc.) in the morning upon awakening or else not at all   2. Subjects should abstain from swimming during the time of the study   3. Subjects should only shower in the morning upon awakening (or else not at all)   4. Female subjects are strongly suggested to wear non-underwire bras throughout this stage of the study     Marieeyal Morfin      Study Data collections   Vitals  (TPBP)     Vitals:    11/04/19 1329  11/04/19 1349 11/04/19 1350   BP: 167/70  142/60 140/54   Patient Site: Left Arm  Left Arm Left Arm   Patient Position: Sitting  Sitting Sitting   Cuff Size: Adult Large  Adult Large Adult Large   Pulse: (!) 50  (!) 54 (!) 55   Resp: 18      Temp: 97.6  F (36.4  C)         VS taken after 5 min rest     MAP 1    102  MAP 2      87   MAP 3             83          Body mass index is 41.66 kg/m .  female  1940  79 y.o.      Note time patient placed in supine position: 1:55 PM     Ethnicity   []   or    [x]  Not  or   Race   []   " or    []    []  Black or   []   or Other   [x]  White  Physical Activity Level  per subject report:   []  0- Extremely Inactive []  1- Sedentary [x]  2- Moderately Active  []  3- Vigorously Active []  4- Extremely Active  Trained Athlete   [] Yes  [x] No     Armijo's' Skin type   [] Type 1 [x] Type 2 [] Type 3    [] Type 4 [] Type 5 [] Type 6    Subject participated in previous ECG [facility] at [facility]: [] Yes    [x] No    Past Medical History:   Diagnosis Date   ? Adenomatous goiter 2004   ? Arthropathy of shoulder region unknown   ? Asthma 2009   ? Atrial flutter (H) 2017   ? Bartholin gland cyst unknown   ? Diabetes mellitus type II, controlled (H) 2004   ? Esophageal reflux 1980   ? Essential hypertension 1995   ? Gastroparesis 1999   ? Herpes simplex type 1 infection unknown   ? IBS (irritable bowel syndrome) 1971   ? Leukocytosis unknown   ? Osteopenia 2009   ? Vitamin D deficiency 2014       HISTORY OF HEART RHYTHM ABNORMALITIES - check all that apply  []  None   []  Atrial Flutter  [] Frequent PACs (>3 in 30 secs)  [] AF (permanent)  []  Tachycardia  [] Bigeminy, trigeminy, and/or quadgeminy  []  AF (persistent)  []  Heart Block   []  AF (paroxysmal)  [x] PVCs    [] AF (other)    [] BBB    []  Other:   How many years? 2  Special interest allergies: active allergic skin reactions  Allergies   Allergen Reactions   ? Conray [Iothalamate Meglumine] Anaphylaxis     30+ yr's ago / x-ray exam / was given \"dye\" iodine contrast / had Anaphylaxis reaction  JDI - 11/30/16 1509    ? Alendronate Sodium Nausea Only   ? Amoxicillin    ? Diatrizoate Meglumine Other (See Comments)   ? Fosamax [Alendronate]      Stomach cramps     ? House Dust    ? Mold/Mildew    ? Other Allergy (See Comments)      Contrast Media Ready-Box MISC, 11/06/2007.; Contrast Media Ready-Box MISC, 11/06/2007.     ? Penicillins    ? Prednisone Nausea Only     Other " reaction(s): Abdominal Pain  She can have this as a shot not oral   ? Seafood    ? Sulfa (Sulfonamide Antibiotics)        Current Outpatient Medications:   ?  acetaminophen (TYLENOL) 500 MG tablet, Take 500 mg by mouth every 4 (four) hours as needed for pain. , Disp: , Rfl:   ?  amLODIPine (NORVASC) 5 MG tablet, Take 1 tablet (5 mg total) by mouth daily., Disp: 90 tablet, Rfl: 11  ?  atorvastatin (LIPITOR) 80 MG tablet, Take 1 tablet (80 mg total) by mouth at bedtime., Disp: 90 tablet, Rfl: 3  ?  azelastine (ASTELIN) 137 mcg (0.1 %) nasal spray, 2 sprays into each nostril 2 (two) times a day Use in each nostril as directed., Disp: 90 mL, Rfl: 3  ?  blood glucose test (ACCU-CHEK MOISE) strips, Use 1 each As Directed daily., Disp: 100 strip, Rfl: 3  ?  blood-glucose meter Misc, Use Daily, Disp: 1 each, Rfl: 0  ?  CALCIUM CARBONATE (CALCIUM 500 ORAL), Take by mouth 2 (two) times a day., Disp: , Rfl:   ?  cholecalciferol, vitamin D3, 1,000 unit tablet, Take 1,000 Units by mouth 2 (two) times a day., Disp: , Rfl:   ?  colestipol (COLESTID) 1 gram tablet, Take 1 tablet (1 g total) by mouth 2 (two) times a day., Disp: 180 tablet, Rfl: 1  ?  cranberry extract 300 mg Tab, Take 1 tablet by mouth daily. , Disp: , Rfl:   ?  dicyclomine (BENTYL) 10 MG capsule, TAKE ONE TO TWO CAPSULES BY MOUTH EVERY 6 HOURS AS NEEDED, Disp: 120 capsule, Rfl: 0  ?  LACTOBACILLUS ACIDOPHILUS (PROBIOTIC ORAL), Take 1 tablet by mouth 2 (two) times a day. , Disp: , Rfl:   ?  lancing device Misc, Use As Directed 4 (four) times a week. , Disp: , Rfl:   ?  losartan (COZAAR) 100 MG tablet, Take 1 tablet (100 mg total) by mouth daily., Disp: 90 tablet, Rfl: 3  ?  LUTEIN ORAL, Take 1 tablet by mouth daily.    , Disp: , Rfl:   ?  metFORMIN (GLUCOPHAGE XR) 500 MG 24 hr tablet, Take 2 tablets (1,000 mg total) by mouth daily with supper. Am meal (Patient taking differently: Take 1,000 mg by mouth daily with breakfast. Am meal   ), Disp: 180 tablet, Rfl: 3  ?   "montelukast (SINGULAIR) 10 mg tablet, Take 1 tablet (10 mg total) by mouth daily., Disp: 90 tablet, Rfl: 3  ?  nitrofurantoin (MACRODANTIN) 50 MG capsule, Take 1 capsule (50 mg total) by mouth daily., Disp: 90 capsule, Rfl: 3  ?  omeprazole (PRILOSEC) 20 MG capsule, Take 1 capsule (20 mg total) by mouth daily., Disp: 90 capsule, Rfl: 3  ?  PROAIR HFA 90 mcg/actuation inhaler, INHALE TWO PUFFS BY MOUTH FOUR TIMES A DAY AS NEEDED FOR WHEEZING, Disp: 25.5 g, Rfl: 0  ?  psyllium (METAMUCIL) 3.4 gram packet, Take 1 packet by mouth every evening., Disp: , Rfl:   ?  QVAR REDIHALER 40 mcg/actuation HFAB inhaler, INHALE TWO PUFFS BY MOUTH TWICE A DAY AS NEEDED (Patient taking differently: INHALE TWO PUFFS BY MOUTH TWICE A DAY), Disp: 10.6 g, Rfl: 2  ?  SIMETHICONE (GAS-X ORAL), Take by mouth 2 (two) times a day., Disp: , Rfl:   ?  tiotropium (SPIRIVA WITH HANDIHALER) 18 mcg inhalation capsule, Place 1 capsule (2 puffs total) into inhaler and inhale daily. Place 1 capsule into the inhaler device. Close and press button to crush the capsule. Inhale the contents of the crushed capsule in 2 breaths., Disp: 30 capsule, Rfl: 2  ?  UBIDECARENONE (COENZYME Q10 ORAL), Take 1 tablet by mouth daily. , Disp: , Rfl:   ?  warfarin (COUMADIN/JANTOVEN) 5 MG tablet, One tab daily.  Adjust to INR of 2 to 3, Disp: 90 tablet, Rfl: 3      10-sec 12-lead ECG & 30-sec 12-lead ECG rhythm strip done; reviewed by & PE done by Diana Thomas  Subject Questionnaire    OCCUPATION: retired   Predominately works outdoors  [] Yes    [x] No       Hours/week spent outdoors (total, not only for work): 8    Frequently participates in \"hand intensive\" activities [] Yes [x] No  Caffeine  []  Never  [] Occasionally        []  Daily (1 cup/day)     [x]  Daily (>1 cup/day)    Alcohol   [x]  Never  [] Light (drink or 2 occasionally) []  Moderate (a drink or 2 almost daily)   []  Occasional-heavy (more than a few drinks <2x / month)  [] Heavy (more than a few drinks " ">2x / month)    Tobacco/nicotine  [x]  Never  [] Rarely  []  Frequently/ Daily     Mattress Information  [] Subject did not participate in Stage 3  Mattress type:  []  Memory foam [] Gel  [x] Innerspring (coil)  [] Airbed  [] Waterbed [] Shikibuton  [] Hybrid  [] No mattress  [] Other (comment):    Mattress foundation   [] Mattress on floor/ground    [] Mattress on foundation/box spring on floor/ground  [x] Mattress on foundation/box spring on bed frame  [] Mattress on tatami on floor/ground  [] Other (comment):    Mattress topper   [x] No mattress topper  [] Pillow top  [] Foam top - flat style  [] Foam top - \"egg crate\" style  [] Other (comment):    Co-sleeper    [x]  Yes  []  No    CPAP use   [x] Yes  [] No      Dominant hand [] left  [x] right [] ambidextrous  Preferred Wrist to wear band on   [x]  left   []  right   Were screening day & study day: [x]  same [] different   Same: wrist circumference:      170   mm  Device wearing wrist skin fold thickness:       8.4  mm  Wrist Band Size:     [x]  Flush Fit S/M  []  Non-Flush Fit S/M   []  Flush Fit M/L  []  Non-Flush Fit M/L  []  Flush Fit XL  []  Non-Flush Fit XL    Preferred/natural band notch: 7  Secure band notch: 7  Crown orientation:  []  left   [x]  right  Device wearing wrist hairiness:     [x]  Light []  Medium       []  Heavy  Spectophotometer   L A B   Reading #1  62.07 12.67  17.00    Reading #2  62.33 11.43 17.12    Reading #3  62.45 10.05  17.65      Pregnancy test    [] WOCBP (age <55 yrs, no tubal ligation, no hysterectomy)    [x] n/a male or female not child bearing potential  Room Temperature ( C): 23  CS Laptop ID: [id number]  CS Cam ID:[id number]  Device Set ID:[id number]  Wrist Device ID:[id number]  Subject has now completed their in-house participation in the Zestar study. Subject will complete Stage 3 at home for the next 3 days and return the equipment on 7 Nov 2019 .  Marie Morfin"

## 2021-06-28 NOTE — PROGRESS NOTES
Progress Notes by Marie Morfin at 11/4/2019 12:30 PM     Author: Marie Morfin Service: -- Author Type: Patient Access    Filed: 11/10/2019  2:28 PM Encounter Date: 11/4/2019 Status: Addendum    : Tanner Wong MD (Physician)    Related Notes: Original Note by Marie Morfin (Patient Access) filed at 11/8/2019 10:46 AM           [facility] Study Inclusion / Exclusion Criteria  Protocol Version 3.0 ([id number])    Inclusion Criteria    Yes No Criteria # Subject must meet all inclusion criteria:      [x]    []  1 At least 18 years old for NSR and 22 years old for Non-NSR. Inclusive for both cohorts, at time of screening, with no upper limit on age.        [x]      []  2 To be enrolled as a non-NSR subject the volunteer must have one of the following conditions: Permanent/Persistent AF, Hx of paroxysmal AF, Hx of High-rate AF, AF + rate control medication, Hx Atrial Flutter, PVC burden >1%, Frequent PACs, Hx BBB, Hx 2nd degree block (any type), Hx Bigeminy/Trigeminy/Quadgeminy, Hx Tachycardia. For subjects with any of the following diagnoses, the condition must be present at the time of screening:   ? Permanent/persistent AF  ? PVC [place]  ? Frequent PACs   Note: If not present at screening, subjects may not be enrolled as a non-NSR subject or NSR subject.         3  [x] N/A HE site For Subjects to be enrolled as NSR they must be in NSR at the time of screening as determined by the investigator. For subjects ?65 years old with PVC burden of ?1% or infrequent PACs (<3 ectopic beats in 30 seconds), they may qualify as individuals in the NSR cohort as long as they don't have a medical history/diagnosis of significant ectopic burden. For subjects ? 66 years old with PVC burden > 1% but ?10%, they may qualify as individuals in the NSR cohort as long as they don't have a medical history/diagnosis of significant ectopic burden.    [x]  []  4 Able to read and understand a written ICF    [x]  []  5 Willing and able  to participate in the study procedures and comply with its restrictions    [x]  []  6 Able to communicate effectively with study staff as well as understand and follow directions    All must be Yes    Exclusion Criteria-all must be no  Yes No Criteria # Subject must not meet any exclusion criteria:    []  [x]  1 Physical disability that prevents safe and adequate testing.   []  [x]  2 Pregnant women or women planning to become pregnant   []  [x]  3 Any acute illness or condition that may interfere with study procedures (e.g. cough, fever, sore throat, headache, sunburn, etc.)   []  [x]  4 Clinically significant hand tremors, as judged by the Investigator   []  [x]  5 Resting hypertension with systolic blood pressure ?161 mmHg or diastolic blood pressure ?101 mmHg (if at least 2 of 3 measurements meet this criteria)   []  [x]  6 Subjects with implanted cardiac devices such as a pacemaker or an automated implantable cardioverter- defibrillator (AICD)   []  [x]  7 Acute myocardial infarction (MI) within 90 days from the screening visit   []  [x]  8 Other cardiovascular disease that increases the risk to the subject or would render the data uninterpretable in the opinion of the Investigator (e.g., recent or ongoing unstable angina, significant valvular heart disease or chronic heart failure, myocarditis or pericarditis)    []  [x]  9 Acute pulmonary embolism, pulmonary infarction, or deep vein thrombosis within 90 days from the screening visit    []  [x]  10 Stroke or transient ischemic attack within 90 days from the screening visit    []  [x]  11 Known untreated medical conditions as determined by the Investigator, such as but not limited to significant anemia, important electrolyte imbalance and untreated or uncontrolled thyroid disease.    []  [x]  12 Any history of wrist surgery with scarring in the area of the sensor location on the wrist where the subject will be wearing the watch;    []  [x]  13 Open wound(s) on  the wrist and forearm where the subject will be wearing the watch    []  [x]  14 Severe symptomatic (or active) overly dry/injured skin, skin disorders, or allergic skin reactions such as eczema, rosacea, impetigo, dermatomyositis or allergic contact dermatitis on wrist and locations where the electrodes will be placed (e.g. chest, forearms, stomach), as determined by the investigator.    []  [x]  15 Tattoos, scars or moles in the area of the sensor location on the wrist where the subject will be wearing the watch    []  [x]  16 Device wearing Wrist circumference ? 129 mm or ? 246 mm    []  [x]  17 Known significant sensitivity to medical adhesives or isopropyl alcohol (for ECG electrode placement)    []  [x]  18 Known allergy or sensitivity to fluorocarbon-based synthetic rubber, such as contact dermatitis with fluoroelastomer bands primarily used in wrist worn fitness devices    []  [x]  19 Subjects with any Medical History, Physical exam, vital sign or any other study procedure finding/assessment that in the opinion of the investigator could compromise subject safety during study participation or interfere with the study integrity and/or the accurate assessment of the study objectives    []  [x]  20 Subject works for a company that develops or sells medical and/or fitness devices (e.g., ECG monitors, wearable fitness bands, sleep monitors, etc.) or are technology journalists (e.g., professional bloggers, TV, magazine, newspaper reporters, etc.)    []  [x]  21 Weight > 181 kg for subjects using the stationary bike and/or treadmill. Weight of ?138 kg for NSR subjects.    []  [x]  22 Subject is employed in shift work, or otherwise does not maintain a reasonably consistent day/night schedule (e.g. Subjects who go to bed after 4am).    []  [x]  23 Overnight travel planned during data collection nights    []  [x]  24 Non-NSR subjects should not have partaken in strenuous physical activity within 12 hours prior to  screening    []  [x]  25 Non-NSR subjects with Atrial fibrillation categories: Subjects taking Class 1 or Class 3 antiarrhythmic agents such as the following may not take part in any stage of the study: amiodarone, sotalol, dronedarone, ibutilide, dofetilide, propafenone, quinidine, procainamide, disopyramide, flecainide (Subjects taking class 2, 4 or 5 antiarrhythmic agents may take part the study).    []  [x]  26 Subjects who have both a history of paroxysmal AF and a Armijo skin type measurement of VI    []  [x]  27 Subjects who have missing index fingers on both hands      Patient meets inclusion criteria. No exclusion criteria documented.    Marie Morfin

## 2021-06-28 NOTE — PROGRESS NOTES
Progress Notes by Diana Thomas CNP at 11/4/2019 12:30 PM     Author: Diana Thomas CNP Service: -- Author Type: Nurse Practitioner    Filed: 11/4/2019  4:15 PM Encounter Date: 11/4/2019 Status: Signed    : Diana Thomas CNP (Nurse Practitioner)        [facility] Study    Physical Examination  For abnormal findings, please evaluate if the finding is Clinically Significant (by 'CS') or Not Clinically Significant (by 'NCS')  General Appearance Normal  Head and Neck  Normal  Lungs    Normal  Cardiovascular  Normal  Abdomen   Normal  Musculoskeletal/Extremities Normal   Lymph Nodes  Normal  Skin    Normal  Neurological   Normal   Tremor absent     If present, evaluate severity on 1-10 scale    Diana Thomas CNP

## 2021-06-29 NOTE — PROGRESS NOTES
Progress Notes by Rivera Saenz MD at 8/20/2020 11:30 AM     Author: Rivera Saenz MD Service: -- Author Type: Physician    Filed: 8/20/2020  2:42 PM Encounter Date: 8/20/2020 Status: Signed    : Rivera Saenz MD (Physician)         Thank you, Dr. Mehta, for asking the [facility] team to see Ms. Krystle Polanco to evaluate frequent PVCs and newly diagnosed AF.      Assessment/Recommendations   Assessment:    1. Asymptomatic PAF with RVR.  Chadvasc 5 with long-term use of Coumadin without bleeding complications.  AF adequately suppressed with low-dose sotalol 40 mg twice daily.  Patient could not tolerate up titration of sotalol to  80 mg twice daily.   2. Very frequent LVOT PVCs, no direct symptoms such as palpitations, suspect shortness of breath to be worsened by high burden of ventricular ectopy.  3. Progressive dyspnea on exertion, multifactorial, could be worsened by #2, myocardial ischemia ruled out with stress MRI.  Ejection fraction was 60-65%.    4. Long-standing hypertension, currently controlled.    It is difficult to assess the contribution of PVCs to patient's symptoms of exertional dyspnea.    Obviously, PVCs can cause LV dyssynchrony and decreased cardiac output.  Given that, the patient's significantly increased PVCs burden increases the chance of that her symptoms are related to PVCs given in the absence of LV dysfunction.  In addition to frequent PVCs, increased BMI, deconditioning and diastolic LV dysfunction could also be contributing to her symptoms.   I told Ms. Polanco that it is reasonable to consider mapping catheter ablation for frequent PVCs.  At this time, she is reluctant to proceed because of her  healthcare situations and need for multiple surgeries.    However, she is interested in returning to clinic in 6 months to reevaluate her symptoms and reassess her need and candidacy for mapping catheter ablation, which I think is very reasonable.        Meanwhile, I encouraged her to increase her efforts to lose weight and engage in daily aerobic exercise.    Ms. Polanco wishes to transition her anticoagulation to apixaban, which I also think is reasonable.      We will schedule follow-up in 6 months, echocardiogram will be obtained if her symptoms worsen.        History of Present Illness/Subjective    Ms. Krystle Polanco is a 80 y.o. female with past medical history significant for:  1. Frequent LVOT PVCs burden between 25 to 35% by review of previous Holter's.  No direct symptoms such as palpitations, possible PVCs related shortness of breath.   2. Paroxysmal atrial fibrillation, sinus rhythm maintenance using sotalol.  Long-term anticoagulation using Coumadin   3. Negative Lexiscan cardiac MRI, normal LV systolic function ejection fraction of 60%  4. Hypertension.  5. 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  6. Type 2 diabetes mellitus, insulin requiring.    Krystle returns for follow-up today, accompanied by her .   No direct symptoms related to PVCs such as palpitations.  Denies lightheadedness, near-syncope or syncope.  Describes worsening of dyspnea with exertion over the past 3 to 4 months.  She attributes the symptoms to deconditioning.   Denies orthopnea, PND's or lower extremity swelling.  No bleeding complications associated with the use of Coumadin. Tolerating low-dose sotalol without side effects.     Repeat Holter monitor shows persistently elevated PVCs burden now at 35%.    Echocardiogram shows preserved LV systolic function ejection fraction of 55%.      Physical Examination Review of Systems   There were no vitals filed for this visit.  There is no height or weight on file to calculate BMI.  Wt Readings from Last 3 Encounters:   07/20/20 (!) 228 lb (103.4 kg)   03/03/20 (!) 228 lb (103.4 kg)   02/04/20 (!) 229 lb 11.2 oz (104.2 kg)     [unfilled]  General Appearance:   no distress, normal body habitus    ENT/Mouth: membranes moist, no oral lesions or bleeding gums.      EYES:  no scleral icterus, normal conjunctivae   Neck: no carotid bruits or thyromegaly   Chest/Lungs:   lungs are clear to auscultation, no rales or wheezing, no sternal scar, equal chest wall expansion    Cardiovascular:   Regular, frequent ectopy. Normal first and second heart sounds with no murmurs, rubs, or gallops; the carotid, radial and posterior tibial pulses are intact, Jugular venous pressure normal, no edema bilaterally    Abdomen:  no organomegaly, masses, bruits, or tenderness; bowel sounds are present   Extremities: no cyanosis or clubbing   Skin: no xanthelasma, warm.    Neurologic: normal  bilateral, no tremors     Psychiatric: alert and oriented x3, calm                  Negative except for findings in HPI.                             Medical History  Surgical History Family History Social History   Past Medical History:   Diagnosis Date   ? Adenomatous goiter    ? Arthropathy of shoulder region unknown   ? Asthma 2009   ? Atrial flutter (H) 2017   ? Bartholin gland cyst unknown   ? Diabetes mellitus type II, controlled (H)    ? Esophageal reflux    ? Essential hypertension    ? Gastroparesis    ? Herpes simplex type 1 infection unknown   ? IBS (irritable bowel syndrome)    ? Leukocytosis unknown   ? Osteopenia 2009   ? Vitamin D deficiency 2014    Past Surgical History:   Procedure Laterality Date   ? BREAST BIOPSY Left 1/22/15    Papilloma   ? HYSTERECTOMY     ? OOPHORECTOMY      1 removed, 1 remains   ? MT  DELIVERY ONLY      Description:  Section;  Recorded: 2008;  Comments: x2   ? MT DILATION/CURETTAGE,DIAGNOSTIC  Unknown    Description: Dilation And Curettage;  Recorded: 2007;   ? MT NASAL/SINUS ENDOSCOPY,BX/RMV POLYP/DEBRID      Description: Nasal Endoscopy Polypectomy;  Recorded: 2007;   ? MT TOTAL ABDOM HYSTERECTOMY      Description: Total  Abdominal Hysterectomy;  Recorded: 07/09/2008;   ? CO TOTAL KNEE ARTHROPLASTY Bilateral 2008    Description: Total Knee Arthroplasty;  Recorded: 02/11/2008;   ? CO XFER SINGLE SUPERFICI LOW LEG TENDON  UNknown    Description: Tibialis Posterior Tendon Transfer Right Foot;  Recorded: 02/12/2008;    Family History   Problem Relation Age of Onset   ? Breast cancer Sister 56   ? Depression Mother    ? Dementia Mother     Social History     Socioeconomic History   ? Marital status:      Spouse name: Not on file   ? Number of children: Not on file   ? Years of education: Not on file   ? Highest education level: Not on file   Occupational History   ? Not on file   Social Needs   ? Financial resource strain: Not on file   ? Food insecurity     Worry: Not on file     Inability: Not on file   ? Transportation needs     Medical: Not on file     Non-medical: Not on file   Tobacco Use   ? Smoking status: Never Smoker   ? Smokeless tobacco: Never Used   Substance and Sexual Activity   ? Alcohol use: No   ? Drug use: No   ? Sexual activity: Not on file   Lifestyle   ? Physical activity     Days per week: Not on file     Minutes per session: Not on file   ? Stress: Not on file   Relationships   ? Social connections     Talks on phone: Not on file     Gets together: Not on file     Attends Anabaptism service: Not on file     Active member of club or organization: Not on file     Attends meetings of clubs or organizations: Not on file     Relationship status: Not on file   ? Intimate partner violence     Fear of current or ex partner: Not on file     Emotionally abused: Not on file     Physically abused: Not on file     Forced sexual activity: Not on file   Other Topics Concern   ? Not on file   Social History Narrative    She is .  She has 4 children and is a retired . She is an avid .           Medications  Allergies   Current Outpatient Medications   Medication Sig Dispense Refill   ?  acetaminophen (TYLENOL) 500 MG tablet Take 500 mg by mouth every 4 (four) hours as needed for pain.      ? albuterol (PROAIR HFA) 90 mcg/actuation inhaler INHALE TWO PUFFS BY MOUTH FOUR TIMES A DAY AS NEEDED FOR WHEEZING 25.5 g 0   ? amLODIPine (NORVASC) 5 MG tablet Take 1 tablet (5 mg total) by mouth daily. 90 tablet 3   ? atorvastatin (LIPITOR) 80 MG tablet Take 1 tablet (80 mg total) by mouth at bedtime. 90 tablet 3   ? azelastine (ASTELIN) 137 mcg (0.1 %) nasal spray 2 sprays into each nostril 2 (two) times a day. Use in each nostril as directed 90 mL 3   ? beclomethasone (QVAR REDIHALER) 40 mcg/actuation HFAB inhaler Inhale 2 puffs 2 (two) times a day. 31.8 g 3   ? blood sugar diagnostic, disc Strp Use as directed with testing blood sugars once daily.  Dispense Contour Next brand per pt's insurance coverage 100 strip 3   ? CALCIUM CARBONATE (CALCIUM 500 ORAL) Take by mouth 2 (two) times a day.     ? cholecalciferol, vitamin D3, 1,000 unit tablet Take 1,000 Units by mouth 2 (two) times a day.     ? colestipoL (COLESTID) 1 gram tablet Take 1 tablet (1 g total) by mouth 2 (two) times a day. 180 tablet 3   ? cranberry extract 300 mg Tab Take 1 tablet by mouth daily.      ? dicyclomine (BENTYL) 10 MG capsule Take 10 mg by mouth 2 (two) times a day as needed.     ? estradiol (ESTRACE) 0.01 % (0.1 mg/gram) vaginal cream Insert 2 g into the vagina every other day.     ? LACTOBACILLUS ACIDOPHILUS (PROBIOTIC ORAL) Take 1 tablet by mouth 2 (two) times a day.      ? lancing device Misc Use As Directed 4 (four) times a week.      ? losartan (COZAAR) 100 MG tablet Take 1 tablet (100 mg total) by mouth daily. 90 tablet 3   ? LUTEIN ORAL Take 1 tablet by mouth daily.            ? metFORMIN (GLUCOPHAGE XR) 500 MG 24 hr tablet 2 tabs in am/ one tab in pm 270 tablet 3   ? metroNIDAZOLE (METROGEL) 0.75 % gel Apply 1 application topically 2 (two) times a day.     ? montelukast (SINGULAIR) 10 mg tablet Take 1 tablet (10 mg total) by  "mouth daily. 90 tablet 3   ? nitrofurantoin (MACRODANTIN) 50 MG capsule Take 1 capsule (50 mg total) by mouth daily. 90 capsule 3   ? omeprazole (PRILOSEC) 20 MG capsule Take 1 capsule (20 mg total) by mouth daily. 90 capsule 3   ? psyllium (METAMUCIL) 3.4 gram packet Take 1 packet by mouth every evening.     ? SIMETHICONE (GAS-X ORAL) Take by mouth 2 (two) times a day.     ? sotaloL (BETAPACE) 80 MG tablet Take 0.5 tablets (40 mg total) by mouth 2 (two) times a day. 90 tablet 3   ? tiotropium (SPIRIVA WITH HANDIHALER) 18 mcg inhalation capsule Place 1 capsule (2 puffs total) into inhaler and inhale daily. Place 1 capsule into the inhaler device. Close and press button to crush the capsule. Inhale the contents of the crushed capsule in 2 breaths. 30 capsule 2   ? UBIDECARENONE (COENZYME Q10 ORAL) Take 1 tablet by mouth daily.      ? warfarin ANTICOAGULANT (COUMADIN/JANTOVEN) 2.5 MG tablet Take 1/2 to 1 tablets (1.25 mg to 2.5 mg) by mouth daily. Adjust dose based on INR results as directed. 100 tablet 1     No current facility-administered medications for this visit.     Allergies   Allergen Reactions   ? Conray [Iothalamate Meglumine] Anaphylaxis     30+ yr's ago / x-ray exam / was given \"dye\" iodine contrast / had Anaphylaxis reaction  [facility]DI - 11/30/16 1509    ? Alendronate Sodium Nausea Only   ? Amoxicillin    ? Diatrizoate Meglumine Other (See Comments)   ? Fosamax [Alendronate]      Stomach cramps     ? House Dust    ? Mold/Mildew    ? Other Allergy (See Comments)      Contrast Media Ready-Box MISC, 11/06/2007.; Contrast Media Ready-Box MISC, 11/06/2007.     ? Penicillins    ? Prednisone Nausea Only     Other reaction(s): Abdominal Pain  She can have this as a shot not oral   ? Seafood    ? Sulfa (Sulfonamide Antibiotics)          Lab Results    Chemistry/lipid CBC Cardiac Enzymes/BNP/TSH/INR   Lab Results   Component Value Date    CHOL 191 02/08/2019    HDL 56 02/08/2019    LDLCALC 83 02/08/2019    TRIG 261 (H) " 02/08/2019    CREATININE 0.71 03/03/2020    BUN 18 03/03/2020    K 4.6 03/03/2020     03/03/2020     03/03/2020    CO2 26 03/03/2020    Lab Results   Component Value Date    WBC 10.0 03/03/2020    HGB 14.9 03/03/2020    HCT 44.3 03/03/2020    MCV 89 03/03/2020     03/03/2020    Lab Results   Component Value Date    CKTOTAL 88 12/14/2016    TROPONINI <0.01 11/26/2019    TSH 0.66 12/04/2019    INR 2.60 (H) 08/06/2020

## 2021-06-29 NOTE — PROGRESS NOTES
Progress Notes by Opal Armando OT at 10/9/2020 12:30 PM     Author: Opal Armando OT Service: -- Author Type: Occupational Therapist    Filed: 10/9/2020  1:59 PM Encounter Date: 10/9/2020 Status: Attested    : Opal Armando OT (Occupational Therapist) Cosigner: Elke Mehta MD at 10/12/2020 10:22 AM    Attestation signed by Elke Mehta MD at 10/12/2020 10:22 AM    ok                    Rehabilitation Certification Request    October 9, 2020      Patient: Krystle Polanco  MR Number: [id number]  YOB: 1940  Date of Visit: 10/9/2020      Dear Dr. Elke Mehta:    Thank you for this referral.   We are seeing Krystle Polanco in Occupational Therapy for vertigo.    Medicare and/or Medicaid requires physician review and approval of the treatment plan. Please review the plan of care and verify that you agree with the therapy plan of care by co-signing this note.      Plan of Care  Authorization / Certification Start Date: 10/09/20  Authorization / Certification End Date: 01/07/21  Authorization / Certification Number of Visits: 12  Communication with: Referral Source  Patient Related Instruction: Nature of Condition;Treatment plan and rationale;Basis of treatment;Expected outcome  Times per Week: 1  Number of Weeks: 12  Number of Visits: 12  Neuromuscular Reeducation: vestibular  Functional Training (ADL's): compensatory training  Canolith Repostioning: .      Goals:  Patient will bend: to dress;without vertigo;without loss of balance;in 12 weeks  Patient able to perform bed mobility: without vertigo;in 12 weeks  Patient will turn head: without dizziness;for conversation;in 12 weeks  Patient will look up / down: without vertigo;for drinking;in 12 weeks        If you have any questions or concerns, please don't hesitate to call.    Sincerely,      Opal Armando OT        Physician recommendation:                                ___ Follow therapist's  recommendation                                                                                                    ___ Modify therapy                                                                                                      Physician Signature:_____________________                                                                                                                                        Date:___________________________    *Physician co-signature indicates they certify the need for these services furnished within this plan and while under their care.         Vestibular Initial Evaluation    Patient Name: Krystle Polanco  Date of evaluation: 10/9/2020  Referral Diagnosis: vertigo  Referring provider: Elke Mehta MD  Visit Diagnosis:     ICD-10-CM    1. Benign paroxysmal positional vertigo, left  H81.12    2. Unsteadiness on feet  R26.81    3. Decreased activities of daily living (ADL)  Z78.9        Assessment:      Patient has questionable left Sutter - Hallpike for anterior canal BPPV and was treated with deeper head hang version of left Epley maneuver today. She has symptoms mainly with sitting up. Patient has neck pain and this may be associated with her vertigo. Transfer care to PT at this time so neck pain can be addressed as well.    Goals:  Patient will bend: to dress;without vertigo;without loss of balance;in 12 weeks  Patient able to perform bed mobility: without vertigo;in 12 weeks  Patient will turn head: without dizziness;for conversation;in 12 weeks  Patient will look up / down: without vertigo;for drinking;in 12 weeks      Patient's expectations/goals are realistic.    Barriers to Learning or Achieving Goals:  No Barriers.       Plan / Patient Instructions:        Plan of Care:   Authorization / Certification Start Date: 10/09/20  Authorization / Certification End Date: 01/07/21  Authorization / Certification Number of Visits: 12  Communication with: Referral Source  Patient  Related Instruction: Nature of Condition;Treatment plan and rationale;Basis of treatment;Expected outcome  Times per Week: 1  Number of Weeks: 12  Number of Visits: 12  Neuromuscular Reeducation: vestibular  Functional Training (ADL's): compensatory training  Canolith Repostioning: .         Subjective:         History of Present Illness:    Krystle is a 80 y.o. female who presents to therapy today with complaints of vertigo, vomiting and unsteadiness. Patient had sudden onset of symptoms in 2020. The vomiting has resolved but she continues to have vertigo and unsteadiness. Symptoms are not improving. She has a history of similar symptoms in  and  that resolved. Patient denies ear pain. Patient denies hearing changes. Functional limitations are described as occurring with balance, bending, bed mobility, head turns, looking up or down, stepping over curbs.    Pain Ratin         Objective:      Note: Items left blank indicates the item was not performed or not indicated at the time of the evaluation.    Patient Outcome Measures:   No data recorded     Vestibular Disorder Examination  1. Benign paroxysmal positional vertigo, left     2. Unsteadiness on feet     3. Decreased activities of daily living (ADL)         Precautions/Restrictions: None    Posture Observation: General standing posture is fair.    ROM:  Not Tested    Strength: Not Tested    Sensation: Patient reports normal sensation    Functional Mobility: fair      Modified CTSIB: NT  Normal Surface Eyes Open-  Normal Surface Eyes Closed-  Perturbed Surface Eyes Open-  Perturbed Surface Eyes Closed-    The remainder of the tests are performed using video frenzel goggles.    Oculomotor Assessment:   Spontaneous Nystagmus with fixation: Negative  Spontaneous Nystagmus without fixation: Negative  Gaze-evoked nystagmus: Negative  Smooth pursuit: Normal  Saccades: Normal  Rapid Head Shake Test:  VOR Head Thrust: NT  Static Visual Acuity: NT  Dynamic  Visual Acuity: NT    Positional Tests:  Hallpike Right:  Negative  Hallpike Left:  Abnormal - Nystagmus left torsional, downward beating , 5 second latency and fatigues in 10 seconds  Head Roll Right: NT  Head Roll Left:  NT    Plan for next visit: transfer to PT for neck pain and vertigo. If see back in OT, Consider tilted table with deep head hang maneuver. Patient unable to continue additional treatment as she was very dizzy.     Treatment Today: Treated with left Epley maneuver x2 today and patient reports that she feels much better after treatment today.  TREATMENT MINUTES COMMENTS   Evaluation 25    Self-care/ Home management     Neuromuscular Re-education     Canalith repositioning procedure 15          Total 40    Blank areas are intentional and mean the treatment did not include these items.     GOALS AND PLAN OF CARE WERE ESTABLISHED IN COOPERATION WITH THE PATIENT    OT Evaluation Code: (Please list factors)   Comorbidities:   Patient Active Problem List   Diagnosis   ? Benign Adenomatous Polyp Of The Large Intestine   ? Osteopenia   ? Benign Essential Hypertension   ? Esophageal reflux   ? Irritable bowel syndrome   ? Type 2 diabetes mellitus without complication (H)   ? Gastroparesis   ? Asthma   ? Vitamin D Deficiency   ? Adenomatous Goiter   ? Hyperlipidemia associated with type 2 diabetes mellitus (H)   ? Arthropathy Of The Shoulder Region   ? Intraductal Papilloma Of The Breast   ? Closed Fracture Of Head Of Left Radius   ? Herpes zoster   ? Pulmonary embolism (H)   ? Pulmonary nodules   ? BMI 40.0-44.9, adult (H)   ? PVC's (premature ventricular contractions)   ? Paroxysmal atrial fibrillation (H)        Profile/History Review: Brief    Need for eval modification: No    # Treatment options: Limited    Clinical Decision Making:  Low      Occupational Profile/ Medical and Therapy History and Comorbidities Occupational Performance Clinical Decision Making   (Complexity)   brief history with review of  medical/therapy records related to the presenting problem.  No comorbidities 1-3 Performance deficits that result in activity limitations and/or participation restrictions.    No Assessment Modification  Low complexity, which includes  problem-focused assessments, and consideration of a limited number of treatment options.      expanded review of medical/therapy records and additional review of physical, cognitive and psychosocial history.    May have comorbidities 3-5 Performance deficits that result in activity limitations and/or participation restrictions.    Minimal to moderate modification of assessment Moderate complexity, which includes analysis of data from detailed assessments, and consideration of several treatment options.         Review of medical/therapy records and extensive additional review of physical, cognitive and psychosocial history.  Comorbidities affect occupational performance 5 or more Performance deficits that result in activity limitations and/or participation restrictions.    Significant modification of assessment High complexity, analysis of  Occupational profile and data,  Comprehensive assessments, multiple treatment options.            Opal Armando  10/9/2020  12:45 PM

## 2021-06-29 NOTE — PROGRESS NOTES
Progress Notes by Brent Girard PT at 10/21/2020  8:30 AM     Author: Brent Girard PT Service: -- Author Type: Physical Therapist    Filed: 10/21/2020  9:32 AM Encounter Date: 10/21/2020 Status: Attested    : Brent Girard PT (Physical Therapist) Cosigner: Elke Mehta MD at 10/22/2020 11:23 AM    Attestation signed by Elke Mehta MD at 10/22/2020 11:23 AM    [facility] Rehabilitation Certification Request    October 21, 2020      Patient: Krystle Polanco  MR Number: [id number]  YOB: 1940  Date of Visit: 10/21/2020      Dear Dr. Elke Mehta:    Thank you for this referral.   We are seeing Krystle Polanco in Physical Therapy for BPPV and neck pain.    Medicare and/or Medicaid requires physician review and approval of the treatment plan. Please review the plan of care and verify that you agree with the therapy plan of care by co-signing this note.      Plan of Care  Authorization / Certification Start Date: 10/21/20  Authorization / Certification End Date: 01/19/21  Authorization / Certification Number of Visits: 12  Communication with: Referral Source  Patient Related Instruction: Nature of Condition;Treatment plan and rationale;Self Care instruction;Basis of treatment;Body mechanics;Posture;Precautions;Next steps;Expected outcome  Times per Week: 1-2  Number of Weeks: 8  Number of Visits: 12  Therapeutic Exercise: ROM;Stretching;Strengthening  Neuromuscular Reeducation: vestibular;posture  Manual Therapy: soft tissue mobilization;myofascial release;joint mobilization;muscle energy      Goals:  Pt. will be independent with home exercise program in : 6 weeks  Pt. will bend: to dress;Comment  Comment:: without vertigo in 4 weeks.  Patient will twist/turn : in bed;for bed mobilitiy;Comment  Comment:: without vertigo in 4 weeks.  Patient will transfer: sit/stand;supine/sit;for in/out of bed;for in/out of chair;Comment  Comment: without vertigo in 4  weeks.  Patient Turn Head: for driving;without vertigo;wiith no pain;Comment  Comment: in 8 weeks.  Patient will look up / down: without vertigo;for reading;with no pain;Comment  Comment: in 8 weeks.        If you have any questions or concerns, please don't hesitate to call.    Sincerely,      Brent Girard, PT        Physician recommendation:     ___ Follow therapist's recommendation        ___ Modify therapy      *Physician co-signature indicates they certify the need for these services furnished within this plan and while under their care.    [facility]   Vestibular Initial Evaluation    Patient Name: Krystle Polanco  Date of evaluation: 10/21/2020  PRECAUTIONS: osteopenia, DM II  Referral Diagnosis:   H81.12 (ICD-10-CM) - Benign paroxysmal positional vertigo, left   M54.2 (ICD-10-CM) - Neck pain     Referring provider: Elke Mehta MD  Visit Diagnosis:     ICD-10-CM    1. BPPV (benign paroxysmal positional vertigo), left  H81.12    2. Acute neck pain  M54.2    3. Decreased ROM of neck  R29.898    4. Abnormal posture  R29.3        Assessment:      Pt. is appropriate for skilled PT intervention as outlined in the Plan of Care (POC).  Pt. is a good candidate for skilled PT services to improve pain levels and function.  Goals and POC established in collaboration with the patient.    Pt presents to PT with acute neck pain and vertigo. Positional testing today reveals L PC BPPV, treated with Epley CRT x2 reps, with significantly decreased intensity and duration of vertigo sx and visible nystagmus on second repetition. Based on patient's subjective h/o, question possibility of previous vestibular neuritis that has now temporarily decompensated; will likely require VOR retraining once BPPV has resolved. Discussed with patient will plan to further assess and treat neck pain as vertigo sx improve as well.    Goals:  Pt. will be independent with home exercise program in : 6 weeks  Pt.  will bend: to dress;Comment  Comment:: without vertigo in 4 weeks.  Patient will twist/turn : in bed;for bed mobilitiy;Comment  Comment:: without vertigo in 4 weeks.  Patient will transfer: sit/stand;supine/sit;for in/out of bed;for in/out of chair;Comment  Comment: without vertigo in 4 weeks.  Patient Turn Head: for driving;without vertigo;wiith no pain;Comment  Comment: in 8 weeks.  Patient will look up / down: without vertigo;for reading;with no pain;Comment  Comment: in 8 weeks.      Patient's expectations/goals are realistic.    Barriers to Learning or Achieving Goals:  Concurrent neck pain/stiffness with vertigo       Plan / Patient Instructions:        Plan of Care:   Authorization / Certification Start Date: 10/21/20  Authorization / Certification End Date: 01/19/21  Authorization / Certification Number of Visits: 12  Communication with: Referral Source  Patient Related Instruction: Nature of Condition;Treatment plan and rationale;Self Care instruction;Basis of treatment;Body mechanics;Posture;Precautions;Next steps;Expected outcome  Times per Week: 1-2  Number of Weeks: 8  Number of Visits: 12  Therapeutic Exercise: ROM;Stretching;Strengthening  Neuromuscular Reeducation: vestibular;posture  Manual Therapy: soft tissue mobilization;myofascial release;joint mobilization;muscle energy      Plan for next visit: Check R DixHallpike, and complete CRT as appopriate if sx persist.     Subjective:         History of Present Illness:    Krystle is a 80 y.o. female who presents to therapy today with complaints of acute vertigo, described as true room spinning.   Pt previously seen by OT on 10/9, noted to have L AC BPPV, treated with Epley, with decision to transfer care to PT, considering concurrent neck pain. Notes her sx were much better the first week after last seen, but then things started to come back.  Onset: 8/10/2020, when her neck pain originally started. Had gone to see her chiropractor, and notes it really  "helped her neck to feel quite a bit better (no manipulation done), but when she went to sit up, she immediately felt the vertigo. Notes h/o 2 separate instances of vertigo 10+ years ago, described as constant for about 5-7 days, and then \"it just got better on it's own.\" Feels this instance is different, because she is still having vertigo. Describes as a constant uneasiness, but intermittent times of intense, room-spinning vertigo with positional changes, including turning in bed, sit<>stand and supine<>sit transfers, bending over, moving the head/neck up/down left/right too quickly. Has been taking OTC dramamine as well.    No previous neck surgeries.    Pt goals: \"relief from vertigo.\"       Objective:      Note: Items left blank indicates the item was not performed or not indicated at the time of the evaluation.    Patient Outcome Measures :    Neck Disability Score in %: 12     Scores range from 0-100%, where a score of 0% represents minimal pain and maximal function. The minmal clinically important difference is a score reduction of 10%.     Vestibular Disorder Examination  1. BPPV (benign paroxysmal positional vertigo), left     2. Acute neck pain     3. Decreased ROM of neck     4. Abnormal posture       Posture Observation:      General sitting posture is  fair.  General standing posture is fair.  Cervical:  Mild forward head  Shoulder/Thoracic complex: Moderate bilateral scapular protraction   Moderately increased upper thoracic kyphosis    Gait Observation: Slightly decreased speed, with wide OSMANY and high guard.  4m walk test: N/T  4m walk test with head turns: N/T  (Normal = <= 0.10-0.15 difference)  FGA: N/T    Cervical AROM:    Flexion with mild loss   Extension with mod loss and PF   L/R ROT with mild loss and end-range stiffness   L/R SB with mild/mod loss and end-range stiffness    Strength: Not Tested    Sensation: Not Tested    Test of Provocation: N/T    Oculomotor Assessment (performed in room " light):  Spontaneous Nystagmus: none  Gaze-holding Nystagmus: none, but reports increased nausea  Smooth Pursuit: WNL, but reports increased nausea  Saccades: WNL, but reports increased nausea  Convergences: N/T  Cover/Uncover: N/T  VOR screen: Intermittent corrective saccades noted bilat  VOR Cancellation: Nt  Head Impulse Test: N/T due to patient guarding/uneasiness  Dynamic Visual Acuity (DVA): Nt  Pressure/Tragal Test: N/T    Positional Tests:  Hallpike Left:  Abnormal - Nystagmus left torsional, up beating    With positional testing:   Latency: 2 seconds  Duration: 15 seconds    Balance Assessment:  Not Assessed      Treatment Today     TREATMENT MINUTES COMMENTS   Evaluation 20    Self-care/ Home management     Manual therapy     Neuromuscular Re-education     Therapeutic Activity     Therapeutic Exercises     Gait training     Modality__________________     CRT 20 Epley CRT performed x2 reps for L PC BPPV.  Encouraged to keep head level and sleep with head elevated today/tonight in effort to prevent reoccurrence.         Total 40    Blank areas are intentional and mean the treatment did not include these items.              PT Evaluation Code: (Please list factors)  Patient History/Comorbidities: neck stiffness with vertigo  Examination: BPPV, neck pain/stiffness  Clinical Presentation: Stable  Clinical Decision Making: Low    Patient History/  Comorbidities Examination  (body structures and functions, activity limitations, and/or participation restrictions) Clinical Presentation Clinical Decision Making (Complexity)   No documented Comorbidities or personal factors 1-2 Elements Stable and/or uncomplicated Low   1-2 documented comorbidities or personal factor 3 Elements Evolving clinical presentation with changing characteristics Moderate   3-4 documented comorbidities or personal factors 4 or more Unstable and unpredictable High           Brent Girard  10/21/2020  7:13 AM

## 2021-06-30 NOTE — PROGRESS NOTES
Progress Notes by Brent Girard PT at 1/5/2021  2:00 PM     Author: Brent Girard PT Service: -- Author Type: Physical Therapist    Filed: 1/5/2021  2:47 PM Encounter Date: 1/5/2021 Status: Attested    : Brent Girard PT (Physical Therapist) Cosigner: Elke Mehta MD at 1/6/2021 10:37 AM    Attestation signed by Elke Mehta MD at 1/6/2021 10:37 AM    ok                [facility] Re-Certification Request    January 5, 2021      Patient: Krystle Polanco  MR Number: [id number]  YOB: 1940  Date of Visit: 1/5/2021  Onset Date:  8/10/20  Date of Eval: 8/21/20    Dear Dr. Elke Mehta:    As you may recall, we have been seeing Krystle Polanco for Physical Therapy for chronic neck pain/stiffness.    For therapy to continue, Medicare and/or Medicaid requires periodic physician review of the treatment plan. Please review the summary of the patient's progress and our plan for continued therapy, and verify  that you agree therapy should continue by entering certification dates at the bottom of this note and co-signing this note.    Plan of Care  Authorization / Certification Start Date: 01/05/21  Authorization / Certification End Date: 03/06/21  Authorization / Certification Number of Visits: up to 6 additional (18 total)  Communication with: Referral Source  Patient Related Instruction: Nature of Condition;Treatment plan and rationale;Self Care instruction;Basis of treatment;Body mechanics;Posture;Precautions;Next steps;Expected outcome  Times per Week: 1  Number of Weeks: 6  Number of Visits: 6 (18 total)  Therapeutic Exercise: ROM;Stretching;Strengthening  Neuromuscular Reeducation: posture;vestibular  Manual Therapy: soft tissue mobilization;myofascial release;joint mobilization;muscle energy      Goal  Pt. will be independent with home exercise program in : 6 weeks  Pt. will bend: to dress;Comment  Comment:: without vertigo in 4 weeks.  Patient will twist/turn  : in bed;for bed mobilitiy;Comment  Comment:: without vertigo in 4 weeks.  Patient will transfer: sit/stand;supine/sit;for in/out of bed;for in/out of chair;Comment  Comment: without vertigo in 4 weeks.  Patient Turn Head: for driving;without vertigo;wiith no pain;Comment  Comment: in 8 weeks.  Patient will look up / down: without vertigo;for reading;with no pain;Comment  Comment: in 8 weeks.        If you have any questions or concerns, please don't hesitate to call.    Sincerely,      Brent Girard, PT        Physician recommendation:     ___ Follow therapist's recommendation        ___ Modify therapy      *Physician co-signature indicates they certify the need for these services furnished within this plan and while under their care.    [facility] Daily Progress     Patient Name: Krystle Polanco  Date: 1/5/2021  Visit #: 12/12 through 1/19/21  PTA visit #:    PRECAUTIONS: osteopenia, DM II  Referral Diagnosis:   H81.12 (ICD-10-CM) - Benign paroxysmal positional vertigo, left   M54.2 (ICD-10-CM) - Neck pain      Referring provider: Elke Mehta MD  Visit Diagnosis:     ICD-10-CM    1. BPPV (benign paroxysmal positional vertigo), left  H81.12    2. Acute neck pain  M54.2    3. Decreased ROM of neck  R29.898    4. Abnormal posture  R29.3          Assessment:     BPPV appears to be well resolved. Vertigo/dizziness goals MET.  Overall, finding HEP and MT to be helpful in reducing neck pain and improving ROM. Reports decreased neck pain and stiffness post MT today.   Recommend several additional visits beyond original POC to address continued ROM/postural strength deficits.    HEP/POC compliance is  good .  Patient is benefitting from skilled physical therapy and is making steady progress toward functional goals.  Patient is appropriate to continue with skilled physical therapy intervention, as indicated by initial plan of care.    Goal Status:   Pt. will be independent with home exercise program  in : 6 weeks. PROGRESSING  Pt. will bend: to dress;Comment  Comment:: without vertigo in 4 weeks. MET  Patient will twist/turn : in bed;for bed mobilitiy;Comment  Comment:: without vertigo in 4 weeks. MET  Patient will transfer: sit/stand;supine/sit;for in/out of bed;for in/out of chair;Comment  Comment: without vertigo in 4 weeks. MET  Patient Turn Head: for driving;without vertigo;wiith no pain;Comment  Comment: in 8 weeks. NO LONGER WITH VERTIGO, BUT MILD LOSS OF ROM  Patient will look up / down: without vertigo;for reading;with no pain;Comment  Comment: in 8 weeks. NO LONGER WITH VERTIGO, BUT MILD LOSS OF ROM    Plan / Patient Education:     Review HEP for correct performance, as patient continues to require mild cueing t/o.  Recheck cervical AROM and continue MT as appropriate.    Subjective:     Pain Ratin-2/10 most of the time, neck pain  Still without any vertigo attacks and no longer with any dizziness.  Neck pain is intermittent, but described more so as stiff/tight. Overall, reports this to be much better from the start of care. Feels a little more stiff the past couple of days compared to where it has been.    Objective:     Cervical ROM  Date:      *Indicate scale AROM AROM AROM   Cervical Flexion 40     Cervical Extension 35      Right Left Right Left Right Left   Cervical Sidebending 15 55       Cervical Rotation 55 55       Cervical Protraction      Cervical Retraction      Thoracic Flexion      Thoracic Extension      Thoracic Sidebending         Thoracic Rotation             Exercises:  Exercise #1: VOR x1: discontinued, as no longer having dizziness  Comment #1: Scap retraction: 5x3 sec  Exercise #2: Doorway pec stretch: 2x20 sec  Comment #2: Chin tucks: 15x3 sec upright  Exercise #3: SL trunk ROT: HEP  Comment #3: L/R upper trap stretch: 3x10 sec bilat  Exercise #4: TB rows and pull downs: 10x each with OTB    Treatment Today     TREATMENT MINUTES COMMENTS   Evaluation     Self-care/ Home  management     Manual therapy 15 Supine cervical distraction, L and R upper trap stretches, MET to increase R ROT/SB 5x5 sec hold   Neuromuscular Re-education     Therapeutic Activity     Therapeutic Exercises 25 Ex per flow sheet   Gait training     Modality__________________     CRT           Total 40    Blank areas are intentional and mean the treatment did not include these items.       Brent Girard  1/5/2021

## 2021-06-30 NOTE — PROGRESS NOTES
Progress Notes by Rivera Saenz MD at 12/9/2020 10:30 AM     Author: Rivera Saenz MD Service: -- Author Type: Physician    Filed: 12/17/2020  1:56 PM Encounter Date: 12/9/2020 Status: Signed    : Rivera Saenz MD (Physician)           Assessment/Recommendations   Very frequent LVOT PVCs, without palpitations or other direct symptoms.  Frequent PVCs, could be indirectly contributing to the patient's dyspnea on exertion.  Unfortunately, this theory is difficult to prove without suppression or elimination of ventricular ectopy.   LV systolic function remains normal by recent echocardiogram, PVCs burden remains significantly increased around 35% by recent Holter, and prior attempts to suppress PVCs using high-dose sotalol failed.  Additionally, catheter ablation previously discussed in August 2020.  At that time, Krystle wished to postpone scheduling until  medical care completed.  Today, discussed again the management options including continued monitoring and annual reassessment of LVEF versus proceeding with mapping catheter ablation.   Discussed the catheter ablation procedure in great detail, quoted patient about 75% chance of success at the expense of 2 to 3% risk of major complications including but not limited to major bleeding, vascular injury, stroke, cardiac perforation, conduction system injury, remote risk of death.      I am convinced that Krystle's dyspnea and reduced functional capacity are multifactorial driven by possible diastolic LV dysfunction, increased BMI, and deconditioning.   While mechanistically and theoretically very likely, it is difficult to prove or rule out role of frequent PVCs by causing LV dyssynchrony and reduced cardiac output and efficiency.     Regardless, Krystle understand that ablation or elimination of her PVCs does not guarantee resolution of her other symptoms.      Today, we agreed to recheck an echocardiogram within the next 2 to 4 weeks.  If there is any  evidence of declining LV systolic function then the indication for mapping catheter ablation of her PVCs will become more clear.    Krystle expressed understanding and satisfaction with the proposed plan of care.       Thank you for allowing us to participate in the care of Ms. Polanco.        History of Present Illness/Subjective    Ms. Krystle Polanco is a 80 y.o. female with past medical history significant for:  1. Frequent LVOT PVCs burden now around 35% by  Holter, remains asymptomatic for palpitations.    2. Paroxysmal atrial fibrillation, sinus rhythm maintenance using sotalol.  Long-term anticoagulation using Coumadin   3. Negative Lexiscan cardiac MRI, normal LV systolic function ejection fraction of 60%  4. Hypertension.  5. 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  6. Type 2 diabetes mellitus, insulin requiring.    Krystle continues to complain of shortness of breath mainly during moderate exertion.  No chest pain, no palpitations, lightheadedness, dizziness near syncope or syncope.        Physical Examination Review of Systems   Vitals:    12/09/20 1055   BP: 122/78   Pulse: 70   Resp: 20   SpO2: 95%     Body mass index is 40.28 kg/m .  Wt Readings from Last 3 Encounters:   12/09/20 222 lb (100.7 kg)   11/06/20 221 lb (100.2 kg)   10/02/20 (!) 223 lb (101.2 kg)     [unfilled]  General Appearance:   no distress, normal body habitus   ENT/Mouth: membranes moist, no oral lesions or bleeding gums.      EYES:  no scleral icterus, normal conjunctivae   Neck: no carotid bruits or thyromegaly   Chest/Lungs:   lungs are clear to auscultation, no rales or wheezing, no sternal scar, equal chest wall expansion    Cardiovascular:   Regular, frequent ectopy. Normal first and second heart sounds with no murmurs, rubs, or gallops; the carotid, radial and posterior tibial pulses are intact, Jugular venous pressure normal, no edema bilaterally    Abdomen:  no organomegaly, masses, bruits, or  tenderness; bowel sounds are present   Extremities: no cyanosis or clubbing   Skin: no xanthelasma, warm.    Neurologic: normal  bilateral, no tremors     Psychiatric: alert and oriented x3, calm     General: Night Sweats  Eyes: WNL  Ears/Nose/Throat: WNL  Lungs: Shortness of Breath  Heart: Shortness of Breath with activityNegative except for findings in HPI.  Stomach: Diarrhea  Bladder: WNL  Muscle/Joints: WNL  Skin: WNL  Nervous System: Dizziness(vertigo)  Mental Health: WNL     Blood: WNL     Medical History  Surgical History Family History Social History   Past Medical History:   Diagnosis Date   ? Adenomatous goiter 2004   ? Arthropathy of shoulder region unknown   ? Asthma 2009   ? Atrial flutter (H) 2017   ? Bartholin gland cyst unknown   ? Diabetes mellitus type II, controlled (H)    ? Esophageal reflux    ? Essential hypertension    ? Gastroparesis    ? Herpes simplex type 1 infection unknown   ? IBS (irritable bowel syndrome)    ? Leukocytosis unknown   ? Osteopenia 2009   ? Vitamin D deficiency     Past Surgical History:   Procedure Laterality Date   ? BREAST BIOPSY Left 1/22/15    Papilloma   ? HYSTERECTOMY     ? OOPHORECTOMY  1975    1 removed, 1 remains   ? MI  DELIVERY ONLY  1971    Description:  Section;  Recorded: 2008;  Comments: x2   ? MI DILATION/CURETTAGE,DIAGNOSTIC  Unknown    Description: Dilation And Curettage;  Recorded: 2007;   ? MI NASAL/SINUS ENDOSCOPY,BX/RMV POLYP/DEBRID      Description: Nasal Endoscopy Polypectomy;  Recorded: 2007;   ? MI TOTAL ABDOM HYSTERECTOMY      Description: Total Abdominal Hysterectomy;  Recorded: 2008;   ? MI TOTAL KNEE ARTHROPLASTY Bilateral     Description: Total Knee Arthroplasty;  Recorded: 2008;   ? MI XFER SINGLE SUPERFICI LOW LEG TENDON  UNknown    Description: Tibialis Posterior Tendon Transfer Right Foot;  Recorded: 2008;    Family History   Problem Relation  Age of Onset   ? Breast cancer Sister 56   ? Depression Mother    ? Dementia Mother     Social History     Socioeconomic History   ? Marital status:      Spouse name: Not on file   ? Number of children: Not on file   ? Years of education: Not on file   ? Highest education level: Not on file   Occupational History   ? Not on file   Social Needs   ? Financial resource strain: Not on file   ? Food insecurity     Worry: Not on file     Inability: Not on file   ? Transportation needs     Medical: Not on file     Non-medical: Not on file   Tobacco Use   ? Smoking status: Never Smoker   ? Smokeless tobacco: Never Used   Substance and Sexual Activity   ? Alcohol use: No   ? Drug use: No   ? Sexual activity: Not on file   Lifestyle   ? Physical activity     Days per week: Not on file     Minutes per session: Not on file   ? Stress: Not on file   Relationships   ? Social connections     Talks on phone: Not on file     Gets together: Not on file     Attends Worship service: Not on file     Active member of club or organization: Not on file     Attends meetings of clubs or organizations: Not on file     Relationship status: Not on file   ? Intimate partner violence     Fear of current or ex partner: Not on file     Emotionally abused: Not on file     Physically abused: Not on file     Forced sexual activity: Not on file   Other Topics Concern   ? Not on file   Social History Narrative    She is .  She has 4 children and is a retired . She is an avid .           Medications  Allergies   Current Outpatient Medications   Medication Sig Dispense Refill   ? acetaminophen (TYLENOL) 500 MG tablet Take 500 mg by mouth every 4 (four) hours as needed for pain.      ? albuterol (PROAIR HFA) 90 mcg/actuation inhaler INHALE TWO PUFFS BY MOUTH FOUR TIMES A DAY AS NEEDED FOR WHEEZING 25.5 g 3   ? amLODIPine (NORVASC) 5 MG tablet Take 1 tablet (5 mg total) by mouth daily. 90 tablet 3   ? atorvastatin  (LIPITOR) 80 MG tablet Take 1 tablet (80 mg total) by mouth at bedtime. 90 tablet 3   ? azelastine (ASTELIN) 137 mcg (0.1 %) nasal spray 2 sprays into each nostril 2 (two) times a day. Use in each nostril as directed 90 mL 3   ? beclomethasone (QVAR REDIHALER) 40 mcg/actuation HFAB inhaler Inhale 2 puffs 2 (two) times a day. 31.8 g 3   ? blood sugar diagnostic, disc Strp Use as directed with testing blood sugars once daily.  Dispense Contour Next brand per pt's insurance coverage 100 strip 3   ? CALCIUM CARBONATE (CALCIUM 500 ORAL) Take by mouth 2 (two) times a day.     ? cholecalciferol, vitamin D3, 1,000 unit tablet Take 1,000 Units by mouth 2 (two) times a day.     ? clindamycin (CLEOCIN) 150 MG capsule TAKE 4 CAPSULES  BY MOUTH 1 HOUR PRIOR TO DENTAL APPOINTMENT. 30 capsule 3   ? colestipoL (COLESTID) 1 gram tablet Take 1 tablet (1 g total) by mouth 2 (two) times a day. 180 tablet 3   ? cranberry extract 300 mg Tab Take 1 tablet by mouth daily.      ? dicyclomine (BENTYL) 10 MG capsule Take 1 capsule (10 mg total) by mouth 2 (two) times a day as needed. 90 capsule 3   ? estradioL (ESTRACE) 0.01 % (0.1 mg/gram) vaginal cream Insert 2 g into the vagina every other day. 42.5 g 0   ? LACTOBACILLUS ACIDOPHILUS (PROBIOTIC ORAL) Take 1 tablet by mouth 2 (two) times a day.      ? lancing device Misc Use as directed 4 times a week. 1 each 3   ? losartan (COZAAR) 100 MG tablet Take 1 tablet (100 mg total) by mouth daily. 90 tablet 3   ? LUTEIN ORAL Take 1 tablet by mouth daily.            ? metFORMIN (GLUCOPHAGE XR) 500 MG 24 hr tablet Take 2 tablets (1,000 mg total) by mouth 2 (two) times a day. 270 tablet 4   ? metroNIDAZOLE (METROGEL) 0.75 % gel Apply 1 application topically as needed.      ? montelukast (SINGULAIR) 10 mg tablet Take 1 tablet (10 mg total) by mouth daily. 90 tablet 3   ? nitrofurantoin (MACRODANTIN) 50 MG capsule Take 1 capsule (50 mg total) by mouth daily. 90 capsule 3   ? omeprazole (PRILOSEC) 20 MG  "capsule Take 1 capsule (20 mg total) by mouth daily before breakfast. 90 capsule 3   ? psyllium (METAMUCIL) 3.4 gram packet Take 1 packet by mouth every evening.     ? SIMETHICONE (GAS-X ORAL) Take by mouth 2 (two) times a day.     ? sotaloL (BETAPACE) 80 MG tablet Take 0.5 tablets (40 mg total) by mouth 2 (two) times a day. 90 tablet 3   ? tiotropium (SPIRIVA WITH HANDIHALER) 18 mcg inhalation capsule Place 1 capsule (2 puffs total) into inhaler and inhale daily. (Patient taking differently: Place 18 mcg into inhaler and inhale as needed. ) 30 capsule 2   ? UBIDECARENONE (COENZYME Q10 ORAL) Take 1 tablet by mouth daily.      ? warfarin ANTICOAGULANT (COUMADIN/JANTOVEN) 2.5 MG tablet Take 1/2 to 1 tablets (1.25 mg to 2.5 mg) by mouth daily. Adjust dose based on INR results as directed. 100 tablet 1   ? meclizine (ANTIVERT) 25 mg tablet Take 1 tablet (25 mg total) by mouth 3 (three) times a day as needed for dizziness or nausea. 30 tablet 1     No current facility-administered medications for this visit.     Allergies   Allergen Reactions   ? Conray [Iothalamate Meglumine] Anaphylaxis     30+ yr's ago / x-ray exam / was given \"dye\" iodine contrast / had Anaphylaxis reaction  [facility] - 11/30/16 1509    ? Alendronate Sodium Nausea Only   ? Amoxicillin    ? Diatrizoate Meglumine Other (See Comments)   ? Fosamax [Alendronate]      Stomach cramps     ? House Dust    ? Mold/Mildew    ? Other Allergy (See Comments)      Contrast Media Ready-Box MISC, 11/06/2007.; Contrast Media Ready-Box MISC, 11/06/2007.     ? Penicillins    ? Prednisone Nausea Only     Other reaction(s): Abdominal Pain  She can have this as a shot not oral   ? Seafood    ? Sulfa (Sulfonamide Antibiotics)          Lab Results    Chemistry/lipid CBC Cardiac Enzymes/BNP/TSH/INR   Lab Results   Component Value Date    CHOL 191 02/08/2019    HDL 56 02/08/2019    LDLCALC 83 02/08/2019    TRIG 261 (H) 02/08/2019    CREATININE 0.77 10/02/2020    BUN 18 10/02/2020    " K 4.3 10/02/2020     10/02/2020     10/02/2020    CO2 25 10/02/2020    Lab Results   Component Value Date    WBC 10.9 10/02/2020    HGB 14.9 10/02/2020    HCT 44.0 10/02/2020    MCV 90 10/02/2020     10/02/2020    Lab Results   Component Value Date    CKTOTAL 88 12/14/2016    TROPONINI <0.01 11/26/2019     (H) 10/02/2020    TSH 0.66 12/04/2019    INR 1.30 (H) 12/14/2020

## 2021-07-03 NOTE — ADDENDUM NOTE
Addendum Note by Diane Fall CMA at 2/13/2020  1:55 PM     Author: Diane Fall CMA Service: -- Author Type: Certified Medical Assistant    Filed: 2/13/2020  1:55 PM Encounter Date: 2/5/2020 Status: Signed    : Diane Fall CMA (Certified Medical Assistant)    Addended by: DIANE FALL on: 2/13/2020 01:55 PM        Modules accepted: Orders

## 2021-07-03 NOTE — ADDENDUM NOTE
Addendum Note by Opal Wheeler MLT at 2/4/2020 12:20 PM     Author: Opal Wheeler MLT Service: -- Author Type:     Filed: 2/4/2020  1:07 PM Encounter Date: 2/4/2020 Status: Signed    : Opal Wheeler MLT ()    Addended by: OPAL WHEELER on: 2/4/2020 01:07 PM        Modules accepted: Orders

## 2021-07-06 NOTE — TELEPHONE ENCOUNTER
Telephone Encounter by Alexandria Morel LPN at 7/6/2021  7:37 AM     Author: Alexandria Morel LPN Service: -- Author Type: Licensed Nurse    Filed: 7/6/2021  7:37 AM Encounter Date: 5/3/2018 Status: Signed    : Alexandria Morel LPN (Licensed Nurse)       error

## 2021-07-13 ENCOUNTER — RECORDS - HEALTHEAST (OUTPATIENT)
Dept: ADMINISTRATIVE | Facility: CLINIC | Age: 81
End: 2021-07-13

## 2021-07-21 ENCOUNTER — RECORDS - HEALTHEAST (OUTPATIENT)
Dept: ADMINISTRATIVE | Facility: CLINIC | Age: 81
End: 2021-07-21

## 2021-07-23 ENCOUNTER — OFFICE VISIT (OUTPATIENT)
Dept: INTERNAL MEDICINE | Facility: CLINIC | Age: 81
End: 2021-07-23
Payer: COMMERCIAL

## 2021-07-23 VITALS
HEART RATE: 68 BPM | DIASTOLIC BLOOD PRESSURE: 70 MMHG | RESPIRATION RATE: 20 BRPM | WEIGHT: 225.3 LBS | OXYGEN SATURATION: 98 % | SYSTOLIC BLOOD PRESSURE: 128 MMHG | BODY MASS INDEX: 41.46 KG/M2 | HEIGHT: 62 IN

## 2021-07-23 DIAGNOSIS — E11.9 TYPE 2 DIABETES MELLITUS WITHOUT COMPLICATION, WITHOUT LONG-TERM CURRENT USE OF INSULIN (H): Primary | ICD-10-CM

## 2021-07-23 DIAGNOSIS — D72.829 LEUKOCYTOSIS, UNSPECIFIED TYPE: ICD-10-CM

## 2021-07-23 DIAGNOSIS — E55.9 VITAMIN D DEFICIENCY: ICD-10-CM

## 2021-07-23 DIAGNOSIS — K58.0 IRRITABLE BOWEL SYNDROME WITH DIARRHEA: ICD-10-CM

## 2021-07-23 DIAGNOSIS — Z00.00 ENCOUNTER FOR PREVENTIVE CARE: ICD-10-CM

## 2021-07-23 DIAGNOSIS — I49.3 PVC'S (PREMATURE VENTRICULAR CONTRACTIONS): ICD-10-CM

## 2021-07-23 LAB
ANION GAP SERPL CALCULATED.3IONS-SCNC: 17 MMOL/L (ref 5–18)
BASOPHILS # BLD AUTO: 0 10E3/UL (ref 0–0.2)
BASOPHILS NFR BLD AUTO: 0 %
BUN SERPL-MCNC: 21 MG/DL (ref 8–28)
CALCIUM SERPL-MCNC: 9.7 MG/DL (ref 8.5–10.5)
CHLORIDE BLD-SCNC: 103 MMOL/L (ref 98–107)
CO2 SERPL-SCNC: 19 MMOL/L (ref 22–31)
CREAT SERPL-MCNC: 0.78 MG/DL (ref 0.6–1.1)
EOSINOPHIL # BLD AUTO: 0.2 10E3/UL (ref 0–0.7)
EOSINOPHIL NFR BLD AUTO: 1 %
ERYTHROCYTE [DISTWIDTH] IN BLOOD BY AUTOMATED COUNT: 13.8 % (ref 10–15)
ERYTHROCYTE [DISTWIDTH] IN BLOOD BY AUTOMATED COUNT: 14 % (ref 10–15)
GFR SERPL CREATININE-BSD FRML MDRD: 73 ML/MIN/1.73M2
GLUCOSE BLD-MCNC: 137 MG/DL (ref 70–125)
HBA1C MFR BLD: 6.5 % (ref 0–5.6)
HCT VFR BLD AUTO: 42.5 % (ref 35–47)
HCT VFR BLD AUTO: 42.7 % (ref 35–47)
HGB BLD-MCNC: 13.7 G/DL (ref 11.7–15.7)
HGB BLD-MCNC: 13.7 G/DL (ref 11.7–15.7)
IMM GRANULOCYTES # BLD: 0 10E3/UL
IMM GRANULOCYTES NFR BLD: 0 %
LYMPHOCYTES # BLD AUTO: 4.4 10E3/UL (ref 0.8–5.3)
LYMPHOCYTES NFR BLD AUTO: 31 %
MAGNESIUM SERPL-MCNC: 1.6 MG/DL (ref 1.8–2.6)
MCH RBC QN AUTO: 28.9 PG (ref 26.5–33)
MCH RBC QN AUTO: 29.1 PG (ref 26.5–33)
MCHC RBC AUTO-ENTMCNC: 32.1 G/DL (ref 31.5–36.5)
MCHC RBC AUTO-ENTMCNC: 32.2 G/DL (ref 31.5–36.5)
MCV RBC AUTO: 90 FL (ref 78–100)
MCV RBC AUTO: 90 FL (ref 78–100)
MONOCYTES # BLD AUTO: 1 10E3/UL (ref 0–1.3)
MONOCYTES NFR BLD AUTO: 7 %
NEUTROPHILS # BLD AUTO: 8.8 10E3/UL (ref 1.6–8.3)
NEUTROPHILS NFR BLD AUTO: 61 %
PLATELET # BLD AUTO: 258 10E3/UL (ref 150–450)
PLATELET # BLD AUTO: 260 10E3/UL (ref 150–450)
POTASSIUM BLD-SCNC: 4.5 MMOL/L (ref 3.5–5)
RBC # BLD AUTO: 4.71 10E6/UL (ref 3.8–5.2)
RBC # BLD AUTO: 4.74 10E6/UL (ref 3.8–5.2)
SODIUM SERPL-SCNC: 139 MMOL/L (ref 136–145)
WBC # BLD AUTO: 14 10E3/UL (ref 4–11)
WBC # BLD AUTO: 14.4 10E3/UL (ref 4–11)

## 2021-07-23 PROCEDURE — 36415 COLL VENOUS BLD VENIPUNCTURE: CPT | Performed by: INTERNAL MEDICINE

## 2021-07-23 PROCEDURE — 83036 HEMOGLOBIN GLYCOSYLATED A1C: CPT | Performed by: INTERNAL MEDICINE

## 2021-07-23 PROCEDURE — 85025 COMPLETE CBC W/AUTO DIFF WBC: CPT | Performed by: INTERNAL MEDICINE

## 2021-07-23 PROCEDURE — 83735 ASSAY OF MAGNESIUM: CPT | Performed by: INTERNAL MEDICINE

## 2021-07-23 PROCEDURE — 82306 VITAMIN D 25 HYDROXY: CPT | Performed by: INTERNAL MEDICINE

## 2021-07-23 PROCEDURE — 86803 HEPATITIS C AB TEST: CPT | Performed by: INTERNAL MEDICINE

## 2021-07-23 PROCEDURE — 99214 OFFICE O/P EST MOD 30 MIN: CPT | Performed by: INTERNAL MEDICINE

## 2021-07-23 PROCEDURE — 85027 COMPLETE CBC AUTOMATED: CPT | Performed by: INTERNAL MEDICINE

## 2021-07-23 PROCEDURE — 80048 BASIC METABOLIC PNL TOTAL CA: CPT | Performed by: INTERNAL MEDICINE

## 2021-07-23 ASSESSMENT — MIFFLIN-ST. JEOR: SCORE: 1454.17

## 2021-07-23 NOTE — PROGRESS NOTES
[facility] Follow Up Note    Assessment/Plan:    1. Type 2 diabetes mellitus without complication, without long-term current use of insulin (H)  Glycohemoglobin at 6.5.  This is excellent for her.  Recommendation: Continue current medications  - Hemoglobin A1c  - CBC with platelets  - Basic metabolic panel  (Ca, Cl, CO2, Creat, Gluc, K, Na, BUN)      As an addendum, Omer has had longstanding type 2 diabetes with peripheral neuropathy and foot deformity.  She is followed by podiatry.  She has bilateral bunions and would benefit from diabetic shoes.    2. Irritable bowel syndrome with diarrhea  Intermittent diarrhea in the setting of a normal abdominal exam and history of IBS with diarrhea-symptoms improved if she uses a daily Imodium.  She is very cautious with this.  She is using colestipol and Creon.  She is not sure if the Creon is helpful but does know that it is markedly expensive.  Recommendation: Agree with follow-up at the IBS clinic at [facility].  She has an established relationship with them.  Could skip midday dose of Creon just to extend the duration of the medication given its expensive cost    3. Vitamin D deficiency  Update labs  - Vitamin D Deficiency    4. PVC's (premature ventricular contractions)  M stable-no ectopy noted  - Magnesium    5. Encounter for preventive care  We will update  - Hepatitis C antibody; Future    6. Leukocytosis, unspecified type  Recent prednisone use for a stye  - CBC with platelets and differential; Future    As an addendum-Omer has type 2 diabetes and altered gait.  She benefits from orthotics to prevent diabetic foot ulcerations/altered gait.      Elke Mehta MD, MD    Chief Complaint:  Chief Complaint   Patient presents with     Follow Up       History of Present Illness:  Krystle is a 81 year old female today for follow-up with regard to her usual medical problems.  First, we have updated her diabetes testing.  Her glycohemoglobin  is at goal at 6.5.  She will continue on her current dose of Metformin.  She denies any polyuria or polydipsia.  Of note, the [facility] pharmacy notes are also reviewed with regard to diabetes.    Next issue discussed as her irritable bowel.  This is a chronic and long-term problem for her.  We have added colestipol and Creon for her.  She is able to use Imodium.  She states that she uses a bit more Imodium i.e. 3-4 times per week, her symptoms improve.  She is aware that we are trying to avoid constipation as well.  She states her diet could be better.  She is also adding some movement exercises.  Her weight has been stable.    Of note, she has significant concern regarding the prices of her medications.  She has 4 high price meds.  2 of them are inhalers from her pulmonologist.  Her Creon and estradiol vaginal cream are contributing to her high pharmacy cost.    She has had a markedly stressful year and this has been discussed today.  Her  has had multiple surgical procedures/etc.  He has had some cognitive issues as well and she has needed to take over many more responsibilities with her household.    She continues to be followed by pulmonary and ENT    Review of Systems:  A comprehensive review of systems was performed and was otherwise negative    PFSH:  Social History:   History   Smoking Status     Never Smoker   Smokeless Tobacco     Never Used       Past History:   Current Outpatient Medications   Medication Sig Dispense Refill     acetaminophen (TYLENOL) 500 MG tablet [ACETAMINOPHEN (TYLENOL) 500 MG TABLET] Take 1,000 mg by mouth every 6 (six) hours as needed for pain.       albuterol (PROAIR HFA) 90 mcg/actuation inhaler [ALBUTEROL (PROAIR HFA) 90 MCG/ACTUATION INHALER] INHALE TWO PUFFS BY MOUTH FOUR TIMES A DAY AS NEEDED FOR WHEEZING 25.5 g 3     amLODIPine (NORVASC) 5 MG tablet [AMLODIPINE (NORVASC) 5 MG TABLET] Take 1 tablet (5 mg total) by mouth daily. 90 tablet 3     apixaban ANTICOAGULANT (ELIQUIS)  5 mg Tab tablet [APIXABAN ANTICOAGULANT (ELIQUIS) 5 MG TAB TABLET] Take 1 tablet (5 mg total) by mouth 2 (two) times a day. 180 tablet 3     atorvastatin (LIPITOR) 80 MG tablet [ATORVASTATIN (LIPITOR) 80 MG TABLET] Take 1 tablet (80 mg total) by mouth at bedtime. 90 tablet 3     azelastine (ASTELIN) 137 mcg (0.1 %) nasal spray [AZELASTINE (ASTELIN) 137 MCG (0.1 %) NASAL SPRAY] 2 sprays into each nostril 2 (two) times a day. Use in each nostril as directed 90 mL 3     beclomethasone (QVAR REDIHALER) 40 mcg/actuation HFAB inhaler [BECLOMETHASONE (QVAR REDIHALER) 40 MCG/ACTUATION HFAB INHALER] Inhale 2 puffs 2 (two) times a day. 31.8 g 3     blood glucose (CONTOUR NEXT TEST) test strip 1 strip by In Vitro route Use as directed with testing blood sugars once daily.  Dispense Contour Next brand per pt's insurance coverage       CALCIUM CARBONATE (CALCIUM 500 ORAL) [CALCIUM CARBONATE (CALCIUM 500 ORAL)] Take 1 tablet by mouth 2 (two) times a day.       cholecalciferol, vitamin D3, 1,000 unit tablet [CHOLECALCIFEROL, VITAMIN D3, 1,000 UNIT TABLET] Take 2,000 Units by mouth 2 (two) times a day.        clindamycin (CLEOCIN) 150 MG capsule [CLINDAMYCIN (CLEOCIN) 150 MG CAPSULE] TAKE 4 CAPSULES  BY MOUTH 1 HOUR PRIOR TO DENTAL APPOINTMENT. 30 capsule 3     colestipoL (COLESTID) 1 gram tablet [COLESTIPOL (COLESTID) 1 GRAM TABLET] Take 1 tablet (1 g total) by mouth 2 (two) times a day. 180 tablet 3     cranberry extract 300 mg Tab [CRANBERRY EXTRACT 300 MG TAB] Take 1 tablet by mouth daily.        dicyclomine (BENTYL) 10 MG capsule [DICYCLOMINE (BENTYL) 10 MG CAPSULE] Take 1 capsule (10 mg total) by mouth 2 (two) times a day as needed. 90 capsule 3     estradioL (ESTRACE) 0.01 % (0.1 mg/gram) vaginal cream [ESTRADIOL (ESTRACE) 0.01 % (0.1 MG/GRAM) VAGINAL CREAM] Insert 2 g into the vagina every other day. 42.5 g 0     LACTOBACILLUS ACIDOPHILUS (PROBIOTIC ORAL) [LACTOBACILLUS ACIDOPHILUS (PROBIOTIC ORAL)] Take 1 tablet by mouth 2  (two) times a day.        lancing device Misc [LANCING DEVICE MISC] Use as directed 4 times a week. 1 each 3     lipase-protease-amylase (CREON) 6,000-19,000 -30,000 unit CpDR capsule [LIPASE-PROTEASE-AMYLASE (CREON) 6,000-19,000 -30,000 UNIT CPDR CAPSULE] Take 1 capsule (6,000 units of lipase total) by mouth 2 (two) times a day with meals. 270 capsule 3     loperamide (IMODIUM) 2 mg capsule [LOPERAMIDE (IMODIUM) 2 MG CAPSULE] Take 2 mg by mouth 4 (four) times a day as needed.       losartan (COZAAR) 100 MG tablet [LOSARTAN (COZAAR) 100 MG TABLET] Take 1 tablet (100 mg total) by mouth daily. 90 tablet 3     LUTEIN ORAL [LUTEIN ORAL] Take 1 tablet by mouth daily. With Zeaxanthin       magnesium oxide 250 mg magnesium Tab [MAGNESIUM OXIDE 250 MG MAGNESIUM TAB] Take 1 tablet by mouth daily.       metFORMIN (GLUCOPHAGE XR) 500 MG 24 hr tablet [METFORMIN (GLUCOPHAGE XR) 500 MG 24 HR TABLET] Take 2 tablets (1,000 mg total) by mouth 2 (two) times a day. 270 tablet 4     metroNIDAZOLE (METROGEL) 0.75 % gel [METRONIDAZOLE (METROGEL) 0.75 % GEL] Apply 1 application topically as needed.        montelukast (SINGULAIR) 10 mg tablet [MONTELUKAST (SINGULAIR) 10 MG TABLET] Take 1 tablet (10 mg total) by mouth daily. 90 tablet 3     multivitamin therapeutic tablet [MULTIVITAMIN THERAPEUTIC TABLET] Take 1 tablet by mouth daily.       nitrofurantoin (MACRODANTIN) 50 MG capsule [NITROFURANTOIN (MACRODANTIN) 50 MG CAPSULE] Take 1 capsule (50 mg total) by mouth daily. 90 capsule 3     omeprazole (PRILOSEC) 20 MG capsule [OMEPRAZOLE (PRILOSEC) 20 MG CAPSULE] Take 1 capsule (20 mg total) by mouth daily before breakfast. 90 capsule 3     psyllium (METAMUCIL) 3.4 gram packet [PSYLLIUM (METAMUCIL) 3.4 GRAM PACKET] Take 1 packet by mouth every evening.       SIMETHICONE (GAS-X ORAL) [SIMETHICONE (GAS-X ORAL)] Take 1 tablet by mouth 2 (two) times a day as needed.       sotaloL (BETAPACE) 80 MG tablet [SOTALOL (BETAPACE) 80 MG TABLET] Take 0.5  "tablets (40 mg total) by mouth 2 (two) times a day. 90 tablet 3     tiotropium (SPIRIVA WITH HANDIHALER) 18 mcg inhalation capsule [TIOTROPIUM (SPIRIVA WITH HANDIHALER) 18 MCG INHALATION CAPSULE] Place 1 capsule (2 puffs total) into inhaler and inhale as needed (at first sign of a cold, increased cough, wheezing.  OK to stop once you feel better).       UBIDECARENONE (COENZYME Q10 ORAL) [UBIDECARENONE (COENZYME Q10 ORAL)] Take 1 tablet by mouth daily.        zinc gluconate 50 mg tablet [ZINC GLUCONATE 50 MG TABLET] Take 50 mg by mouth daily.         Family History:     Physical Exam:  General Appearance:   She appears at baseline and in no acute distress  Vitals:    07/23/21 0829   BP: 128/70   BP Location: Right arm   Patient Position: Sitting   Cuff Size: Adult Large   Pulse: 68   Resp: 20   SpO2: 98%   Weight: 102.2 kg (225 lb 4.8 oz)   Height: 1.581 m (5' 2.25\")     Wt Readings from Last 3 Encounters:   07/23/21 102.2 kg (225 lb 4.8 oz)   04/16/21 101.2 kg (223 lb)   02/09/21 102.1 kg (225 lb)     Body mass index is 40.88 kg/m .    Neck exam is negative  Lungs are clear to auscultation and percussion  Cardiac exam reveals a regular rate and rhythm.  No ectopy or atrial fib is appreciated  Abdomen is obese and nontender    "

## 2021-07-26 LAB
DEPRECATED CALCIDIOL+CALCIFEROL SERPL-MC: 53 UG/L (ref 30–80)
HCV AB SERPL QL IA: NEGATIVE

## 2021-07-30 DIAGNOSIS — E11.9 TYPE 2 DIABETES MELLITUS WITHOUT COMPLICATION, WITHOUT LONG-TERM CURRENT USE OF INSULIN (H): ICD-10-CM

## 2021-08-03 PROBLEM — I48.91 ATRIAL FIBRILLATION (H): Status: RESOLVED | Noted: 2020-03-03 | Resolved: 2020-03-03

## 2021-08-04 RX ORDER — METFORMIN HCL 500 MG
TABLET, EXTENDED RELEASE 24 HR ORAL
Qty: 360 TABLET | Refills: 0 | Status: SHIPPED | OUTPATIENT
Start: 2021-08-04 | End: 2021-10-28

## 2021-08-04 NOTE — TELEPHONE ENCOUNTER
"Last Written Prescription Date:  10/5/2020  Last Fill Quantity: 270,  # refills: 4   Last office visit provider:  5/3/21    Requested Prescriptions   Pending Prescriptions Disp Refills     metFORMIN (GLUCOPHAGE-XR) 500 MG 24 hr tablet [Pharmacy Med Name: METFORMIN HCL ER 500MG TB24] 270 tablet 1     Sig: TAKE TWO TABLETS BY MOUTH TWICE A DAY       Biguanide Agents Passed - 7/30/2021  4:16 PM        Passed - Patient is age 10 or older        Passed - Patient has documented A1c within the specified period of time.     If HgbA1C is 8 or greater, it needs to be on file within the past 3 months.  If less than 8, must be on file within the past 6 months.     Recent Labs   Lab Test 07/23/21  0817   A1C 6.5*             Passed - Patient's CR is NOT>1.4 OR Patient's EGFR is NOT<45 within past 12 mos.     Recent Labs   Lab Test 07/23/21  0817 02/09/21  0951   GFRESTIMATED 73 >60   GFRESTBLACK  --  >60       Recent Labs   Lab Test 07/23/21 0817   CR 0.78             Passed - Patient does NOT have a diagnosis of CHF.        Passed - Medication is active on med list        Passed - Patient is not pregnant        Passed - Patient has not had a positive pregnancy test within the past 12 mos.         Passed - Recent (6 mo) or future (30 days) visit within the authorizing provider's specialty     Patient had office visit in the last 6 months or has a visit in the next 30 days with authorizing provider or within the authorizing provider's specialty.  See \"Patient Info\" tab in inbasket, or \"Choose Columns\" in Meds & Orders section of the refill encounter.                 Ellyn Cook RN 08/04/21 7:37 AM  "
No

## 2021-10-06 ENCOUNTER — TELEPHONE (OUTPATIENT)
Dept: INTERNAL MEDICINE | Facility: CLINIC | Age: 81
End: 2021-10-06

## 2021-10-06 ENCOUNTER — ALLIED HEALTH/NURSE VISIT (OUTPATIENT)
Dept: FAMILY MEDICINE | Facility: CLINIC | Age: 81
End: 2021-10-06
Payer: COMMERCIAL

## 2021-10-06 ENCOUNTER — MEDICAL CORRESPONDENCE (OUTPATIENT)
Dept: HEALTH INFORMATION MANAGEMENT | Facility: CLINIC | Age: 81
End: 2021-10-06

## 2021-10-06 DIAGNOSIS — E11.9 TYPE 2 DIABETES MELLITUS WITHOUT COMPLICATION, WITHOUT LONG-TERM CURRENT USE OF INSULIN (H): Primary | ICD-10-CM

## 2021-10-06 DIAGNOSIS — Z00.00 ROUTINE HEALTH MAINTENANCE: Primary | ICD-10-CM

## 2021-10-06 PROCEDURE — 99207 PR NO CHARGE NURSE ONLY: CPT

## 2021-10-06 PROCEDURE — G0008 ADMIN INFLUENZA VIRUS VAC: HCPCS

## 2021-10-06 PROCEDURE — 90662 IIV NO PRSV INCREASED AG IM: CPT

## 2021-10-06 NOTE — TELEPHONE ENCOUNTER
Please sign new order for orthotics and also addend your last note stating the need for orthotics     Pended for you sig    Tilges form printed from scan and in your box to sign as well

## 2021-10-07 ENCOUNTER — TRANSFERRED RECORDS (OUTPATIENT)
Dept: HEALTH INFORMATION MANAGEMENT | Facility: CLINIC | Age: 81
End: 2021-10-07

## 2021-10-07 LAB — RETINOPATHY: NEGATIVE

## 2021-10-15 ENCOUNTER — ANCILLARY PROCEDURE (OUTPATIENT)
Dept: BONE DENSITY | Facility: CLINIC | Age: 81
End: 2021-10-15
Attending: INTERNAL MEDICINE
Payer: COMMERCIAL

## 2021-10-15 DIAGNOSIS — E55.9 VITAMIN D DEFICIENCY: ICD-10-CM

## 2021-10-15 DIAGNOSIS — Z78.0 MENOPAUSE: ICD-10-CM

## 2021-10-15 PROCEDURE — 77080 DXA BONE DENSITY AXIAL: CPT | Performed by: INTERNAL MEDICINE

## 2021-10-19 ENCOUNTER — TELEPHONE (OUTPATIENT)
Dept: INTERNAL MEDICINE | Facility: CLINIC | Age: 81
End: 2021-10-19
Payer: COMMERCIAL

## 2021-10-19 DIAGNOSIS — R93.7 ABNORMAL BONE DENSITY SCREENING: Primary | ICD-10-CM

## 2021-10-19 NOTE — TELEPHONE ENCOUNTER
Please let Omer know that I am not sure if there is an error in her bone density in the left femoral neck.  The reader or  of that study seems to question the accuracy.  I would like to see if I can get a repeat bone density of just the left femoral neck.  I am going to place an order.  We will be in touch

## 2021-10-22 DIAGNOSIS — N39.0 CHRONIC UTI: Primary | ICD-10-CM

## 2021-10-22 DIAGNOSIS — I10 ESSENTIAL HYPERTENSION, BENIGN: ICD-10-CM

## 2021-10-22 DIAGNOSIS — E78.00 HYPERCHOLESTEROLEMIA: ICD-10-CM

## 2021-10-24 RX ORDER — NITROFURANTOIN MACROCRYSTALS 50 MG/1
CAPSULE ORAL
Qty: 90 CAPSULE | Refills: 2 | Status: SHIPPED | OUTPATIENT
Start: 2021-10-24 | End: 2021-11-23

## 2021-10-24 RX ORDER — LOSARTAN POTASSIUM 100 MG/1
100 TABLET ORAL DAILY
Qty: 90 TABLET | Refills: 2 | Status: SHIPPED | OUTPATIENT
Start: 2021-10-24 | End: 2021-11-23

## 2021-10-24 RX ORDER — ATORVASTATIN CALCIUM 80 MG/1
TABLET, FILM COATED ORAL
Qty: 90 TABLET | Refills: 2 | Status: SHIPPED | OUTPATIENT
Start: 2021-10-24 | End: 2021-11-23

## 2021-10-24 NOTE — TELEPHONE ENCOUNTER
"Routing refill request to provider for review/approval because:  Drug not on the [facility] refill protocol   Labs not current:  LDL    Last Written Prescription Date:  10/5/20  Last Fill Quantity: 90,  # refills: 3   Last office visit provider:  7/23/21     Requested Prescriptions   Pending Prescriptions Disp Refills     nitroFURantoin macrocrystal (MACRODANTIN) 50 MG capsule [Pharmacy Med Name: NITROFURANTOIN MACROCRYSTAL 50 CAPS] 90 capsule 2     Sig: TAKE ONE CAPSULE BY MOUTH ONCE DAILY       There is no refill protocol information for this order        atorvastatin (LIPITOR) 80 MG tablet [Pharmacy Med Name: ATORVASTATIN CALCIUM 80MG TABS] 90 tablet 2     Sig: TAKE ONE TABLET BY MOUTH EVERY NIGHT AT BEDTIME       Statins Protocol Failed - 10/22/2021  4:43 PM        Failed - LDL on file in past 12 months     Recent Labs   Lab Test 10/02/20  1504   LDL 97             Passed - No abnormal creatine kinase in past 12 months     No lab results found.             Passed - Recent (12 mo) or future (30 days) visit within the authorizing provider's specialty     Patient has had an office visit with the authorizing provider or a provider within the authorizing providers department within the previous 12 mos or has a future within next 30 days. See \"Patient Info\" tab in inbasket, or \"Choose Columns\" in Meds & Orders section of the refill encounter.              Passed - Medication is active on med list        Passed - Patient is age 18 or older        Passed - No active pregnancy on record        Passed - No positive pregnancy test in past 12 months         Signed Prescriptions Disp Refills    losartan (COZAAR) 100 MG tablet 90 tablet 2     Sig: Take 1 tablet (100 mg) by mouth daily       Angiotensin-II Receptors Passed - 10/22/2021  4:43 PM        Passed - Last blood pressure under 140/90 in past 12 months     BP Readings from Last 3 Encounters:   07/23/21 128/70   04/16/21 124/70   02/09/21 132/64                 Passed - Recent " "(12 mo) or future (30 days) visit within the authorizing provider's specialty     Patient has had an office visit with the authorizing provider or a provider within the authorizing providers department within the previous 12 mos or has a future within next 30 days. See \"Patient Info\" tab in inbasket, or \"Choose Columns\" in Meds & Orders section of the refill encounter.              Passed - Medication is active on med list        Passed - Patient is age 18 or older        Passed - No active pregnancy on record        Passed - Normal serum creatinine on file in past 12 months     Recent Labs   Lab Test 07/23/21  0817   CR 0.78       Ok to refill medication if creatinine is low          Passed - Normal serum potassium on file in past 12 months     Recent Labs   Lab Test 07/23/21  0817   POTASSIUM 4.5                    Passed - No positive pregnancy test in past 12 months             London Leong RN 10/24/21 1:06 PM  "

## 2021-10-24 NOTE — TELEPHONE ENCOUNTER
"Last Written Prescription Date:  10/5/20  Last Fill Quantity: 90,  # refills: 3   Last office visit provider:  7/23/21     Requested Prescriptions   Pending Prescriptions Disp Refills     losartan (COZAAR) 100 MG tablet [Pharmacy Med Name: LOSARTAN POTASSIUM 100MG TABS] 90 tablet 2     Sig: TAKE ONE TABLET BY MOUTH ONCE DAILY       Angiotensin-II Receptors Passed - 10/22/2021  4:43 PM        Passed - Last blood pressure under 140/90 in past 12 months     BP Readings from Last 3 Encounters:   07/23/21 128/70   04/16/21 124/70   02/09/21 132/64                 Passed - Recent (12 mo) or future (30 days) visit within the authorizing provider's specialty     Patient has had an office visit with the authorizing provider or a provider within the authorizing providers department within the previous 12 mos or has a future within next 30 days. See \"Patient Info\" tab in inbasket, or \"Choose Columns\" in Meds & Orders section of the refill encounter.              Passed - Medication is active on med list        Passed - Patient is age 18 or older        Passed - No active pregnancy on record        Passed - Normal serum creatinine on file in past 12 months     Recent Labs   Lab Test 07/23/21  0817   CR 0.78       Ok to refill medication if creatinine is low          Passed - Normal serum potassium on file in past 12 months     Recent Labs   Lab Test 07/23/21  0817   POTASSIUM 4.5                    Passed - No positive pregnancy test in past 12 months           nitroFURantoin macrocrystal (MACRODANTIN) 50 MG capsule [Pharmacy Med Name: NITROFURANTOIN MACROCRYSTAL 50 CAPS] 90 capsule 2     Sig: TAKE ONE CAPSULE BY MOUTH ONCE DAILY       There is no refill protocol information for this order        atorvastatin (LIPITOR) 80 MG tablet [Pharmacy Med Name: ATORVASTATIN CALCIUM 80MG TABS] 90 tablet 2     Sig: TAKE ONE TABLET BY MOUTH EVERY NIGHT AT BEDTIME       Statins Protocol Failed - 10/22/2021  4:43 PM        Failed - LDL on " "file in past 12 months     Recent Labs   Lab Test 10/02/20  1504   LDL 97             Passed - No abnormal creatine kinase in past 12 months     No lab results found.             Passed - Recent (12 mo) or future (30 days) visit within the authorizing provider's specialty     Patient has had an office visit with the authorizing provider or a provider within the authorizing providers department within the previous 12 mos or has a future within next 30 days. See \"Patient Info\" tab in inbasket, or \"Choose Columns\" in Meds & Orders section of the refill encounter.              Passed - Medication is active on med list        Passed - Patient is age 18 or older        Passed - No active pregnancy on record        Passed - No positive pregnancy test in past 12 months             London Leong RN 10/24/21 1:05 PM  "

## 2021-10-27 ENCOUNTER — TRANSFERRED RECORDS (OUTPATIENT)
Dept: HEALTH INFORMATION MANAGEMENT | Facility: CLINIC | Age: 81
End: 2021-10-27
Payer: COMMERCIAL

## 2021-10-27 DIAGNOSIS — E11.9 TYPE 2 DIABETES MELLITUS WITHOUT COMPLICATION, WITHOUT LONG-TERM CURRENT USE OF INSULIN (H): ICD-10-CM

## 2021-10-27 LAB — RETINOPATHY: NEGATIVE

## 2021-10-28 RX ORDER — METFORMIN HCL 500 MG
TABLET, EXTENDED RELEASE 24 HR ORAL
Qty: 360 TABLET | Refills: 0 | Status: SHIPPED | OUTPATIENT
Start: 2021-10-28 | End: 2021-11-23

## 2021-11-17 ENCOUNTER — TRANSFERRED RECORDS (OUTPATIENT)
Dept: HEALTH INFORMATION MANAGEMENT | Facility: CLINIC | Age: 81
End: 2021-11-17
Payer: COMMERCIAL

## 2021-11-19 ENCOUNTER — TELEPHONE (OUTPATIENT)
Dept: INTERNAL MEDICINE | Facility: CLINIC | Age: 81
End: 2021-11-19

## 2021-11-19 DIAGNOSIS — I10 ESSENTIAL HYPERTENSION, BENIGN: ICD-10-CM

## 2021-11-19 DIAGNOSIS — I49.3 PVC'S (PREMATURE VENTRICULAR CONTRACTIONS): ICD-10-CM

## 2021-11-19 DIAGNOSIS — K21.9 ESOPHAGEAL REFLUX: ICD-10-CM

## 2021-11-19 DIAGNOSIS — I48.0 PAF (PAROXYSMAL ATRIAL FIBRILLATION) (H): ICD-10-CM

## 2021-11-19 NOTE — TELEPHONE ENCOUNTER
Reason for Call:  Other call back    Detailed comments: pt is requesting call back as she is wondering what is taking so long between [facility] and Dr Mehta office for her Diabetic shoes.  Order was put in Oct. 6    She was told it was faxed per Sunni JOHNS but then when she calls [facility] they state they don't have anything.    Please follow up as [facility] said they faxed over forms to fill out as well.    Phone Number Patient can be reached at: Cell number on file:    Telephone Information:   Mobile [phone]       Best Time: any    Can we leave a detailed message on this number? YES    Call taken on 11/19/2021 at 2:43 PM by Pam J. Behr

## 2021-11-22 RX ORDER — AMLODIPINE BESYLATE 5 MG/1
TABLET ORAL
Qty: 90 TABLET | Refills: 2 | Status: SHIPPED | OUTPATIENT
Start: 2021-11-22 | End: 2021-11-23

## 2021-11-22 RX ORDER — SOTALOL HYDROCHLORIDE 80 MG/1
TABLET ORAL
Qty: 90 TABLET | Refills: 2 | Status: SHIPPED | OUTPATIENT
Start: 2021-11-22 | End: 2021-11-23

## 2021-11-23 ENCOUNTER — OFFICE VISIT (OUTPATIENT)
Dept: INTERNAL MEDICINE | Facility: CLINIC | Age: 81
End: 2021-11-23
Payer: COMMERCIAL

## 2021-11-23 ENCOUNTER — TELEPHONE (OUTPATIENT)
Dept: INTERNAL MEDICINE | Facility: CLINIC | Age: 81
End: 2021-11-23

## 2021-11-23 VITALS
HEART RATE: 78 BPM | SYSTOLIC BLOOD PRESSURE: 114 MMHG | WEIGHT: 228.3 LBS | DIASTOLIC BLOOD PRESSURE: 76 MMHG | OXYGEN SATURATION: 99 % | BODY MASS INDEX: 41.42 KG/M2

## 2021-11-23 DIAGNOSIS — J45.20 ASTHMA IN ADULT, MILD INTERMITTENT, UNCOMPLICATED: ICD-10-CM

## 2021-11-23 DIAGNOSIS — N95.2 ATROPHIC VAGINITIS: ICD-10-CM

## 2021-11-23 DIAGNOSIS — E11.9 TYPE 2 DIABETES MELLITUS WITHOUT COMPLICATION, WITHOUT LONG-TERM CURRENT USE OF INSULIN (H): Primary | ICD-10-CM

## 2021-11-23 DIAGNOSIS — J45.40 MODERATE PERSISTENT ASTHMA, UNSPECIFIED WHETHER COMPLICATED: ICD-10-CM

## 2021-11-23 DIAGNOSIS — I48.0 PAF (PAROXYSMAL ATRIAL FIBRILLATION) (H): ICD-10-CM

## 2021-11-23 DIAGNOSIS — E78.00 HYPERCHOLESTEROLEMIA: ICD-10-CM

## 2021-11-23 DIAGNOSIS — J30.89 NON-SEASONAL ALLERGIC RHINITIS, UNSPECIFIED TRIGGER: ICD-10-CM

## 2021-11-23 DIAGNOSIS — N39.0 CHRONIC UTI: ICD-10-CM

## 2021-11-23 DIAGNOSIS — R06.09 EXERTIONAL DYSPNEA: ICD-10-CM

## 2021-11-23 DIAGNOSIS — K21.9 GASTROESOPHAGEAL REFLUX DISEASE WITHOUT ESOPHAGITIS: ICD-10-CM

## 2021-11-23 DIAGNOSIS — K31.84 GASTROPARESIS: ICD-10-CM

## 2021-11-23 DIAGNOSIS — E04.9 NON-TOXIC NODULAR GOITER: ICD-10-CM

## 2021-11-23 DIAGNOSIS — I26.99 PULMONARY EMBOLISM WITHOUT ACUTE COR PULMONALE, UNSPECIFIED CHRONICITY, UNSPECIFIED PULMONARY EMBOLISM TYPE (H): ICD-10-CM

## 2021-11-23 DIAGNOSIS — I49.3 PVC'S (PREMATURE VENTRICULAR CONTRACTIONS): ICD-10-CM

## 2021-11-23 DIAGNOSIS — I10 ESSENTIAL HYPERTENSION, BENIGN: ICD-10-CM

## 2021-11-23 LAB
ANION GAP SERPL CALCULATED.3IONS-SCNC: 11 MMOL/L (ref 5–18)
BASOPHILS # BLD AUTO: 0 10E3/UL (ref 0–0.2)
BASOPHILS NFR BLD AUTO: 0 %
BUN SERPL-MCNC: 15 MG/DL (ref 8–28)
CALCIUM SERPL-MCNC: 9.5 MG/DL (ref 8.5–10.5)
CHLORIDE BLD-SCNC: 106 MMOL/L (ref 98–107)
CO2 SERPL-SCNC: 23 MMOL/L (ref 22–31)
CREAT SERPL-MCNC: 0.72 MG/DL (ref 0.6–1.1)
EOSINOPHIL # BLD AUTO: 0.1 10E3/UL (ref 0–0.7)
EOSINOPHIL NFR BLD AUTO: 1 %
ERYTHROCYTE [DISTWIDTH] IN BLOOD BY AUTOMATED COUNT: 13.7 % (ref 10–15)
GFR SERPL CREATININE-BSD FRML MDRD: 80 ML/MIN/1.73M2
GLUCOSE BLD-MCNC: 140 MG/DL (ref 70–125)
HBA1C MFR BLD: 6.9 % (ref 0–5.6)
HCT VFR BLD AUTO: 42.4 % (ref 35–47)
HGB BLD-MCNC: 13.3 G/DL (ref 11.7–15.7)
IMM GRANULOCYTES # BLD: 0 10E3/UL
IMM GRANULOCYTES NFR BLD: 0 %
LYMPHOCYTES # BLD AUTO: 3.4 10E3/UL (ref 0.8–5.3)
LYMPHOCYTES NFR BLD AUTO: 32 %
MAGNESIUM SERPL-MCNC: 1.7 MG/DL (ref 1.8–2.6)
MCH RBC QN AUTO: 28.5 PG (ref 26.5–33)
MCHC RBC AUTO-ENTMCNC: 31.4 G/DL (ref 31.5–36.5)
MCV RBC AUTO: 91 FL (ref 78–100)
MONOCYTES # BLD AUTO: 0.9 10E3/UL (ref 0–1.3)
MONOCYTES NFR BLD AUTO: 8 %
NEUTROPHILS # BLD AUTO: 6.2 10E3/UL (ref 1.6–8.3)
NEUTROPHILS NFR BLD AUTO: 58 %
PLATELET # BLD AUTO: 246 10E3/UL (ref 150–450)
POTASSIUM BLD-SCNC: 4.4 MMOL/L (ref 3.5–5)
RBC # BLD AUTO: 4.67 10E6/UL (ref 3.8–5.2)
SODIUM SERPL-SCNC: 140 MMOL/L (ref 136–145)
TSH SERPL DL<=0.005 MIU/L-ACNC: 0.45 UIU/ML (ref 0.3–5)
WBC # BLD AUTO: 10.7 10E3/UL (ref 4–11)

## 2021-11-23 PROCEDURE — 85025 COMPLETE CBC W/AUTO DIFF WBC: CPT | Performed by: INTERNAL MEDICINE

## 2021-11-23 PROCEDURE — 83036 HEMOGLOBIN GLYCOSYLATED A1C: CPT | Performed by: INTERNAL MEDICINE

## 2021-11-23 PROCEDURE — 80048 BASIC METABOLIC PNL TOTAL CA: CPT | Performed by: INTERNAL MEDICINE

## 2021-11-23 PROCEDURE — 36415 COLL VENOUS BLD VENIPUNCTURE: CPT | Performed by: INTERNAL MEDICINE

## 2021-11-23 PROCEDURE — 99215 OFFICE O/P EST HI 40 MIN: CPT | Performed by: INTERNAL MEDICINE

## 2021-11-23 PROCEDURE — 83735 ASSAY OF MAGNESIUM: CPT | Performed by: INTERNAL MEDICINE

## 2021-11-23 PROCEDURE — 84443 ASSAY THYROID STIM HORMONE: CPT | Performed by: INTERNAL MEDICINE

## 2021-11-23 RX ORDER — SOTALOL HYDROCHLORIDE 80 MG/1
TABLET ORAL
Qty: 90 TABLET | Refills: 3 | Status: SHIPPED | OUTPATIENT
Start: 2021-11-23 | End: 2022-06-01

## 2021-11-23 RX ORDER — MONTELUKAST SODIUM 10 MG/1
10 TABLET ORAL DAILY
Qty: 90 TABLET | Refills: 3 | Status: SHIPPED | OUTPATIENT
Start: 2021-11-23 | End: 2022-06-01

## 2021-11-23 RX ORDER — METFORMIN HCL 500 MG
TABLET, EXTENDED RELEASE 24 HR ORAL
Qty: 360 TABLET | Refills: 3 | Status: SHIPPED | OUTPATIENT
Start: 2021-11-23 | End: 2022-06-01

## 2021-11-23 RX ORDER — ESTRADIOL 0.1 MG/G
2 CREAM VAGINAL EVERY OTHER DAY
Qty: 42.5 G | Refills: 3 | Status: SHIPPED | OUTPATIENT
Start: 2021-11-23 | End: 2022-06-01

## 2021-11-23 RX ORDER — BECLOMETHASONE DIPROPIONATE HFA 40 UG/1
2 AEROSOL, METERED RESPIRATORY (INHALATION) 2 TIMES DAILY
Qty: 31.8 G | Refills: 3 | Status: SHIPPED | OUTPATIENT
Start: 2021-11-23 | End: 2022-02-22

## 2021-11-23 RX ORDER — FAMOTIDINE 40 MG/1
40 TABLET, FILM COATED ORAL DAILY
Qty: 90 TABLET | Refills: 3
Start: 2021-11-23 | End: 2022-02-22

## 2021-11-23 RX ORDER — TIOTROPIUM BROMIDE 18 UG/1
18 CAPSULE ORAL; RESPIRATORY (INHALATION) DAILY
Qty: 90 CAPSULE | Refills: 3 | Status: SHIPPED | OUTPATIENT
Start: 2021-11-23

## 2021-11-23 RX ORDER — AZELASTINE 1 MG/ML
2 SPRAY, METERED NASAL 2 TIMES DAILY
Qty: 90 ML | Refills: 3 | Status: SHIPPED | OUTPATIENT
Start: 2021-11-23 | End: 2022-06-01

## 2021-11-23 RX ORDER — LOSARTAN POTASSIUM 100 MG/1
100 TABLET ORAL DAILY
Qty: 90 TABLET | Refills: 3 | Status: SHIPPED | OUTPATIENT
Start: 2021-11-23 | End: 2022-06-01

## 2021-11-23 RX ORDER — ATORVASTATIN CALCIUM 80 MG/1
TABLET, FILM COATED ORAL
Qty: 90 TABLET | Refills: 3 | Status: SHIPPED | OUTPATIENT
Start: 2021-11-23 | End: 2022-06-01

## 2021-11-23 RX ORDER — FAMOTIDINE 40 MG/1
40 TABLET, FILM COATED ORAL DAILY
Qty: 90 TABLET | Refills: 3 | Status: SHIPPED | OUTPATIENT
Start: 2021-11-23 | End: 2021-11-23

## 2021-11-23 RX ORDER — ALBUTEROL SULFATE 90 UG/1
AEROSOL, METERED RESPIRATORY (INHALATION)
Qty: 25.5 G | Refills: 3 | Status: SHIPPED | OUTPATIENT
Start: 2021-11-23 | End: 2022-06-01

## 2021-11-23 RX ORDER — NITROFURANTOIN MACROCRYSTALS 50 MG/1
CAPSULE ORAL
Qty: 90 CAPSULE | Refills: 3 | Status: SHIPPED | OUTPATIENT
Start: 2021-11-23 | End: 2022-02-22

## 2021-11-23 RX ORDER — AMLODIPINE BESYLATE 5 MG/1
5 TABLET ORAL DAILY
Qty: 90 TABLET | Refills: 3 | Status: SHIPPED | OUTPATIENT
Start: 2021-11-23 | End: 2022-06-01

## 2021-11-23 NOTE — PROGRESS NOTES
[facility] Follow Up Note    Assessment/Plan:  1. Type 2 diabetes mellitus without complication, without long-term current use of insulin (H)  Due for labs today-medications refilled.  Of note, weight is up 5 pounds.  She is not exercising.  Recommendation: Recommend to short snippets of exercise at 6 to 7 minutes each.  We will update labs.  Encourage a lower carb diet-which is difficult given her IBS.  - metFORMIN (GLUCOPHAGE-XR) 500 MG 24 hr tablet; TAKE TWO TABLETS BY MOUTH TWICE A DAY  Dispense: 360 tablet; Refill: 3  - CBC with Platelets & Differential; Future  - Hemoglobin A1c; Future    2. Essential hypertension, benign  Stable on current medications  - amLODIPine (NORVASC) 5 MG tablet; Take 1 tablet (5 mg) by mouth daily  Dispense: 90 tablet; Refill: 3  - losartan (COZAAR) 100 MG tablet; Take 1 tablet (100 mg) by mouth daily  Dispense: 90 tablet; Refill: 3  - Basic metabolic panel  (Ca, Cl, CO2, Creat, Gluc, K, Na, BUN); Future    3. Hypercholesterolemia  Medications renewed  - atorvastatin (LIPITOR) 80 MG tablet; TAKE ONE TABLET BY MOUTH EVERY NIGHT AT BEDTIME  Dispense: 90 tablet; Refill: 3    4. Esophageal reflux without esophagitis  Of note, she is on daily omeprazole.  With bone loss, IBS, would like to work toward weaning or at least reducing proton pump inhibitor dose.  She has hypomagnesemia which may be secondary to this medication.  Recommendation: Begin famotidine at 40 mg and alternate with omeprazole.  We will do this for the next 2 to 3 months and slowly wean as able    5. PAF (paroxysmal atrial fibrillation) (H)  Meds renewed  - sotalol (BETAPACE) 80 MG tablet; TAKE ONE-HALF TABLET BY MOUTH TWO TIMES A DAY  Dispense: 90 tablet; Refill: 3  - Magnesium; Future    6. PVC's (premature ventricular contractions)  Medications renewed-she has chronic and frequent PVCs.  She has been followed by electrophysiology.  Her medications are renewed.  Checking a magnesium today and will update  echocardiogram especially and lieu of exertional dyspnea  - sotalol (BETAPACE) 80 MG tablet; TAKE ONE-HALF TABLET BY MOUTH TWO TIMES A DAY  Dispense: 90 tablet; Refill: 3  - Magnesium; Future  - Echocardiogram Complete; Future    7. Chronic UTI    - nitroFURantoin macrocrystal (MACRODANTIN) 50 MG capsule; TAKE ONE CAPSULE BY MOUTH ONCE DAILY  Dispense: 90 capsule; Refill: 3    8. Moderate persistent asthma, unspecified whether complicated  Followed by Dr. Gutierrez  - albuterol (PROAIR HFA/PROVENTIL HFA/VENTOLIN HFA) 108 (90 Base) MCG/ACT inhaler; [ALBUTEROL (PROAIR HFA) 90 MCG/ACTUATION INHALER] INHALE TWO PUFFS BY MOUTH FOUR TIMES A DAY AS NEEDED FOR WHEEZING  Dispense: 25.5 g; Refill: 3    9.  Low bone mass with elevated hip fracture risk - 5% osteoporosis equivalent  Reviewed addended bone density with her.  She is at risk for fracture based on multiple medications/low exercise level/possible malabsorption/etc.  Have provided osteoporosis folder.  She is going to do some research.  Would recommend Prolia.  We will follow-up with her at next visit to get this going.    If she visits with pharmacy, can further address Prolia as well.  I think this would be a good option for her.    10. Atrophic vaginitis    - estradiol (ESTRACE) 0.1 MG/GM vaginal cream; Place 2 g vaginally every other day  Dispense: 42.5 g; Refill: 3    11. Pulmonary embolism without acute cor pulmonale, unspecified chronicity, unspecified pulmonary embolism type (H)    - apixaban ANTICOAGULANT (ELIQUIS) 5 MG tablet; Take 1 tablet (5 mg) by mouth 2 times daily  Dispense: 180 tablet; Refill: 3    12. Gastroesophageal reflux disease without esophagitis    - famotidine (PEPCID) 40 MG tablet; Take 1 tablet (40 mg) by mouth daily Alternate with omeprezole  Dispense: 90 tablet; Refill: 3  - omeprazole (PRILOSEC) 20 MG DR capsule; Alternate daily with pepcid  Dispense: 90 capsule; Refill: 3    14.  Irritable bowel syndrome with diarrhea  Tricky  situation-worry about overflow incontinence.  She is using Imodium twice weekly and Colestid daily-caution with other medications and absorption    15. Adenomatous Goiter    - TSH with free T4 reflex; Future    16. Exertional dyspnea  Likely secondary to obesity-sedentary lifestyle-lack of exercise  Recommendation: Slowly reintroduce exercise.  Work to reduce weight.  Follow-up with cardiology and cardiogram as described  - Echocardiogram Complete; Future      Follow-up for comprehensive exam    Elke Mehta MD, MD    Chief Complaint:  No chief complaint on file.      History of Present Illness:  Krystle is a 81 year old female who is here today for follow-up of her usual medical problems which are multiple.  Of note, she is due for diabetic follow-up.  Her diabetes has been relatively well controlled on current medications.  She is somewhat of a difficult patient and that she has significant irritable bowel with diarrhea.  There is no definitive pattern to this.  She does have some good days.  She has had overall improvement with Colestid and intermittent Imodium use.  We are trying to avoid overflow diarrhea-however.  It is also tricky with her current medications to get these things in and absorbed on time.    She has had a slight increase in diarrhea recently.  She has also had hypomagnesemia.  We have discussed the impact proton pump inhibitors on this as well.    Today, reviewed reviewed her bone density in depth.  It had to be addendum some discrepancies were noted.  She does not have osteoporosis but has low bone mass with elevated FRAX.  She is a candidate for osteoporosis medications.  She would like to contemplate this.  Additionally, she had a severe reaction to bisphosphonate therapy in the past.    We have also reviewed medication schedule.    She reports exertional dyspnea but does admit that she has not at all been exercising.  She has gained 5 pounds recently.  She does not do any activity.  BMI  is in the 40s.  She is followed up with her pulmonary specialist who does not believe her asthma is the cause of this.    Review of Systems:  A comprehensive review of systems was performed and was otherwise negative    PFSH:  Social History:  with adult children  History   Smoking Status     Never Smoker   Smokeless Tobacco     Never Used       Past History:   Current Outpatient Medications   Medication Sig Dispense Refill     albuterol (PROAIR HFA/PROVENTIL HFA/VENTOLIN HFA) 108 (90 Base) MCG/ACT inhaler [ALBUTEROL (PROAIR HFA) 90 MCG/ACTUATION INHALER] INHALE TWO PUFFS BY MOUTH FOUR TIMES A DAY AS NEEDED FOR WHEEZING 25.5 g 3     amLODIPine (NORVASC) 5 MG tablet Take 1 tablet (5 mg) by mouth daily 90 tablet 3     apixaban ANTICOAGULANT (ELIQUIS) 5 MG tablet Take 1 tablet (5 mg) by mouth 2 times daily 180 tablet 3     atorvastatin (LIPITOR) 80 MG tablet TAKE ONE TABLET BY MOUTH EVERY NIGHT AT BEDTIME 90 tablet 3     azelastine (ASTELIN) 0.1 % nasal spray Spray 2 sprays in nostril 2 times daily 90 mL 3     beclomethasone HFA (QVAR REDIHALER) 40 MCG/ACT inhaler Inhale 2 puffs into the lungs 2 times daily 31.8 g 3     estradiol (ESTRACE) 0.1 MG/GM vaginal cream Place 2 g vaginally every other day 42.5 g 3     famotidine (PEPCID) 40 MG tablet Take 1 tablet (40 mg) by mouth daily Alternate with omeprezole 90 tablet 3     losartan (COZAAR) 100 MG tablet Take 1 tablet (100 mg) by mouth daily 90 tablet 3     metFORMIN (GLUCOPHAGE-XR) 500 MG 24 hr tablet TAKE TWO TABLETS BY MOUTH TWICE A  tablet 3     montelukast (SINGULAIR) 10 MG tablet Take 1 tablet (10 mg) by mouth daily 90 tablet 3     nitroFURantoin macrocrystal (MACRODANTIN) 50 MG capsule TAKE ONE CAPSULE BY MOUTH ONCE DAILY 90 capsule 3     omeprazole (PRILOSEC) 20 MG DR capsule Alternate daily with pepcid 90 capsule 3     sotalol (BETAPACE) 80 MG tablet TAKE ONE-HALF TABLET BY MOUTH TWO TIMES A DAY 90 tablet 3     tiotropium (SPIRIVA HANDIHALER) 18  MCG inhaled capsule Inhale 1 capsule (18 mcg) into the lungs daily 90 capsule 3     acetaminophen (TYLENOL) 500 MG tablet [ACETAMINOPHEN (TYLENOL) 500 MG TABLET] Take 1,000 mg by mouth every 6 (six) hours as needed for pain.       blood glucose (CONTOUR NEXT TEST) test strip 1 strip by In Vitro route Use as directed with testing blood sugars once daily.  Dispense Contour Next brand per pt's insurance coverage       CALCIUM CARBONATE (CALCIUM 500 ORAL) [CALCIUM CARBONATE (CALCIUM 500 ORAL)] Take 1 tablet by mouth 2 (two) times a day.       cholecalciferol, vitamin D3, 1,000 unit tablet [CHOLECALCIFEROL, VITAMIN D3, 1,000 UNIT TABLET] Take 2,000 Units by mouth 2 (two) times a day.        clindamycin (CLEOCIN) 150 MG capsule [CLINDAMYCIN (CLEOCIN) 150 MG CAPSULE] TAKE 4 CAPSULES  BY MOUTH 1 HOUR PRIOR TO DENTAL APPOINTMENT. 30 capsule 3     colestipoL (COLESTID) 1 gram tablet [COLESTIPOL (COLESTID) 1 GRAM TABLET] Take 1 tablet (1 g total) by mouth 2 (two) times a day. 180 tablet 3     cranberry extract 300 mg Tab [CRANBERRY EXTRACT 300 MG TAB] Take 1 tablet by mouth daily.        dicyclomine (BENTYL) 10 MG capsule [DICYCLOMINE (BENTYL) 10 MG CAPSULE] Take 1 capsule (10 mg total) by mouth 2 (two) times a day as needed. 90 capsule 3     LACTOBACILLUS ACIDOPHILUS (PROBIOTIC ORAL) [LACTOBACILLUS ACIDOPHILUS (PROBIOTIC ORAL)] Take 1 tablet by mouth 2 (two) times a day.        lancing device Misc [LANCING DEVICE MISC] Use as directed 4 times a week. 1 each 3     lipase-protease-amylase (CREON) 6,000-19,000 -30,000 unit CpDR capsule [LIPASE-PROTEASE-AMYLASE (CREON) 6,000-19,000 -30,000 UNIT CPDR CAPSULE] Take 1 capsule (6,000 units of lipase total) by mouth 2 (two) times a day with meals. 270 capsule 3     loperamide (IMODIUM) 2 mg capsule [LOPERAMIDE (IMODIUM) 2 MG CAPSULE] Take 2 mg by mouth 4 (four) times a day as needed.       LUTEIN ORAL [LUTEIN ORAL] Take 1 tablet by mouth daily. With Zeaxanthin       magnesium oxide  250 mg magnesium Tab [MAGNESIUM OXIDE 250 MG MAGNESIUM TAB] Take 1 tablet by mouth daily.       metroNIDAZOLE (METROGEL) 0.75 % gel [METRONIDAZOLE (METROGEL) 0.75 % GEL] Apply 1 application topically as needed.        multivitamin therapeutic tablet [MULTIVITAMIN THERAPEUTIC TABLET] Take 1 tablet by mouth daily.       nitrofurantoin (MACRODANTIN) 50 MG capsule [NITROFURANTOIN (MACRODANTIN) 50 MG CAPSULE] Take 1 capsule (50 mg total) by mouth daily. 90 capsule 3     psyllium (METAMUCIL) 3.4 gram packet [PSYLLIUM (METAMUCIL) 3.4 GRAM PACKET] Take 1 packet by mouth every evening.       SIMETHICONE (GAS-X ORAL) [SIMETHICONE (GAS-X ORAL)] Take 1 tablet by mouth 2 (two) times a day as needed.       UBIDECARENONE (COENZYME Q10 ORAL) [UBIDECARENONE (COENZYME Q10 ORAL)] Take 1 tablet by mouth daily.        zinc gluconate 50 mg tablet [ZINC GLUCONATE 50 MG TABLET] Take 50 mg by mouth daily.         Family History:     Physical Exam:  General Appearance:   Appears at baseline-no full exam today  Vitals:    11/23/21 0939   BP: 114/76   BP Location: Left arm   Patient Position: Sitting   Cuff Size: Adult Large   Pulse: 78   SpO2: 99%   Weight: 103.6 kg (228 lb 4.8 oz)     Wt Readings from Last 3 Encounters:   11/23/21 103.6 kg (228 lb 4.8 oz)   07/23/21 102.2 kg (225 lb 4.8 oz)   04/16/21 101.2 kg (223 lb)     Body mass index is 41.42 kg/m .        Data Review:    Analysis and Summary of Old Records (2): Reviewed old cardiology and pulmonary records    Records Requested (1):       Other History Summarized (from other people in the room) (2):     Radiology Tests Summarized (XRAY/CT/MRI/DXA) (1): Reviewed bone density in depth    Labs Reviewed (1):     Medicine Tests Reviewed (EKG/ECHO/COLONOSCOPY/EGD) (1):     Independent Review of EKG or X-RAY (2):     Time spent today greater than 40 minutes with more than half that time spent in counseling and discussion of medications for osteoporosis, diabetes, IBS/etc.

## 2021-11-23 NOTE — TELEPHONE ENCOUNTER
"Last Written Prescription     Last office visit provider: 7/23/21     Requested Prescriptions   Pending Prescriptions Disp Refills     sotalol (BETAPACE) 80 MG tablet [Pharmacy Med Name: SOTALOL HCL 80MG TABS] 90 tablet 2     Sig: TAKE ONE-HALF TABLET BY MOUTH TWO TIMES A DAY       Beta-Blockers Protocol Passed - 11/19/2021  1:01 PM        Passed - Blood pressure under 140/90 in past 12 months     BP Readings from Last 3 Encounters:   07/23/21 128/70   04/16/21 124/70   02/09/21 132/64                 Passed - Patient is age 6 or older        Passed - Recent (12 mo) or future (30 days) visit within the authorizing provider's specialty     Patient has had an office visit with the authorizing provider or a provider within the authorizing providers department within the previous 12 mos or has a future within next 30 days. See \"Patient Info\" tab in inbasket, or \"Choose Columns\" in Meds & Orders section of the refill encounter.              Passed - Medication is active on med list           omeprazole (PRILOSEC) 20 MG DR capsule [Pharmacy Med Name: OMEPRAZOLE 20MG CPDR] 90 capsule 2     Sig: TAKE ONE CAPSULE BY MOUTH ONCE DAILY BEFORE BREAKFAST       PPI Protocol Passed - 11/19/2021  1:01 PM        Passed - Not on Clopidogrel (unless Pantoprazole ordered)        Passed - No diagnosis of osteoporosis on record        Passed - Recent (12 mo) or future (30 days) visit within the authorizing provider's specialty     Patient has had an office visit with the authorizing provider or a provider within the authorizing providers department within the previous 12 mos or has a future within next 30 days. See \"Patient Info\" tab in inbasket, or \"Choose Columns\" in Meds & Orders section of the refill encounter.              Passed - Medication is active on med list        Passed - Patient is age 18 or older        Passed - No active pregnacy on record        Passed - No positive pregnancy test in past 12 months           amLODIPine " "(NORVASC) 5 MG tablet [Pharmacy Med Name: AMLODIPINE BESYLATE 5MG TABS] 90 tablet 3     Sig: TAKE ONE TABLET BY MOUTH ONCE DAILY       Calcium Channel Blockers Protocol  Passed - 11/19/2021  1:01 PM        Passed - Blood pressure under 140/90 in past 12 months     BP Readings from Last 3 Encounters:   07/23/21 128/70   04/16/21 124/70   02/09/21 132/64                 Passed - Recent (12 mo) or future (30 days) visit within the authorizing provider's specialty     Patient has had an office visit with the authorizing provider or a provider within the authorizing providers department within the previous 12 mos or has a future within next 30 days. See \"Patient Info\" tab in inbasket, or \"Choose Columns\" in Meds & Orders section of the refill encounter.              Passed - Medication is active on med list        Passed - Patient is age 18 or older        Passed - No active pregnancy on record        Passed - Normal serum creatinine on file in past 12 months     Recent Labs   Lab Test 07/23/21  0817   CR 0.78       Ok to refill medication if creatinine is low          Passed - No positive pregnancy test in past 12 months             Nicol Syed RN 11/22/21 6:23 PM  "

## 2021-11-23 NOTE — TELEPHONE ENCOUNTER
Left patient a voicemail stating [facility] is requesting office notes. If patient calls back, patient needs to sign ARNOL form is she consents to it.

## 2021-11-24 NOTE — TELEPHONE ENCOUNTER
Pt is calling back and she is confused why the notes are requested.  Please call pt as she is thinking that only Dr Mehta's office note needs to go to [facility] for her therapeutic shoes

## 2021-12-16 NOTE — TELEPHONE ENCOUNTER
We called to inform her that a request was sent in without an ARNOL. We will not send the notes. No further task needed.

## 2021-12-23 ENCOUNTER — HOSPITAL ENCOUNTER (OUTPATIENT)
Dept: CARDIOLOGY | Facility: HOSPITAL | Age: 81
Discharge: HOME | End: 2021-12-23
Attending: INTERNAL MEDICINE | Admitting: INTERNAL MEDICINE
Payer: COMMERCIAL

## 2021-12-23 DIAGNOSIS — I49.3 PVC'S (PREMATURE VENTRICULAR CONTRACTIONS): ICD-10-CM

## 2021-12-23 DIAGNOSIS — R06.09 EXERTIONAL DYSPNEA: ICD-10-CM

## 2021-12-23 LAB — LVEF ECHO: NORMAL

## 2021-12-23 PROCEDURE — 93306 TTE W/DOPPLER COMPLETE: CPT | Mod: 26 | Performed by: INTERNAL MEDICINE

## 2021-12-23 PROCEDURE — 255N000002 HC RX 255 OP 636: Performed by: INTERNAL MEDICINE

## 2021-12-23 RX ADMIN — PERFLUTREN 3 ML: 6.52 INJECTION, SUSPENSION INTRAVENOUS at 09:30

## 2022-01-18 ENCOUNTER — OFFICE VISIT (OUTPATIENT)
Dept: INTERNAL MEDICINE | Facility: CLINIC | Age: 82
End: 2022-01-18
Payer: COMMERCIAL

## 2022-01-18 VITALS
OXYGEN SATURATION: 98 % | DIASTOLIC BLOOD PRESSURE: 70 MMHG | WEIGHT: 222.8 LBS | BODY MASS INDEX: 40.42 KG/M2 | SYSTOLIC BLOOD PRESSURE: 124 MMHG | HEART RATE: 78 BPM

## 2022-01-18 DIAGNOSIS — E11.9 TYPE 2 DIABETES MELLITUS WITHOUT COMPLICATION, WITHOUT LONG-TERM CURRENT USE OF INSULIN (H): ICD-10-CM

## 2022-01-18 DIAGNOSIS — K58.0 IRRITABLE BOWEL SYNDROME WITH DIARRHEA: ICD-10-CM

## 2022-01-18 DIAGNOSIS — R79.0 LOW MAGNESIUM LEVEL: ICD-10-CM

## 2022-01-18 DIAGNOSIS — I49.3 PVC'S (PREMATURE VENTRICULAR CONTRACTIONS): ICD-10-CM

## 2022-01-18 DIAGNOSIS — R10.84 ABDOMINAL PAIN, GENERALIZED: ICD-10-CM

## 2022-01-18 DIAGNOSIS — R19.7 DIARRHEA, UNSPECIFIED TYPE: Primary | ICD-10-CM

## 2022-01-18 PROCEDURE — 99214 OFFICE O/P EST MOD 30 MIN: CPT | Performed by: INTERNAL MEDICINE

## 2022-01-18 ASSESSMENT — ASTHMA QUESTIONNAIRES
QUESTION_2 LAST FOUR WEEKS HOW OFTEN HAVE YOU HAD SHORTNESS OF BREATH: THREE TO SIX TIMES A WEEK
ACT_TOTALSCORE: 20
QUESTION_5 LAST FOUR WEEKS HOW WOULD YOU RATE YOUR ASTHMA CONTROL: WELL CONTROLLED
QUESTION_4 LAST FOUR WEEKS HOW OFTEN HAVE YOU USED YOUR RESCUE INHALER OR NEBULIZER MEDICATION (SUCH AS ALBUTEROL): ONCE A WEEK OR LESS
QUESTION_1 LAST FOUR WEEKS HOW MUCH OF THE TIME DID YOUR ASTHMA KEEP YOU FROM GETTING AS MUCH DONE AT WORK, SCHOOL OR AT HOME: A LITTLE OF THE TIME
QUESTION_3 LAST FOUR WEEKS HOW OFTEN DID YOUR ASTHMA SYMPTOMS (WHEEZING, COUGHING, SHORTNESS OF BREATH, CHEST TIGHTNESS OR PAIN) WAKE YOU UP AT NIGHT OR EARLIER THAN USUAL IN THE MORNING: NOT AT ALL

## 2022-01-18 NOTE — PROGRESS NOTES
[facility] Follow Up Note    Assessment/Plan:  1. Diarrhea, unspecified type/with generalized abdominal pain/underlying history of IBS with diarrhea  Continues to be problematic.  Appointment with Dr. Shannon early February  Exam reveals somewhat distended abdomen.  Question ventral hernia.  Underlying history of diffuse diverticuli.  Question whether there is overflow diarrhea.  On a complex regimen of medications including Colestid/pancreatic enzymes/rare Imodium-every couple of days/etc.  Question Lotronex???  Recommendation: We will proceed with CT scan of the abdomen pelvis to evaluate for diverticulitis.  Consideration for a course of rifaximin in the event that there is bacterial overgrowth.  - rifaximin (XIFAXAN) 200 MG tablet; Take 1 tablet (200 mg) by mouth 3 times daily  Dispense: 30 tablet; Refill: 0    2.  PVCs/setting and dyspnea/etc.  Echocardiogram reviewed.  Ejection fraction normal.  Exam unremarkable today.  Tolerating low-dose sotalol at 40 mg twice daily.  It seems to be controlling her symptoms and ectopy    3. Irritable bowel syndrome with diarrhea  As above  - rifaximin (XIFAXAN) 200 MG tablet; Take 1 tablet (200 mg) by mouth 3 times daily  Dispense: 30 tablet; Refill: 0    4. Type 2 diabetes mellitus without complication, without long-term current use of insulin (H)  Will update at next visit    5. BMI 40.0-44.9, adult (H)  Weight gain and not weight loss but this    6. Low magnesium level  Weaning proton pump inhibitor.  We will discontinue omeprazole completely and switch to Pepcid-hopefully will slowly wean Pepcid.  - Magnesium; Future      Follow-up in February    Elke Mehta MD, MD    Chief Complaint:  Chief Complaint   Patient presents with     Back Pain     Abdominal Pain     Musculoskeletal Problem       History of Present Illness:  Krystle is a 81 year old female who is here today for follow-up of a multitude of problems.  Today, she is plagued by issues related to  IBS with diarrhea.  She states that she has had ongoing abdominal tightness/burning and frequent BMs.  This has been a chronic problem for her.  She has been on bile acid sequestrant's, probiotics, digestive enzymes and has used loperamide 2-3 times per week.  She states she had a bout of severe exacerbation shortly after the holidays.  This likely was triggered by a different set of fluids.  Today, she is feeling slightly better.  However, she is frustrated that she has not lost weight and she continues to feel uncomfortable.  She believes that the distended abdomen has resulted in flank pain as well as perhaps pain on the level of the hips.  She had been experiencing significant hip and knee pain and did visit with her orthopedist.  X-rays did not show anything definitive.  She is wondering whether the abdominal distention is contributing.    Her diet is reviewed.  She does enjoy a fair amount of dairy including cheeses, yogurt and ice cream.  She does eat a variety of vegetables and fruit.  They attempt to eat about 50% of their intake as fruits and vegetables.  She does not exercise.  Her weight remains elevated.    Additionally, we reviewed her echocardiogram and PVCs issue.  Her echocardiogram reveals a completely normal EF.  Therefore, she is not experiencing cardiomyopathy from frequent PVCs.  She is on low-dose sotalol and this appears to be doing the job.    Additionally, she has low magnesium levels.  I believe this is likely from her proton pump inhibitor.  We are working on discontinuing this such that she would be able to cut back on her magnesium which can contribute to diarrhea.    Review of Systems:  A comprehensive review of systems was performed and was otherwise negative    PFSH:  Social History:   History   Smoking Status     Never Smoker   Smokeless Tobacco     Never Used       Past History:   Current Outpatient Medications   Medication Sig Dispense Refill     acetaminophen (TYLENOL) 500 MG  tablet [ACETAMINOPHEN (TYLENOL) 500 MG TABLET] Take 1,000 mg by mouth every 6 (six) hours as needed for pain.       albuterol (PROAIR HFA/PROVENTIL HFA/VENTOLIN HFA) 108 (90 Base) MCG/ACT inhaler [ALBUTEROL (PROAIR HFA) 90 MCG/ACTUATION INHALER] INHALE TWO PUFFS BY MOUTH FOUR TIMES A DAY AS NEEDED FOR WHEEZING 25.5 g 3     amLODIPine (NORVASC) 5 MG tablet Take 1 tablet (5 mg) by mouth daily 90 tablet 3     apixaban ANTICOAGULANT (ELIQUIS) 5 MG tablet Take 1 tablet (5 mg) by mouth 2 times daily 180 tablet 3     atorvastatin (LIPITOR) 80 MG tablet TAKE ONE TABLET BY MOUTH EVERY NIGHT AT BEDTIME 90 tablet 3     azelastine (ASTELIN) 0.1 % nasal spray Spray 2 sprays in nostril 2 times daily 90 mL 3     beclomethasone HFA (QVAR REDIHALER) 40 MCG/ACT inhaler Inhale 2 puffs into the lungs 2 times daily 31.8 g 3     blood glucose (CONTOUR NEXT TEST) test strip 1 strip by In Vitro route Use as directed with testing blood sugars once daily.  Dispense Contour Next brand per pt's insurance coverage       CALCIUM CARBONATE (CALCIUM 500 ORAL) [CALCIUM CARBONATE (CALCIUM 500 ORAL)] Take 1 tablet by mouth 2 (two) times a day.       cholecalciferol, vitamin D3, 1,000 unit tablet [CHOLECALCIFEROL, VITAMIN D3, 1,000 UNIT TABLET] Take 2,000 Units by mouth 2 (two) times a day.        clindamycin (CLEOCIN) 150 MG capsule [CLINDAMYCIN (CLEOCIN) 150 MG CAPSULE] TAKE 4 CAPSULES  BY MOUTH 1 HOUR PRIOR TO DENTAL APPOINTMENT. 30 capsule 3     colestipoL (COLESTID) 1 gram tablet [COLESTIPOL (COLESTID) 1 GRAM TABLET] Take 1 tablet (1 g total) by mouth 2 (two) times a day. 180 tablet 3     cranberry extract 300 mg Tab [CRANBERRY EXTRACT 300 MG TAB] Take 1 tablet by mouth daily.        estradiol (ESTRACE) 0.1 MG/GM vaginal cream Place 2 g vaginally every other day 42.5 g 3     famotidine (PEPCID) 40 MG tablet Take 1 tablet (40 mg) by mouth daily Alternate with omeprezole 90 tablet 3     LACTOBACILLUS ACIDOPHILUS (PROBIOTIC ORAL) [LACTOBACILLUS  ACIDOPHILUS (PROBIOTIC ORAL)] Take 1 tablet by mouth 2 (two) times a day.        lancing device Misc [LANCING DEVICE MISC] Use as directed 4 times a week. 1 each 3     lipase-protease-amylase (CREON) 6,000-19,000 -30,000 unit CpDR capsule [LIPASE-PROTEASE-AMYLASE (CREON) 6,000-19,000 -30,000 UNIT CPDR CAPSULE] Take 1 capsule (6,000 units of lipase total) by mouth 2 (two) times a day with meals. 270 capsule 3     loperamide (IMODIUM) 2 mg capsule [LOPERAMIDE (IMODIUM) 2 MG CAPSULE] Take 2 mg by mouth 4 (four) times a day as needed.       losartan (COZAAR) 100 MG tablet Take 1 tablet (100 mg) by mouth daily 90 tablet 3     LUTEIN ORAL [LUTEIN ORAL] Take 1 tablet by mouth daily. With Zeaxanthin       magnesium oxide 250 mg magnesium Tab [MAGNESIUM OXIDE 250 MG MAGNESIUM TAB] Take 1 tablet by mouth daily.       metFORMIN (GLUCOPHAGE-XR) 500 MG 24 hr tablet TAKE TWO TABLETS BY MOUTH TWICE A  tablet 3     metroNIDAZOLE (METROGEL) 0.75 % gel [METRONIDAZOLE (METROGEL) 0.75 % GEL] Apply 1 application topically as needed.        montelukast (SINGULAIR) 10 MG tablet Take 1 tablet (10 mg) by mouth daily 90 tablet 3     multivitamin therapeutic tablet [MULTIVITAMIN THERAPEUTIC TABLET] Take 1 tablet by mouth daily.       nitrofurantoin (MACRODANTIN) 50 MG capsule [NITROFURANTOIN (MACRODANTIN) 50 MG CAPSULE] Take 1 capsule (50 mg total) by mouth daily. 90 capsule 3     nitroFURantoin macrocrystal (MACRODANTIN) 50 MG capsule TAKE ONE CAPSULE BY MOUTH ONCE DAILY 90 capsule 3     psyllium (METAMUCIL) 3.4 gram packet [PSYLLIUM (METAMUCIL) 3.4 GRAM PACKET] Take 1 packet by mouth every evening.       rifaximin (XIFAXAN) 200 MG tablet Take 1 tablet (200 mg) by mouth 3 times daily 30 tablet 0     SIMETHICONE (GAS-X ORAL) [SIMETHICONE (GAS-X ORAL)] Take 1 tablet by mouth 2 (two) times a day as needed.       sotalol (BETAPACE) 80 MG tablet TAKE ONE-HALF TABLET BY MOUTH TWO TIMES A DAY 90 tablet 3     tiotropium (SPIRIVA HANDIHALER)  18 MCG inhaled capsule Inhale 1 capsule (18 mcg) into the lungs daily 90 capsule 3     UBIDECARENONE (COENZYME Q10 ORAL) [UBIDECARENONE (COENZYME Q10 ORAL)] Take 1 tablet by mouth daily.        zinc gluconate 50 mg tablet [ZINC GLUCONATE 50 MG TABLET] Take 50 mg by mouth daily.         Family History:     Physical Exam:  General Appearance:   She appears at baseline.-  Her weight is indeed down 6 pounds  Vitals:    01/18/22 1326   BP: 124/70   BP Location: Left arm   Patient Position: Sitting   Cuff Size: Adult Large   Pulse: 78   SpO2: 98%   Weight: 101.1 kg (222 lb 12.8 oz)     Wt Readings from Last 3 Encounters:   01/18/22 101.1 kg (222 lb 12.8 oz)   11/23/21 103.6 kg (228 lb 4.8 oz)   07/23/21 102.2 kg (225 lb 4.8 oz)     Body mass index is 40.42 kg/m .    Lungs are clear  Cardiac exam reveals a regular rate with a very occasional extrasystole.  Abdomen is obese soft and nontender today

## 2022-01-19 ASSESSMENT — ASTHMA QUESTIONNAIRES: ACT_TOTALSCORE: 20

## 2022-01-25 DIAGNOSIS — K58.9 IRRITABLE BOWEL SYNDROME, UNSPECIFIED TYPE: ICD-10-CM

## 2022-01-27 NOTE — TELEPHONE ENCOUNTER
"Routing refill request to provider for review/approval because:  Labs not current:  Lipid    Last Written Prescription Date:  10/5/20  Last Fill Quantity: 180,  # refills: 3   Last office visit provider:  4/16/21     Requested Prescriptions   Pending Prescriptions Disp Refills     colestipol (COLESTID) 1 g tablet [Pharmacy Med Name: COLESTIPOL HCL 1GM TABS] 180 tablet 2     Sig: TAKE ONE TABLET BY MOUTH TWICE A DAY       Bile Acid Sequestrant Agents Failed - 1/25/2022  9:17 AM        Failed - Lipid panel on file in past 12 mos     Recent Labs   Lab Test 10/02/20  1504 02/08/19  1038   CHOL  --  191   TRIG  --  261*   HDL  --  56   LDL 97 112  83               Passed - Recent (12 mo) or future (30 days) visit within the authorizing provider's specialty     Patient has had an office visit with the authorizing provider or a provider within the authorizing providers department within the previous 12 mos or has a future within next 30 days. See \"Patient Info\" tab in inbasket, or \"Choose Columns\" in Meds & Orders section of the refill encounter.              Passed - Medication is active on med list        Passed - Patient is age 18 years or older        Passed - No active pregnancy on record        Passed - No positive pregnancy test in past 12 months             trent osman RN 01/26/22 7:47 PM  "

## 2022-01-28 ENCOUNTER — TELEPHONE (OUTPATIENT)
Dept: INTERNAL MEDICINE | Facility: CLINIC | Age: 82
End: 2022-01-28
Payer: COMMERCIAL

## 2022-01-28 DIAGNOSIS — R10.84 ABDOMINAL PAIN, GENERALIZED: Primary | ICD-10-CM

## 2022-01-28 RX ORDER — MONTELUKAST SODIUM 4 MG/1
TABLET, CHEWABLE ORAL
Qty: 180 TABLET | Refills: 2 | Status: SHIPPED | OUTPATIENT
Start: 2022-01-28 | End: 2022-06-01

## 2022-01-28 NOTE — TELEPHONE ENCOUNTER
Talked with the pt and she states that [facility] wouldn't do the CT with out contrast and would only do it if she was pre medicated     Pt states that someone in imaging told her that she was not allergic sometime ago?    Pt then states that she can not do the premedication (Prednisone because she has a allergy to it)    Pt needs this CT scan done before her appointment 2 weeks from now      Martha can you please help with this, Pt requesting to talk with someone who knows more about contrast and medications

## 2022-01-31 NOTE — TELEPHONE ENCOUNTER
The CT scan was ordered without contrast, I am not sure why the imaging department would not perform this for her, and why she require premedication if she will not be receiving contrast.    Could you please call and discuss with imaging to see if we are misunderstanding the issue from the patient?  Based on chart review she had a very significant reaction to contrast therefore I do not believe she should be receiving contrast, also the reason why PCP did not order contrast.  If the patient does not fact still have questions about contrast after this is clarified am happy to discuss.

## 2022-01-31 NOTE — TELEPHONE ENCOUNTER
Writer contacted imaging.    Writer asked why the CT was not preformed without contrast.    The  of the clinic went and talked to a tech, advised writer that the tech found in the notes that the CT was not done because the radiologist (Hermilo) mentioned it would be more beneficial to have the CT done with contrast.

## 2022-01-31 NOTE — TELEPHONE ENCOUNTER
The CT of the abdomen and pelvis was ordered without contrast    We are looking for a significant case of diverticulitis, so this is the appropriate test.  We do not need to know every tiny detail    Proceed with the CT scan of the abdomen and pelvis without contrast

## 2022-02-01 NOTE — TELEPHONE ENCOUNTER
"Contacted imaging department to advise them of the message below.  Writer spoke to tech and she stated that there is a question on the order form that she couldn't find but states something like \"Can the radiologist change the protocol? (Yes or No)? Tech mentioned if doctor doesn't check one it automatically defaults to yes. In this case the radiologist thought it would be more beneficial with contrast.    Tech stated that patient can schedule another CT of abdomen/pelvis with the current order, but stated it might be better to place a new order either answering that question or the tech stated in the reason for exam to state \" No Contrast\".           "

## 2022-02-02 ENCOUNTER — TRANSFERRED RECORDS (OUTPATIENT)
Dept: HEALTH INFORMATION MANAGEMENT | Facility: CLINIC | Age: 82
End: 2022-02-02
Payer: COMMERCIAL

## 2022-02-07 NOTE — TELEPHONE ENCOUNTER
Please call pt as she is needs to be told the status of this please.    Can she proceed or does she need to do something else?    She did not want any answers from me.    Ok to leave detailed msg if no answer

## 2022-02-08 NOTE — TELEPHONE ENCOUNTER
Contact imaging to verify the order. Order is placed for CT of abdomen/pelvis without contrast.      of the imaging department stated they reached out to patient to     Writer contacted patient and advised her that the order was all set and that she could contact the imaging department to schedule.     Pt requested imaging to contact her to schedule.     Writer contact imaging and asked that the patient be called tomorrow to help schedule her CT.

## 2022-02-08 NOTE — TELEPHONE ENCOUNTER
Order signed by Dr Yin with comment for NO Contrast - pt ok to schedule    Pt stated the radiologist told her there was no point doing scan without contrast and wants her to do Prednisone day before and after CT

## 2022-02-11 ENCOUNTER — TELEPHONE (OUTPATIENT)
Dept: INTERNAL MEDICINE | Facility: CLINIC | Age: 82
End: 2022-02-11
Payer: COMMERCIAL

## 2022-02-11 NOTE — TELEPHONE ENCOUNTER
She has questions about her history of contrast reaction. In the 80's she had a stomach CT scan and went into shock. And in 2016 she had severe clots. She has not had contrast since her episode in the 80's.     She was told that the contrast used in the 80's were different than the contrast in [id number] that happened at [facility]. She tolerated this contrast and is wondering what this was and if there is a dye she can tolerate this.

## 2022-02-11 NOTE — TELEPHONE ENCOUNTER
Reason for Call:  Other call back    Detailed comments: Pt calling regarding her medications    Phone Number Patient can be reached at: Cell number on file:    Telephone Information:   Mobile [phone]       Best Time: anytime    Can we leave a detailed message on this number? YES    Call taken on 2/11/2022 at 1:50 PM by Miguelina Pagan

## 2022-02-15 NOTE — TELEPHONE ENCOUNTER
Per chart review I was not able to find any imaging tests that were ordered with contrast. Reviewed imaging documents as well as media tab.     Relayed message to Krsytle.

## 2022-02-16 ENCOUNTER — HOSPITAL ENCOUNTER (OUTPATIENT)
Dept: CT IMAGING | Facility: HOSPITAL | Age: 82
Discharge: HOME | End: 2022-02-16
Attending: INTERNAL MEDICINE | Admitting: INTERNAL MEDICINE
Payer: COMMERCIAL

## 2022-02-16 DIAGNOSIS — R10.84 ABDOMINAL PAIN, GENERALIZED: ICD-10-CM

## 2022-02-16 PROCEDURE — 74176 CT ABD & PELVIS W/O CONTRAST: CPT

## 2022-02-22 ENCOUNTER — OFFICE VISIT (OUTPATIENT)
Dept: INTERNAL MEDICINE | Facility: CLINIC | Age: 82
End: 2022-02-22
Payer: COMMERCIAL

## 2022-02-22 VITALS
HEART RATE: 72 BPM | BODY MASS INDEX: 42.1 KG/M2 | HEIGHT: 61 IN | SYSTOLIC BLOOD PRESSURE: 128 MMHG | OXYGEN SATURATION: 99 % | DIASTOLIC BLOOD PRESSURE: 78 MMHG | WEIGHT: 223 LBS

## 2022-02-22 DIAGNOSIS — K58.9 IRRITABLE BOWEL SYNDROME, UNSPECIFIED TYPE: ICD-10-CM

## 2022-02-22 DIAGNOSIS — E11.9 TYPE 2 DIABETES MELLITUS WITHOUT COMPLICATION, WITHOUT LONG-TERM CURRENT USE OF INSULIN (H): ICD-10-CM

## 2022-02-22 DIAGNOSIS — R39.15 URINARY URGENCY: ICD-10-CM

## 2022-02-22 DIAGNOSIS — E83.42 HYPOMAGNESEMIA: ICD-10-CM

## 2022-02-22 DIAGNOSIS — I49.3 PVC'S (PREMATURE VENTRICULAR CONTRACTIONS): ICD-10-CM

## 2022-02-22 DIAGNOSIS — Z79.899 MEDICATION MANAGEMENT: ICD-10-CM

## 2022-02-22 DIAGNOSIS — M81.0 AGE-RELATED OSTEOPOROSIS WITHOUT CURRENT PATHOLOGICAL FRACTURE: ICD-10-CM

## 2022-02-22 DIAGNOSIS — R63.5 ABNORMAL WEIGHT GAIN: ICD-10-CM

## 2022-02-22 DIAGNOSIS — Z92.29 HX DRUG THERAPY: ICD-10-CM

## 2022-02-22 DIAGNOSIS — Z86.711 HISTORY OF PULMONARY EMBOLISM: ICD-10-CM

## 2022-02-22 DIAGNOSIS — I10 ESSENTIAL HYPERTENSION, BENIGN: ICD-10-CM

## 2022-02-22 DIAGNOSIS — E55.9 VITAMIN D DEFICIENCY: ICD-10-CM

## 2022-02-22 DIAGNOSIS — K21.00 GASTROESOPHAGEAL REFLUX DISEASE WITH ESOPHAGITIS WITHOUT HEMORRHAGE: ICD-10-CM

## 2022-02-22 DIAGNOSIS — Z00.00 ENCOUNTER FOR PREVENTIVE CARE: Primary | ICD-10-CM

## 2022-02-22 LAB
ALBUMIN SERPL-MCNC: 3.7 G/DL (ref 3.5–5)
ALBUMIN UR-MCNC: NEGATIVE MG/DL
ALP SERPL-CCNC: 74 U/L (ref 45–120)
ALT SERPL W P-5'-P-CCNC: 20 U/L (ref 0–45)
ANION GAP SERPL CALCULATED.3IONS-SCNC: 11 MMOL/L (ref 5–18)
APPEARANCE UR: CLEAR
AST SERPL W P-5'-P-CCNC: 15 U/L (ref 0–40)
BASOPHILS # BLD AUTO: 0 10E3/UL (ref 0–0.2)
BASOPHILS NFR BLD AUTO: 0 %
BILIRUB SERPL-MCNC: 0.7 MG/DL (ref 0–1)
BILIRUB UR QL STRIP: NEGATIVE
BUN SERPL-MCNC: 16 MG/DL (ref 8–28)
CALCIUM SERPL-MCNC: 9.9 MG/DL (ref 8.5–10.5)
CHLORIDE BLD-SCNC: 104 MMOL/L (ref 98–107)
CHOLEST SERPL-MCNC: 158 MG/DL
CO2 SERPL-SCNC: 24 MMOL/L (ref 22–31)
COLOR UR AUTO: YELLOW
CREAT SERPL-MCNC: 0.72 MG/DL (ref 0.6–1.1)
EOSINOPHIL # BLD AUTO: 0.2 10E3/UL (ref 0–0.7)
EOSINOPHIL NFR BLD AUTO: 2 %
ERYTHROCYTE [DISTWIDTH] IN BLOOD BY AUTOMATED COUNT: 14 % (ref 10–15)
FASTING STATUS PATIENT QL REPORTED: NO
GFR SERPL CREATININE-BSD FRML MDRD: 85 ML/MIN/1.73M2
GLUCOSE BLD-MCNC: 154 MG/DL (ref 70–125)
GLUCOSE UR STRIP-MCNC: NEGATIVE MG/DL
HBA1C MFR BLD: 6.9 % (ref 0–5.6)
HCT VFR BLD AUTO: 43.6 % (ref 35–47)
HDLC SERPL-MCNC: 49 MG/DL
HGB BLD-MCNC: 13.9 G/DL (ref 11.7–15.7)
HGB UR QL STRIP: NEGATIVE
IMM GRANULOCYTES # BLD: 0 10E3/UL
IMM GRANULOCYTES NFR BLD: 0 %
KETONES UR STRIP-MCNC: NEGATIVE MG/DL
LDLC SERPL CALC-MCNC: 63 MG/DL
LEUKOCYTE ESTERASE UR QL STRIP: NEGATIVE
LYMPHOCYTES # BLD AUTO: 3.6 10E3/UL (ref 0.8–5.3)
LYMPHOCYTES NFR BLD AUTO: 32 %
MAGNESIUM SERPL-MCNC: 1.4 MG/DL (ref 1.8–2.6)
MCH RBC QN AUTO: 28.8 PG (ref 26.5–33)
MCHC RBC AUTO-ENTMCNC: 31.9 G/DL (ref 31.5–36.5)
MCV RBC AUTO: 90 FL (ref 78–100)
MONOCYTES # BLD AUTO: 0.8 10E3/UL (ref 0–1.3)
MONOCYTES NFR BLD AUTO: 7 %
NEUTROPHILS # BLD AUTO: 6.6 10E3/UL (ref 1.6–8.3)
NEUTROPHILS NFR BLD AUTO: 59 %
NITRATE UR QL: NEGATIVE
PH UR STRIP: 7 [PH] (ref 5–8)
PLATELET # BLD AUTO: 244 10E3/UL (ref 150–450)
POTASSIUM BLD-SCNC: 4.3 MMOL/L (ref 3.5–5)
PROT SERPL-MCNC: 7.3 G/DL (ref 6–8)
RBC # BLD AUTO: 4.83 10E6/UL (ref 3.8–5.2)
SODIUM SERPL-SCNC: 139 MMOL/L (ref 136–145)
SP GR UR STRIP: 1.01 (ref 1–1.03)
TRIGL SERPL-MCNC: 228 MG/DL
TSH SERPL DL<=0.005 MIU/L-ACNC: 0.77 UIU/ML (ref 0.3–5)
UROBILINOGEN UR STRIP-ACNC: 0.2 E.U./DL
WBC # BLD AUTO: 11.2 10E3/UL (ref 4–11)

## 2022-02-22 PROCEDURE — 36415 COLL VENOUS BLD VENIPUNCTURE: CPT | Performed by: INTERNAL MEDICINE

## 2022-02-22 PROCEDURE — 85025 COMPLETE CBC W/AUTO DIFF WBC: CPT | Performed by: INTERNAL MEDICINE

## 2022-02-22 PROCEDURE — 80053 COMPREHEN METABOLIC PANEL: CPT | Performed by: INTERNAL MEDICINE

## 2022-02-22 PROCEDURE — 81003 URINALYSIS AUTO W/O SCOPE: CPT | Performed by: INTERNAL MEDICINE

## 2022-02-22 PROCEDURE — 82306 VITAMIN D 25 HYDROXY: CPT | Performed by: INTERNAL MEDICINE

## 2022-02-22 PROCEDURE — 83036 HEMOGLOBIN GLYCOSYLATED A1C: CPT | Performed by: INTERNAL MEDICINE

## 2022-02-22 PROCEDURE — 80061 LIPID PANEL: CPT | Performed by: INTERNAL MEDICINE

## 2022-02-22 PROCEDURE — 99397 PER PM REEVAL EST PAT 65+ YR: CPT | Performed by: INTERNAL MEDICINE

## 2022-02-22 PROCEDURE — 83735 ASSAY OF MAGNESIUM: CPT | Performed by: INTERNAL MEDICINE

## 2022-02-22 PROCEDURE — 84443 ASSAY THYROID STIM HORMONE: CPT | Performed by: INTERNAL MEDICINE

## 2022-02-22 RX ORDER — FAMOTIDINE 40 MG/1
80 TABLET, FILM COATED ORAL DAILY
COMMUNITY
End: 2022-05-02

## 2022-02-22 ASSESSMENT — ACTIVITIES OF DAILY LIVING (ADL): CURRENT_FUNCTION: NO ASSISTANCE NEEDED

## 2022-02-22 NOTE — PROGRESS NOTES
ANNUAL WELLNESS VISIT--Face to Face    Assessment and Plan:     ASSESSMENT and PLAN:  1. Encounter for preventive care  She has a mammogram scheduled.  She will be having a colonoscopy before her 82th birthday.  Bone density is updated and reviewed.  Immunizations are updated.  Ophthalmologic, dental and Derm care are all up-to-date.    She has morbid obesity and an elevated body mass index.  Have encouraged both weight loss and exercise.  Baseline labs as below.    - Comprehensive metabolic panel; Future  - CBC with Platelets & Differential; Future  - Lipid panel reflex to direct LDL Fasting; Future  - Comprehensive metabolic panel  - CBC with Platelets & Differential  - Lipid panel reflex to direct LDL Fasting    2. Benign Essential Hypertension  At goal on current medications    3. Type 2 diabetes mellitus without complication, without long-term current use of insulin (H)  Glycohemoglobin is 6.9.  This is stable for her age.  Activity level.  Of note, she has difficulty with IBS with diarrhea.  Therefore food choices are not always optimal.  Encourage regular walks.    - Hemoglobin A1c  - Comprehensive metabolic panel  - CBC with Platelets & Differential  - Lipid panel reflex to direct LDL Fasting    4. PVC's (premature ventricular contractions)  Stable.  Normal EF per echocardiogram  - TSH with free T4 reflex; Future  - TSH with free T4 reflex    5. History of pulmonary embolism  On Eliquis indefinitely    6. BMI 40.0-44.9, adult (H)  Have discussed the importance of diet and exercise    7. Irritable bowel syndrome, unspecified type  [facility] consult reviewed.  She has a full program to follow.  This includes colestipol, Imodium as needed, possibly Creon.  - Vitamin D Deficiency; Future  - Vitamin D Deficiency  - denosumab (PROLIA) injection 60 mg    8. Vitamin D deficiency  We will update labs  - Vitamin D Deficiency; Future  - Vitamin D Deficiency    9. Abnormal weight gain     - TSH with free T4 reflex;  Future  - TSH with free T4 reflex    10. Medication management    - beclomethasone HFA (QVAR REDIHALER) 80 MCG/ACT inhaler; Inhale 2 puffs into the lungs 2 times daily  Dispense: 10.6 g; Refill: 3    11. Urinary urgency    - UA Macro with Reflex to Micro and Culture - lab collect; Future  - UA Macro with Reflex to Micro and Culture - lab collect    12. Hypomagnesemia    - Magnesium Oxide 250 MG TABS; Use every 3rd day  - Magnesium; Future  - Magnesium    13. Age-related osteoporosis without current pathological fracture  Osteoporosis equivalent with low bone mass and elevated FRAX.  She is not a candidate for oral bisphosphonate therapy-allergy also, given her significant GI issues/CKD/etc.  She has been on calcium and vitamin D without improvement    We will proceed with done with some  - denosumab (PROLIA) injection 60 mg    14. Hx drug therapy    - denosumab (PROLIA) injection 60 mg    15. Gastroesophageal reflux disease with esophagitis without hemorrhage    - denosumab (PROLIA) injection 60 mg       Preventive Care Assessed: Assessed and detailed        Medication list carefully reviewed and corrected  Epic Merger Problem list addressed and updated      Subjective:   Krystle is a 81 year old female who presents to the clinic today for an Annual Wellness Visit.    CHIEF COMPLAINT:  No chief complaint on file.      HPI: Omer is a delightful 81-year-old female who is here today for a wellness visit.  Of note, currently, things are relatively stable for her.    She has IBS with diarrhea.  We have been working on this for many years.  She has tried a variety of things including probiotics, Imodium, cholesterol, creatinine combinations thereof.  Additionally, we have for to wean her off her proton pump inhibitor.  This is actually resulted in more symptom-free days than before.  She is using Colestid regularly although cut the dose back when she thought it was causing a very dry throat and hoarseness.  She has been  using Creon with plus minus effects.  She is on probiotic.  She eats a very bland diet.  She has sought consultation with Dr. Payne at [facility].  Those notes are reviewed as well.    Additionally, we reviewed reviewed the results of her abdominal CT scan which was ordered for bloating.  It showed significant diverticuli in the sigmoid colon.  No diverticulitis was noted at that time.  No other major abdominal aberrations were seen.  Constipation was not noted as well.  This was reviewed.    Additionally, her glycohemoglobin is 6.9.  We discussed that this is stable.    Health maintenance and activity levels reviewed: She is up-to-date with health maintenance as noted in the summary above.  However, she is locally on active.  Have discussed the importance of some daily physical activity and weight loss.    Review of Systems: She has IBS with diarrhea.  Her lungs are stable.  She has cataracts that will need treatment.  She has some mild decreased hearing but does not want to address at this point.    Patient Care Team:  Elke Mehta MD as PCP - General  Damian Youssef, PharmD as Pharmacist (Pharmacist)  Rivera Saenz MD as Assigned Heart and Vascular Provider  Elke Mehta MD as Assigned PCP     Patient Active Problem List   Diagnosis     Benign Adenomatous Polyp Of The Large Intestine     Osteopenia     Benign Essential Hypertension     Esophageal reflux     Irritable bowel syndrome     Type 2 diabetes mellitus without complication (H)     Gastroparesis     Asthma     Vitamin D Deficiency     Adenomatous Goiter     Hyperlipidemia associated with type 2 diabetes mellitus (H)     Arthropathy Of The Shoulder Region     Intraductal Papilloma Of The Breast     Closed Fracture Of Head Of Left Radius     Herpes zoster     Pulmonary embolism (H)     Pulmonary nodules     BMI 40.0-44.9, adult (H)     PVC's (premature ventricular contractions)     Paroxysmal atrial fibrillation (H)     Past Medical History:    Diagnosis Date     Adenomatous goiter 2004     Arthropathy of shoulder region unknown     Asthma 2009     Atrial flutter (H) 2017     Bartholin gland cyst unknown     Diabetes mellitus type II, controlled (H) 2004     Esophageal reflux 1980     Essential hypertension      Gastroparesis      Herpes simplex type 1 infection unknown     IBS (irritable bowel syndrome) 1971     Leukocytosis unknown     Osteopenia 2009     Vitamin D deficiency       Past Surgical History:   Procedure Laterality Date     BIOPSY BREAST Left 1/22/15    Papilloma     EXCISION / BIOPSY BREAST / NIPPLE / DUCT Left      HC DILATION/CURETTAGE DIAG/THER NON OB  Unknown    Description: Dilation And Curettage;  Recorded: 2007;     HYSTERECTOMY  1984     OOPHORECTOMY      1 removed, 1 remains     MA NASAL/SINUS ENDOSCOPY,BX/RMV POLYP/DEBRID      Description: Nasal Endoscopy Polypectomy;  Recorded: 2007;     [facility]  DELIVERY ONLY  1971    Description:  Section;  Recorded: 2008;  Comments: x2     [facility] TOTAL ABDOM HYSTERECTOMY      Description: Total Abdominal Hysterectomy;  Recorded: 2008;     [facility] TOTAL KNEE ARTHROPLASTY Bilateral     Description: Total Knee Arthroplasty;  Recorded: 2008;     [facility] XFER SINGLE SUPERFICI LOW LEG TENDON  UNknown    Description: Tibialis Posterior Tendon Transfer Right Foot;  Recorded: 2008;      Family History   Problem Relation Age of Onset     Breast Cancer Sister 56.00     Depression Mother      Dementia Mother       Social History     Socioeconomic History     Marital status:      Spouse name: Not on file     Number of children: Not on file     Years of education: Not on file     Highest education level: Not on file   Occupational History     Not on file   Tobacco Use     Smoking status: Never Smoker     Smokeless tobacco: Never Used   Substance and Sexual Activity     Alcohol use: No     Drug use: No     Sexual activity: Not on  file   Other Topics Concern     Not on file   Social History Narrative    She is .  She has 4 children and is a retired . She is an avid .      Social Determinants of Health     Financial Resource Strain: Not on file   Food Insecurity: Not on file   Transportation Needs: Not on file   Physical Activity: Not on file   Stress: Not on file   Social Connections: Not on file   Intimate Partner Violence: Not on file   Housing Stability: Not on file      Social History     Social History Narrative    She is .  She has 4 children and is a retired . She is an avid .        Current Outpatient Medications   Medication Sig Dispense Refill     acetaminophen (TYLENOL) 500 MG tablet [ACETAMINOPHEN (TYLENOL) 500 MG TABLET] Take 1,000 mg by mouth every 6 (six) hours as needed for pain.       albuterol (PROAIR HFA/PROVENTIL HFA/VENTOLIN HFA) 108 (90 Base) MCG/ACT inhaler [ALBUTEROL (PROAIR HFA) 90 MCG/ACTUATION INHALER] INHALE TWO PUFFS BY MOUTH FOUR TIMES A DAY AS NEEDED FOR WHEEZING 25.5 g 3     amLODIPine (NORVASC) 5 MG tablet Take 1 tablet (5 mg) by mouth daily 90 tablet 3     apixaban ANTICOAGULANT (ELIQUIS) 5 MG tablet Take 1 tablet (5 mg) by mouth 2 times daily 180 tablet 3     atorvastatin (LIPITOR) 80 MG tablet TAKE ONE TABLET BY MOUTH EVERY NIGHT AT BEDTIME 90 tablet 3     azelastine (ASTELIN) 0.1 % nasal spray Spray 2 sprays in nostril 2 times daily 90 mL 3     blood glucose (CONTOUR NEXT TEST) test strip 1 strip by In Vitro route Use as directed with testing blood sugars once daily.  Dispense Contour Next brand per pt's insurance coverage       CALCIUM CARBONATE (CALCIUM 500 ORAL) [CALCIUM CARBONATE (CALCIUM 500 ORAL)] Take 1 tablet by mouth 2 (two) times a day.       cholecalciferol, vitamin D3, 1,000 unit tablet [CHOLECALCIFEROL, VITAMIN D3, 1,000 UNIT TABLET] Take 2,000 Units by mouth 2 (two) times a day.        clindamycin (CLEOCIN) 150 MG capsule  [CLINDAMYCIN (CLEOCIN) 150 MG CAPSULE] TAKE 4 CAPSULES  BY MOUTH 1 HOUR PRIOR TO DENTAL APPOINTMENT. 30 capsule 3     colestipol (COLESTID) 1 g tablet TAKE ONE TABLET BY MOUTH TWICE A  tablet 2     cranberry extract 300 mg Tab [CRANBERRY EXTRACT 300 MG TAB] Take 1 tablet by mouth daily.        estradiol (ESTRACE) 0.1 MG/GM vaginal cream Place 2 g vaginally every other day 42.5 g 3     famotidine (PEPCID) 40 MG tablet Take 1 tablet (40 mg) by mouth daily Alternate with omeprezole 90 tablet 3     LACTOBACILLUS ACIDOPHILUS (PROBIOTIC ORAL) [LACTOBACILLUS ACIDOPHILUS (PROBIOTIC ORAL)] Take 1 tablet by mouth 2 (two) times a day.        lancing device Misc [LANCING DEVICE MISC] Use as directed 4 times a week. 1 each 3     lipase-protease-amylase (CREON) 6,000-19,000 -30,000 unit CpDR capsule [LIPASE-PROTEASE-AMYLASE (CREON) 6,000-19,000 -30,000 UNIT CPDR CAPSULE] Take 1 capsule (6,000 units of lipase total) by mouth 2 (two) times a day with meals. 270 capsule 3     loperamide (IMODIUM) 2 mg capsule [LOPERAMIDE (IMODIUM) 2 MG CAPSULE] Take 2 mg by mouth 4 (four) times a day as needed.       losartan (COZAAR) 100 MG tablet Take 1 tablet (100 mg) by mouth daily 90 tablet 3     LUTEIN ORAL [LUTEIN ORAL] Take 1 tablet by mouth daily. With Zeaxanthin       magnesium oxide 250 mg magnesium Tab [MAGNESIUM OXIDE 250 MG MAGNESIUM TAB] Take 1 tablet by mouth daily.       metFORMIN (GLUCOPHAGE-XR) 500 MG 24 hr tablet TAKE TWO TABLETS BY MOUTH TWICE A  tablet 3     metroNIDAZOLE (METROGEL) 0.75 % gel [METRONIDAZOLE (METROGEL) 0.75 % GEL] Apply 1 application topically as needed.        montelukast (SINGULAIR) 10 MG tablet Take 1 tablet (10 mg) by mouth daily 90 tablet 3     multivitamin therapeutic tablet [MULTIVITAMIN THERAPEUTIC TABLET] Take 1 tablet by mouth daily.       nitrofurantoin (MACRODANTIN) 50 MG capsule [NITROFURANTOIN (MACRODANTIN) 50 MG CAPSULE] Take 1 capsule (50 mg total) by mouth daily. 90 capsule 3      "nitroFURantoin macrocrystal (MACRODANTIN) 50 MG capsule TAKE ONE CAPSULE BY MOUTH ONCE DAILY 90 capsule 3     psyllium (METAMUCIL) 3.4 gram packet [PSYLLIUM (METAMUCIL) 3.4 GRAM PACKET] Take 1 packet by mouth every evening.       rifaximin (XIFAXAN) 200 MG tablet Take 1 tablet (200 mg) by mouth 3 times daily 30 tablet 0     rifaximin (XIFAXAN) 550 MG TABS tablet Take 1 tablet (550 mg) by mouth 2 times daily 20 tablet 0     SIMETHICONE (GAS-X ORAL) [SIMETHICONE (GAS-X ORAL)] Take 1 tablet by mouth 2 (two) times a day as needed.       sotalol (BETAPACE) 80 MG tablet TAKE ONE-HALF TABLET BY MOUTH TWO TIMES A DAY 90 tablet 3     tiotropium (SPIRIVA HANDIHALER) 18 MCG inhaled capsule Inhale 1 capsule (18 mcg) into the lungs daily 90 capsule 3     UBIDECARENONE (COENZYME Q10 ORAL) [UBIDECARENONE (COENZYME Q10 ORAL)] Take 1 tablet by mouth daily.        zinc gluconate 50 mg tablet [ZINC GLUCONATE 50 MG TABLET] Take 50 mg by mouth daily.        Objective:   /78 (BP Location: Left arm, Patient Position: Sitting, Cuff Size: Adult Large)   Pulse 72   Ht 1.549 m (5' 1\")   Wt 101.2 kg (223 lb)   SpO2 99%   BMI 42.14 kg/m   Estimated body mass index is 42.14 kg/m  as calculated from the following:    Height as of this encounter: 1.549 m (5' 1\").    Weight as of this encounter: 101.2 kg (223 lb).    VisionScreening:  No exam data present     PHYSICAL EXAM:  EYES: Eyelids, conjunctiva, and sclera were normal. Pupils were normal. Cornea, iris, and lens were normal bilaterally.  HEAD, EARS, NOSE, MOUTH, AND THROAT: Head and face were normal. Hearing was normal to voice and the ears were normal to external exam. Nose appearance was normal and there was no discharge. Oropharynx was normal.  TMs were normal.  NECK: Neck appearance was normal. There were no neck masses and the thyroid was not enlarged.  RESPIRATORY: Breathing pattern was normal and the chest moved symmetrically.   Lung sounds were equal " bilaterally.  CARDIOVASCULAR: Heart rate and rhythm were normal.  S1 and S2 were normal and there were no extra sounds or murmurs. Peripheral pulses in arms and legs were normal.  Jugular venous pressure was normal.  There was no peripheral edema.  GASTROINTESTINAL: The abdomen was normal in contour.  Bowel sounds were present.   Palpation detected no tenderness, mass, or enlarged organs.   MUSCULOSKELETAL: Skeletal configuration was normal and muscle mass was normal for age. Joint appearance was overall normal.  LYMPHATIC: There were no enlarged nodes.  SKIN/HAIR/NAILS: Skin color was normal.  There were no abnormal skin lesions.  Hair and nails were normal.  NEUROLOGIC: The patient was alert and oriented to person, place, time, and circumstance. Speech was normal. Cranial nerves were normal. Motor strength was normal for age. The patient was normally coordinated.  PSYCHIATRIC:  Mood and affect were normal and the patient had normal recent and remote memory. The patient's judgment and insight were normal.    A breast exam was performed and is within normal limits      Recent Labs   Lab Test 11/23/21  1047 07/23/21  0817   * 137*   VITDT  --  53       No components found for: VITD  Lab Results   Component Value Date    VITDT 53 07/23/2021       No flowsheet data found.  Cognitive Screening   1) Repeat 3 items (Leader, Season, Table)    2) Clock draw: NORMAL  3) 3 item recall: Recalls 3 objects  Results: 3 items recalled: COGNITIVE IMPAIRMENT LESS LIKELY    Mini-CogTM Copyright S Celestina. Licensed by the author for use in [facility]; reprinted with permission ([email or web address]). All rights reserved.    A Mini-Cog score of 0-2 suggests the possibility of dementia, score of 3-5 suggests no dementia    ROOMING STAFF:    Patient has been advised of split billing requirements and indicates understanding: Yes   Are you in the first 12 months of your Medicare coverage?  No      Do you feel safe in your  "environment? Yes      Have you ever done Advance Care Planning? (For example, a Health Directive, POLST, or a discussion with a medical provider or your loved ones about your wishes): Yes, advance care planning is on file.    Healthy Habits:     In general, how would you rate your overall health?  Good    Frequency of exercise:  2-3 days/week    Duration of exercise:  15-30 minutes    Do you usually eat at least 4 servings of fruit and vegetables a day, include whole grains    & fiber and avoid regularly eating high fat or \"junk\" foods?  Yes    Taking medications regularly:  Yes    Medication side effects:  Other    Ability to successfully perform activities of daily living:  No assistance needed    Home Safety:  Lack of grab bars in the bathroom    Hearing Impairment:  No hearing concerns    In the past 6 months, have you been bothered by leaking of urine? Yes    In general, how would you rate your overall mental or emotional health?  Excellent      PHQ-2 Total Score: 0    Additional concerns today:  No          The patient's current medical problems were reviewed.    The following high BMI interventions were performed this visit: encouragement to exercise        The following health maintenance items are reviewed in Epic and correct as of today:  Health Maintenance Due   Topic Date Due     ANNUAL REVIEW OF HM ORDERS  Never done     ASTHMA ACTION PLAN  Never done     MEDICARE ANNUAL WELLNESS VISIT  Never done     ADVANCE CARE PLANNING  06/09/2021     LIPID  10/02/2021     A1C  02/23/2022       Appropriate preventive services were discussed with this patient, including applicable screening as appropriate for cardiovascular disease, diabetes, osteopenia/osteoporosis, and glaucoma.  As appropriate for age/gender, discussed screening for colorectal cancer, prostate cancer, breast cancer, and cervical cancer. Checklist reviewing preventive services available has been given to the patient.    Reviewed patients plan of " care and provided an AVS. The Basic Care Plan for Krystle meets the Care Plan requirement. This Care Plan has been established and reviewed with the Patient, and printed in the AVS.    The following health maintenance schedule was reviewed with the patient and provided in printed form in the after visit summary:   Health Maintenance   Topic Date Due     ANNUAL REVIEW OF HM ORDERS  Never done     ASTHMA ACTION PLAN  Never done     MEDICARE ANNUAL WELLNESS VISIT  Never done     ADVANCE CARE PLANNING  06/09/2021     LIPID  10/02/2021     A1C  02/23/2022     DTAP/TDAP/TD IMMUNIZATION (2 - Td or Tdap) 05/09/2022     ASTHMA CONTROL TEST  07/18/2022     EYE EXAM  10/27/2022     BMP  11/23/2022     DIABETIC FOOT EXAM  11/23/2022     FALL RISK ASSESSMENT  11/23/2022     COLORECTAL CANCER SCREENING  01/08/2029     DEXA  10/15/2036     HEPATITIS C SCREENING  Completed     PHQ-2  Completed     INFLUENZA VACCINE  Completed     Pneumococcal Vaccine: 65+ Years  Completed     ZOSTER IMMUNIZATION  Completed     COVID-19 Vaccine  Completed     IPV IMMUNIZATION  Aged Out     MENINGITIS IMMUNIZATION  Aged Out     MICROALBUMIN  Discontinued          The visit lasted a total of 40 minutes     Reviewed and updated as needed this visit by clinical staff   Tobacco                Elke Mehta MD  [facility]

## 2022-02-23 LAB — DEPRECATED CALCIDIOL+CALCIFEROL SERPL-MC: 55 UG/L (ref 30–80)

## 2022-02-24 ENCOUNTER — TELEPHONE (OUTPATIENT)
Dept: INTERNAL MEDICINE | Facility: CLINIC | Age: 82
End: 2022-02-24

## 2022-02-24 DIAGNOSIS — Z23 HIGH PRIORITY FOR 2019-NCOV VACCINE: Primary | ICD-10-CM

## 2022-02-24 DIAGNOSIS — Z79.899 MEDICATION MANAGEMENT: ICD-10-CM

## 2022-02-24 NOTE — TELEPHONE ENCOUNTER
Reason for Call:  Other call back    Detailed comments: Pharmacy needing to know if they can dispense a 3 month supply of:  Q Faiza inhaler  Pt has previously done the 3 month supply    Please advise    Phone Number Patient can be reached at: Other phone number:  [phone]    Best Time: anytime    Can we leave a detailed message on this number? YES    Call taken on 2/24/2022 at 3:23 PM by Miguelina Pagan

## 2022-03-01 ENCOUNTER — HOSPITAL ENCOUNTER (OUTPATIENT)
Dept: MAMMOGRAPHY | Facility: CLINIC | Age: 82
Discharge: HOME | End: 2022-03-01
Attending: INTERNAL MEDICINE | Admitting: INTERNAL MEDICINE
Payer: COMMERCIAL

## 2022-03-01 DIAGNOSIS — Z12.31 SCREENING MAMMOGRAM, ENCOUNTER FOR: ICD-10-CM

## 2022-03-01 PROCEDURE — 77067 SCR MAMMO BI INCL CAD: CPT

## 2022-03-08 ENCOUNTER — ALLIED HEALTH/NURSE VISIT (OUTPATIENT)
Dept: FAMILY MEDICINE | Facility: CLINIC | Age: 82
End: 2022-03-08
Payer: COMMERCIAL

## 2022-03-08 DIAGNOSIS — I10 ESSENTIAL HYPERTENSION, BENIGN: Primary | ICD-10-CM

## 2022-03-08 PROCEDURE — 99207 PR NO CHARGE NURSE ONLY: CPT

## 2022-03-08 PROCEDURE — 96372 THER/PROPH/DIAG INJ SC/IM: CPT | Performed by: INTERNAL MEDICINE

## 2022-03-08 NOTE — PROGRESS NOTES
"Prolia Injection Phone Screen      Screening questions have been asked 2-3 days prior to administration visit for Prolia. If any questions are answered with \"Yes,\" this phone encounter were will routed to ordering provider for further evaluation.     1.  When was the last injection?  NEW    2.  Has insurance for this injection been verified?  Yes    3.  Did you experience any new onset achiness or rashes that lasted for over a month with your previous Prolia injection?   No    4.  Do you have a fever over 101?F or a new deep cough that is unusual for you today? No    5.  Have you started any new medications in the last 6 months that you were told could affect your immune system? These may have been prescribed by oncologist, transplant, rheumatology, or dermatology.   No    6.  In the last 6 months have you have gastric bypass or parathyroid surgery?   No    7.  Do you plan dental work requiring drilling into the bone such as implants/extractions or oral surgery in the next 2-3 months?   No    8. Do you have new insurance since the last injection?    9. Have you received the Covid-19 vaccine? Yes  If No - Proceed with Prolia injection  If Yes - Date of vaccination 8/20/21. Will need to wait until 2 weeks after 2nd dose of Covid-19 vaccine before administering Prolia       Patient informed if symptoms discussed above present prior to their administration appointment, they are to notify clinic immediately.     Sunni Fall              "

## 2022-03-16 ENCOUNTER — TELEPHONE (OUTPATIENT)
Dept: INTERNAL MEDICINE | Facility: CLINIC | Age: 82
End: 2022-03-16
Payer: COMMERCIAL

## 2022-03-16 NOTE — TELEPHONE ENCOUNTER
Reason for Call: Request for an order or referral:    Order or referral being requested:   To stop Eliquis 3 days prior to procedure - Colonoscopy which will be done on 04/12/2022    FAX ORDER TO:  [phone]    Date needed: as soon as possible    Has the patient been seen by the PCP for this problem? YES    Additional comments: n/a    Phone number Patient can be reached at:  Other phone number:  [phone]    Best Time:  anytime    Can we leave a detailed message on this number?  YES    Call taken on 3/16/2022 at 12:05 PM by Miguelina Pagan

## 2022-04-05 ENCOUNTER — ALLIED HEALTH/NURSE VISIT (OUTPATIENT)
Dept: FAMILY MEDICINE | Facility: CLINIC | Age: 82
End: 2022-04-05
Payer: COMMERCIAL

## 2022-04-05 DIAGNOSIS — Z00.00 ROUTINE HEALTH MAINTENANCE: Primary | ICD-10-CM

## 2022-04-05 PROCEDURE — 91305 COVID-19,PF,PFIZER (12+ YRS): CPT

## 2022-04-05 PROCEDURE — 99207 PR NO CHARGE NURSE ONLY: CPT

## 2022-04-05 PROCEDURE — 0054A COVID-19,PF,PFIZER (12+ YRS): CPT

## 2022-04-12 ENCOUNTER — TRANSFERRED RECORDS (OUTPATIENT)
Dept: HEALTH INFORMATION MANAGEMENT | Facility: CLINIC | Age: 82
End: 2022-04-12
Payer: COMMERCIAL

## 2022-05-02 DIAGNOSIS — K21.9 ESOPHAGEAL REFLUX: Primary | ICD-10-CM

## 2022-05-02 RX ORDER — FAMOTIDINE 40 MG/1
40 TABLET, FILM COATED ORAL 2 TIMES DAILY
Qty: 180 TABLET | Refills: 3 | Status: SHIPPED | OUTPATIENT
Start: 2022-05-02 | End: 2022-06-01

## 2022-05-02 NOTE — TELEPHONE ENCOUNTER
Pt was in with her spouse and stated that at her last appt with pcp, she was told to increase her pepcid to bid.  Pt needs refill.  Order pended.

## 2022-06-01 ENCOUNTER — OFFICE VISIT (OUTPATIENT)
Dept: INTERNAL MEDICINE | Facility: CLINIC | Age: 82
End: 2022-06-01
Payer: COMMERCIAL

## 2022-06-01 VITALS
WEIGHT: 219.7 LBS | DIASTOLIC BLOOD PRESSURE: 76 MMHG | OXYGEN SATURATION: 98 % | BODY MASS INDEX: 41.51 KG/M2 | SYSTOLIC BLOOD PRESSURE: 104 MMHG | HEART RATE: 80 BPM

## 2022-06-01 DIAGNOSIS — I48.0 PAF (PAROXYSMAL ATRIAL FIBRILLATION) (H): ICD-10-CM

## 2022-06-01 DIAGNOSIS — I49.3 PVC'S (PREMATURE VENTRICULAR CONTRACTIONS): ICD-10-CM

## 2022-06-01 DIAGNOSIS — N95.2 ATROPHIC VAGINITIS: ICD-10-CM

## 2022-06-01 DIAGNOSIS — Z51.81 MEDICATION MONITORING ENCOUNTER: ICD-10-CM

## 2022-06-01 DIAGNOSIS — E78.00 HYPERCHOLESTEROLEMIA: ICD-10-CM

## 2022-06-01 DIAGNOSIS — E78.5 HYPERLIPIDEMIA ASSOCIATED WITH TYPE 2 DIABETES MELLITUS (H): ICD-10-CM

## 2022-06-01 DIAGNOSIS — K58.9 IRRITABLE BOWEL SYNDROME, UNSPECIFIED TYPE: ICD-10-CM

## 2022-06-01 DIAGNOSIS — J45.40 MODERATE PERSISTENT ASTHMA, UNSPECIFIED WHETHER COMPLICATED: ICD-10-CM

## 2022-06-01 DIAGNOSIS — E11.69 HYPERLIPIDEMIA ASSOCIATED WITH TYPE 2 DIABETES MELLITUS (H): ICD-10-CM

## 2022-06-01 DIAGNOSIS — K21.00 GASTROESOPHAGEAL REFLUX DISEASE WITH ESOPHAGITIS WITHOUT HEMORRHAGE: ICD-10-CM

## 2022-06-01 DIAGNOSIS — I10 ESSENTIAL HYPERTENSION, BENIGN: ICD-10-CM

## 2022-06-01 DIAGNOSIS — Z79.899 MEDICATION MANAGEMENT: ICD-10-CM

## 2022-06-01 DIAGNOSIS — E11.9 TYPE 2 DIABETES MELLITUS WITHOUT COMPLICATION, WITHOUT LONG-TERM CURRENT USE OF INSULIN (H): Primary | ICD-10-CM

## 2022-06-01 DIAGNOSIS — R10.9 RIGHT FLANK PAIN: ICD-10-CM

## 2022-06-01 DIAGNOSIS — J30.89 NON-SEASONAL ALLERGIC RHINITIS, UNSPECIFIED TRIGGER: ICD-10-CM

## 2022-06-01 DIAGNOSIS — I26.99 PULMONARY EMBOLISM WITHOUT ACUTE COR PULMONALE, UNSPECIFIED CHRONICITY, UNSPECIFIED PULMONARY EMBOLISM TYPE (H): ICD-10-CM

## 2022-06-01 DIAGNOSIS — R10.13 DYSPEPSIA: ICD-10-CM

## 2022-06-01 LAB
ANION GAP SERPL CALCULATED.3IONS-SCNC: 12 MMOL/L (ref 5–18)
BUN SERPL-MCNC: 18 MG/DL (ref 8–28)
CALCIUM SERPL-MCNC: 9.1 MG/DL (ref 8.5–10.5)
CHLORIDE BLD-SCNC: 105 MMOL/L (ref 98–107)
CO2 SERPL-SCNC: 24 MMOL/L (ref 22–31)
CREAT SERPL-MCNC: 0.74 MG/DL (ref 0.6–1.1)
GFR SERPL CREATININE-BSD FRML MDRD: 82 ML/MIN/1.73M2
GLUCOSE BLD-MCNC: 133 MG/DL (ref 70–125)
HBA1C MFR BLD: 6.6 % (ref 0–5.6)
POTASSIUM BLD-SCNC: 4 MMOL/L (ref 3.5–5)
SODIUM SERPL-SCNC: 141 MMOL/L (ref 136–145)

## 2022-06-01 PROCEDURE — 83036 HEMOGLOBIN GLYCOSYLATED A1C: CPT | Performed by: INTERNAL MEDICINE

## 2022-06-01 PROCEDURE — 99214 OFFICE O/P EST MOD 30 MIN: CPT | Performed by: INTERNAL MEDICINE

## 2022-06-01 PROCEDURE — 36415 COLL VENOUS BLD VENIPUNCTURE: CPT | Performed by: INTERNAL MEDICINE

## 2022-06-01 PROCEDURE — 80048 BASIC METABOLIC PNL TOTAL CA: CPT | Performed by: INTERNAL MEDICINE

## 2022-06-01 RX ORDER — AMLODIPINE BESYLATE 5 MG/1
5 TABLET ORAL DAILY
Qty: 90 TABLET | Refills: 3 | Status: SHIPPED | OUTPATIENT
Start: 2022-06-01 | End: 2023-07-10

## 2022-06-01 RX ORDER — MONTELUKAST SODIUM 10 MG/1
10 TABLET ORAL DAILY
Qty: 90 TABLET | Refills: 3 | Status: SHIPPED | OUTPATIENT
Start: 2022-06-01 | End: 2022-12-06

## 2022-06-01 RX ORDER — CLINDAMYCIN HCL 150 MG
CAPSULE ORAL
Qty: 90 CAPSULE | Refills: 3 | Status: SHIPPED | OUTPATIENT
Start: 2022-06-01

## 2022-06-01 RX ORDER — FAMOTIDINE 40 MG/1
40 TABLET, FILM COATED ORAL 2 TIMES DAILY
Qty: 180 TABLET | Refills: 3 | Status: SHIPPED | OUTPATIENT
Start: 2022-06-01 | End: 2022-12-06

## 2022-06-01 RX ORDER — METFORMIN HCL 500 MG
TABLET, EXTENDED RELEASE 24 HR ORAL
Qty: 360 TABLET | Refills: 3 | Status: SHIPPED | OUTPATIENT
Start: 2022-06-01 | End: 2022-12-06

## 2022-06-01 RX ORDER — SOTALOL HYDROCHLORIDE 80 MG/1
TABLET ORAL
Qty: 90 TABLET | Refills: 3 | Status: SHIPPED | OUTPATIENT
Start: 2022-06-01 | End: 2022-09-06

## 2022-06-01 RX ORDER — AZELASTINE 1 MG/ML
2 SPRAY, METERED NASAL 2 TIMES DAILY
Qty: 90 ML | Refills: 3 | Status: SHIPPED | OUTPATIENT
Start: 2022-06-01 | End: 2023-09-14

## 2022-06-01 RX ORDER — ALBUTEROL SULFATE 90 UG/1
AEROSOL, METERED RESPIRATORY (INHALATION)
Qty: 25.5 G | Refills: 3 | Status: SHIPPED | OUTPATIENT
Start: 2022-06-01

## 2022-06-01 RX ORDER — ATORVASTATIN CALCIUM 80 MG/1
TABLET, FILM COATED ORAL
Qty: 90 TABLET | Refills: 3 | Status: SHIPPED | OUTPATIENT
Start: 2022-06-01 | End: 2022-12-06

## 2022-06-01 RX ORDER — ESTRADIOL 0.1 MG/G
2 CREAM VAGINAL EVERY OTHER DAY
Qty: 42.5 G | Refills: 3 | Status: SHIPPED | OUTPATIENT
Start: 2022-06-01 | End: 2022-12-06

## 2022-06-01 RX ORDER — LOSARTAN POTASSIUM 100 MG/1
100 TABLET ORAL DAILY
Qty: 90 TABLET | Refills: 3 | Status: SHIPPED | OUTPATIENT
Start: 2022-06-01 | End: 2022-12-06

## 2022-06-01 RX ORDER — MONTELUKAST SODIUM 4 MG/1
1 TABLET, CHEWABLE ORAL 2 TIMES DAILY
Qty: 180 TABLET | Refills: 3 | Status: SHIPPED | OUTPATIENT
Start: 2022-06-01 | End: 2022-12-06

## 2022-06-01 NOTE — PROGRESS NOTES
[facility] Follow Up Note    Assessment/Plan:  1. Type 2 diabetes mellitus without complication, without long-term current use of insulin (H)  Glycohemoglobin at 6.6- doing well.  Continue current medications.  Labs pending.  - Hemoglobin A1c; Future  - Basic metabolic panel; Future  - Hemoglobin A1c  - Basic metabolic panel  - metFORMIN (GLUCOPHAGE XR) 500 MG 24 hr tablet; TAKE TWO TABLETS BY MOUTH TWICE A DAY  Dispense: 360 tablet; Refill: 3    2. Moderate persistent asthma, unspecified whether complicated  Medications refilled-doing pulmonary rehab per Dr. Gutierrez.  Have discussed progress.  Blood pressure stable during exercise.  PVCs suppressed.  - albuterol (PROAIR HFA/PROVENTIL HFA/VENTOLIN HFA) 108 (90 Base) MCG/ACT inhaler; [ALBUTEROL (PROAIR HFA) 90 MCG/ACTUATION INHALER] INHALE TWO PUFFS BY MOUTH FOUR TIMES A DAY AS NEEDED FOR WHEEZING  Dispense: 25.5 g; Refill: 3    3. Medication monitoring encounter    - clindamycin (CLEOCIN) 150 MG capsule; [CLINDAMYCIN (CLEOCIN) 150 MG CAPSULE] TAKE 4 CAPSULES  BY MOUTH 1 HOUR PRIOR TO DENTAL APPOINTMENT.  Dispense: 90 capsule; Refill: 3    4. Irritable bowel syndrome, unspecified type  Active every 10 days-able to manage with 1 Imodium.  Colestid has been very helpful  - colestipol (COLESTID) 1 g tablet; Take 1 tablet (1 g) by mouth 2 times daily  Dispense: 180 tablet; Refill: 3    5. Hypercholesterolemia    - atorvastatin (LIPITOR) 80 MG tablet; TAKE ONE TABLET BY MOUTH EVERY NIGHT AT BEDTIME  Dispense: 90 tablet; Refill: 3    6. PAF (paroxysmal atrial fibrillation) (H)  Stable  - sotalol (BETAPACE) 80 MG tablet; TAKE ONE-HALF TABLET BY MOUTH TWO TIMES A DAY  Dispense: 90 tablet; Refill: 3    7. PVC's (premature ventricular contractions)  Stable  - sotalol (BETAPACE) 80 MG tablet; TAKE ONE-HALF TABLET BY MOUTH TWO TIMES A DAY  Dispense: 90 tablet; Refill: 3    8. Non-seasonal allergic rhinitis, unspecified trigger  Meds renewed  - montelukast  (SINGULAIR) 10 MG tablet; Take 1 tablet (10 mg) by mouth daily  Dispense: 90 tablet; Refill: 3  - azelastine (ASTELIN) 0.1 % nasal spray; Spray 2 sprays in nostril 2 times daily  Dispense: 90 mL; Refill: 3    9. Essential hypertension, benign  Stable/Meds renewed  - losartan (COZAAR) 100 MG tablet; Take 1 tablet (100 mg) by mouth daily  Dispense: 90 tablet; Refill: 3  - amLODIPine (NORVASC) 5 MG tablet; Take 1 tablet (5 mg) by mouth daily  Dispense: 90 tablet; Refill: 3    10. Atrophic vaginitis  Meds renewed  - estradiol (ESTRACE) 0.1 MG/GM vaginal cream; Place 2 g vaginally every other day  Dispense: 42.5 g; Refill: 3    11. Medication management    - beclomethasone HFA (QVAR REDIHALER) 80 MCG/ACT inhaler; Inhale 2 puffs into the lungs 2 times daily  Dispense: 31.2 g; Refill: 3    12. Pulmonary embolism without acute cor pulmonale, unspecified chronicity, unspecified pulmonary embolism type (H)    - apixaban ANTICOAGULANT (ELIQUIS) 5 MG tablet; Take 1 tablet (5 mg) by mouth 2 times daily  Dispense: 180 tablet; Refill: 3    13. Gastroesophageal reflux disease with esophagitis without hemorrhage  Uses Tums as needed  - famotidine (PEPCID) 40 MG tablet; Take 1 tablet (40 mg) by mouth 2 times daily  Dispense: 180 tablet; Refill: 3    14. Right flank pain  Associated with diarrhea and IBS.  Pain is between scapula and right flank  - US Abdomen Complete; Future    15-morbid obesity-encourage exercise and healthy diet      Follow-up 3 months  Elke Mehta MD, MD    Chief Complaint:  Chief Complaint   Patient presents with     Follow Up     3 months       History of Present Illness:  Krystle is a 81 year old female who is here today for follow-up and for medication management.  Of note, she has multiple medical problems.  We have reviewed and refilled multiple prescription prescriptions today.    First, we reviewed diabetes.  She has lost about 10 pounds over the past year.  She states she has been losing more weight  recently as she has been attending pulmonary rehabilitation classes.  She is on a treadmill 15 minutes.  She is also doing other activities.  She also reports that she has checked a postprandial sugar.  She checked about an hour and a half after eating her sugar was 155.  We discussed the appropriate time to check blood sugars i.e. premeals and 2 hours postprandially.  Overall, she has been stable.    She is attending pulmonary rehab for her asthma and emphysema.  She notes that her PVCs are very quiet during this time.  As a result, she has lost some confidence in her exercise physiologist.  I have given her reassurance that exercise often suppresses PVCs.  Her blood pressures have been stable during rehab.    Her IBS is active every 10 days.  1 Imodium should sit down.    She continues to discuss the stress in her life as her  has had 9 surgeries in the last 3 to 4 years.    Review of Systems:  A comprehensive review of systems was performed and was otherwise negative    PFSH:  Social History:  with adult children  History   Smoking Status     Never Smoker   Smokeless Tobacco     Never Used       Past History:   Current Outpatient Medications   Medication Sig Dispense Refill     acetaminophen (TYLENOL) 500 MG tablet [ACETAMINOPHEN (TYLENOL) 500 MG TABLET] Take 1,000 mg by mouth every 6 (six) hours as needed for pain.       albuterol (PROAIR HFA/PROVENTIL HFA/VENTOLIN HFA) 108 (90 Base) MCG/ACT inhaler [ALBUTEROL (PROAIR HFA) 90 MCG/ACTUATION INHALER] INHALE TWO PUFFS BY MOUTH FOUR TIMES A DAY AS NEEDED FOR WHEEZING 25.5 g 3     amLODIPine (NORVASC) 5 MG tablet Take 1 tablet (5 mg) by mouth daily 90 tablet 3     apixaban ANTICOAGULANT (ELIQUIS) 5 MG tablet Take 1 tablet (5 mg) by mouth 2 times daily 180 tablet 3     atorvastatin (LIPITOR) 80 MG tablet TAKE ONE TABLET BY MOUTH EVERY NIGHT AT BEDTIME 90 tablet 3     azelastine (ASTELIN) 0.1 % nasal spray Spray 2 sprays in nostril 2 times daily 90 mL 3      beclomethasone HFA (QVAR REDIHALER) 80 MCG/ACT inhaler Inhale 2 puffs into the lungs 2 times daily 31.2 g 3     blood glucose (CONTOUR NEXT TEST) test strip 1 strip by In Vitro route Use as directed with testing blood sugars once daily.  Dispense Contour Next brand per pt's insurance coverage       CALCIUM CARBONATE (CALCIUM 500 ORAL) [CALCIUM CARBONATE (CALCIUM 500 ORAL)] Take 1 tablet by mouth 2 (two) times a day.       cholecalciferol, vitamin D3, 1,000 unit tablet [CHOLECALCIFEROL, VITAMIN D3, 1,000 UNIT TABLET] Take 2,000 Units by mouth 2 (two) times a day.        clindamycin (CLEOCIN) 150 MG capsule [CLINDAMYCIN (CLEOCIN) 150 MG CAPSULE] TAKE 4 CAPSULES  BY MOUTH 1 HOUR PRIOR TO DENTAL APPOINTMENT. 90 capsule 3     colestipol (COLESTID) 1 g tablet Take 1 tablet (1 g) by mouth 2 times daily 180 tablet 3     cranberry extract 300 mg Tab [CRANBERRY EXTRACT 300 MG TAB] Take 1 tablet by mouth daily.        estradiol (ESTRACE) 0.1 MG/GM vaginal cream Place 2 g vaginally every other day 42.5 g 3     famotidine (PEPCID) 40 MG tablet Take 1 tablet (40 mg) by mouth 2 times daily 180 tablet 3     LACTOBACILLUS ACIDOPHILUS (PROBIOTIC ORAL) [LACTOBACILLUS ACIDOPHILUS (PROBIOTIC ORAL)] Take 1 tablet by mouth 2 (two) times a day.        lancing device Misc [LANCING DEVICE MISC] Use as directed 4 times a week. 1 each 3     loperamide (IMODIUM) 2 mg capsule [LOPERAMIDE (IMODIUM) 2 MG CAPSULE] Take 2 mg by mouth 4 (four) times a day as needed.       losartan (COZAAR) 100 MG tablet Take 1 tablet (100 mg) by mouth daily 90 tablet 3     LUTEIN ORAL [LUTEIN ORAL] Take 1 tablet by mouth daily. With Zeaxanthin       Magnesium Oxide 250 MG TABS Use every 3rd day       metFORMIN (GLUCOPHAGE XR) 500 MG 24 hr tablet TAKE TWO TABLETS BY MOUTH TWICE A  tablet 3     metroNIDAZOLE (METROGEL) 0.75 % gel [METRONIDAZOLE (METROGEL) 0.75 % GEL] Apply 1 application topically as needed.        montelukast (SINGULAIR) 10 MG tablet Take 1  tablet (10 mg) by mouth daily 90 tablet 3     nitrofurantoin (MACRODANTIN) 50 MG capsule [NITROFURANTOIN (MACRODANTIN) 50 MG CAPSULE] Take 1 capsule (50 mg total) by mouth daily. 90 capsule 3     psyllium (METAMUCIL) 3.4 gram packet [PSYLLIUM (METAMUCIL) 3.4 GRAM PACKET] Take 1 packet by mouth every evening.       SIMETHICONE (GAS-X ORAL) [SIMETHICONE (GAS-X ORAL)] Take 1 tablet by mouth 2 (two) times a day as needed.       sotalol (BETAPACE) 80 MG tablet TAKE ONE-HALF TABLET BY MOUTH TWO TIMES A DAY 90 tablet 3     tiotropium (SPIRIVA HANDIHALER) 18 MCG inhaled capsule Inhale 1 capsule (18 mcg) into the lungs daily (Patient taking differently: Inhale 18 mcg into the lungs daily as needed) 90 capsule 3     UBIDECARENONE (COENZYME Q10 ORAL) [UBIDECARENONE (COENZYME Q10 ORAL)] Take 1 tablet by mouth daily.        zinc gluconate 50 mg tablet [ZINC GLUCONATE 50 MG TABLET] Take 50 mg by mouth daily.         Family History:     Physical Exam:  General Appearance:   She appears quite well and in no acute distress  Vitals:    06/01/22 0953   BP: 104/76   BP Location: Left arm   Patient Position: Sitting   Cuff Size: Adult Large   Pulse: 80   SpO2: 98%   Weight: 99.7 kg (219 lb 11.2 oz)     Wt Readings from Last 3 Encounters:   06/01/22 99.7 kg (219 lb 11.2 oz)   02/22/22 101.2 kg (223 lb)   01/18/22 101.1 kg (222 lb 12.8 oz)     Body mass index is 41.51 kg/m .    Time spent today greater than 30 minutes with more than half that time spent in discussion of IBS, heart rate response to exercise, refill and review of medications/etc.

## 2022-06-15 ENCOUNTER — HOSPITAL ENCOUNTER (OUTPATIENT)
Dept: ULTRASOUND IMAGING | Facility: HOSPITAL | Age: 82
Discharge: HOME | End: 2022-06-15
Attending: INTERNAL MEDICINE | Admitting: INTERNAL MEDICINE
Payer: COMMERCIAL

## 2022-06-15 DIAGNOSIS — R10.9 RIGHT FLANK PAIN: ICD-10-CM

## 2022-06-15 DIAGNOSIS — R10.13 DYSPEPSIA: ICD-10-CM

## 2022-06-15 PROCEDURE — 76700 US EXAM ABDOM COMPLETE: CPT

## 2022-08-04 ENCOUNTER — TELEPHONE (OUTPATIENT)
Dept: NURSING | Facility: CLINIC | Age: 82
End: 2022-08-04

## 2022-08-04 NOTE — TELEPHONE ENCOUNTER
Patient is at Cardiac rehab and has been told by cardiology that she needs to be seen in the ED today.    Patient refuses to go to [facility] and will be going to [facility].    Brooklyn Voss RN  [place] Nurse Advisor  10:14 AM  8/4/2022

## 2022-08-05 ENCOUNTER — TELEPHONE (OUTPATIENT)
Dept: INTERNAL MEDICINE | Facility: CLINIC | Age: 82
End: 2022-08-05

## 2022-08-05 NOTE — TELEPHONE ENCOUNTER
General Call      Reason for Call:  Pt had an episode of A Fib on 08/04/2022 and would like to discuss further with PCP    What are your questions or concerns:    n/a    Date of last appointment with provider:   n/a    Could we send this information to you in [employer] or would you prefer to receive a phone call?:   Patient would prefer a phone call   Okay to leave a detailed message?: Yes at Cell number on file:    Telephone Information:   Mobile [phone]

## 2022-08-09 NOTE — TELEPHONE ENCOUNTER
08/09 I called and talked to pt she called and got an appt with Dr. Wong for Thursday, her pulse remains high she cut me short winsome she was heading to cardiac rehab will see what they say and call back if needed, did offer a telephone visit with partner to discuss is needed also

## 2022-08-11 ENCOUNTER — OFFICE VISIT (OUTPATIENT)
Dept: CARDIOLOGY | Facility: CLINIC | Age: 82
End: 2022-08-11
Payer: COMMERCIAL

## 2022-08-11 VITALS
DIASTOLIC BLOOD PRESSURE: 74 MMHG | HEART RATE: 96 BPM | RESPIRATION RATE: 14 BRPM | BODY MASS INDEX: 41.57 KG/M2 | SYSTOLIC BLOOD PRESSURE: 132 MMHG | WEIGHT: 220 LBS

## 2022-08-11 DIAGNOSIS — I48.0 PAROXYSMAL ATRIAL FIBRILLATION (H): Primary | ICD-10-CM

## 2022-08-11 DIAGNOSIS — I49.3 PVC'S (PREMATURE VENTRICULAR CONTRACTIONS): ICD-10-CM

## 2022-08-11 PROCEDURE — 99205 OFFICE O/P NEW HI 60 MIN: CPT | Performed by: INTERNAL MEDICINE

## 2022-08-11 NOTE — LETTER
8/11/2022    Elke Mehta MD  [address] [facility] [address]    RE: Krystle Polanco       Dear Colleague,     I had the pleasure of seeing Krystle Polanco in the [facility].    [facility]  Cardiac Electrophysiology     Consultation Note: Tanner Wong MD    Primary Care: Elke Ponce      Thank you, Elke Ponce, for asking the [facility] team to consult on Ms. Krystle Polanco to evaluate frequent PVCs and paroxysmal atrial fibrillation.      Assessment/Recommendations   Assessment:      Frequent PVCs: I have been asked by Dr. Mehta to consult on her patient with prior documented high PVC burden (35%) on most recent Holter monitor from 2020.  She has had some short runs of nonsustained VT.  Patient has no direct awareness of any palpitations.  She does have some periodic increase in her dyspnea but this has not been clearly correlated with increased PVC frequency.  PVC morphology has been largely unifocal (left bundle, inferior axis, transition V3) consistent with LVOT site of origin.  Thus far the patient's echo studies have demonstrated preserved LV function in the setting of her frequent ventricular ectopy, but it certainly possible at this PVC burden to develop PVC induced myopathy.    Paroxysmal atrial fibrillation: Sustained episodes of paroxysmal atrial fibrillation demonstrated on Holter monitor dated 11/22/2019.  The patient reports again periodic increases in her baseline dyspnea which could potentially correlate with her atrial fibrillation.  She was documented to be in atrial fibrillation at her pulmonary rehab session on August 4 of this year.  Ventricular rates were in the 140-170 bpm range.  She had spontaneous resolution of that episode.  She has a [id number] score of 5 (age 4, gender, HTN, DM) and is appropriately treated with OAC (apixaban).  Patient has a prior history of pulmonary embolism and may  not be a candidate for left atrial closure.    Primary hypertension: Chronic problem for patient and her blood pressure today is at target on her current medical therapy.  The question of diastolic dysfunction has been raised in previous notes.  The echo does not demonstrate significant wall thickening or other elements consistent with diastolic dysfunction.  Previous BNP levels have been mildly elevated.     Plan:    Patient will wear a 24-hour Holter monitor to assess her current rhythm status including PVC burden.    The patient may be a candidate for a implantable loop recorder to more continuously monitor rhythm particularly as it pertains to her atrial fibrillation to better determine the degree of symptoms.  A decision regarding ILR implant will be made following her Holter monitor study.    Follow-up in the arrhythmia clinic with the EP LEILANI in 3 months.       History of Present Illness/Subjective    Ms. 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  The patient reports no direct awareness of her arrhythmias i.e. palpitations.  She does have some chronic pulmonary disease but at times will have worsening dyspnea on exertion.  She has not documented a heart rate by pulse oximeter or other means during those symptoms.  The patient is only able to tolerate low-dose sotalol, 40 mg twice daily.  She reports no exertional chest pain or pressure.  She is not describing any orthopnea, PND or ankle edema.    ECG: Personally reviewed: Multiple twelve-lead EKGs reviewed.  Normal sinus rhythm and all tracings demonstrating frequent PVCs.  PVC morphology left bundle, inferior axis, transition in V3.    ECHO: Dated 12/23/2021                                              Interpretation Summary     Left ventricular  size, wall motion and function are normal. The ejection  fraction is 60-65%.  Normal right ventricle size and systolic function.  No hemodynamically significant valvular abnormalities on 2D or color flow  Imaging.    Other Studies: Holter monitor dated 7/20/2020    Abnormal to monitor tracing by virtue of the presence of frequent ventricular ectopy.  Ectopy does not appear to be particularly worrisome however given the frequency it could potentially result in PVC induced cardiomyopathy.  The site of origin is   apparently the ventricular outflow tract region.    The patient did not report any direct symptoms of awareness of her ventricular arrhythmia, further clinical correlation may be warranted.    No sustained atrial or ventricular tachyarrhythmia.    No profound bradycardia or pauses.    Time spent: 60 minutes spent on the date of the encounter doing chart review, history and exam, documentation and further activities as noted above.    Clinically Significant Risk Factors Present on Admission               # DMII: A1C = @FVIPPHYSSINGLELAB(A1C)@ within past 3 months      Cardiovascular: Cardiac Arrhythmia: Atrial fibrillation: Paroxysmal    Systemic: Chronic Fatigue and Other Debilities: Age-related physical debility            Physical Examination Review of Systems   There were no vitals taken for this visit.  There is no height or weight on file to calculate BMI.  Wt Readings from Last 3 Encounters:   06/01/22 99.7 kg (219 lb 11.2 oz)   02/22/22 101.2 kg (223 lb)   01/18/22 101.1 kg (222 lb 12.8 oz)     [unfilled]    General     Appearance:   The patient is alert oriented to person place and situation.    The patient is in no acute distress at the time of my evaluation.   HEENT:  Pupils are equal, round, and reactive to light.  Conjunctiva and sclera are clear.  ENT: Oral mucosa is moist and without redness. No evident nasal discharge.   Neck: Without palpable thyroid or appreciable lymph nodes.   Chest:  Symmetric, without deformity.   Lungs:   Clear bilaterally with no rales, rhonchi, or wheezes.     Cardiovascular:   Rhythm is mostly regular with occasional ectopic beats. S1 and S2 are normal. No significant murmur is present. JVP is mildly elevated.  Lower extremities demonstrate no significant edema. Distal pulses are intact bilaterally.   Abdomen:  Abdomen is obese, soft, and nontender.   Extremities: Without deformity.   Skin: Skin is warm, dry, and otherwise intact.   Neurologic: Gait is normal.           A 12 point comprehensive review of systems was  negative except as noted.      Medical History  Surgical History Family History Social History   Past Medical History:   Diagnosis Date     Adenomatous goiter 2004     Arthropathy of shoulder region unknown     Asthma 2009     Atrial flutter (H) 2017     Bartholin gland cyst unknown     Diabetes mellitus type II, controlled (H)      Esophageal reflux 1980     Essential hypertension      Gastroparesis      Herpes simplex type 1 infection unknown     IBS (irritable bowel syndrome)      Leukocytosis unknown     Osteopenia 2009     Vitamin D deficiency     Past Surgical History:   Procedure Laterality Date     BIOPSY BREAST Left 1/22/15    Papilloma     EXCISION / BIOPSY BREAST / NIPPLE / DUCT Left       DILATION/CURETTAGE DIAG/THER NON OB  Unknown    Description: Dilation And Curettage;  Recorded: 2007;     HYSTERECTOMY  1984     OOPHORECTOMY      1 removed, 1 remains     SD NASAL/SINUS ENDOSCOPY,BX/RMV POLYP/DEBRID      Description: Nasal Endoscopy Polypectomy;  Recorded: 2007;     [facility]  DELIVERY ONLY      Description:  Section;  Recorded: 2008;  Comments: x2     [facility] TOTAL ABDOM HYSTERECTOMY      Description: Total Abdominal Hysterectomy;  Recorded: 2008;     [facility] TOTAL KNEE ARTHROPLASTY Bilateral     Description: Total Knee Arthroplasty;  Recorded: 2008;     [facility] XFER SINGLE  "SUPERFICI LOW LEG TENDON  UNknown    Description: Tibialis Posterior Tendon Transfer Right Foot;  Recorded: 02/12/2008;    Family History   Problem Relation Age of Onset     Breast Cancer Sister 56.00     Depression Mother      Dementia Mother     Social History     Socioeconomic History     Marital status:      Spouse name: Not on file     Number of children: Not on file     Years of education: Not on file     Highest education level: Not on file   Occupational History     Not on file   Tobacco Use     Smoking status: Never Smoker     Smokeless tobacco: Never Used   Substance and Sexual Activity     Alcohol use: No     Drug use: No     Sexual activity: Not on file   Other Topics Concern     Not on file   Social History Narrative    She is .  She has 4 children and is a retired . She is an avid .      Social Determinants of Health     Financial Resource Strain: Not on file   Food Insecurity: Not on file   Transportation Needs: Not on file   Physical Activity: Not on file   Stress: Not on file   Social Connections: Not on file   Intimate Partner Violence: Not on file   Housing Stability: Not on file          Medications  Allergies   Scheduled Meds:  Continuous Infusions:  PRN Meds:. Allergies   Allergen Reactions     Conray [Iothalamate] Anaphylaxis     30+ yr's ago / x-ray exam / was given \"dye\" iodine contrast / had Anaphylaxis reaction, JDI - 11/30/16 1509      Diagnostic X-Ray Materials Other (See Comments)     hypotension       Alendronate Sodium [Alendronic Acid] Nausea     Amoxicillin Unknown     Diatrizoate Meglumine [Diatrizoate] Other (See Comments)     Fosamax [Alendronic Acid] Unknown     Stomach cramps     House Dust [Dust Mite Extract] Unknown     Mold/Mildew [Molds & Smuts] Unknown     Other Allergy (See Comments) [External Allergen Needs Reconciliation - See Comment] Unknown     Contrast Media Ready-Box MISC, 11/06/2007.; Contrast Media Ready-Box MISC, " 11/06/2007.       Penicillins Unknown     Prednisone Nausea     Other reaction(s): Abdominal Pain, She can have this as a shot not oral     Seafood Unknown     Sulfa (Sulfonamide Antibiotics) [Sulfa Drugs] Unknown         Lab Results    Chemistry/lipid CBC Cardiac Enzymes/BNP/TSH/INR   Lab Results   Component Value Date    CHOL 158 02/22/2022    HDL 49 (L) 02/22/2022    TRIG 228 (H) 02/22/2022    BUN 18 06/01/2022     06/01/2022    CO2 24 06/01/2022    Lab Results   Component Value Date    WBC 11.2 (H) 02/22/2022    HGB 13.9 02/22/2022    HCT 43.6 02/22/2022    MCV 90 02/22/2022     02/22/2022    Lab Results   Component Value Date    TROPONINI <0.01 11/26/2019     (H) 10/02/2020    TSH 0.77 02/22/2022    INR 1.40 (H) 02/16/2021           Tanner Wong MD  Clinical Cardiac Electrophysiologist  [facility]      Thank you for allowing me to participate in the care of your patient.      Sincerely,     Tanner Wong MD     [facility]  cc: No referring provider defined for this encounter.

## 2022-08-11 NOTE — PROGRESS NOTES
[facility]  Cardiac Electrophysiology     Consultation Note: Tanner Wong MD    Primary Care: Elke Ponce      Thank you, Elke Ponce, for asking the [facility] team to consult on Ms. Krystle Polanco to evaluate frequent PVCs and paroxysmal atrial fibrillation.      Assessment/Recommendations   Assessment:      Frequent PVCs: I have been asked by Dr. Mehta to consult on her patient with prior documented high PVC burden (35%) on most recent Holter monitor from 2020.  She has had some short runs of nonsustained VT.  Patient has no direct awareness of any palpitations.  She does have some periodic increase in her dyspnea but this has not been clearly correlated with increased PVC frequency.  PVC morphology has been largely unifocal (left bundle, inferior axis, transition V3) consistent with LVOT site of origin.  Thus far the patient's echo studies have demonstrated preserved LV function in the setting of her frequent ventricular ectopy, but it certainly possible at this PVC burden to develop PVC induced myopathy.    Paroxysmal atrial fibrillation: Sustained episodes of paroxysmal atrial fibrillation demonstrated on Holter monitor dated 11/22/2019.  The patient reports again periodic increases in her baseline dyspnea which could potentially correlate with her atrial fibrillation.  She was documented to be in atrial fibrillation at her pulmonary rehab session on August 4 of this year.  Ventricular rates were in the 140-170 bpm range.  She had spontaneous resolution of that episode.  She has a [id number] score of 5 (age 4, gender, HTN, DM) and is appropriately treated with OAC (apixaban).  Patient has a prior history of pulmonary embolism and may not be a candidate for left atrial closure.    Primary hypertension: Chronic problem for patient and her blood pressure today is at target on her current medical therapy.  The question of diastolic dysfunction has  been raised in previous notes.  The echo does not demonstrate significant wall thickening or other elements consistent with diastolic dysfunction.  Previous BNP levels have been mildly elevated.     Plan:    Patient will wear a 24-hour Holter monitor to assess her current rhythm status including PVC burden.    The patient may be a candidate for a implantable loop recorder to more continuously monitor rhythm particularly as it pertains to her atrial fibrillation to better determine the degree of symptoms.  A decision regarding ILR implant will be made following her Holter monitor study.    Follow-up in the arrhythmia clinic with the EP LEILANI in 3 months.       History of Present Illness/Subjective    Ms. 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  The patient reports no direct awareness of her arrhythmias i.e. palpitations.  She does have some chronic pulmonary disease but at times will have worsening dyspnea on exertion.  She has not documented a heart rate by pulse oximeter or other means during those symptoms.  The patient is only able to tolerate low-dose sotalol, 40 mg twice daily.  She reports no exertional chest pain or pressure.  She is not describing any orthopnea, PND or ankle edema.    ECG: Personally reviewed: Multiple twelve-lead EKGs reviewed.  Normal sinus rhythm and all tracings demonstrating frequent PVCs.  PVC morphology left bundle, inferior axis, transition in V3.    ECHO: Dated 12/23/2021                                              Interpretation Summary     Left ventricular size, wall motion and function are normal. The ejection  fraction is 60-65%.  Normal right ventricle size and systolic function.  No hemodynamically significant valvular abnormalities on 2D or color  flow  Imaging.    Other Studies: Holter monitor dated 7/20/2020    Abnormal to monitor tracing by virtue of the presence of frequent ventricular ectopy.  Ectopy does not appear to be particularly worrisome however given the frequency it could potentially result in PVC induced cardiomyopathy.  The site of origin is   apparently the ventricular outflow tract region.    The patient did not report any direct symptoms of awareness of her ventricular arrhythmia, further clinical correlation may be warranted.    No sustained atrial or ventricular tachyarrhythmia.    No profound bradycardia or pauses.    Time spent: 60 minutes spent on the date of the encounter doing chart review, history and exam, documentation and further activities as noted above.    Clinically Significant Risk Factors Present on Admission               # DM[facility] within past 3 months      Cardiovascular: Cardiac Arrhythmia: Atrial fibrillation: Paroxysmal    Systemic: Chronic Fatigue and Other Debilities: Age-related physical debility            Physical Examination Review of Systems   There were no vitals taken for this visit.  There is no height or weight on file to calculate BMI.  Wt Readings from Last 3 Encounters:   06/01/22 99.7 kg (219 lb 11.2 oz)   02/22/22 101.2 kg (223 lb)   01/18/22 101.1 kg (222 lb 12.8 oz)     [unfilled]    General     Appearance:   The patient is alert oriented to person place and situation.    The patient is in no acute distress at the time of my evaluation.   HEENT:  Pupils are equal, round, and reactive to light.  Conjunctiva and sclera are clear.  ENT: Oral mucosa is moist and without redness. No evident nasal discharge.   Neck: Without palpable thyroid or appreciable lymph nodes.   Chest: Symmetric, without deformity.   Lungs:   Clear bilaterally with no rales, rhonchi, or wheezes.     Cardiovascular:   Rhythm is mostly regular with occasional ectopic beats. S1 and S2 are normal. No  significant murmur is present. JVP is mildly elevated.  Lower extremities demonstrate no significant edema. Distal pulses are intact bilaterally.   Abdomen:  Abdomen is obese, soft, and nontender.   Extremities: Without deformity.   Skin: Skin is warm, dry, and otherwise intact.   Neurologic: Gait is normal.           A 12 point comprehensive review of systems was  negative except as noted.      Medical History  Surgical History Family History Social History   Past Medical History:   Diagnosis Date     Adenomatous goiter 2004     Arthropathy of shoulder region unknown     Asthma 2009     Atrial flutter (H) 2017     Bartholin gland cyst unknown     Diabetes mellitus type II, controlled (H) 2004     Esophageal reflux 1980     Essential hypertension 1995     Gastroparesis      Herpes simplex type 1 infection unknown     IBS (irritable bowel syndrome) 1971     Leukocytosis unknown     Osteopenia 2009     Vitamin D deficiency 2014    Past Surgical History:   Procedure Laterality Date     BIOPSY BREAST Left 1/22/15    Papilloma     EXCISION / BIOPSY BREAST / NIPPLE / DUCT Left       DILATION/CURETTAGE DIAG/THER NON OB  Unknown    Description: Dilation And Curettage;  Recorded: 2007;     HYSTERECTOMY  1984     OOPHORECTOMY  1975    1 removed, 1 remains     DC NASAL/SINUS ENDOSCOPY,BX/RMV POLYP/DEBRID      Description: Nasal Endoscopy Polypectomy;  Recorded: 2007;     [facility]  DELIVERY ONLY  1971    Description:  Section;  Recorded: 2008;  Comments: x2     [facility] TOTAL ABDOM HYSTERECTOMY      Description: Total Abdominal Hysterectomy;  Recorded: 2008;     [facility] TOTAL KNEE ARTHROPLASTY Bilateral     Description: Total Knee Arthroplasty;  Recorded: 2008;     [facility] XFER SINGLE SUPERFICI LOW LEG TENDON  UNknown    Description: Tibialis Posterior Tendon Transfer Right Foot;  Recorded: 2008;    Family History   Problem Relation Age of Onset     Breast Cancer Sister  "56.00     Depression Mother      Dementia Mother     Social History     Socioeconomic History     Marital status:      Spouse name: Not on file     Number of children: Not on file     Years of education: Not on file     Highest education level: Not on file   Occupational History     Not on file   Tobacco Use     Smoking status: Never Smoker     Smokeless tobacco: Never Used   Substance and Sexual Activity     Alcohol use: No     Drug use: No     Sexual activity: Not on file   Other Topics Concern     Not on file   Social History Narrative    She is .  She has 4 children and is a retired . She is an avid .      Social Determinants of Health     Financial Resource Strain: Not on file   Food Insecurity: Not on file   Transportation Needs: Not on file   Physical Activity: Not on file   Stress: Not on file   Social Connections: Not on file   Intimate Partner Violence: Not on file   Housing Stability: Not on file          Medications  Allergies   Scheduled Meds:  Continuous Infusions:  PRN Meds:. Allergies   Allergen Reactions     Conray [Iothalamate] Anaphylaxis     30+ yr's ago / x-ray exam / was given \"dye\" iodine contrast / had Anaphylaxis reaction, [facility] - 11/30/16 1509      Diagnostic X-Ray Materials Other (See Comments)     hypotension       Alendronate Sodium [Alendronic Acid] Nausea     Amoxicillin Unknown     Diatrizoate Meglumine [Diatrizoate] Other (See Comments)     Fosamax [Alendronic Acid] Unknown     Stomach cramps     House Dust [Dust Mite Extract] Unknown     Mold/Mildew [Molds & Smuts] Unknown     Other Allergy (See Comments) [External Allergen Needs Reconciliation - See Comment] Unknown     Contrast Media Ready-Box MISC, 11/06/2007.; Contrast [employer] Ready-Box MISC, 11/06/2007.       Penicillins Unknown     Prednisone Nausea     Other reaction(s): Abdominal Pain, She can have this as a shot not oral     Seafood Unknown     Sulfa (Sulfonamide Antibiotics) [Sulfa Drugs] " Unknown         Lab Results    Chemistry/lipid CBC Cardiac Enzymes/BNP/TSH/INR   Lab Results   Component Value Date    CHOL 158 02/22/2022    HDL 49 (L) 02/22/2022    TRIG 228 (H) 02/22/2022    BUN 18 06/01/2022     06/01/2022    CO2 24 06/01/2022    Lab Results   Component Value Date    WBC 11.2 (H) 02/22/2022    HGB 13.9 02/22/2022    HCT 43.6 02/22/2022    MCV 90 02/22/2022     02/22/2022    Lab Results   Component Value Date    TROPONINI <0.01 11/26/2019     (H) 10/02/2020    TSH 0.77 02/22/2022    INR 1.40 (H) 02/16/2021           Tanner Wong MD  Clinical Cardiac Electrophysiologist  [facility]

## 2022-08-12 ENCOUNTER — HOSPITAL ENCOUNTER (OUTPATIENT)
Dept: CARDIOLOGY | Facility: HOSPITAL | Age: 82
Discharge: HOME | End: 2022-08-12
Attending: INTERNAL MEDICINE | Admitting: INTERNAL MEDICINE
Payer: COMMERCIAL

## 2022-08-12 DIAGNOSIS — I49.3 PVC'S (PREMATURE VENTRICULAR CONTRACTIONS): ICD-10-CM

## 2022-08-12 DIAGNOSIS — I48.0 PAROXYSMAL ATRIAL FIBRILLATION (H): ICD-10-CM

## 2022-08-12 PROCEDURE — 93226 XTRNL ECG REC<48 HR SCAN A/R: CPT

## 2022-08-16 PROCEDURE — 93227 XTRNL ECG REC<48 HR R&I: CPT | Performed by: INTERNAL MEDICINE

## 2022-08-31 ENCOUNTER — TELEPHONE (OUTPATIENT)
Dept: PHARMACY | Facility: CLINIC | Age: 82
End: 2022-08-31

## 2022-08-31 NOTE — TELEPHONE ENCOUNTER
Called patient to schedule an MTM appointment. LM for patient to return call.  Brooklyn Wagner, Pharm.D, BCACP  Medication Therapy Management Pharmacist

## 2022-09-02 ENCOUNTER — ALLIED HEALTH/NURSE VISIT (OUTPATIENT)
Dept: CARDIOLOGY | Facility: CLINIC | Age: 82
End: 2022-09-02

## 2022-09-02 ENCOUNTER — OFFICE VISIT (OUTPATIENT)
Dept: CARDIOLOGY | Facility: CLINIC | Age: 82
End: 2022-09-02
Payer: COMMERCIAL

## 2022-09-02 VITALS
WEIGHT: 220.02 LBS | SYSTOLIC BLOOD PRESSURE: 121 MMHG | OXYGEN SATURATION: 95 % | RESPIRATION RATE: 20 BRPM | BODY MASS INDEX: 40.49 KG/M2 | HEART RATE: 77 BPM | HEIGHT: 62 IN | DIASTOLIC BLOOD PRESSURE: 63 MMHG | TEMPERATURE: 98.2 F

## 2022-09-02 DIAGNOSIS — I48.91 ATRIAL FIBRILLATION, UNSPECIFIED TYPE (H): Primary | ICD-10-CM

## 2022-09-02 DIAGNOSIS — Z00.6 EXAMINATION OF PARTICIPANT OR CONTROL IN CLINICAL RESEARCH: ICD-10-CM

## 2022-09-02 DIAGNOSIS — Z00.6 EXAMINATION OF PARTICIPANT OR CONTROL IN CLINICAL RESEARCH: Primary | ICD-10-CM

## 2022-09-02 LAB
ATRIAL RATE - MUSE: 77 BPM
DIASTOLIC BLOOD PRESSURE - MUSE: NORMAL MMHG
INTERPRETATION ECG - MUSE: NORMAL
P AXIS - MUSE: 46 DEGREES
PR INTERVAL - MUSE: 162 MS
QRS DURATION - MUSE: 86 MS
QT - MUSE: 408 MS
QTC - MUSE: 461 MS
R AXIS - MUSE: 102 DEGREES
SYSTOLIC BLOOD PRESSURE - MUSE: NORMAL MMHG
T AXIS - MUSE: 35 DEGREES
VENTRICULAR RATE- MUSE: 77 BPM

## 2022-09-02 PROCEDURE — 93000 ELECTROCARDIOGRAM COMPLETE: CPT | Performed by: GENERAL ACUTE CARE HOSPITAL

## 2022-09-02 PROCEDURE — 99207 PR NO CHARGE NURSE ONLY: CPT

## 2022-09-02 PROCEDURE — 99207 PR NO CHARGE-RESEARCH SERVICE: CPT

## 2022-09-02 NOTE — PROGRESS NOTES
Jair Study End Note    Study Description:   Multiconditions PPG Sub-Study. The purpose of this research study is to collect data related to health for the development of mobile technologies. This data will include physiological signal recordings from medical devices and smart watch data collection software. This study is not to provide any treatment. This study will only collect information for research and  purposes.     Adverse Events & Con Med Assessment Performed?   [x]   Did the Subject Complete the Study? Yes    If no, Termination Reason: N/A    Study Termination/Completion Date: 2-SEP-2022    CHASITY Mckenna

## 2022-09-02 NOTE — PROGRESS NOTES
"   Oban Study Consent On-Site Visit      Study description:   Multiconditions PPG Study. The purpose of this research study is to collect data related to health for the development of mobile technologies. This data will include physiological signal recordings from medical devices and data collection software on Apple Watches (\"study watches\"). This study is not to provide any treatment nor assess safety and effectiveness, but rather to collect information for research and  purposes.     Krystle Polanco a 82 year old female, was onsite today to discuss participation in the Oban study.   The consent form was reviewed with the patient.     The review of the study included:    Study Purpose     COVID-19 Criteria     On-Site Study Participation    Participant Responsibilities      Study Data and Devices    Benefits and Risks of Participation    Compensation and Costs of Participation    Voluntary Participation    Confidentiality     Injury and Legal Rights    The subject was queried in regards to her willingness to continue and her questions were answered to her satisfaction.     The patient has given her agreement to volunteer to participate in the above noted study.     The In-Lab consent form and HIPPA form version 17 Jun 2022 was signed 2-SEP-2022 onsite in the Clinic Research Unit.     A copy of the Oban consent will be placed in subject's medical record.  A copy of the consent form was given to the subject today.    Study data is directly entered into Epic per protocol.     No study procedures were done prior to Krystle Polanco providing informed consent.       CHASITY Mckenna     "

## 2022-09-02 NOTE — PROGRESS NOTES
"Oban Study Physical Exam      Medical History Reviewed? Yes    Physical Examination  For abnormal findings, please evaluate if the finding is Clinically Significant (by 'CS') or Not Clinically Significant (by 'NCS')  General Appearance   Abnormal; NCS Patient wears glasses   Head and Neck   Normal  Lungs     Normal  Cardiovascular   Normal  Abdomen    Normal  Musculoskeletal/Extremities  Normal   Lymph Nodes    Normal  Skin     Normal  Neurological    Normal    Tremor (If present document)  Absent         Vitals:  /63 (BP Location: Left arm, Patient Position: Sitting, Cuff Size: Adult Large)   Pulse 77   Temp 98.2  F (36.8  C)   Resp 20   Ht 1.575 m (5' 2\")   Wt 99.8 kg (220 lb 0.3 oz)   SpO2 95%   BMI 40.24 kg/m        COVID: No symptoms, chills, shortness of breath, or difficulty breathing, muscle or body aches, headache, loss of taste or smell, sore throat, runny nose, congestion, nausea, vomiting or diarrhea.according to the [place] Department of Health and Human Services based on the CARES Act.     COVID Vaccinations:   Immunization History   Administered Date(s) Administered     COVID-19,PF,Pfizer (12+ Yrs) 01/29/2021, 02/19/2021, 08/20/2021     COVID-19,PF,Pfizer 12+ Yrs (2022 and After) 04/05/2022     DT (PEDS <7y) 06/06/1994, 09/29/2004     Flu, Unspecified 11/01/2007, 10/15/2008, 09/28/2015     HepA, Unspecified 05/23/2014     HepA-Adult 05/23/2014     Influenza (H1N1) 11/25/2009     Influenza (High Dose) 3 valent vaccine 10/31/2011, 10/13/2014, 10/05/2015, 09/13/2016, 10/18/2017, 09/11/2018, 09/17/2019     Influenza (IIV3) PF 09/29/2004, 11/30/2005, 11/07/2006, 11/01/2007, 10/15/2008, 12/07/2010, 09/18/2012, 09/25/2013     Influenza Vaccine IM > 6 months Valent IIV4 (Alfuria,Fluzone) 10/02/2017     Influenza Vaccine, 6+MO IM (QUADRIVALENT W/PRESERVATIVES) 12/07/2010, 09/18/2012, 09/25/2013     Influenza, Quad, High Dose, Pf, 65yr+ (Fluzone HD) 10/02/2020, 10/06/2021     Pneumo Conj 13-V " (2010&after) 07/07/2015     Pneumococcal 23 valent 12/16/2008, 11/09/2018     Td (Adult), Adsorbed 09/29/2004     Td,adult,historic,unspecified 09/29/2004     Tdap (Adacel,Boostrix) 05/09/2012, 05/09/2012     Zoster vaccine recombinant adjuvanted (SHINGRIX) 10/30/2019, 02/14/2020     Zoster vaccine, live 10/15/2008       Smoking History  Are you currently smoking or vaping? No  How Many Years Have You Smoked or Vaped? Never  Packs or E-Cigs Per Day: Never    Electrocardiogram   12 Lead Interpretation: PVC's/PAC's  Rhythm Interpretation: Sinus rhythm with one PVC on 30 second and 60 seconds     Respiratory Conditions:   N/A    Spirometer Test Results (FEV%): N/A  Condition Severity: Not Applicable      Diana Thomas NP

## 2022-09-02 NOTE — PROGRESS NOTES
"      [facility] Study In-Lab Note      Study description: Multiconditions PPG Study. The purpose of this research study is to collect data related to health for the development of mobile technologies. This data will include physiological signal recordings from medical devices and data collection software on Apple Watches (\"study watches\"). This study is not to provide any treatment nor assess safety and effectiveness, but rather to collect information for research and  purposes.    Subject ID:  [id number]       SCREENING        Krystle Polanco   1940          82 year old  female    Time Subject Sat: 13:23    Past Medical History:   Diagnosis Date     Adenomatous goiter 2004     Arthropathy of shoulder region unknown     Asthma 2009     Atrial flutter (H) 2017     Bartholin gland cyst unknown     Diabetes mellitus type II, controlled (H) 2004     Esophageal reflux 1980     Essential hypertension 1995     Gastroparesis 1999     Herpes simplex type 1 infection unknown     IBS (irritable bowel syndrome) 1971     Leukocytosis unknown     Osteopenia 2009     Vitamin D deficiency 2014       Current Outpatient Medications:      acetaminophen (TYLENOL) 500 MG tablet, [ACETAMINOPHEN (TYLENOL) 500 MG TABLET] Take 1,000 mg by mouth every 6 (six) hours as needed for pain., Disp: , Rfl:      albuterol (PROAIR HFA/PROVENTIL HFA/VENTOLIN HFA) 108 (90 Base) MCG/ACT inhaler, [ALBUTEROL (PROAIR HFA) 90 MCG/ACTUATION INHALER] INHALE TWO PUFFS BY MOUTH FOUR TIMES A DAY AS NEEDED FOR WHEEZING, Disp: 25.5 g, Rfl: 3     amLODIPine (NORVASC) 5 MG tablet, Take 1 tablet (5 mg) by mouth daily, Disp: 90 tablet, Rfl: 3     apixaban ANTICOAGULANT (ELIQUIS) 5 MG tablet, Take 1 tablet (5 mg) by mouth 2 times daily, Disp: 180 tablet, Rfl: 3     atorvastatin (LIPITOR) 80 MG tablet, TAKE ONE TABLET BY MOUTH EVERY NIGHT AT BEDTIME, Disp: 90 tablet, Rfl: 3     azelastine (ASTELIN) 0.1 % nasal spray, Spray 2 sprays in nostril 2 times " daily, Disp: 90 mL, Rfl: 3     beclomethasone HFA (QVAR REDIHALER) 80 MCG/ACT inhaler, Inhale 2 puffs into the lungs 2 times daily, Disp: 31.2 g, Rfl: 3     blood glucose (CONTOUR NEXT TEST) test strip, 1 strip by In Vitro route Use as directed with testing blood sugars once daily.  Dispense Contour Next brand per pt's insurance coverage, Disp: , Rfl:      CALCIUM CARBONATE (CALCIUM 500 ORAL), [CALCIUM CARBONATE (CALCIUM 500 ORAL)] Take 1 tablet by mouth 2 (two) times a day., Disp: , Rfl:      cholecalciferol, vitamin D3, 1,000 unit tablet, [CHOLECALCIFEROL, VITAMIN D3, 1,000 UNIT TABLET] Take 2,000 Units by mouth 2 (two) times a day. , Disp: , Rfl:      clindamycin (CLEOCIN) 150 MG capsule, [CLINDAMYCIN (CLEOCIN) 150 MG CAPSULE] TAKE 4 CAPSULES  BY MOUTH 1 HOUR PRIOR TO DENTAL APPOINTMENT., Disp: 90 capsule, Rfl: 3     colestipol (COLESTID) 1 g tablet, Take 1 tablet (1 g) by mouth 2 times daily, Disp: 180 tablet, Rfl: 3     cranberry extract 300 mg Tab, [CRANBERRY EXTRACT 300 MG TAB] Take 1 tablet by mouth daily. , Disp: , Rfl:      estradiol (ESTRACE) 0.1 MG/GM vaginal cream, Place 2 g vaginally every other day, Disp: 42.5 g, Rfl: 3     famotidine (PEPCID) 40 MG tablet, Take 1 tablet (40 mg) by mouth 2 times daily, Disp: 180 tablet, Rfl: 3     LACTOBACILLUS ACIDOPHILUS (PROBIOTIC ORAL), [LACTOBACILLUS ACIDOPHILUS (PROBIOTIC ORAL)] Take 1 tablet by mouth 2 (two) times a day. , Disp: , Rfl:      lancing device Misc, [LANCING DEVICE MISC] Use as directed 4 times a week., Disp: 1 each, Rfl: 3     loperamide (IMODIUM) 2 mg capsule, [LOPERAMIDE (IMODIUM) 2 MG CAPSULE] Take 2 mg by mouth 4 (four) times a day as needed., Disp: , Rfl:      losartan (COZAAR) 100 MG tablet, Take 1 tablet (100 mg) by mouth daily, Disp: 90 tablet, Rfl: 3     LUTEIN ORAL, [LUTEIN ORAL] Take 1 tablet by mouth daily. With Zeaxanthin, Disp: , Rfl:      Magnesium Oxide 250 MG TABS, Use every 3rd day, Disp: , Rfl:      metFORMIN (GLUCOPHAGE XR) 500  "MG 24 hr tablet, TAKE TWO TABLETS BY MOUTH TWICE A DAY, Disp: 360 tablet, Rfl: 3     metroNIDAZOLE (METROGEL) 0.75 % gel, [METRONIDAZOLE (METROGEL) 0.75 % GEL] Apply 1 application topically as needed. , Disp: , Rfl:      montelukast (SINGULAIR) 10 MG tablet, Take 1 tablet (10 mg) by mouth daily, Disp: 90 tablet, Rfl: 3     nitrofurantoin (MACRODANTIN) 50 MG capsule, [NITROFURANTOIN (MACRODANTIN) 50 MG CAPSULE] Take 1 capsule (50 mg total) by mouth daily., Disp: 90 capsule, Rfl: 3     psyllium (METAMUCIL) 3.4 gram packet, [PSYLLIUM (METAMUCIL) 3.4 GRAM PACKET] Take 1 packet by mouth every evening., Disp: , Rfl:      SIMETHICONE (GAS-X ORAL), [SIMETHICONE (GAS-X ORAL)] Take 1 tablet by mouth 2 (two) times a day as needed., Disp: , Rfl:      sotalol (BETAPACE) 80 MG tablet, TAKE ONE-HALF TABLET BY MOUTH TWO TIMES A DAY, Disp: 90 tablet, Rfl: 3     tiotropium (SPIRIVA HANDIHALER) 18 MCG inhaled capsule, Inhale 1 capsule (18 mcg) into the lungs daily (Patient taking differently: Inhale 18 mcg into the lungs daily as needed), Disp: 90 capsule, Rfl: 3     UBIDECARENONE (COENZYME Q10 ORAL), [UBIDECARENONE (COENZYME Q10 ORAL)] Take 1 tablet by mouth daily. , Disp: , Rfl:      zinc gluconate 50 mg tablet, [ZINC GLUCONATE 50 MG TABLET] Take 50 mg by mouth daily., Disp: , Rfl:     Allergies   Allergen Reactions     Conray [Iothalamate] Anaphylaxis     30+ yr's ago / x-ray exam / was given \"dye\" iodine contrast / had Anaphylaxis reaction, JDI - 11/30/16 150      Diagnostic X-Ray Materials Other (See Comments)     hypotension       Alendronate Sodium [Alendronic Acid] Nausea     Amoxicillin Unknown     Diatrizoate Meglumine [Diatrizoate] Other (See Comments)     Fosamax [Alendronic Acid] Unknown     Stomach cramps     House Dust [Dust Mite Extract] Unknown     Mold/Mildew [Molds & Smuts] Unknown     Other Allergy (See Comments) [External Allergen Needs Reconciliation - See Comment] Unknown     Contrast Media Ready-Box MISC, " "2007.; Contrast Media Ready-Box MISC, 2007.       Penicillins Unknown     Prednisone Nausea     Other reaction(s): Abdominal Pain, She can have this as a shot not oral     Seafood Unknown     Sulfa (Sulfonamide Antibiotics) [Sulfa Drugs] Unknown        Past Surgical History:   Procedure Laterality Date     BIOPSY BREAST Left 1/22/15     EXCISION / BIOPSY BREAST / NIPPLE / DUCT Left       DILATION/CURETTAGE DIAG/THER NON OB  Unknown     HYSTERECTOMY       OOPHORECTOMY       DC NASAL/SINUS ENDOSCOPY,BX/RMV POLYP/DEBRID       [facility]  DELIVERY ONLY       Z TOTAL ABDOM HYSTERECTOMY       ZC TOTAL KNEE ARTHROPLASTY Bilateral      Z XFER SINGLE SUPERFICI LOW LEG TENDON  UNknown        Child-Bearing Potential?: No    Race: White  Race (Secondary): N/A    : No    Ethnicity: Non-/     Vitals:  /63 (BP Location: Left arm, Patient Position: Sitting, Cuff Size: Adult Large)   Pulse 77   Temp 98.2  F (36.8  C)   Resp 20   Ht 1.575 m (5' 2\")   Wt 99.8 kg (220 lb 0.3 oz)   SpO2 95%   BMI 40.24 kg/m       Sponsor Expected Values   Blood Pressure: SBP: ; DBP: 40-90  Pulse:  bpm  Temp: 35.5-37.5  C  Respiration: 10-23  Ht: in cm  Wt: in kg  SpO2%: %  BMI: Rounded to nearest whole number    Repeated Measurements: (enter as needed)  none     Respiratory Conditions: N/A    Spirometer Test Results (FEV%): N/A    Condition Severity: Not Applicable      Sleep Conditions:  Sleep Apnea Diagnosis: Yes  Use of CPAP at Night: Yes    Oxygen Therapy: No      Minutes of Exercise per Week: >60  Type of Activity: Both      Measurements & Preferences:  Dominant Hand: Right   Preferred Watch Hand: Left    Volunteer-Reported Armijo Scale: 2  Staff-Recorded Armijo Scale: 2    Hairiness Level: A: Thin Hair, Low Density     Wrist Circumference:  Left: 178 mm       Right: 175 mm          ECG:  Heart Rate: 76   PVC/PACs: 1   Total " Seconds: 60  % [place]: 1.32          STUDY PROCEDURE DATA     Spectrometer Values:            Left:   L*: 62.59    A*: 9.01   B*: 18.35      Right: L*: 60.59    A*: 9.87   B*: 20.74                 Environmental Conditions:   Temperature: 25  C  Barometric Pressure: 29.81 in (from weather.com)   Humidity: 35 %    Lux Level (Near Wrist):  Left: 434.4  Right: 596.0  Pre-Procedure Temperatures:  Left Wrist Skin: 31.0  C  Right Wrist Skin: 31.3  C  Finger Nellcor #1: 29.4  C  Finger Nellcor #2: 30.6  C    Study Date: 09/02/22  Study Time (Macbook Picture 1): 14:15:38     Device IDs  Left Watch ID (Size): [id number] (40)   Right Watch ID (Size): [id number] (40)   Band Size  Left: M/L    Right: M/L  Secure Setting Notch  Left: 4   Right: 4  Watch Enclosure: Aluminum   ([id number] and [id number] are Titanium-delete if not applicable)  Nox ID: [id number]     Sync Box ID: [id number]     Nellcor #1 ID: [id number]  Nellcor #1 Location: Right Index Finger    Nellcor #2 ID: [id number]  Nellcor #2 Location: Right Ring Finger    Subject Transgressions: (time stamp and identify all extraneous subject movements, coughs, etc)     Time Macbook based Picture 1: 14:15:38  Time Watch Left Based Picture 1: 14:15:38   Time Watch Right Based Picture 1: 14:15:37     Time Nellcor #1 Based Picture 2: 14:19:43   Time Nellcor #2 Based Picture 2: 14:19:43   Time Macbook Based Picture 2: 14:19:43     POST-PROCEDURE      Temperatures:  Left Wrist Skin (post): 32.7  C   Right Wrist Skin (post): 31.3  C  Finger Nellcor #1 (post): 30.4  C Finger Nellcor #2 (post): 27.6  C  Subject Performed Secure-1 Measurement? No  Moisturizing Cream/Lotion applied at wrist? No  Apple Watch Band Tightness During In-Lab Study: Snug but comfortable  Additional Comments (Device/participant issues): None     2-SEP-2022  Asha Leija, EP

## 2022-09-02 NOTE — PROGRESS NOTES
Jair Inclusion/Exclusion Criteria:     Study Name: Jari  : Tanner Wong MD    Protocol version: 4.0 - [id number]    Criteria #  Inclusion Criterita (ALL MUST BE YES)  YES/NO/N/A   1   Male and female subjects at least 18 years old at the time of the screening visit.  Yes   2   Wrist circumference and 120mm-245mm (inclusive).  Yes   3   Ability to understand and provide written informed consent.  Yes   4   Willing and able to comply with study procedures, activities, and duration as described in the ICF.   Yes   5  If not vaccinated and up to date with COVID -19 vaccinations, willing to take a rapid AG test, or PCR test, and produce a negative result within 3 days of the study visit(s).   Yes   6   Didn't smoke at least 2 hours before screening (or study procedures).  NA   7   Neither subject, nor any individuals living with subject, have had new development in the following within the last 14 days prior to study screening:        a. Have failed to comply with any country, state, and local travel restrictions.         b. Have had any unexpected flu-like symptoms (such as fever, chills, cough, shortness of breath, diarrhea, sore throat, runny nose, or trouble breathing).        c. Have had any contact with people confirmed COVID-19.         d. Have been confirmed to have COVID-19 and have not subsequently received a negative COVID-19 test result.    Yes   8   If Cohort 1 (In-Lab Cardiac Conditions):         a. Indication of a rhythm disorder (dated up to 5 years ago) as outlined in Table A (see Protocol page 9), and be present at the time of screening.     Yes   9   If Cohort 2 (In-Lab Respiratory Conditions):          a. Prior diagnosis of one of the following conditions, within 5 years: 1) Moderate (GOLD Stage 2) COPD, 2) Severe or Very Severe (GOLD Stage 3 or 4) COPD, 3) Idiopathic pulmonary fibrosis.          b. Record of spirometry FEV% result (within 5 years) are available.    NA   10   If  Cohort 3 (At-Home respiratory Conditions):         a. Prior diagnosis of one of the following conditions, within 5 years: 1) Moderate (GOLD Stage 2) COPD, 2) Severe or Very Severe (GOLD Stage 3 or 4) COPD, 3) Idiopathic pulmonary fibrosis.          b. Record of spirometry FEV% result (within 5 years) are available.          c. Willing and able to use a study provided iPhone and navigate study Antonio flow.          d. Stable WIFI at home and are able to connect it to study iPhones     NA       Criteria # Exclusion Criteria (ALL MUST BE NO) YES/NO/N/A   1   Individuals with severe contact allergies to standard adhesives, or other materials found in pulse oximetry sensors, ECG electrodes, respiration monitor electrodes, wearables device bands and watch surfaces.    No   2   Individuals that do not have at least 2 intact fingers (excluding thumb, *pinky will be excluded only for cohort 1 and cohort 2) on non-preferred hand to wear a watch.    No   3   Open wound(s) or active infections on wrists at study watch wear locations or where the ECG electrodes may be placed.    No   4   Physical disability that prevents safe and adequate testing.  No   5   Individuals with a pacemaker or an automated implantable cardioverter-defibrillator (AICD).    No   6   Individuals with physical scars, tattoos, or other skin markings on wrists where sensors or finger sensor are to be worn.    No   7   Individuals with clinically significant hand tremors, as judged by a Study Investigator.    No   8   Pregnant women.     No   9   Subjects with any medical history, physical exam, vital sign or any other study procedure finding/assessment that in the opinion of the Investigator could compromise subject safety during study participation or interfere with the study integrity and/or the accurate assessment of the study objectives.    No   10   Presence of skin conditions or disease at the fingers of SpO2% application sites that could interfere with  SpO2% sensor placement or the accuracy of measurement. Such conditions include, but are not limited to: extensive scarring, skin lesions, redness, infection or edema at target measurement sites.     No   11   Presence of long fingernails that interfere with the placement of the SpO2% sensor or nail polish at the fingers of SpO2% application sites.    No   12   Medical history or physical assessment finding that makes the subject inappropriate for participation, according to the investigator.    No     Patient does fulfill study inclusion criteria and no exclusion criteria are found.     Tanner Wong MD    2-SEP-2022    Asha Leija, EP

## 2022-09-06 ENCOUNTER — OFFICE VISIT (OUTPATIENT)
Dept: INTERNAL MEDICINE | Facility: CLINIC | Age: 82
End: 2022-09-06
Payer: COMMERCIAL

## 2022-09-06 VITALS
RESPIRATION RATE: 14 BRPM | HEART RATE: 70 BPM | BODY MASS INDEX: 40.43 KG/M2 | OXYGEN SATURATION: 97 % | HEIGHT: 62 IN | SYSTOLIC BLOOD PRESSURE: 122 MMHG | WEIGHT: 219.7 LBS | DIASTOLIC BLOOD PRESSURE: 70 MMHG

## 2022-09-06 DIAGNOSIS — I10 ESSENTIAL HYPERTENSION, BENIGN: ICD-10-CM

## 2022-09-06 DIAGNOSIS — E78.5 HYPERLIPIDEMIA ASSOCIATED WITH TYPE 2 DIABETES MELLITUS (H): ICD-10-CM

## 2022-09-06 DIAGNOSIS — I49.3 PVC'S (PREMATURE VENTRICULAR CONTRACTIONS): ICD-10-CM

## 2022-09-06 DIAGNOSIS — I48.0 PAF (PAROXYSMAL ATRIAL FIBRILLATION) (H): Primary | ICD-10-CM

## 2022-09-06 DIAGNOSIS — E11.69 HYPERLIPIDEMIA ASSOCIATED WITH TYPE 2 DIABETES MELLITUS (H): ICD-10-CM

## 2022-09-06 DIAGNOSIS — E11.9 TYPE 2 DIABETES MELLITUS WITHOUT COMPLICATION, WITHOUT LONG-TERM CURRENT USE OF INSULIN (H): ICD-10-CM

## 2022-09-06 PROCEDURE — 90662 IIV NO PRSV INCREASED AG IM: CPT | Performed by: INTERNAL MEDICINE

## 2022-09-06 PROCEDURE — G0008 ADMIN INFLUENZA VIRUS VAC: HCPCS | Performed by: INTERNAL MEDICINE

## 2022-09-06 PROCEDURE — 99214 OFFICE O/P EST MOD 30 MIN: CPT | Mod: 25 | Performed by: INTERNAL MEDICINE

## 2022-09-06 RX ORDER — SOTALOL HYDROCHLORIDE 80 MG/1
TABLET ORAL
Qty: 145 TABLET | Refills: 3 | Status: SHIPPED | OUTPATIENT
Start: 2022-09-06 | End: 2022-09-27

## 2022-09-06 RX ORDER — SOTALOL HYDROCHLORIDE 80 MG/1
TABLET ORAL
Qty: 145 TABLET | Refills: 3 | Status: SHIPPED | OUTPATIENT
Start: 2022-09-06 | End: 2022-09-06

## 2022-09-06 ASSESSMENT — ASTHMA QUESTIONNAIRES
QUESTION_5 LAST FOUR WEEKS HOW WOULD YOU RATE YOUR ASTHMA CONTROL: WELL CONTROLLED
QUESTION_3 LAST FOUR WEEKS HOW OFTEN DID YOUR ASTHMA SYMPTOMS (WHEEZING, COUGHING, SHORTNESS OF BREATH, CHEST TIGHTNESS OR PAIN) WAKE YOU UP AT NIGHT OR EARLIER THAN USUAL IN THE MORNING: NOT AT ALL
ACT_TOTALSCORE: 21
QUESTION_2 LAST FOUR WEEKS HOW OFTEN HAVE YOU HAD SHORTNESS OF BREATH: THREE TO SIX TIMES A WEEK
ACT_TOTALSCORE: 21
QUESTION_1 LAST FOUR WEEKS HOW MUCH OF THE TIME DID YOUR ASTHMA KEEP YOU FROM GETTING AS MUCH DONE AT WORK, SCHOOL OR AT HOME: NONE OF THE TIME
QUESTION_4 LAST FOUR WEEKS HOW OFTEN HAVE YOU USED YOUR RESCUE INHALER OR NEBULIZER MEDICATION (SUCH AS ALBUTEROL): ONCE A WEEK OR LESS

## 2022-09-06 NOTE — PROGRESS NOTES
[facility] Follow Up Note    Assessment/Plan:  1. PAF (paroxysmal atrial fibrillation) (H)  Holter monitor reviewed-68% burden of atrial fibrillation-not discernible by patient.  Of note, she is only on 40 mg of sotalol twice daily.  She is awaiting cardiology follow-up.  She has a follow-up office visit with the atrial fibs clinic in November.  Awaiting word from Dr. Wong.  Recommendation: We will attempt to gently increase dose of sotalol.  Of note, this was reduced in the past by electrophysiology because she had nausea and IBS with 80 mg twice daily.  We will see if she can get an extra half tablet per day.  I.e. total 120 mg.  - sotalol (BETAPACE) 80 MG tablet; TAKE ONE-HALF TABLET BY MOUTH THREE TIMES A DAY  Dispense: 145 tablet; Refill: 3    2. Type 2 diabetes mellitus without complication, without long-term current use of insulin (H)  We will update hemoglobin A1c at next visit.  Chemistries from the emergency room at [facility] are reviewed and are stable    3. Hyperlipidemia associated with type 2 diabetes mellitus (H)  Stable    4. Benign Essential Hypertension  Up-to-date    5. BMI 40.0-44.9, adult (H)  She is working on portion reduction    6.  IBS with diarrhea  Tricky with medications.  She is on Colestid and uses an occasional Imodium          Elke Mehta MD, MD    Chief Complaint:  No chief complaint on file.      History of Present Illness:  Krystle is a 82 year old female who is here today-accompanied by her -for discussion of her current medical problems.  Her type 2 diabetes has been relatively well controlled and she is experiencing no new issues here.  She has a history of pulmonary embolism and is on chronic anticoagulation.  She also has reactive airways disease.  She sees Dr. Gutierrez at [facility].  She-along with her -are attending a pulmonary rehab course.  It was there that they determined that she had atrial fibrillation.  She has since had a  Holter monitor which showed a 68% burden of atrial fibrillation.  Her PVC burden was decreased.  She is on a modest dose of sotalol because she developed IBS symptoms with diarrhea-worsened with this medication.  Her electrophysiologist at the time thus decreased her dose.  Now, with regard to IBS, she is on Colestid.  Her IBS is much better controlled.    She has dyspnea with exertion.  It appears that she has atrial fibrillation significantly.  This may be a contributing cause.  She believes that when she is in A. fib, she is more short of breath.    Review of Systems:  A comprehensive review of systems was performed and was otherwise negative    PFSH:  Social History:  with adult children  History   Smoking Status     Never Smoker   Smokeless Tobacco     Never Used       Past History:   Current Outpatient Medications   Medication Sig Dispense Refill     sotalol (BETAPACE) 80 MG tablet TAKE ONE-HALF TABLET BY MOUTH TWO TIMES A  tablet 3     acetaminophen (TYLENOL) 500 MG tablet [ACETAMINOPHEN (TYLENOL) 500 MG TABLET] Take 1,000 mg by mouth every 6 (six) hours as needed for pain.       albuterol (PROAIR HFA/PROVENTIL HFA/VENTOLIN HFA) 108 (90 Base) MCG/ACT inhaler [ALBUTEROL (PROAIR HFA) 90 MCG/ACTUATION INHALER] INHALE TWO PUFFS BY MOUTH FOUR TIMES A DAY AS NEEDED FOR WHEEZING 25.5 g 3     amLODIPine (NORVASC) 5 MG tablet Take 1 tablet (5 mg) by mouth daily 90 tablet 3     apixaban ANTICOAGULANT (ELIQUIS) 5 MG tablet Take 1 tablet (5 mg) by mouth 2 times daily 180 tablet 3     atorvastatin (LIPITOR) 80 MG tablet TAKE ONE TABLET BY MOUTH EVERY NIGHT AT BEDTIME 90 tablet 3     azelastine (ASTELIN) 0.1 % nasal spray Spray 2 sprays in nostril 2 times daily 90 mL 3     beclomethasone HFA (QVAR REDIHALER) 80 MCG/ACT inhaler Inhale 2 puffs into the lungs 2 times daily 31.2 g 3     blood glucose (CONTOUR NEXT TEST) test strip 1 strip by In Vitro route Use as directed with testing blood sugars once daily.   Dispense Contour Next brand per pt's insurance coverage       CALCIUM CARBONATE (CALCIUM 500 ORAL) [CALCIUM CARBONATE (CALCIUM 500 ORAL)] Take 1 tablet by mouth 2 (two) times a day.       cholecalciferol, vitamin D3, 1,000 unit tablet [CHOLECALCIFEROL, VITAMIN D3, 1,000 UNIT TABLET] Take 2,000 Units by mouth 2 (two) times a day.        clindamycin (CLEOCIN) 150 MG capsule [CLINDAMYCIN (CLEOCIN) 150 MG CAPSULE] TAKE 4 CAPSULES  BY MOUTH 1 HOUR PRIOR TO DENTAL APPOINTMENT. 90 capsule 3     colestipol (COLESTID) 1 g tablet Take 1 tablet (1 g) by mouth 2 times daily 180 tablet 3     cranberry extract 300 mg Tab [CRANBERRY EXTRACT 300 MG TAB] Take 1 tablet by mouth daily.        estradiol (ESTRACE) 0.1 MG/GM vaginal cream Place 2 g vaginally every other day 42.5 g 3     famotidine (PEPCID) 40 MG tablet Take 1 tablet (40 mg) by mouth 2 times daily 180 tablet 3     LACTOBACILLUS ACIDOPHILUS (PROBIOTIC ORAL) [LACTOBACILLUS ACIDOPHILUS (PROBIOTIC ORAL)] Take 1 tablet by mouth 2 (two) times a day.        lancing device Misc [LANCING DEVICE MISC] Use as directed 4 times a week. 1 each 3     loperamide (IMODIUM) 2 mg capsule [LOPERAMIDE (IMODIUM) 2 MG CAPSULE] Take 2 mg by mouth 4 (four) times a day as needed.       losartan (COZAAR) 100 MG tablet Take 1 tablet (100 mg) by mouth daily 90 tablet 3     LUTEIN ORAL [LUTEIN ORAL] Take 1 tablet by mouth daily. With Zeaxanthin       Magnesium Oxide 250 MG TABS Use every 3rd day       metFORMIN (GLUCOPHAGE XR) 500 MG 24 hr tablet TAKE TWO TABLETS BY MOUTH TWICE A  tablet 3     metroNIDAZOLE (METROGEL) 0.75 % gel [METRONIDAZOLE (METROGEL) 0.75 % GEL] Apply 1 application topically as needed.        montelukast (SINGULAIR) 10 MG tablet Take 1 tablet (10 mg) by mouth daily 90 tablet 3     nitrofurantoin (MACRODANTIN) 50 MG capsule [NITROFURANTOIN (MACRODANTIN) 50 MG CAPSULE] Take 1 capsule (50 mg total) by mouth daily. 90 capsule 3     psyllium (METAMUCIL) 3.4 gram packet [PSYLLIUM  "(METAMUCIL) 3.4 GRAM PACKET] Take 1 packet by mouth every evening.       SIMETHICONE (GAS-X ORAL) [SIMETHICONE (GAS-X ORAL)] Take 1 tablet by mouth 2 (two) times a day as needed.       tiotropium (SPIRIVA HANDIHALER) 18 MCG inhaled capsule Inhale 1 capsule (18 mcg) into the lungs daily (Patient taking differently: Inhale 18 mcg into the lungs daily as needed) 90 capsule 3     UBIDECARENONE (COENZYME Q10 ORAL) [UBIDECARENONE (COENZYME Q10 ORAL)] Take 1 tablet by mouth daily.        zinc gluconate 50 mg tablet [ZINC GLUCONATE 50 MG TABLET] Take 50 mg by mouth daily.         Family History:     Physical Exam:  General Appearance:   She appears quite well and in no acute distress  Vitals:    09/06/22 0932   BP: 122/70   BP Location: Left arm   Patient Position: Sitting   Cuff Size: Adult Large   Pulse: 70   Resp: 14   SpO2: 97%   Weight: 99.7 kg (219 lb 11.2 oz)   Height: 1.575 m (5' 2\")     Wt Readings from Last 3 Encounters:   09/06/22 99.7 kg (219 lb 11.2 oz)   09/02/22 99.8 kg (220 lb 0.3 oz)   08/11/22 99.8 kg (220 lb)     Body mass index is 40.18 kg/m .    No exam today    Data Review:    Analysis and Summary of Old Records (2): Time spent reviewing EP records as well as ER records    Records Requested (1):       Other History Summarized (from other people in the room) (2):     Radiology Tests Summarized (XRAY/CT/MRI/DXA) (1):     Labs Reviewed (1): Labs reviewed from the hospital stay    Medicine Tests Reviewed (EKG/ECHO/COLONOSCOPY/EGD) (1):     Independent Review of EKG or X-RAY (2):       "

## 2022-09-08 ENCOUNTER — TELEPHONE (OUTPATIENT)
Dept: INTERNAL MEDICINE | Facility: CLINIC | Age: 82
End: 2022-09-08

## 2022-09-08 DIAGNOSIS — Z92.29 PERSONAL HISTORY OF OTHER DRUG THERAPY: ICD-10-CM

## 2022-09-08 DIAGNOSIS — M81.0 SENILE OSTEOPOROSIS: Primary | ICD-10-CM

## 2022-09-09 ENCOUNTER — ALLIED HEALTH/NURSE VISIT (OUTPATIENT)
Dept: FAMILY MEDICINE | Facility: CLINIC | Age: 82
End: 2022-09-09
Payer: COMMERCIAL

## 2022-09-09 DIAGNOSIS — M89.9 DISORDER OF BONE AND CARTILAGE: Primary | ICD-10-CM

## 2022-09-09 DIAGNOSIS — M94.9 DISORDER OF BONE AND CARTILAGE: Primary | ICD-10-CM

## 2022-09-09 PROCEDURE — 96372 THER/PROPH/DIAG INJ SC/IM: CPT | Performed by: INTERNAL MEDICINE

## 2022-09-09 PROCEDURE — 99207 PR NO CHARGE NURSE ONLY: CPT

## 2022-09-09 NOTE — PROGRESS NOTES
"Prolia Injection Phone Screen      Screening questions have been asked 2-3 days prior to administration visit for Prolia. If any questions are answered with \"Yes,\" this phone encounter were will routed to ordering provider for further evaluation.     1.  When was the last injection?  3/8/22    2.  Has insurance for this injection been verified?  Yes    3.  Did you experience any new onset achiness or rashes that lasted for over a month with your previous Prolia injection?   No    4.  Do you have a fever over 101?F or a new deep cough that is unusual for you today? No    5.  Have you started any new medications in the last 6 months that you were told could affect your immune system? These may have been prescribed by oncologist, transplant, rheumatology, or dermatology.   No    6.  In the last 6 months have you have gastric bypass or parathyroid surgery?   No    7.  Do you plan dental work requiring drilling into the bone such as implants/extractions or oral surgery in the next 2-3 months?   No    8. Do you have new insurance since the last injection?    9. Have you received the Covid-19 vaccine? Yes  If No - Proceed with Prolia injection  If Yes - Date of vaccination 4/5/22. Will need to wait until 2 weeks after 2nd dose of Covid-19 vaccine before administering Prolia       Patient informed if symptoms discussed above present prior to their administration appointment, they are to notify clinic immediately.     LEN LEDESMA LPN    Clinic Administered Medication Documentation    Administrations This Visit     denosumab (PROLIA) injection 60 mg     Admin Date  09/09/2022 Action  Given Dose  60 mg Route  Subcutaneous Site  Right Arm Administered By  Len Ledesma LPN    Ordering Provider: Elke Mehta MD    Patient Supplied?: No                  Prolia Documentation    Prior to injection, verified patient identity using patient's name and date of birth. Medication was administered. Please see MAR and " medication order for additional information. Patient instructed to report any adverse reaction to staff immediately .    Indication: Prolia  (denosumab) is a prescription medicine used to treat osteoporosis in patients who:     Are at high risk for fracture, meaning patients who have had a fracture related to osteoporosis, or who have multiple risk factors for fracture.    Cannot use another osteoporosis medicine or other osteoporosis medicines did not work well.    The timeline for early/late injections would be 4 weeks early and any time after the 6 month chico. If a patient receives their injection late, then the subsequent injection would be 6 months from the date that they actually received the injection.    When was the last injection?  3/8/22  Was the last injection at least 6 months ago? Yes  Has the prior authorization been completed?  Yes  Is there an active order (within the past 365 days) in the chart?  Yes  Patient denies any dental work involving the bone (e.g. tooth extraction or dental implants) in the past 4 weeks?  Yes  Patient denies plans for any dental work involving the bone (e.g. tooth extraction or dental implants) in the next 4 weeks? Yes    The following steps were completed to comply with the REMS program for Prolia:    Confirms that patient received education from RN or provider via the Medication Guide and Patient Counseling Chart, including the serious risks of Prolia  and the symptoms of each risk.    Told the patient to seek prompt medical attention if they have signs or symptoms of any of the serious risks, as described in the Medication Guide and Patient Counseling Chart that was reviewed between the patient and RN or LP.    Provided each patient a copy of the Medication Guide and Patient Brochure.      Was entire vial of medication used? Yes  Vial/Syringe: Syringe  Expiration Date:  10/31/2024  Was this medication supplied by the patient? No

## 2022-09-18 ENCOUNTER — HEALTH MAINTENANCE LETTER (OUTPATIENT)
Age: 82
End: 2022-09-18

## 2022-09-20 ENCOUNTER — TELEPHONE (OUTPATIENT)
Dept: CARDIOLOGY | Facility: CLINIC | Age: 82
End: 2022-09-20

## 2022-09-20 NOTE — TELEPHONE ENCOUNTER
[facility] Call Center    Phone Message    May a detailed message be left on voicemail: yes     Reason for Call: Other: Pt would like a call back on her cell phone to discuss her afib as she missed a call from the nurse yesterday     Action Taken: Message routed to:  [facility] ([facility] cardio    Travel Screening: Not Applicable

## 2022-09-27 DIAGNOSIS — I48.0 PAF (PAROXYSMAL ATRIAL FIBRILLATION) (H): ICD-10-CM

## 2022-09-27 DIAGNOSIS — I49.3 PVC'S (PREMATURE VENTRICULAR CONTRACTIONS): ICD-10-CM

## 2022-09-27 RX ORDER — SOTALOL HYDROCHLORIDE 120 MG/1
60 TABLET ORAL 2 TIMES DAILY
Qty: 90 TABLET | Refills: 1 | Status: SHIPPED | OUTPATIENT
Start: 2022-09-27 | End: 2023-02-13

## 2022-10-05 ENCOUNTER — TRANSFERRED RECORDS (OUTPATIENT)
Dept: HEALTH INFORMATION MANAGEMENT | Facility: CLINIC | Age: 82
End: 2022-10-05

## 2022-10-13 ENCOUNTER — TRANSFERRED RECORDS (OUTPATIENT)
Dept: HEALTH INFORMATION MANAGEMENT | Facility: CLINIC | Age: 82
End: 2022-10-13

## 2022-10-13 LAB
RETINOPATHY: NEGATIVE
RETINOPATHY: NEGATIVE

## 2022-10-18 ENCOUNTER — TELEPHONE (OUTPATIENT)
Dept: NURSING | Facility: CLINIC | Age: 82
End: 2022-10-18

## 2022-10-18 NOTE — TELEPHONE ENCOUNTER
Patient had a cortisone shot on 10-5-2022.    Her blood sugars have been high for her. They were 146 today.    She is also having an increase in urination.     She would like to talk to her provider for measuring her cataracts.    She is wondering if she can proceed with the appointment.    Her appointment is next Wednesday.      Please advise.    Anaya Mata RN on 10/18/2022 at 5:26 PM

## 2022-10-19 NOTE — TELEPHONE ENCOUNTER
Attempted to contact patient to gather more information for Dr Mehta. No answer. Left message to call clinic. When patient calls back please gather the information requested below from Dr Mehta.         Elke Mehta MD [facility] Care Team Pool 10 hours ago (6:45 AM)     TC  I want to clarify her message.  I am not sure what she means about the cataracts.  Is she asking if she can proceed with cataract surgery?-The answer is yes.  It is not unusual for her blood sugars to be elevated after cortisone shot.  It typically settles after about a week.

## 2022-10-20 NOTE — TELEPHONE ENCOUNTER
Spoke with patient and clarified her questions. She stated that she was worried about the elevated blood sugars causing her A1C to rise and disqualify her from being able to have the surgery. Reassured patient that a few higher numbers will not effect A1C too much and she should still be fine for the cataract surgery.    She does not have a date set for the cataract surgery yet and states she has an appointment next week to measure her cataracts and set a surgery date. States she will call back and set up a pre-op appointment after she knows when her surgery date is.    Patient also states that the increased frequency of urinating is resolving itself. States she believes it was from her elevated blood sugars, which are coming down.

## 2022-10-28 ENCOUNTER — TELEPHONE (OUTPATIENT)
Dept: INTERNAL MEDICINE | Facility: CLINIC | Age: 82
End: 2022-10-28

## 2022-10-28 NOTE — TELEPHONE ENCOUNTER
Spoke with Krystle, her question is actually related to a medication for her , documented in his chart.

## 2022-10-28 NOTE — TELEPHONE ENCOUNTER
General Call      Reason for Call:  Medication changes    What are your questions or concerns:  I offered appt but she said she needs to speak with Damian lamar to see about if she needs an appt.    Date of last appointment with provider: not sure    Could we send this information to you in [place] or would you prefer to receive a phone call?:   Patient would prefer a phone call   Okay to leave a detailed message?: Yes at Cell number on file:    Telephone Information:   Mobile [phone]

## 2022-11-01 ENCOUNTER — TRANSFERRED RECORDS (OUTPATIENT)
Dept: HEALTH INFORMATION MANAGEMENT | Facility: CLINIC | Age: 82
End: 2022-11-01

## 2022-11-11 ENCOUNTER — OFFICE VISIT (OUTPATIENT)
Dept: CARDIOLOGY | Facility: CLINIC | Age: 82
End: 2022-11-11
Attending: INTERNAL MEDICINE
Payer: COMMERCIAL

## 2022-11-11 VITALS
BODY MASS INDEX: 39.51 KG/M2 | DIASTOLIC BLOOD PRESSURE: 70 MMHG | OXYGEN SATURATION: 99 % | WEIGHT: 216 LBS | RESPIRATION RATE: 18 BRPM | SYSTOLIC BLOOD PRESSURE: 126 MMHG | HEART RATE: 66 BPM

## 2022-11-11 DIAGNOSIS — I48.0 PAROXYSMAL ATRIAL FIBRILLATION (H): Primary | ICD-10-CM

## 2022-11-11 DIAGNOSIS — I49.3 PVC'S (PREMATURE VENTRICULAR CONTRACTIONS): ICD-10-CM

## 2022-11-11 DIAGNOSIS — I10 ESSENTIAL HYPERTENSION, BENIGN: ICD-10-CM

## 2022-11-11 PROCEDURE — 99215 OFFICE O/P EST HI 40 MIN: CPT | Performed by: NURSE PRACTITIONER

## 2022-11-11 NOTE — Clinical Note
She was able to correlate symptoms to A. fib with pulse monitoring and at pulmonary rehab.  Recommend PVI.  She discussed this somewhat with Dr. Wong at their last appointment, further discussed today.  Please call her in about a week to follow-up.  If she does not wish to move forward with PVI, please have her follow-up with me in 3 months. Thanks, Ellyn

## 2022-11-11 NOTE — PROGRESS NOTES
HEART CARE ELECTROPHYSIOLOGY NOTE      [facility]  [phone]      Assessment/Recommendations   Assessment/Plan:  1.  Paroxysmal Atrial Fibrillation: Significant decrease in episodes of AF per symptom, pulse checks, and EKG monitoring in pulmonary rehab.  She has been able to correlate symptoms with episodes of A. fib; symptoms consist of lightheadedness and fatigue.    We reviewed the physiology and natural progression of atrial fibrillation and treatment options including rate control versus rhythm control depending upon the presence of symptoms.  We further discussed rhythm control with antiarrhythmic medications versus pulmonary vein isolation ablation.  Antiarrhythmic medication options are limited as she has not previously tolerated a higher dose of sotalol due to IBS exacerbation.  We further discussed PVI, risks, and expected recovery.  She was given information to review at home.  She is potentially interested in pursuing ablation.  Continue sotalol 60 mg twice daily.    She was reassured that atrial fibrillation is not life-threatening, but carries an increased risk for stroke.  She has a [id number] score of 7 for age >75, female gender, diabetes, hypertension, and PE.  Continue Eliquis 5 mg twice daily for stroke prophylaxis.  She is not a candidate for left atrial appendage closure due to history of PE requiring long-term oral anticoagulation.    2.  Frequent PVCs: Holter monitor in 2020 showed PVC burden of 35%, but without evidence for PVC induced cardiomyopathy and asymptomatic, so no further intervention recommended by Dr. Saenz.  Additionally, higher dose sotalol was ineffective at PVC suppression and resulted in side effects.  Recent Holter monitor showed rare PVCs.  No further intervention recommended at this time.    3.  Hypertension: Blood pressure at target.  Continue amlodipine and sotalol as above.    Follow up pending decision regarding PVI       History of Present  Illness/Subjective    HPI: Krystle Polanco is a 82 year old female who comes in today for EP follow-up of arrhythmias.  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    Previous attempts at using high-dose sotalol to suppress PVCs was successful.  Additionally, she is only able to tolerate low-dose sotalol due to IBS exacerbation on higher dose.  She has been having intermittent worsened dyspnea on exertion, though improving with pulmonary rehab.  She does well on the NuStep, but gets more winded with walking.  This does not appear to be rhythm related, though she has had some documented episodes of AF with RVR at pulmonary rehab.  Holter monitoring showed an AF burden of 66%, though rare PVCs.  Sotalol was increased to 60 mg twice daily.  She is on Eliquis for stroke prophylaxis.    Krystle states that she is now able to tell when she is in A. fib based on paying attention to symptoms and checking her pulse.  She has lightheadedness and fatigue when in A. fib.  She has had significant decrease in AF frequency and has not had any episodes noted at pulmonary rehab in the past week.  She denies chest discomfort, palpitations, abdominal fullness/bloating or peripheral edema, worsened shortness of breath, paroxysmal nocturnal dyspnea, orthopnea, lightheadedness, dizziness, pre-syncope, or syncope.    Cardiographics (EKG personally reviewed):  EKG done 9/2/2022 shows sinus rhythm at 77 bpm, QRS 86 ms, QT/QTc 408/461 ms.    24-hour Holter monitor worn 8/12/2022 shows sinus rhythm with paroxysmal atrial fibrillation.  AF burden 66%.  Average heart rate of 101 bpm with a range of 61 to 157 bpm.  No significant bradycardia or pauses.  Rare PVCs.  Symptoms of dyspnea correlated to AF with RVR 120s.    ECHO done 12/23/2021:  Left  ventricular size, wall motion and function are normal. The ejection  fraction is 60-65%.  Normal right ventricle size and systolic function.  No hemodynamically significant valvular abnormalities on 2D or color flow  imaging.    Stress MRI done 1/14/2020 showed normal EF and no inducible ischemia.    I have reviewed and updated the patient's Past Medical History, Social History, Family History and Medication List.  Outside records personally reviewed.     Physical Examination  Review of Systems   Vitals: /70 (BP Location: Right arm, Patient Position: Sitting, Cuff Size: Adult Large)   Pulse 66   Resp 18   Wt 98 kg (216 lb)   SpO2 99%   BMI 39.51 kg/m    BMI= Body mass index is 39.51 kg/m .  Wt Readings from Last 3 Encounters:   11/11/22 98 kg (216 lb)   09/06/22 99.7 kg (219 lb 11.2 oz)   09/02/22 99.8 kg (220 lb 0.3 oz)       General Appearance:   Alert, well-appearing and in no acute distress.   HEENT: Atraumatic, normocephalic.  No scleral icterus, normal conjunctivae, EOMs intact, PERRL.  Wearing a mask.   Chest/Lungs:   Chest symmetric, spine straight.  Respirations unlabored.  Lungs are clear to auscultation.   Cardiovascular:   Regular rate and rhythm.  Normal first and second heart sounds with no murmurs, rubs, or gallops; radial and posterior tibial pulses are intact, No edema.   Abdomen:  Soft, nondistended, bowel sounds present.   Extremities: No cyanosis or clubbing.   Musculoskeletal: Moves all extremities.    Skin: Warm, dry, intact.    Neurologic: Mood and affect are appropriate.  Alert and oriented to person, place, time, and situation.     ROS: 10 point ROS neg other than the symptoms noted above in the HPI.         Medical History  Surgical History Family History Social History   Past Medical History:   Diagnosis Date     Adenomatous goiter 2004     Arthropathy of shoulder region unknown     Asthma 2009     Atrial flutter (H) 2017     Bartholin gland cyst unknown     Diabetes mellitus  type II, controlled (H) 2004     Esophageal reflux 1980     Essential hypertension 1995     Gastroparesis      Herpes simplex type 1 infection unknown     IBS (irritable bowel syndrome) 1971     Leukocytosis unknown     Osteopenia 2009     Vitamin D deficiency      Past Surgical History:   Procedure Laterality Date     BIOPSY BREAST Left 1/22/15    Papilloma     EXCISION / BIOPSY BREAST / NIPPLE / DUCT Left       DILATION/CURETTAGE DIAG/THER NON OB  Unknown    Description: Dilation And Curettage;  Recorded: 2007;     HYSTERECTOMY  1984     OOPHORECTOMY  1975    1 removed, 1 remains     NC NASAL/SINUS ENDOSCOPY,BX/RMV POLYP/DEBRID      Description: Nasal Endoscopy Polypectomy;  Recorded: 2007;     [facility]  DELIVERY ONLY      Description:  Section;  Recorded: 2008;  Comments: x2     [facility] TOTAL ABDOM HYSTERECTOMY      Description: Total Abdominal Hysterectomy;  Recorded: 2008;     [facility] TOTAL KNEE ARTHROPLASTY Bilateral     Description: Total Knee Arthroplasty;  Recorded: 2008;     [facility] XFER SINGLE SUPERFICI LOW LEG TENDON  UNknown    Description: Tibialis Posterior Tendon Transfer Right Foot;  Recorded: 2008;     Family History   Problem Relation Age of Onset     Breast Cancer Sister 56.00     Depression Mother      Dementia Mother         Social History     Socioeconomic History     Marital status:      Spouse name: Not on file     Number of children: Not on file     Years of education: Not on file     Highest education level: Not on file   Occupational History     Not on file   Tobacco Use     Smoking status: Never     Smokeless tobacco: Never   Substance and Sexual Activity     Alcohol use: No     Drug use: No     Sexual activity: Not on file   Other Topics Concern     Not on file   Social History Narrative    She is .  She has 4 children and is a retired . She is an avid .      Social Determinants of  Health     Financial Resource Strain: Not on file   Food Insecurity: Not on file   Transportation Needs: Not on file   Physical Activity: Not on file   Stress: Not on file   Social Connections: Not on file   Intimate Partner Violence: Not on file   Housing Stability: Not on file           Medications  Allergies   Current Outpatient Medications   Medication Sig Dispense Refill     acetaminophen (TYLENOL) 500 MG tablet [ACETAMINOPHEN (TYLENOL) 500 MG TABLET] Take 1,000 mg by mouth every 6 (six) hours as needed for pain.       albuterol (PROAIR HFA/PROVENTIL HFA/VENTOLIN HFA) 108 (90 Base) MCG/ACT inhaler [ALBUTEROL (PROAIR HFA) 90 MCG/ACTUATION INHALER] INHALE TWO PUFFS BY MOUTH FOUR TIMES A DAY AS NEEDED FOR WHEEZING 25.5 g 3     amLODIPine (NORVASC) 5 MG tablet Take 1 tablet (5 mg) by mouth daily 90 tablet 3     apixaban ANTICOAGULANT (ELIQUIS) 5 MG tablet Take 1 tablet (5 mg) by mouth 2 times daily 180 tablet 3     atorvastatin (LIPITOR) 80 MG tablet TAKE ONE TABLET BY MOUTH EVERY NIGHT AT BEDTIME 90 tablet 3     azelastine (ASTELIN) 0.1 % nasal spray Spray 2 sprays in nostril 2 times daily 90 mL 3     beclomethasone HFA (QVAR REDIHALER) 80 MCG/ACT inhaler Inhale 2 puffs into the lungs 2 times daily 31.2 g 3     blood glucose (CONTOUR NEXT TEST) test strip 1 strip by In Vitro route Use as directed with testing blood sugars once daily.  Dispense Contour Next brand per pt's insurance coverage       CALCIUM CARBONATE (CALCIUM 500 ORAL) [CALCIUM CARBONATE (CALCIUM 500 ORAL)] Take 1 tablet by mouth 2 (two) times a day.       cholecalciferol, vitamin D3, 1,000 unit tablet [CHOLECALCIFEROL, VITAMIN D3, 1,000 UNIT TABLET] Take 2,000 Units by mouth 2 (two) times a day.        clindamycin (CLEOCIN) 150 MG capsule [CLINDAMYCIN (CLEOCIN) 150 MG CAPSULE] TAKE 4 CAPSULES  BY MOUTH 1 HOUR PRIOR TO DENTAL APPOINTMENT. 90 capsule 3     colestipol (COLESTID) 1 g tablet Take 1 tablet (1 g) by mouth 2 times daily 180 tablet 3      cranberry extract 300 mg Tab [CRANBERRY EXTRACT 300 MG TAB] Take 1 tablet by mouth daily.        estradiol (ESTRACE) 0.1 MG/GM vaginal cream Place 2 g vaginally every other day 42.5 g 3     famotidine (PEPCID) 40 MG tablet Take 1 tablet (40 mg) by mouth 2 times daily 180 tablet 3     LACTOBACILLUS ACIDOPHILUS (PROBIOTIC ORAL) [LACTOBACILLUS ACIDOPHILUS (PROBIOTIC ORAL)] Take 1 tablet by mouth 2 (two) times a day.        lancing device Misc [LANCING DEVICE MISC] Use as directed 4 times a week. 1 each 3     loperamide (IMODIUM) 2 mg capsule [LOPERAMIDE (IMODIUM) 2 MG CAPSULE] Take 2 mg by mouth 4 (four) times a day as needed.       losartan (COZAAR) 100 MG tablet Take 1 tablet (100 mg) by mouth daily 90 tablet 3     LUTEIN ORAL [LUTEIN ORAL] Take 1 tablet by mouth daily. With Zeaxanthin       Magnesium Oxide 250 MG TABS Use every 3rd day       metFORMIN (GLUCOPHAGE XR) 500 MG 24 hr tablet TAKE TWO TABLETS BY MOUTH TWICE A  tablet 3     metroNIDAZOLE (METROGEL) 0.75 % gel [METRONIDAZOLE (METROGEL) 0.75 % GEL] Apply 1 application topically as needed.        montelukast (SINGULAIR) 10 MG tablet Take 1 tablet (10 mg) by mouth daily 90 tablet 3     nitrofurantoin (MACRODANTIN) 50 MG capsule [NITROFURANTOIN (MACRODANTIN) 50 MG CAPSULE] Take 1 capsule (50 mg total) by mouth daily. 90 capsule 3     psyllium (METAMUCIL) 3.4 gram packet [PSYLLIUM (METAMUCIL) 3.4 GRAM PACKET] Take 1 packet by mouth every evening.       SIMETHICONE (GAS-X ORAL) [SIMETHICONE (GAS-X ORAL)] Take 1 tablet by mouth 2 (two) times a day as needed.       sotalol (BETAPACE) 120 MG tablet Take 0.5 tablets (60 mg) by mouth 2 times daily 90 tablet 1     tiotropium (SPIRIVA HANDIHALER) 18 MCG inhaled capsule Inhale 1 capsule (18 mcg) into the lungs daily (Patient taking differently: Inhale 18 mcg into the lungs daily as needed) 90 capsule 3     UBIDECARENONE (COENZYME Q10 ORAL) [UBIDECARENONE (COENZYME Q10 ORAL)] Take 1 tablet by mouth daily.         "zinc gluconate 50 mg tablet [ZINC GLUCONATE 50 MG TABLET] Take 50 mg by mouth daily.         Allergies   Allergen Reactions     Conray [Iothalamate] Anaphylaxis     30+ yr's ago / x-ray exam / was given \"dye\" iodine contrast / had Anaphylaxis reaction, JDI - 11/30/16 1509      Diagnostic X-Ray Materials Other (See Comments)     hypotension       Alendronate Sodium [Alendronic Acid] Nausea     Amoxicillin Unknown     Diatrizoate Meglumine [Diatrizoate] Other (See Comments)     Fosamax [Alendronic Acid] Unknown     Stomach cramps     House Dust [Dust Mite Extract] Unknown     Mold/Mildew [Molds & Smuts] Unknown     Other Allergy (See Comments) [External Allergen Needs Reconciliation - See Comment] Unknown     Contrast Media Ready-Box MISC, 11/06/2007.; Contrast Media Ready-Box MISC, 11/06/2007.       Penicillins Unknown     Prednisone Nausea     Other reaction(s): Abdominal Pain, She can have this as a shot not oral     Seafood Unknown     Sulfa (Sulfonamide Antibiotics) [Sulfa Drugs] Unknown          Lab Results    Chemistry/lipid CBC Cardiac Enzymes/BNP/TSH/INR   Recent Labs   Lab Test 02/22/22  0819   CHOL 158   HDL 49*   LDL 63   TRIG 228*     Recent Labs   Lab Test 02/22/22  0819 10/02/20  1504 02/08/19  1038   LDL 63 97 112  83     Recent Labs   Lab Test 06/01/22  0949      POTASSIUM 4.0   CHLORIDE 105   CO2 24   *   BUN 18   CR 0.74   GFRESTIMATED 82   WILNER 9.1     Recent Labs   Lab Test 06/01/22  0949 02/22/22  0819 11/23/21  1047   CR 0.74 0.72 0.72     Recent Labs   Lab Test 06/01/22  0949 02/22/22  0819 11/23/21  1047   A1C 6.6* 6.9* 6.9*      Recent Labs   Lab Test 02/22/22  0819   WBC 11.2*   HGB 13.9   HCT 43.6   MCV 90        Recent Labs   Lab Test 02/22/22  0819 11/23/21  1047 07/23/21  1043   HGB 13.9 13.3 13.7    Recent Labs   Lab Test 11/26/19  1540   TROPONINI <0.01     Recent Labs   Lab Test 10/02/20  1504   *     Recent Labs   Lab Test 02/22/22  0819   TSH 0.77 "     Recent Labs   Lab Test 02/16/21  1031 02/09/21  0953 01/25/21  1043   INR 1.40* 3.40* 2.90*      65 minutes were spent on the date of encounter performing chart review, history and exam, documentation, and further activities as noted above.

## 2022-11-11 NOTE — PATIENT INSTRUCTIONS
Krystle Polanco,    It was a pleasure to see you today at the [facility].     My recommendations after this visit include:    Continue sotalol 60 mg twice daily    Consider ablation to treat A fib    Ellyn Sr CNP  [facility], Electrophysiology  [phone]  EP nurses [phone]

## 2022-11-11 NOTE — LETTER
11/11/2022    Elke Mehta MD  [address] [facility] [address]    RE: Krystle JUANA Sherri       Dear Colleague,     I had the pleasure of seeing Krystle ARIAS Sherri in the ealth [facility].    HEART CARE ELECTROPHYSIOLOGY NOTE      JUANA [facility]  [phone]      Assessment/Recommendations   Assessment/Plan:  1.  Paroxysmal Atrial Fibrillation: Significant decrease in episodes of AF per symptom, pulse checks, and EKG monitoring in pulmonary rehab.  She has been able to correlate symptoms with episodes of A. fib; symptoms consist of lightheadedness and fatigue.    We reviewed the physiology and natural progression of atrial fibrillation and treatment options including rate control versus rhythm control depending upon the presence of symptoms.  We further discussed rhythm control with antiarrhythmic medications versus pulmonary vein isolation ablation.  Antiarrhythmic medication options are limited as she has not previously tolerated a higher dose of sotalol due to IBS exacerbation.  We further discussed PVI, risks, and expected recovery.  She was given information to review at home.  She is potentially interested in pursuing ablation.  Continue sotalol 60 mg twice daily.    She was reassured that atrial fibrillation is not life-threatening, but carries an increased risk for stroke.  She has a [id number] score of 7 for age >75, female gender, diabetes, hypertension, and PE.  Continue Eliquis 5 mg twice daily for stroke prophylaxis.  She is not a candidate for left atrial appendage closure due to history of PE requiring long-term oral anticoagulation.    2.  Frequent PVCs: Holter monitor in 2020 showed PVC burden of 35%, but without evidence for PVC induced cardiomyopathy and asymptomatic, so no further intervention recommended by Dr. Saenz.  Additionally, higher dose sotalol was ineffective at PVC suppression and resulted in side effects.  Recent Holter monitor showed rare  PVCs.  No further intervention recommended at this time.    3.  Hypertension: Blood pressure at target.  Continue amlodipine and sotalol as above.    Follow up pending decision regarding PVI       History of Present Illness/Subjective    HPI: Krystle Polanco is a 82 year old female who comes in today for EP follow-up of arrhythmias.  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    Previous attempts at using high-dose sotalol to suppress PVCs was successful.  Additionally, she is only able to tolerate low-dose sotalol due to IBS exacerbation on higher dose.  She has been having intermittent worsened dyspnea on exertion, though improving with pulmonary rehab.  She does well on the NuStep, but gets more winded with walking.  This does not appear to be rhythm related, though she has had some documented episodes of AF with RVR at pulmonary rehab.  Holter monitoring showed an AF burden of 66%, though rare PVCs.  Sotalol was increased to 60 mg twice daily.  She is on Eliquis for stroke prophylaxis.    Krystle states that she is now able to tell when she is in A. fib based on paying attention to symptoms and checking her pulse.  She has lightheadedness and fatigue when in A. fib.  She has had significant decrease in AF frequency and has not had any episodes noted at pulmonary rehab in the past week.  She denies chest discomfort, palpitations, abdominal fullness/bloating or peripheral edema, worsened shortness of breath, paroxysmal nocturnal dyspnea, orthopnea, lightheadedness, dizziness, pre-syncope, or syncope.    Cardiographics (EKG personally reviewed):  EKG done 9/2/2022 shows sinus rhythm at 77 bpm, QRS 86 ms, QT/QTc 408/461 ms.    24-hour Holter monitor worn 8/12/2022 shows sinus rhythm with paroxysmal atrial fibrillation.   AF burden 66%.  Average heart rate of 101 bpm with a range of 61 to 157 bpm.  No significant bradycardia or pauses.  Rare PVCs.  Symptoms of dyspnea correlated to AF with RVR 120s.    ECHO done 12/23/2021:  Left ventricular size, wall motion and function are normal. The ejection  fraction is 60-65%.  Normal right ventricle size and systolic function.  No hemodynamically significant valvular abnormalities on 2D or color flow  imaging.    Stress MRI done 1/14/2020 showed normal EF and no inducible ischemia.    I have reviewed and updated the patient's Past Medical History, Social History, Family History and Medication List.  Outside records personally reviewed.     Physical Examination  Review of Systems   Vitals: /70 (BP Location: Right arm, Patient Position: Sitting, Cuff Size: Adult Large)   Pulse 66   Resp 18   Wt 98 kg (216 lb)   SpO2 99%   BMI 39.51 kg/m    BMI= Body mass index is 39.51 kg/m .  Wt Readings from Last 3 Encounters:   11/11/22 98 kg (216 lb)   09/06/22 99.7 kg (219 lb 11.2 oz)   09/02/22 99.8 kg (220 lb 0.3 oz)       General Appearance:   Alert, well-appearing and in no acute distress.   HEENT: Atraumatic, normocephalic.  No scleral icterus, normal conjunctivae, EOMs intact, PERRL.  Wearing a mask.   Chest/Lungs:   Chest symmetric, spine straight.  Respirations unlabored.  Lungs are clear to auscultation.   Cardiovascular:   Regular rate and rhythm.  Normal first and second heart sounds with no murmurs, rubs, or gallops; radial and posterior tibial pulses are intact, No edema.   Abdomen:  Soft, nondistended, bowel sounds present.   Extremities: No cyanosis or clubbing.   Musculoskeletal: Moves all extremities.    Skin: Warm, dry, intact.    Neurologic: Mood and affect are appropriate.  Alert and oriented to person, place, time, and situation.     ROS: 10 point ROS neg other than the symptoms noted above in the HPI.         Medical History  Surgical History Family History Social History    Past Medical History:   Diagnosis Date     Adenomatous goiter 2004     Arthropathy of shoulder region unknown     Asthma 2009     Atrial flutter (H) 2017     Bartholin gland cyst unknown     Diabetes mellitus type II, controlled (H) 2004     Esophageal reflux 1980     Essential hypertension      Gastroparesis      Herpes simplex type 1 infection unknown     IBS (irritable bowel syndrome) 1971     Leukocytosis unknown     Osteopenia 2009     Vitamin D deficiency      Past Surgical History:   Procedure Laterality Date     BIOPSY BREAST Left 1/22/15    Papilloma     EXCISION / BIOPSY BREAST / NIPPLE / DUCT Left       DILATION/CURETTAGE DIAG/THER NON OB  Unknown    Description: Dilation And Curettage;  Recorded: 2007;     HYSTERECTOMY  1984     OOPHORECTOMY      1 removed, 1 remains     UT NASAL/SINUS ENDOSCOPY,BX/RMV POLYP/DEBRID      Description: Nasal Endoscopy Polypectomy;  Recorded: 2007;     [facility]  DELIVERY ONLY  1971    Description:  Section;  Recorded: 2008;  Comments: x2     [facility] TOTAL ABDOM HYSTERECTOMY      Description: Total Abdominal Hysterectomy;  Recorded: 2008;     [facility] TOTAL KNEE ARTHROPLASTY Bilateral     Description: Total Knee Arthroplasty;  Recorded: 2008;     [facility] XFER SINGLE SUPERFICI LOW LEG TENDON  UNknown    Description: Tibialis Posterior Tendon Transfer Right Foot;  Recorded: 2008;     Family History   Problem Relation Age of Onset     Breast Cancer Sister 56.00     Depression Mother      Dementia Mother         Social History     Socioeconomic History     Marital status:      Spouse name: Not on file     Number of children: Not on file     Years of education: Not on file     Highest education level: Not on file   Occupational History     Not on file   Tobacco Use     Smoking status: Never     Smokeless tobacco: Never   Substance and Sexual Activity     Alcohol use: No     Drug use: No     Sexual  activity: Not on file   Other Topics Concern     Not on file   Social History Narrative    She is .  She has 4 children and is a retired . She is an avid .      Social Determinants of Health     Financial Resource Strain: Not on file   Food Insecurity: Not on file   Transportation Needs: Not on file   Physical Activity: Not on file   Stress: Not on file   Social Connections: Not on file   Intimate Partner Violence: Not on file   Housing Stability: Not on file           Medications  Allergies   Current Outpatient Medications   Medication Sig Dispense Refill     acetaminophen (TYLENOL) 500 MG tablet [ACETAMINOPHEN (TYLENOL) 500 MG TABLET] Take 1,000 mg by mouth every 6 (six) hours as needed for pain.       albuterol (PROAIR HFA/PROVENTIL HFA/VENTOLIN HFA) 108 (90 Base) MCG/ACT inhaler [ALBUTEROL (PROAIR HFA) 90 MCG/ACTUATION INHALER] INHALE TWO PUFFS BY MOUTH FOUR TIMES A DAY AS NEEDED FOR WHEEZING 25.5 g 3     amLODIPine (NORVASC) 5 MG tablet Take 1 tablet (5 mg) by mouth daily 90 tablet 3     apixaban ANTICOAGULANT (ELIQUIS) 5 MG tablet Take 1 tablet (5 mg) by mouth 2 times daily 180 tablet 3     atorvastatin (LIPITOR) 80 MG tablet TAKE ONE TABLET BY MOUTH EVERY NIGHT AT BEDTIME 90 tablet 3     azelastine (ASTELIN) 0.1 % nasal spray Spray 2 sprays in nostril 2 times daily 90 mL 3     beclomethasone HFA (QVAR REDIHALER) 80 MCG/ACT inhaler Inhale 2 puffs into the lungs 2 times daily 31.2 g 3     blood glucose (CONTOUR NEXT TEST) test strip 1 strip by In Vitro route Use as directed with testing blood sugars once daily.  Dispense Contour Next brand per pt's insurance coverage       CALCIUM CARBONATE (CALCIUM 500 ORAL) [CALCIUM CARBONATE (CALCIUM 500 ORAL)] Take 1 tablet by mouth 2 (two) times a day.       cholecalciferol, vitamin D3, 1,000 unit tablet [CHOLECALCIFEROL, VITAMIN D3, 1,000 UNIT TABLET] Take 2,000 Units by mouth 2 (two) times a day.        clindamycin (CLEOCIN) 150 MG capsule  [CLINDAMYCIN (CLEOCIN) 150 MG CAPSULE] TAKE 4 CAPSULES  BY MOUTH 1 HOUR PRIOR TO DENTAL APPOINTMENT. 90 capsule 3     colestipol (COLESTID) 1 g tablet Take 1 tablet (1 g) by mouth 2 times daily 180 tablet 3     cranberry extract 300 mg Tab [CRANBERRY EXTRACT 300 MG TAB] Take 1 tablet by mouth daily.        estradiol (ESTRACE) 0.1 MG/GM vaginal cream Place 2 g vaginally every other day 42.5 g 3     famotidine (PEPCID) 40 MG tablet Take 1 tablet (40 mg) by mouth 2 times daily 180 tablet 3     LACTOBACILLUS ACIDOPHILUS (PROBIOTIC ORAL) [LACTOBACILLUS ACIDOPHILUS (PROBIOTIC ORAL)] Take 1 tablet by mouth 2 (two) times a day.        lancing device Misc [LANCING DEVICE MISC] Use as directed 4 times a week. 1 each 3     loperamide (IMODIUM) 2 mg capsule [LOPERAMIDE (IMODIUM) 2 MG CAPSULE] Take 2 mg by mouth 4 (four) times a day as needed.       losartan (COZAAR) 100 MG tablet Take 1 tablet (100 mg) by mouth daily 90 tablet 3     LUTEIN ORAL [LUTEIN ORAL] Take 1 tablet by mouth daily. With Zeaxanthin       Magnesium Oxide 250 MG TABS Use every 3rd day       metFORMIN (GLUCOPHAGE XR) 500 MG 24 hr tablet TAKE TWO TABLETS BY MOUTH TWICE A  tablet 3     metroNIDAZOLE (METROGEL) 0.75 % gel [METRONIDAZOLE (METROGEL) 0.75 % GEL] Apply 1 application topically as needed.        montelukast (SINGULAIR) 10 MG tablet Take 1 tablet (10 mg) by mouth daily 90 tablet 3     nitrofurantoin (MACRODANTIN) 50 MG capsule [NITROFURANTOIN (MACRODANTIN) 50 MG CAPSULE] Take 1 capsule (50 mg total) by mouth daily. 90 capsule 3     psyllium (METAMUCIL) 3.4 gram packet [PSYLLIUM (METAMUCIL) 3.4 GRAM PACKET] Take 1 packet by mouth every evening.       SIMETHICONE (GAS-X ORAL) [SIMETHICONE (GAS-X ORAL)] Take 1 tablet by mouth 2 (two) times a day as needed.       sotalol (BETAPACE) 120 MG tablet Take 0.5 tablets (60 mg) by mouth 2 times daily 90 tablet 1     tiotropium (SPIRIVA HANDIHALER) 18 MCG inhaled capsule Inhale 1 capsule (18 mcg) into the  "lungs daily (Patient taking differently: Inhale 18 mcg into the lungs daily as needed) 90 capsule 3     UBIDECARENONE (COENZYME Q10 ORAL) [UBIDECARENONE (COENZYME Q10 ORAL)] Take 1 tablet by mouth daily.        zinc gluconate 50 mg tablet [ZINC GLUCONATE 50 MG TABLET] Take 50 mg by mouth daily.         Allergies   Allergen Reactions     Conray [Iothalamate] Anaphylaxis     30+ yr's ago / x-ray exam / was given \"dye\" iodine contrast / had Anaphylaxis reaction, J - 11/30/16 1509      Diagnostic X-Ray Materials Other (See Comments)     hypotension       Alendronate Sodium [Alendronic Acid] Nausea     Amoxicillin Unknown     Diatrizoate Meglumine [Diatrizoate] Other (See Comments)     Fosamax [Alendronic Acid] Unknown     Stomach cramps     House Dust [Dust Mite Extract] Unknown     Mold/Mildew [Molds & Smuts] Unknown     Other Allergy (See Comments) [External Allergen Needs Reconciliation - See Comment] Unknown     Contrast Media Ready-Box MISC, 11/06/2007.; Contrast Media Ready-Box MISC, 11/06/2007.       Penicillins Unknown     Prednisone Nausea     Other reaction(s): Abdominal Pain, She can have this as a shot not oral     Seafood Unknown     Sulfa (Sulfonamide Antibiotics) [Sulfa Drugs] Unknown          Lab Results    Chemistry/lipid CBC Cardiac Enzymes/BNP/TSH/INR   Recent Labs   Lab Test 02/22/22  0819   CHOL 158   HDL 49*   LDL 63   TRIG 228*     Recent Labs   Lab Test 02/22/22  0819 10/02/20  1504 02/08/19  1038   LDL 63 97 112  83     Recent Labs   Lab Test 06/01/22  0949      POTASSIUM 4.0   CHLORIDE 105   CO2 24   *   BUN 18   CR 0.74   GFRESTIMATED 82   WILNER 9.1     Recent Labs   Lab Test 06/01/22  0949 02/22/22  0819 11/23/21  1047   CR 0.74 0.72 0.72     Recent Labs   Lab Test 06/01/22  0949 02/22/22  0819 11/23/21  1047   A1C 6.6* 6.9* 6.9*      Recent Labs   Lab Test 02/22/22  0819   WBC 11.2*   HGB 13.9   HCT 43.6   MCV 90        Recent Labs   Lab Test 02/22/22  0819 " 11/23/21  1047 07/23/21  1043   HGB 13.9 13.3 13.7    Recent Labs   Lab Test 11/26/19  1540   TROPONINI <0.01     Recent Labs   Lab Test 10/02/20  1504   *     Recent Labs   Lab Test 02/22/22  0819   TSH 0.77     Recent Labs   Lab Test 02/16/21  1031 02/09/21  0953 01/25/21  1043   INR 1.40* 3.40* 2.90*      65 minutes were spent on the date of encounter performing chart review, history and exam, documentation, and further activities as noted above.            Thank you for allowing me to participate in the care of your patient.      Sincerely,     [place] [facility]  cc:   Tanner Wong MD  [address]  [place] [address]

## 2022-11-15 ENCOUNTER — TELEPHONE (OUTPATIENT)
Dept: INTERNAL MEDICINE | Facility: CLINIC | Age: 82
End: 2022-11-15

## 2022-11-15 NOTE — TELEPHONE ENCOUNTER
Reason for Call:  Appointment Request    Patient requesting this type of appt:  Pre op  11/22 left Cataract, 12/13 right cataract.  [facility]. Grecia Muñoz at [facility]    Requested provider: anyone    Reason patient unable to be scheduled: Scheduled pt at [facility] with Dr. López 4 pm on 11/21    When does patient want to be seen/preferred time: 3-7 days    Comments: Pre op can be for both eyes since they are 3 wks within each other.     Could we send this information to you in [employer] or would you prefer to receive a phone call?:   No preference     Okay to leave a detailed message?: No  Task completed.     Call taken on 11/15/2022 at 9:07 AM by Margie Costello

## 2022-11-15 NOTE — TELEPHONE ENCOUNTER
General Call      Reason for Call:  Pt having cataract surgery on 11/22/2022 - needing a preop     What are your questions or concerns:  n/a    Date of last appointment with provider: n/a    Could we [facility] you [facility] or would you prefer to receive a phone call?:   Patient would prefer a phone call   Okay to leave a detailed message?: Yes at Cell number on file:    Telephone Information:   Mobile [phone]

## 2022-11-17 ENCOUNTER — TELEPHONE (OUTPATIENT)
Dept: CARDIOLOGY | Facility: CLINIC | Age: 82
End: 2022-11-17

## 2022-11-17 NOTE — CONFIDENTIAL NOTE
1st Attempt to contact pt, voicemail message was left with contact information and instructing pt to call back.  11/17/2022 10:13 AM  Belkys Gonzalez RN    From: Ellyn Sr, [place] CNP   Sent: 11/11/2022   3:00 PM CST   To: [facility] Support Pool - [facility]     She was able to correlate symptoms to A. fib with pulse monitoring and at pulmonary rehab.  Recommend PVI.  She discussed this somewhat with Dr. Wong at their last appointment, further discussed today.  Please call her in about a week to follow-up.  If she does not wish to move forward with PVI, please have her follow-up with me in 3 months.   Thanks,   Ellyn

## 2022-11-21 ENCOUNTER — OFFICE VISIT (OUTPATIENT)
Dept: FAMILY MEDICINE | Facility: CLINIC | Age: 82
End: 2022-11-21
Payer: COMMERCIAL

## 2022-11-21 VITALS
HEIGHT: 62 IN | DIASTOLIC BLOOD PRESSURE: 80 MMHG | OXYGEN SATURATION: 99 % | WEIGHT: 211 LBS | RESPIRATION RATE: 16 BRPM | BODY MASS INDEX: 38.83 KG/M2 | TEMPERATURE: 97.9 F | SYSTOLIC BLOOD PRESSURE: 134 MMHG | HEART RATE: 82 BPM

## 2022-11-21 DIAGNOSIS — H25.9 AGE-RELATED CATARACT OF BOTH EYES, UNSPECIFIED AGE-RELATED CATARACT TYPE: ICD-10-CM

## 2022-11-21 DIAGNOSIS — I48.0 PAROXYSMAL ATRIAL FIBRILLATION (H): Primary | ICD-10-CM

## 2022-11-21 DIAGNOSIS — E11.69 HYPERLIPIDEMIA ASSOCIATED WITH TYPE 2 DIABETES MELLITUS (H): ICD-10-CM

## 2022-11-21 DIAGNOSIS — E11.9 TYPE 2 DIABETES MELLITUS WITHOUT COMPLICATION, WITHOUT LONG-TERM CURRENT USE OF INSULIN (H): ICD-10-CM

## 2022-11-21 DIAGNOSIS — Z01.818 PREOP EXAMINATION: Primary | ICD-10-CM

## 2022-11-21 DIAGNOSIS — E78.5 HYPERLIPIDEMIA ASSOCIATED WITH TYPE 2 DIABETES MELLITUS (H): ICD-10-CM

## 2022-11-21 DIAGNOSIS — I10 ESSENTIAL HYPERTENSION, BENIGN: ICD-10-CM

## 2022-11-21 PROCEDURE — 99214 OFFICE O/P EST MOD 30 MIN: CPT | Performed by: FAMILY MEDICINE

## 2022-11-21 ASSESSMENT — ENCOUNTER SYMPTOMS
ARTHRALGIAS: 1
BLOOD IN STOOL: 0
FLANK PAIN: 0
POLYDIPSIA: 0
BRUISES/BLEEDS EASILY: 1
ABDOMINAL PAIN: 0
FEVER: 0
SORE THROAT: 0
CHILLS: 0
PALPITATIONS: 0
UNEXPECTED WEIGHT CHANGE: 0
SHORTNESS OF BREATH: 1
PSYCHIATRIC NEGATIVE: 1
HEMATURIA: 0
COUGH: 0
NEUROLOGICAL NEGATIVE: 1

## 2022-11-21 NOTE — PROGRESS NOTES
[facility] [address] [place] [address]  Phone: [phone]  Fax: [phone]  Primary Provider: Elke Mehta  Pre-op Performing Provider: DANTE TURCIOS      PREOPERATIVE EVALUATION:  Today's date: 11/21/2022    Krystle Polanco is a 82 year old female who presents for a preoperative evaluation.    Surgical Information:  Surgery/Procedure: Cataract  Surgery Location: [facility]  Surgeon: Dr. Grecia Muñoz  Surgery Date: 11/22/22, 12/13/2022  Time of Surgery: 11:00am  Where patient plans to recover: At home with family  Fax number for surgical facility: [phone]    Type of Anesthesia Anticipated: to be determined    Assessment & Plan     The proposed surgical procedure is considered LOW risk.    Problem List Items Addressed This Visit        Endocrine    Type 2 diabetes mellitus without complication (H)    Hyperlipidemia associated with type 2 diabetes mellitus (H)       Circulatory    Benign Essential Hypertension   Other Visit Diagnoses     Preop examination    -  Primary    Age-related cataract of both eyes, unspecified age-related cataract type            Lacey benjamin is an 83 yo female with vision impairing cataracts.  She is here for preop evaluation today.  She has underlying conditions including type II DM (well controlled), hypertension (BP well controlled), atrial fib (rate controlled), hyperlipidemia (stable).  H/o blood clots  - on chronic anticoagulation - no bleeding/bruising.       Overall, stable.  OK for surgery.            Risks and Recommendations:  The patient has the following additional risks and recommendations for perioperative complications:   - No identified additional risk factors other than previously addressed    Medication Instructions:  Patient is to take all scheduled medications on the day of surgery    RECOMMENDATION:  APPROVAL GIVEN to proceed with proposed procedure, without further diagnostic  evaluation.      Subjective     HPI related to upcoming procedure: has had cataracts x 6-8 years  - having declining vision and can't get prescription corrected due to this.  Scheduled for cataract surgery       Preop Questions 11/21/2022   1. Have you ever had a heart attack or stroke? No   2. Have you ever had surgery on your heart or blood vessels, such as a stent placement, a coronary artery bypass, or surgery on an artery in your head, neck, heart, or legs? No   3. Do you have chest pain with activity? No   4. Do you have a history of  heart failure? No   5. Do you currently have a cold, bronchitis or symptoms of other infection? No   6. Do you have a cough, shortness of breath, or wheezing? No   7. Do you or anyone in your family have previous history of blood clots? YES - personal history of clots - no clear etiology   8. Do you or does anyone in your family have a serious bleeding problem such as prolonged bleeding following surgeries or cuts? YES - has anticoagulation on board   9. Have you ever had problems with anemia or been told to take iron pills? No   10. Have you had any abnormal blood loss such as black, tarry or bloody stools, or abnormal vaginal bleeding? No   11. Have you ever had a blood transfusion? No   12. Are you willing to have a blood transfusion if it is medically needed before, during, or after your surgery? NO -   13. Have you or any of your relatives ever had problems with anesthesia? No   14. Do you have sleep apnea, excessive snoring or daytime drowsiness? No   15. Do you have any artifical heart valves or other implanted medical devices like a pacemaker, defibrillator, or continuous glucose monitor? No   16. Do you have artificial joints? No   17. Are you allergic to latex? No       Health Care Directive:  Patient does not have a Health Care Directive or Living Will: Advance Directive received and scanned. Click on Code in the patient header to view.    Preoperative Review of  ":   reviewed - no record of controlled substances prescribed.      Status of Chronic Conditions:  See problem list for active medical problems.  Problems all longstanding and stable, except as noted/documented.  See ROS for pertinent symptoms related to these conditions.      Review of Systems   Constitutional: Negative for chills, fever and unexpected weight change.   HENT: Positive for congestion. Negative for sore throat.    Eyes: Positive for visual disturbance (due to cataracts).   Respiratory: Positive for shortness of breath (mild). Negative for cough.    Cardiovascular: Negative for chest pain, palpitations and leg swelling.   Gastrointestinal: Negative for abdominal pain and blood in stool.   Endocrine: Negative for polydipsia and polyuria.   Genitourinary: Negative for flank pain and hematuria.   Musculoskeletal: Positive for arthralgias.   Allergic/Immunologic: Positive for environmental allergies.   Neurological: Negative.    Hematological: Bruises/bleeds easily (no bruising, but is on Eliquis).   Psychiatric/Behavioral: Negative.    All other systems reviewed and are negative.        A1c has \"never\" been above 7 (usually in 6's; had recent cortisone shot and shingles shot)  Today - am glucose 126; was in 140s when had cortisone shot     2016 - unexplained blood clots - has been on warfarin and then (1-1/2 years ago), went to Eliquis.   - was on Eliquis - put her on Sotalol  - December 2019  Had detected a lot of PVCs -   Lung doctor sent her to pulmonary rehab - monitored there for exercise; had \"bad\" a fib attack (August 2022) - now sees Dr. oWng - he monitors thing    Goes to pulmonary rehab Tues/Thursday - never had COVID - had 5 vaccinations   Uses Albuterol almost not at all - ?asthma, but bad allergies/bronchitis = sees [facility]    Hemoglobin A1C   Date Value Ref Range Status   06/01/2022 6.6 (H) 0.0 - 5.6 % Final     Comment:     Normal <5.7%   Prediabetes 5.7-6.4%    Diabetes 6.5% " or higher     Note: Adopted from ADA consensus guidelines.           Patient Active Problem List    Diagnosis Date Noted     Paroxysmal atrial fibrillation (H) 03/03/2020     Priority: Medium     BMI 40.0-44.9, adult (H) 08/08/2018     Priority: Medium     PVC's (premature ventricular contractions) 08/08/2018     Priority: Medium     Pulmonary nodules 01/27/2017     Priority: Medium     Pulmonary embolism (H) 12/04/2016     Priority: Medium     Benign Essential Hypertension      Priority: Medium     Created by Conversion         Esophageal reflux      Priority: Medium     Created by Conversion         Irritable bowel syndrome      Priority: Medium     Created by Conversion         Type 2 diabetes mellitus without complication (H)      Priority: Medium     Created by Conversion         Asthma      Priority: Medium     Created by Conversion         Hyperlipidemia associated with type 2 diabetes mellitus (H)      Priority: Medium     Created by Conversion         Osteopenia      Priority: Medium     Created by Conversion  Replacement Utility updated for latest IMO load         Vitamin D Deficiency      Priority: Medium     Created by Conversion  Replacement Utility updated for latest IMO load         Adenomatous Goiter      Priority: Medium     Created by Conversion  Replacement Utility updated for latest IMO load         Arthropathy Of The Shoulder Region      Priority: Medium     Created by Conversion  Replacement Utility updated for latest IMO load         Herpes zoster 03/09/2015     Priority: Medium     Intraductal Papilloma Of The Breast      Priority: Medium     Created by Conversion         Closed Fracture Of Head Of Left Radius      Priority: Medium     Created by Conversion         Benign Adenomatous Polyp Of The Large Intestine      Priority: Medium     Created by Conversion         Gastroparesis      Priority: Medium     Created by Conversion          Past Medical History:   Diagnosis Date     Adenomatous  goiter 2004     Arthropathy of shoulder region unknown     Asthma 2009     Atrial flutter (H) 2017     Bartholin gland cyst unknown     Diabetes mellitus type II, controlled (H)      Esophageal reflux      Essential hypertension      Gastroparesis      Herpes simplex type 1 infection unknown     IBS (irritable bowel syndrome) 1971     Leukocytosis unknown     Osteopenia 2009     Vitamin D deficiency      Past Surgical History:   Procedure Laterality Date     BIOPSY BREAST Left 1/22/15    Papilloma     EXCISION / BIOPSY BREAST / NIPPLE / DUCT Left       DILATION/CURETTAGE DIAG/THER NON OB  Unknown    Description: Dilation And Curettage;  Recorded: 2007;     HYSTERECTOMY  1984     OOPHORECTOMY  1975    1 removed, 1 remains     [place] NASAL/SINUS ENDOSCOPY,BX/RMV POLYP/DEBRID      Description: Nasal Endoscopy Polypectomy;  Recorded: 2007;     [facility]  DELIVERY ONLY      Description:  Section;  Recorded: 2008;  Comments: x2     [facility] TOTAL ABDOM HYSTERECTOMY      Description: Total Abdominal Hysterectomy;  Recorded: 2008;     [facility] TOTAL KNEE ARTHROPLASTY Bilateral     Description: Total Knee Arthroplasty;  Recorded: 2008;     [facility] XFER SINGLE SUPERFICI LOW LEG TENDON  UNknown    Description: Tibialis Posterior Tendon Transfer Right Foot;  Recorded: 2008;     Current Outpatient Medications   Medication Sig Dispense Refill     acetaminophen (TYLENOL) 500 MG tablet [ACETAMINOPHEN (TYLENOL) 500 MG TABLET] Take 1,000 mg by mouth every 6 (six) hours as needed for pain.       albuterol (PROAIR HFA/PROVENTIL HFA/VENTOLIN HFA) 108 (90 Base) MCG/ACT inhaler [ALBUTEROL (PROAIR HFA) 90 MCG/ACTUATION INHALER] INHALE TWO PUFFS BY MOUTH FOUR TIMES A DAY AS NEEDED FOR WHEEZING 25.5 g 3     amLODIPine (NORVASC) 5 MG tablet Take 1 tablet (5 mg) by mouth daily 90 tablet 3     apixaban ANTICOAGULANT (ELIQUIS) 5 MG tablet Take 1 tablet (5 mg) by mouth 2 times  daily 180 tablet 3     atorvastatin (LIPITOR) 80 MG tablet TAKE ONE TABLET BY MOUTH EVERY NIGHT AT BEDTIME 90 tablet 3     azelastine (ASTELIN) 0.1 % nasal spray Spray 2 sprays in nostril 2 times daily 90 mL 3     beclomethasone HFA (QVAR REDIHALER) 80 MCG/ACT inhaler Inhale 2 puffs into the lungs 2 times daily 31.2 g 3     blood glucose (CONTOUR NEXT TEST) test strip 1 strip by In Vitro route Use as directed with testing blood sugars once daily.  Dispense Contour Next brand per pt's insurance coverage       CALCIUM CARBONATE (CALCIUM 500 ORAL) [CALCIUM CARBONATE (CALCIUM 500 ORAL)] Take 1 tablet by mouth 2 (two) times a day.       cholecalciferol, vitamin D3, 1,000 unit tablet [CHOLECALCIFEROL, VITAMIN D3, 1,000 UNIT TABLET] Take 2,000 Units by mouth 2 (two) times a day.        clindamycin (CLEOCIN) 150 MG capsule [CLINDAMYCIN (CLEOCIN) 150 MG CAPSULE] TAKE 4 CAPSULES  BY MOUTH 1 HOUR PRIOR TO DENTAL APPOINTMENT. 90 capsule 3     colestipol (COLESTID) 1 g tablet Take 1 tablet (1 g) by mouth 2 times daily 180 tablet 3     cranberry extract 300 mg Tab [CRANBERRY EXTRACT 300 MG TAB] Take 1 tablet by mouth daily.        estradiol (ESTRACE) 0.1 MG/GM vaginal cream Place 2 g vaginally every other day 42.5 g 3     famotidine (PEPCID) 40 MG tablet Take 1 tablet (40 mg) by mouth 2 times daily 180 tablet 3     LACTOBACILLUS ACIDOPHILUS (PROBIOTIC ORAL) [LACTOBACILLUS ACIDOPHILUS (PROBIOTIC ORAL)] Take 1 tablet by mouth 2 (two) times a day.        lancing device Misc [LANCING DEVICE MISC] Use as directed 4 times a week. 1 each 3     loperamide (IMODIUM) 2 mg capsule [LOPERAMIDE (IMODIUM) 2 MG CAPSULE] Take 2 mg by mouth 4 (four) times a day as needed.       losartan (COZAAR) 100 MG tablet Take 1 tablet (100 mg) by mouth daily 90 tablet 3     LUTEIN ORAL [LUTEIN ORAL] Take 1 tablet by mouth daily. With Zeaxanthin       Magnesium Oxide 250 MG TABS Use every 3rd day       metFORMIN (GLUCOPHAGE XR) 500 MG 24 hr tablet TAKE TWO  "TABLETS BY MOUTH TWICE A  tablet 3     metroNIDAZOLE (METROGEL) 0.75 % gel [METRONIDAZOLE (METROGEL) 0.75 % GEL] Apply 1 application topically as needed.        montelukast (SINGULAIR) 10 MG tablet Take 1 tablet (10 mg) by mouth daily 90 tablet 3     nitrofurantoin (MACRODANTIN) 50 MG capsule [NITROFURANTOIN (MACRODANTIN) 50 MG CAPSULE] Take 1 capsule (50 mg total) by mouth daily. 90 capsule 3     psyllium (METAMUCIL) 3.4 gram packet [PSYLLIUM (METAMUCIL) 3.4 GRAM PACKET] Take 1 packet by mouth every evening.       SIMETHICONE (GAS-X ORAL) [SIMETHICONE (GAS-X ORAL)] Take 1 tablet by mouth 2 (two) times a day as needed.       sotalol (BETAPACE) 120 MG tablet Take 0.5 tablets (60 mg) by mouth 2 times daily 90 tablet 1     tiotropium (SPIRIVA HANDIHALER) 18 MCG inhaled capsule Inhale 1 capsule (18 mcg) into the lungs daily (Patient taking differently: Inhale 18 mcg into the lungs daily as needed) 90 capsule 3     UBIDECARENONE (COENZYME Q10 ORAL) [UBIDECARENONE (COENZYME Q10 ORAL)] Take 1 tablet by mouth daily.        zinc gluconate 50 mg tablet [ZINC GLUCONATE 50 MG TABLET] Take 50 mg by mouth daily.         Allergies   Allergen Reactions     Conray [Iothalamate] Anaphylaxis     30+ yr's ago / x-ray exam / was given \"dye\" iodine contrast / had Anaphylaxis reaction, [facility] - 11/30/16 1509      Diagnostic X-Ray Materials Other (See Comments)     hypotension       Alendronate Sodium [Alendronic Acid] Nausea     Amoxicillin Unknown     Diatrizoate Meglumine [Diatrizoate] Other (See Comments)     Fosamax [Alendronic Acid] Unknown     Stomach cramps     House Dust [Dust Mite Extract] Unknown     Mold/Mildew [Molds & Smuts] Unknown     Other Allergy (See Comments) [External Allergen Needs Reconciliation - See Comment] Unknown     Contrast Media Ready-Box MISC, 11/06/2007.; Contrast Media Ready-Box MISC, 11/06/2007.       Penicillins Unknown     Prednisone Nausea     Other reaction(s): Abdominal Pain, She can have this as " "a shot not oral     Seafood Unknown     Sulfa (Sulfonamide Antibiotics) [Sulfa Drugs] Unknown        Social History     Tobacco Use     Smoking status: Never     Smokeless tobacco: Never   Substance Use Topics     Alcohol use: No     Family History   Problem Relation Age of Onset     Breast Cancer Sister 56.00     Depression Mother      Dementia Mother      History   Drug Use No         Objective     /80 (BP Location: Right arm, Patient Position: Sitting, Cuff Size: Adult Large)   Pulse 82   Temp 97.9  F (36.6  C) (Temporal)   Resp 16   Ht 1.575 m (5' 2\")   Wt 95.7 kg (211 lb)   SpO2 99%   BMI 38.59 kg/m      Physical Exam  Vitals reviewed.   Constitutional:       General: She is not in acute distress.     Appearance: Normal appearance.   HENT:      Head: Normocephalic.      Right Ear: Tympanic membrane, ear canal and external ear normal.      Left Ear: Tympanic membrane, ear canal and external ear normal.      Nose: Nose normal.      Mouth/Throat:      Mouth: Mucous membranes are moist.      Pharynx: No posterior oropharyngeal erythema.   Eyes:      Extraocular Movements: Extraocular movements intact.      Conjunctiva/sclera: Conjunctivae normal.      Pupils: Pupils are equal, round, and reactive to light.      Comments: Bilateral cataracts   Cardiovascular:      Rate and Rhythm: Normal rate. Rhythm irregular.      Pulses: Normal pulses.      Heart sounds: Normal heart sounds. No murmur heard.  Pulmonary:      Effort: Pulmonary effort is normal.      Breath sounds: Normal breath sounds.   Abdominal:      Palpations: Abdomen is soft. There is no mass.      Tenderness: There is no abdominal tenderness. There is no guarding or rebound.   Musculoskeletal:         General: No deformity. Normal range of motion.      Cervical back: Normal range of motion and neck supple.   Lymphadenopathy:      Cervical: No cervical adenopathy.   Skin:     General: Skin is warm and dry.   Neurological:      General: No focal " deficit present.      Mental Status: She is alert.   Psychiatric:         Mood and Affect: Mood normal.         Behavior: Behavior normal.           Recent Labs   Lab Test 06/01/22  0949 02/22/22  0819 11/23/21  1047 07/23/21  0817 02/16/21  1031 02/09/21  0953   HGB  --  13.9 13.3   < >  --   --    PLT  --  244 246   < >  --   --    INR  --   --   --   --  1.40* 3.40*    139 140   < >  --   --    POTASSIUM 4.0 4.3 4.4   < >  --   --    CR 0.74 0.72 0.72   < >  --   --    A1C 6.6* 6.9* 6.9*   < >  --   --     < > = values in this interval not displayed.        Diagnostics:  No labs were ordered during this visit.   No EKG required for low risk surgery (cataract, skin procedure, breast biopsy, etc).    Revised Cardiac Risk Index (RCRI):  The patient has the following serious cardiovascular risks for perioperative complications:   - No serious cardiac risks = 0 points     RCRI Interpretation: 0 points: Class I (very low risk - 0.4% complication rate)           Signed Electronically by: DANTE TURCIOS MD  Copy of this evaluation report is provided to requesting physician.

## 2022-11-21 NOTE — TELEPHONE ENCOUNTER
Follow up phone call to patient.  She is currently no interested in pursuing ablation at this time.  She is agreeable to a 3 month follow up with Ellyn.  Order entered and scheduling updated.  KIANA   [FreeTextEntry1] : 89 year old female comes in bc of left  lower back pain and some dysuria for about 2 weeks. She states she has a history of kidney stones. Pt denies gross hematuria. As per daughter last UTI was about 3 months ago. She does get them often. Has taken bactrim in the past with success.\par \par Today pt comes in fu UTI. UTI was treated successfully with cipro. Pt denies any dysuria. Does complain of urinary incontinence that has been going on for years.

## 2022-12-01 ENCOUNTER — TRANSFERRED RECORDS (OUTPATIENT)
Dept: HEALTH INFORMATION MANAGEMENT | Facility: CLINIC | Age: 82
End: 2022-12-01

## 2022-12-06 ENCOUNTER — VIRTUAL VISIT (OUTPATIENT)
Dept: INTERNAL MEDICINE | Facility: CLINIC | Age: 82
End: 2022-12-06
Payer: COMMERCIAL

## 2022-12-06 DIAGNOSIS — E11.9 TYPE 2 DIABETES MELLITUS WITHOUT COMPLICATION, WITHOUT LONG-TERM CURRENT USE OF INSULIN (H): Primary | ICD-10-CM

## 2022-12-06 DIAGNOSIS — J30.89 NON-SEASONAL ALLERGIC RHINITIS, UNSPECIFIED TRIGGER: ICD-10-CM

## 2022-12-06 DIAGNOSIS — N95.2 ATROPHIC VAGINITIS: ICD-10-CM

## 2022-12-06 DIAGNOSIS — I26.99 PULMONARY EMBOLISM WITHOUT ACUTE COR PULMONALE, UNSPECIFIED CHRONICITY, UNSPECIFIED PULMONARY EMBOLISM TYPE (H): ICD-10-CM

## 2022-12-06 DIAGNOSIS — K58.9 IRRITABLE BOWEL SYNDROME, UNSPECIFIED TYPE: ICD-10-CM

## 2022-12-06 DIAGNOSIS — E78.00 HYPERCHOLESTEROLEMIA: ICD-10-CM

## 2022-12-06 DIAGNOSIS — K21.00 GASTROESOPHAGEAL REFLUX DISEASE WITH ESOPHAGITIS WITHOUT HEMORRHAGE: ICD-10-CM

## 2022-12-06 DIAGNOSIS — I48.0 PAROXYSMAL ATRIAL FIBRILLATION (H): ICD-10-CM

## 2022-12-06 DIAGNOSIS — I10 ESSENTIAL HYPERTENSION, BENIGN: ICD-10-CM

## 2022-12-06 PROCEDURE — 99213 OFFICE O/P EST LOW 20 MIN: CPT | Mod: 95 | Performed by: INTERNAL MEDICINE

## 2022-12-06 RX ORDER — METFORMIN HCL 500 MG
TABLET, EXTENDED RELEASE 24 HR ORAL
Qty: 360 TABLET | Refills: 3 | Status: SHIPPED | OUTPATIENT
Start: 2022-12-06 | End: 2024-01-19

## 2022-12-06 RX ORDER — MONTELUKAST SODIUM 4 MG/1
1 TABLET, CHEWABLE ORAL 2 TIMES DAILY
Qty: 180 TABLET | Refills: 3 | Status: SHIPPED | OUTPATIENT
Start: 2022-12-06 | End: 2023-04-13

## 2022-12-06 RX ORDER — ESTRADIOL 0.1 MG/G
2 CREAM VAGINAL EVERY OTHER DAY
Qty: 42.5 G | Refills: 3 | Status: SHIPPED | OUTPATIENT
Start: 2022-12-06

## 2022-12-06 RX ORDER — FAMOTIDINE 40 MG/1
40 TABLET, FILM COATED ORAL 2 TIMES DAILY
Qty: 180 TABLET | Refills: 3 | Status: SHIPPED | OUTPATIENT
Start: 2022-12-06 | End: 2024-01-19

## 2022-12-06 RX ORDER — LANCING DEVICE
EACH MISCELLANEOUS
Qty: 1 EACH | Refills: 3 | Status: SHIPPED | OUTPATIENT
Start: 2022-12-06

## 2022-12-06 RX ORDER — ATORVASTATIN CALCIUM 80 MG/1
TABLET, FILM COATED ORAL
Qty: 90 TABLET | Refills: 3 | Status: SHIPPED | OUTPATIENT
Start: 2022-12-06 | End: 2024-01-19

## 2022-12-06 RX ORDER — MONTELUKAST SODIUM 10 MG/1
10 TABLET ORAL DAILY
Qty: 90 TABLET | Refills: 3 | Status: SHIPPED | OUTPATIENT
Start: 2022-12-06 | End: 2024-01-19

## 2022-12-06 RX ORDER — LOSARTAN POTASSIUM 100 MG/1
100 TABLET ORAL DAILY
Qty: 90 TABLET | Refills: 3 | Status: SHIPPED | OUTPATIENT
Start: 2022-12-06 | End: 2024-01-19

## 2022-12-06 NOTE — PROGRESS NOTES
Krystle is a 82 year old who is being evaluated via a billable telephone visit.      What phone number would you like to be contacted at? [phone]  How would you like to obtain your AVS? Mail a copy    Assessment & Plan     Type 2 diabetes mellitus without complication, without long-term current use of insulin (H)-goal for glycohemoglobin less than 7.5    She has had some difficulty with type 2 diabetes in control due to inability to exercise/poor diet due to IBS syndrome/etc.    Omer is due for lab work.  Lab orders placed.  She was hoping to have this in person visit today but unfortunately we are remote.  All equipment and medications renewed.  She will drop in for labs and will give any further follow-up as needed.    - blood glucose (NO BRAND SPECIFIED) lancing device  Dispense: 1 each; Refill: 3  - metFORMIN (GLUCOPHAGE XR) 500 MG 24 hr tablet  Dispense: 360 tablet; Refill: 3  - Basic metabolic panel  - Hemoglobin A1c  - Lipid panel reflex to direct LDL Fasting    Paroxysmal atrial fibrillation (H)  Followed by Dr. Wong-electrophysiology.  On Eliquis.  This is becoming quite expensive for she and her .  It costs about $3000 per year.  They will contemplate going back on warfarin.    Pulmonary embolism without acute cor pulmonale, unspecified chronicity, unspecified pulmonary embolism type (H)  High burden of pulmonary embolism some years ago-unprovoked.  Will remain on anticoagulation-as some history-Eliquis was chosen as she had a problem in the past with recurrent epistaxis.  The data on Eliquis show lower incidence of hemorrhage on this medication.  - apixaban ANTICOAGULANT (ELIQUIS) 5 MG tablet  Dispense: 180 tablet; Refill: 3    Hypercholesterolemia  Will renew medication  - atorvastatin (LIPITOR) 80 MG tablet  Dispense: 90 tablet; Refill: 3    Irritable bowel syndrome, unspecified type  Variable.  Some improvement with Colestid-we will continue the same  - colestipol (COLESTID) 1 g tablet   "Dispense: 180 tablet; Refill: 3    Atrophic vaginitis-with history of recurrent UTI  Continue the same  - estradiol (ESTRACE) 0.1 MG/GM vaginal cream  Dispense: 42.5 g; Refill: 3    Gastroesophageal reflux disease with esophagitis without hemorrhage  Continue the same  - famotidine (PEPCID) 40 MG tablet  Dispense: 180 tablet; Refill: 3    Essential hypertension, benign  Has been stable  - losartan (COZAAR) 100 MG tablet  Dispense: 90 tablet; Refill: 3    Osteoporosis-on Prolia  Stable  Continue current plan  [id number]}     BMI:   Estimated body mass index is 38.59 kg/m  as calculated from the following:    Height as of 11/21/22: 1.575 m (5' 2\").    Weight as of 11/21/22: 95.7 kg (211 lb).           No follow-ups on file.    Elke Mehta MD  [facility]   Krystle is a 82 year old accompanied by her spouse, presenting for the following health issues:  Recheck Medication and RECHECK      Omer and Fitz are here today for a final office visit with me.  Most of the focus today was on Fitz and his illnesses.  Omer is his primary caretaker and has been busy tending to his needs.    She has type 2 diabetes with some variability of control-due to difficulty with healthy diet secondary to her IBS with diarrhea.  Also, they have been unable to exercise due to the busyness of their medical schedule.  Additionally, she has paroxysmal atrial fibrillation-discovered fairly recently.  She has been on chronic anticoagulation for a past history of a high burden of unprovoked pulmonary emboli.  She is working with Dr. Wong from  on this.    Other physicians involved in her care include Dr. Gutierrez who helps with sleep apnea/reactive airways disease.    She sees ENT periodically for recurrent epistaxis and rhinitis.    She has no new complaints today.  She requests renewal on medications.  Her problems will be listed as above.  She is also on Prolia for osteoporosis management.    History of " Present Illness       Reason for visit:  FOLLOW UP        Current Outpatient Medications   Medication     acetaminophen (TYLENOL) 500 MG tablet     albuterol (PROAIR HFA/PROVENTIL HFA/VENTOLIN HFA) 108 (90 Base) MCG/ACT inhaler     amLODIPine (NORVASC) 5 MG tablet     apixaban ANTICOAGULANT (ELIQUIS) 5 MG tablet     atorvastatin (LIPITOR) 80 MG tablet     azelastine (ASTELIN) 0.1 % nasal spray     beclomethasone HFA (QVAR REDIHALER) 80 MCG/ACT inhaler     blood glucose (CONTOUR NEXT TEST) test strip     blood glucose (NO BRAND SPECIFIED) lancing device     CALCIUM CARBONATE (CALCIUM 500 ORAL)     cholecalciferol, vitamin D3, 1,000 unit tablet     clindamycin (CLEOCIN) 150 MG capsule     colestipol (COLESTID) 1 g tablet     cranberry extract 300 mg Tab     estradiol (ESTRACE) 0.1 MG/GM vaginal cream     famotidine (PEPCID) 40 MG tablet     LACTOBACILLUS ACIDOPHILUS (PROBIOTIC ORAL)     loperamide (IMODIUM) 2 mg capsule     losartan (COZAAR) 100 MG tablet     LUTEIN ORAL     Magnesium Oxide 250 MG TABS     metFORMIN (GLUCOPHAGE XR) 500 MG 24 hr tablet     metroNIDAZOLE (METROGEL) 0.75 % gel     montelukast (SINGULAIR) 10 MG tablet     psyllium (METAMUCIL) 3.4 gram packet     SIMETHICONE (GAS-X ORAL)     sotalol (BETAPACE) 120 MG tablet     tiotropium (SPIRIVA HANDIHALER) 18 MCG inhaled capsule     UBIDECARENONE (COENZYME Q10 ORAL)     zinc gluconate 50 mg tablet     Current Facility-Administered Medications   Medication     denosumab (PROLIA) injection 60 mg       Objective           Vitals:  No vitals were obtained today due to virtual visit.    Physical Exam   Voice is clear-no shortness of breath noted.    Phone call duration: 20 minutes/review general health problems and re fill prescriptions

## 2022-12-16 ENCOUNTER — LAB (OUTPATIENT)
Dept: LAB | Facility: CLINIC | Age: 82
End: 2022-12-16
Payer: COMMERCIAL

## 2022-12-16 DIAGNOSIS — E11.9 TYPE 2 DIABETES MELLITUS WITHOUT COMPLICATION, WITHOUT LONG-TERM CURRENT USE OF INSULIN (H): ICD-10-CM

## 2022-12-16 LAB
ANION GAP SERPL CALCULATED.3IONS-SCNC: 10 MMOL/L (ref 7–15)
BUN SERPL-MCNC: 14.8 MG/DL (ref 8–23)
CALCIUM SERPL-MCNC: 9.7 MG/DL (ref 8.8–10.2)
CHLORIDE SERPL-SCNC: 104 MMOL/L (ref 98–107)
CHOLEST SERPL-MCNC: 199 MG/DL
CREAT SERPL-MCNC: 0.64 MG/DL (ref 0.51–0.95)
DEPRECATED HCO3 PLAS-SCNC: 27 MMOL/L (ref 22–29)
GFR SERPL CREATININE-BSD FRML MDRD: 89 ML/MIN/1.73M2
GLUCOSE SERPL-MCNC: 142 MG/DL (ref 70–99)
HBA1C MFR BLD: 7.2 % (ref 0–5.6)
HDLC SERPL-MCNC: 58 MG/DL
LDLC SERPL CALC-MCNC: 99 MG/DL
NONHDLC SERPL-MCNC: 141 MG/DL
POTASSIUM SERPL-SCNC: 4.3 MMOL/L (ref 3.4–5.3)
SODIUM SERPL-SCNC: 141 MMOL/L (ref 136–145)
TRIGL SERPL-MCNC: 209 MG/DL

## 2022-12-16 PROCEDURE — 83036 HEMOGLOBIN GLYCOSYLATED A1C: CPT

## 2022-12-16 PROCEDURE — 80061 LIPID PANEL: CPT

## 2022-12-16 PROCEDURE — 80048 BASIC METABOLIC PNL TOTAL CA: CPT

## 2022-12-16 PROCEDURE — 36415 COLL VENOUS BLD VENIPUNCTURE: CPT

## 2023-01-15 ENCOUNTER — TELEPHONE (OUTPATIENT)
Dept: INTERNAL MEDICINE | Facility: CLINIC | Age: 83
End: 2023-01-15
Payer: COMMERCIAL

## 2023-01-15 NOTE — TELEPHONE ENCOUNTER
Reason for Call:  Appointment Request    Patient requesting this type of appt: Constipation/Follow-up med check    Requested provider: Cori Campuzano     Reason patient unable to be scheduled: Not within requested timeframe    When does patient want to be seen/preferred time: 1-2 days    Comments: Patient has appointments scheduled with Dr. Campuzano for both herself and her  (MRN:[id number]) for 02/27. She would like a sooner appointment for the both of them as she had to cancel the original appointments (01/16/23) because of a scheduling conflict.        Could we send this information to you in [employer] or would you prefer to receive a phone call?:   Patient would prefer a phone call   Okay to leave a detailed message?: No Cell phone number on file [phone] (cell)    Call taken on 1/15/2023 at 1:54 PM by Mak Walsh

## 2023-01-16 NOTE — TELEPHONE ENCOUNTER
Dr Campuzano does not have any availability prior to scheduled appointments. Can add patients to wait list but no guarantee they would be called & seen together    Left message for pt to call back

## 2023-02-13 ENCOUNTER — OFFICE VISIT (OUTPATIENT)
Dept: CARDIOLOGY | Facility: CLINIC | Age: 83
End: 2023-02-13
Attending: NURSE PRACTITIONER
Payer: COMMERCIAL

## 2023-02-13 VITALS
DIASTOLIC BLOOD PRESSURE: 64 MMHG | BODY MASS INDEX: 39.87 KG/M2 | SYSTOLIC BLOOD PRESSURE: 142 MMHG | RESPIRATION RATE: 20 BRPM | HEART RATE: 72 BPM | WEIGHT: 218 LBS

## 2023-02-13 DIAGNOSIS — I48.0 PAROXYSMAL ATRIAL FIBRILLATION (H): Primary | ICD-10-CM

## 2023-02-13 DIAGNOSIS — I48.0 PAF (PAROXYSMAL ATRIAL FIBRILLATION) (H): ICD-10-CM

## 2023-02-13 DIAGNOSIS — I49.3 PVC'S (PREMATURE VENTRICULAR CONTRACTIONS): ICD-10-CM

## 2023-02-13 DIAGNOSIS — I10 ESSENTIAL HYPERTENSION, BENIGN: ICD-10-CM

## 2023-02-13 PROCEDURE — 99215 OFFICE O/P EST HI 40 MIN: CPT | Performed by: NURSE PRACTITIONER

## 2023-02-13 RX ORDER — SOTALOL HYDROCHLORIDE 120 MG/1
60 TABLET ORAL 2 TIMES DAILY
Qty: 90 TABLET | Refills: 3 | Status: SHIPPED | OUTPATIENT
Start: 2023-02-13 | End: 2023-04-14

## 2023-02-13 NOTE — PROGRESS NOTES
HEART CARE ELECTROPHYSIOLOGY NOTE      [facility]  [phone]      Assessment/Recommendations   Assessment/Plan:  1.  Paroxysmal Atrial Fibrillation: No symptomatic episodes of A-fib.  She previously had asymptomatic episodes as well, symptoms primarily with RVR.  2-week MCOT to evaluate for AF recurrence.  Reviewed treatment options of medications versus ablation.  She is not interested in pursuing ablation at this time.  Continue sotalol 60 mg twice daily.    She was reassured that atrial fibrillation is not life-threatening, but carries an increased risk for stroke.  She has a [id number] score of 7 for age >75, female gender, diabetes, hypertension, and PE.  Continue Eliquis 5 mg twice daily for stroke prophylaxis.  She is not a candidate for left atrial appendage closure due to history of PE requiring long-term oral anticoagulation.    2.  Frequent PVCs: Holter monitor in 2020 showed PVC burden of 35%, but without evidence for PVC induced cardiomyopathy and asymptomatic, so no further intervention recommended by Dr. Saenz.  Additionally, higher dose sotalol was ineffective at PVC suppression and resulted in side effects.  Recent Holter monitor showed rare PVCs.  No further intervention recommended at this time.    3.  Hypertension: Blood pressure mildly elevated in clinic today, but consistently at target.  Continue amlodipine and sotalol as above.  She will have it rechecked when she establishes with a new PCP this month.    Follow up in 6 months     History of Present Illness/Subjective    HPI: Krystle Polanco is a 82 year old female who comes in today accompanied by her  for EP follow-up of arrhythmias.  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    Previous attempts at using  high-dose sotalol to suppress PVCs was successful.  Additionally, she is only able to tolerate low-dose sotalol due to IBS exacerbation on higher dose.  She was having intermittent worsened dyspnea on exertion, though improved with pulmonary rehab.  She did well on the NuStep, but gets more winded with walking.  This does not appear to be rhythm related, though she has had some documented episodes of AF with RVR at pulmonary rehab.  Holter monitoring showed an AF burden of 66%, though rare PVCs.  Sotalol was increased to 60 mg twice daily.  She was able to correlate symptoms of lightheadedness and fatigue when in AF, particular when ventricular rates were elevated.  She is on Eliquis for stroke prophylaxis.    Krystle states that she has been feeling well.  She has not had any episodes of A-fib of which she is aware.  She denies chest discomfort, palpitations, abdominal fullness/bloating or peripheral edema, worsened shortness of breath, paroxysmal nocturnal dyspnea, orthopnea, lightheadedness, dizziness, pre-syncope, or syncope.  She has not been as active and notes she felt better when doing pulmonary rehab, so was encouraged to increase her daily activity.    Cardiographics (EKG personally reviewed):  EKG done 9/2/2022 shows sinus rhythm at 77 bpm, QRS 86 ms, QT/QTc 408/461 ms.    24-hour Holter monitor worn 8/12/2022 shows sinus rhythm with paroxysmal atrial fibrillation.  AF burden 66%.  Average heart rate of 101 bpm with a range of 61 to 157 bpm.  No significant bradycardia or pauses.  Rare PVCs.  Symptoms of dyspnea correlated to AF with RVR 120s.    ECHO done 12/23/2021:  Left ventricular size, wall motion and function are normal. The ejection  fraction is 60-65%.  Normal right ventricle size and systolic function.  No hemodynamically significant valvular abnormalities on 2D or color flow  imaging.    Stress MRI done 1/14/2020 showed normal EF and no inducible ischemia.    I have reviewed and updated the  patient's Past Medical History, Social History, Family History and Medication List.  Outside records personally reviewed.     Physical Examination  Review of Systems   Vitals: BP (!) 142/64 (BP Location: Right arm, Patient Position: Sitting, Cuff Size: Adult Large)   Pulse 72   Resp 20   Wt 98.9 kg (218 lb)   BMI 39.87 kg/m    BMI= Body mass index is 39.87 kg/m .  Wt Readings from Last 3 Encounters:   02/13/23 98.9 kg (218 lb)   11/21/22 95.7 kg (211 lb)   11/11/22 98 kg (216 lb)       General Appearance:   Alert, well-appearing and in no acute distress.   HEENT: Atraumatic, normocephalic.  No scleral icterus, normal conjunctivae, EOMs intact, PERRL.  Wearing a mask.   Chest/Lungs:   Chest symmetric, spine straight.  Respirations unlabored.  Lungs are clear to auscultation.   Cardiovascular:   Regular rate and rhythm.  Normal first and second heart sounds with no murmurs, rubs, or gallops; radial and posterior tibial pulses are intact, No edema.   Abdomen:  Soft, nondistended, bowel sounds present.   Extremities: No cyanosis or clubbing.   Musculoskeletal: Moves all extremities.    Skin: Warm, dry, intact.    Neurologic: Mood and affect are appropriate.  Alert and oriented to person, place, time, and situation.     ROS: 10 point ROS neg other than the symptoms noted above in the HPI.         Medical History  Surgical History Family History Social History   Past Medical History:   Diagnosis Date     Adenomatous goiter 2004     Arthropathy of shoulder region unknown     Asthma 2009     Atrial flutter (H) 2017     Bartholin gland cyst unknown     Diabetes mellitus type II, controlled (H) 2004     Esophageal reflux 1980     Essential hypertension 1995     Gastroparesis 1999     Herpes simplex type 1 infection unknown     IBS (irritable bowel syndrome) 1971     Leukocytosis unknown     Osteopenia 2009     Vitamin D deficiency 2014     Past Surgical History:   Procedure Laterality Date     BIOPSY BREAST Left 1/22/15     Papilloma     EXCISION / BIOPSY BREAST / NIPPLE / DUCT Left       DILATION/CURETTAGE DIAG/THER NON OB  Unknown    Description: Dilation And Curettage;  Recorded: 2007;     HYSTERECTOMY  1984     OOPHORECTOMY      1 removed, 1 remains     [facility] NASAL/SINUS ENDOSCOPY,BX/RMV POLYP/DEBRID      Description: Nasal Endoscopy Polypectomy;  Recorded: 2007;     [facility]  DELIVERY ONLY  1971    Description:  Section;  Recorded: 2008;  Comments: x2     [facility] TOTAL ABDOM HYSTERECTOMY      Description: Total Abdominal Hysterectomy;  Recorded: 2008;     [facility] TOTAL KNEE ARTHROPLASTY Bilateral     Description: Total Knee Arthroplasty;  Recorded: 2008;     [facility] XFER SINGLE SUPERFICI LOW LEG TENDON  UNknown    Description: Tibialis Posterior Tendon Transfer Right Foot;  Recorded: 2008;     Family History   Problem Relation Age of Onset     Breast Cancer Sister 56.00     Depression Mother      Dementia Mother         Social History     Socioeconomic History     Marital status:      Spouse name: Not on file     Number of children: Not on file     Years of education: Not on file     Highest education level: Not on file   Occupational History     Not on file   Tobacco Use     Smoking status: Never     Smokeless tobacco: Never   Substance and Sexual Activity     Alcohol use: No     Drug use: No     Sexual activity: Not on file   Other Topics Concern     Not on file   Social History Narrative    She is .  She has 4 children and is a retired . She is an avid .      Social Determinants of Health     Financial Resource Strain: Not on file   Food Insecurity: Not on file   Transportation Needs: Not on file   Physical Activity: Not on file   Stress: Not on file   Social Connections: Not on file   Intimate Partner Violence: Not on file   Housing Stability: Not on file           Medications  Allergies   Current Outpatient Medications   Medication  Sig Dispense Refill     acetaminophen (TYLENOL) 500 MG tablet [ACETAMINOPHEN (TYLENOL) 500 MG TABLET] Take 1,000 mg by mouth every 6 (six) hours as needed for pain.       albuterol (PROAIR HFA/PROVENTIL HFA/VENTOLIN HFA) 108 (90 Base) MCG/ACT inhaler [ALBUTEROL (PROAIR HFA) 90 MCG/ACTUATION INHALER] INHALE TWO PUFFS BY MOUTH FOUR TIMES A DAY AS NEEDED FOR WHEEZING 25.5 g 3     amLODIPine (NORVASC) 5 MG tablet Take 1 tablet (5 mg) by mouth daily 90 tablet 3     apixaban ANTICOAGULANT (ELIQUIS) 5 MG tablet Take 1 tablet (5 mg) by mouth 2 times daily 180 tablet 3     atorvastatin (LIPITOR) 80 MG tablet TAKE ONE TABLET BY MOUTH EVERY NIGHT AT BEDTIME 90 tablet 3     azelastine (ASTELIN) 0.1 % nasal spray Spray 2 sprays in nostril 2 times daily 90 mL 3     beclomethasone HFA (QVAR REDIHALER) 80 MCG/ACT inhaler Inhale 2 puffs into the lungs 2 times daily 31.2 g 3     blood glucose (CONTOUR NEXT TEST) test strip 1 strip by In Vitro route Use as directed with testing blood sugars once daily.  Dispense Contour Next brand per pt's insurance coverage       blood glucose (NO BRAND SPECIFIED) lancing device [LANCING DEVICE MISC] Use as directed 4 times a week. 1 each 3     CALCIUM CARBONATE (CALCIUM 500 ORAL) [CALCIUM CARBONATE (CALCIUM 500 ORAL)] Take 1 tablet by mouth 2 (two) times a day.       cholecalciferol, vitamin D3, 1,000 unit tablet [CHOLECALCIFEROL, VITAMIN D3, 1,000 UNIT TABLET] Take 2,000 Units by mouth 2 (two) times a day.        clindamycin (CLEOCIN) 150 MG capsule [CLINDAMYCIN (CLEOCIN) 150 MG CAPSULE] TAKE 4 CAPSULES  BY MOUTH 1 HOUR PRIOR TO DENTAL APPOINTMENT. 90 capsule 3     colestipol (COLESTID) 1 g tablet Take 1 tablet (1 g) by mouth 2 times daily 180 tablet 3     cranberry extract 300 mg Tab [CRANBERRY EXTRACT 300 MG TAB] Take 1 tablet by mouth daily.        estradiol (ESTRACE) 0.1 MG/GM vaginal cream Place 2 g vaginally every other day 42.5 g 3     famotidine (PEPCID) 40 MG tablet Take 1 tablet (40 mg)  "by mouth 2 times daily 180 tablet 3     LACTOBACILLUS ACIDOPHILUS (PROBIOTIC ORAL) [LACTOBACILLUS ACIDOPHILUS (PROBIOTIC ORAL)] Take 1 tablet by mouth 2 (two) times a day.        loperamide (IMODIUM) 2 mg capsule [LOPERAMIDE (IMODIUM) 2 MG CAPSULE] Take 2 mg by mouth 4 (four) times a day as needed.       losartan (COZAAR) 100 MG tablet Take 1 tablet (100 mg) by mouth daily 90 tablet 3     LUTEIN ORAL [LUTEIN ORAL] Take 1 tablet by mouth daily. With Zeaxanthin       Magnesium Oxide 250 MG TABS Use every 3rd day       metFORMIN (GLUCOPHAGE XR) 500 MG 24 hr tablet TAKE TWO TABLETS BY MOUTH TWICE A  tablet 3     metroNIDAZOLE (METROGEL) 0.75 % gel [METRONIDAZOLE (METROGEL) 0.75 % GEL] Apply 1 application topically as needed.        montelukast (SINGULAIR) 10 MG tablet Take 1 tablet (10 mg) by mouth daily 90 tablet 3     psyllium (METAMUCIL) 3.4 gram packet [PSYLLIUM (METAMUCIL) 3.4 GRAM PACKET] Take 1 packet by mouth every evening.       SIMETHICONE (GAS-X ORAL) [SIMETHICONE (GAS-X ORAL)] Take 1 tablet by mouth 2 (two) times a day as needed.       sotalol (BETAPACE) 120 MG tablet Take 0.5 tablets (60 mg) by mouth 2 times daily 90 tablet 3     tiotropium (SPIRIVA HANDIHALER) 18 MCG inhaled capsule Inhale 1 capsule (18 mcg) into the lungs daily (Patient taking differently: Inhale 18 mcg into the lungs daily as needed) 90 capsule 3     UBIDECARENONE (COENZYME Q10 ORAL) [UBIDECARENONE (COENZYME Q10 ORAL)] Take 1 tablet by mouth daily.        zinc gluconate 50 mg tablet [ZINC GLUCONATE 50 MG TABLET] Take 50 mg by mouth daily.         Allergies   Allergen Reactions     Conray [Iothalamate] Anaphylaxis     30+ yr's ago / x-ray exam / was given \"dye\" iodine contrast / had Anaphylaxis reaction, JDI - 11/30/16 3920      Diagnostic X-Ray Materials Other (See Comments)     hypotension       Alendronate Sodium [Alendronic Acid] Nausea     Amoxicillin Unknown     Diatrizoate Meglumine [Diatrizoate] Other (See Comments)     " Fosamax [Alendronic Acid] Unknown     Stomach cramps     House Dust [Dust Mite Extract] Unknown     Mold/Mildew [Molds & Smuts] Unknown     Other Allergy (See Comments) [External Allergen Needs Reconciliation - See Comment] Unknown     Contrast Media Ready-Box MISC, 11/06/2007.; Contrast Media Ready-Box MISC, 11/06/2007.       Penicillins Unknown     Prednisone Nausea     Other reaction(s): Abdominal Pain, She can have this as a shot not oral     Seafood Unknown     Sulfa (Sulfonamide Antibiotics) [Sulfa Drugs] Unknown          Lab Results    Chemistry/lipid CBC Cardiac Enzymes/BNP/TSH/INR   Recent Labs   Lab Test 12/16/22  0917 02/22/22  0819   CHOL 199 158   HDL 58 49*   LDL 99 63   TRIG 209* 228*     Recent Labs   Lab Test 12/16/22  0917 06/01/22  0949    141   POTASSIUM 4.3 4.0   CHLORIDE 104 105   CO2 27 24   ANIONGAP 10 12   * 133*   BUN 14.8 18   CR 0.64 0.74   WILNER 9.7 9.1     Hemoglobin A1C   Date Value Ref Range Status   12/16/2022 7.2 (H) 0.0 - 5.6 % Final     Comment:     Normal <5.7%   Prediabetes 5.7-6.4%    Diabetes 6.5% or higher     Note: Adopted from ADA consensus guidelines.      CBC RESULTS: Recent Labs   Lab Test 02/22/22  0819   WBC 11.2*   RBC 4.83   HGB 13.9   HCT 43.6   MCV 90   MCH 28.8   MCHC 31.9   RDW 14.0         Recent Labs   Lab Test 11/26/19  1540   TROPONINI <0.01     Recent Labs   Lab Test 10/02/20  1504   *     Recent Labs   Lab Test 02/22/22  0819   TSH 0.77     Recent Labs   Lab Test 02/16/21  1031 02/09/21  0953 01/25/21  1043   INR 1.40* 3.40* 2.90*      45 minutes were spent on the date of encounter performing chart review, history and exam, documentation, and further activities as noted above.

## 2023-02-13 NOTE — PATIENT INSTRUCTIONS
Krystle Polanco,    It was a pleasure to see you today at the [facility].     My recommendations after this visit include:    Continue current medications    Wear heart monitor for 2 weeks to re-evaluate A fib and PVCs.  Record symptoms.    Follow up in 6 months, or sooner if needed for A fib    Ellyn Sr, CNP  [facility], Electrophysiology  [phone]  EP nurses [phone]

## 2023-02-13 NOTE — LETTER
2/13/2023    Elke Mehta MD  No address on file    RE: Krystle Polanco       Dear Colleague,     I had the pleasure of seeing Krystle Polanco in the [facility].    HEART CARE ELECTROPHYSIOLOGY NOTE      [facility]  [phone]      Assessment/Recommendations   Assessment/Plan:  1.  Paroxysmal Atrial Fibrillation: No symptomatic episodes of A-fib.  She previously had asymptomatic episodes as well, symptoms primarily with RVR.  2-week MCOT to evaluate for AF recurrence.  Reviewed treatment options of medications versus ablation.  She is not interested in pursuing ablation at this time.  Continue sotalol 60 mg twice daily.    She was reassured that atrial fibrillation is not life-threatening, but carries an increased risk for stroke.  She has a [id number] score of 7 for age >75, female gender, diabetes, hypertension, and PE.  Continue Eliquis 5 mg twice daily for stroke prophylaxis.  She is not a candidate for left atrial appendage closure due to history of PE requiring long-term oral anticoagulation.    2.  Frequent PVCs: Holter monitor in 2020 showed PVC burden of 35%, but without evidence for PVC induced cardiomyopathy and asymptomatic, so no further intervention recommended by Dr. Saenz.  Additionally, higher dose sotalol was ineffective at PVC suppression and resulted in side effects.  Recent Holter monitor showed rare PVCs.  No further intervention recommended at this time.    3.  Hypertension: Blood pressure mildly elevated in clinic today, but consistently at target.  Continue amlodipine and sotalol as above.  She will have it rechecked when she establishes with a new PCP this month.    Follow up in 6 months     History of Present Illness/Subjective    HPI: Krystle Polanco is a 82 year old female who comes in today accompanied by her  for EP follow-up of arrhythmias.  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    Previous attempts at using high-dose sotalol to suppress PVCs was successful.  Additionally, she is only able to tolerate low-dose sotalol due to IBS exacerbation on higher dose.  She was having intermittent worsened dyspnea on exertion, though improved with pulmonary rehab.  She did well on the NuStep, but gets more winded with walking.  This does not appear to be rhythm related, though she has had some documented episodes of AF with RVR at pulmonary rehab.  Holter monitoring showed an AF burden of 66%, though rare PVCs.  Sotalol was increased to 60 mg twice daily.  She was able to correlate symptoms of lightheadedness and fatigue when in AF, particular when ventricular rates were elevated.  She is on Eliquis for stroke prophylaxis.    Krystle states that she has been feeling well.  She has not had any episodes of A-fib of which she is aware.  She denies chest discomfort, palpitations, abdominal fullness/bloating or peripheral edema, worsened shortness of breath, paroxysmal nocturnal dyspnea, orthopnea, lightheadedness, dizziness, pre-syncope, or syncope.  She has not been as active and notes she felt better when doing pulmonary rehab, so was encouraged to increase her daily activity.    Cardiographics (EKG personally reviewed):  EKG done 9/2/2022 shows sinus rhythm at 77 bpm, QRS 86 ms, QT/QTc 408/461 ms.    24-hour Holter monitor worn 8/12/2022 shows sinus rhythm with paroxysmal atrial fibrillation.  AF burden 66%.  Average heart rate of 101 bpm with a range of 61 to 157 bpm.  No significant bradycardia or pauses.  Rare PVCs.  Symptoms of dyspnea correlated to AF with RVR 120s.    ECHO done 12/23/2021:  Left ventricular size, wall motion and function are normal. The ejection  fraction is 60-65%.  Normal right ventricle size and systolic function.  No  hemodynamically significant valvular abnormalities on 2D or color flow  imaging.    Stress MRI done 1/14/2020 showed normal EF and no inducible ischemia.    I have reviewed and updated the patient's Past Medical History, Social History, Family History and Medication List.  Outside records personally reviewed.     Physical Examination  Review of Systems   Vitals: BP (!) 142/64 (BP Location: Right arm, Patient Position: Sitting, Cuff Size: Adult Large)   Pulse 72   Resp 20   Wt 98.9 kg (218 lb)   BMI 39.87 kg/m    BMI= Body mass index is 39.87 kg/m .  Wt Readings from Last 3 Encounters:   02/13/23 98.9 kg (218 lb)   11/21/22 95.7 kg (211 lb)   11/11/22 98 kg (216 lb)       General Appearance:   Alert, well-appearing and in no acute distress.   HEENT: Atraumatic, normocephalic.  No scleral icterus, normal conjunctivae, EOMs intact, PERRL.  Wearing a mask.   Chest/Lungs:   Chest symmetric, spine straight.  Respirations unlabored.  Lungs are clear to auscultation.   Cardiovascular:   Regular rate and rhythm.  Normal first and second heart sounds with no murmurs, rubs, or gallops; radial and posterior tibial pulses are intact, No edema.   Abdomen:  Soft, nondistended, bowel sounds present.   Extremities: No cyanosis or clubbing.   Musculoskeletal: Moves all extremities.    Skin: Warm, dry, intact.    Neurologic: Mood and affect are appropriate.  Alert and oriented to person, place, time, and situation.     ROS: 10 point ROS neg other than the symptoms noted above in the HPI.         Medical History  Surgical History Family History Social History   Past Medical History:   Diagnosis Date     Adenomatous goiter 2004     Arthropathy of shoulder region unknown     Asthma 2009     Atrial flutter (H) 2017     Bartholin gland cyst unknown     Diabetes mellitus type II, controlled (H) 2004     Esophageal reflux 1980     Essential hypertension 1995     Gastroparesis 1999     Herpes simplex type 1 infection unknown     IBS  (irritable bowel syndrome) 1971     Leukocytosis unknown     Osteopenia 2009     Vitamin D deficiency 2014     Past Surgical History:   Procedure Laterality Date     BIOPSY BREAST Left 1/22/15    Papilloma     EXCISION / BIOPSY BREAST / NIPPLE / DUCT Left       DILATION/CURETTAGE DIAG/THER NON OB  Unknown    Description: Dilation And Curettage;  Recorded: 2007;     HYSTERECTOMY  1984     OOPHORECTOMY  1975    1 removed, 1 remains     [place] NASAL/SINUS ENDOSCOPY,BX/RMV POLYP/DEBRID      Description: Nasal Endoscopy Polypectomy;  Recorded: 2007;     [facility]  DELIVERY ONLY  1971    Description:  Section;  Recorded: 2008;  Comments: x2     [facility] TOTAL ABDOM HYSTERECTOMY      Description: Total Abdominal Hysterectomy;  Recorded: 2008;     [facility] TOTAL KNEE ARTHROPLASTY Bilateral     Description: Total Knee Arthroplasty;  Recorded: 2008;     [facility] XFER SINGLE SUPERFICI LOW LEG TENDON  UNknown    Description: Tibialis Posterior Tendon Transfer Right Foot;  Recorded: 2008;     Family History   Problem Relation Age of Onset     Breast Cancer Sister 56.00     Depression Mother      Dementia Mother         Social History     Socioeconomic History     Marital status:      Spouse name: Not on file     Number of children: Not on file     Years of education: Not on file     Highest education level: Not on file   Occupational History     Not on file   Tobacco Use     Smoking status: Never     Smokeless tobacco: Never   Substance and Sexual Activity     Alcohol use: No     Drug use: No     Sexual activity: Not on file   Other Topics Concern     Not on file   Social History Narrative    She is .  She has 4 children and is a retired . She is an avid .      Social Determinants of Health     Financial Resource Strain: Not on file   Food Insecurity: Not on file   Transportation Needs: Not on file   Physical Activity: Not on file   Stress: Not  on file   Social Connections: Not on file   Intimate Partner Violence: Not on file   Housing Stability: Not on file           Medications  Allergies   Current Outpatient Medications   Medication Sig Dispense Refill     acetaminophen (TYLENOL) 500 MG tablet [ACETAMINOPHEN (TYLENOL) 500 MG TABLET] Take 1,000 mg by mouth every 6 (six) hours as needed for pain.       albuterol (PROAIR HFA/PROVENTIL HFA/VENTOLIN HFA) 108 (90 Base) MCG/ACT inhaler [ALBUTEROL (PROAIR HFA) 90 MCG/ACTUATION INHALER] INHALE TWO PUFFS BY MOUTH FOUR TIMES A DAY AS NEEDED FOR WHEEZING 25.5 g 3     amLODIPine (NORVASC) 5 MG tablet Take 1 tablet (5 mg) by mouth daily 90 tablet 3     apixaban ANTICOAGULANT (ELIQUIS) 5 MG tablet Take 1 tablet (5 mg) by mouth 2 times daily 180 tablet 3     atorvastatin (LIPITOR) 80 MG tablet TAKE ONE TABLET BY MOUTH EVERY NIGHT AT BEDTIME 90 tablet 3     azelastine (ASTELIN) 0.1 % nasal spray Spray 2 sprays in nostril 2 times daily 90 mL 3     beclomethasone HFA (QVAR REDIHALER) 80 MCG/ACT inhaler Inhale 2 puffs into the lungs 2 times daily 31.2 g 3     blood glucose (CONTOUR NEXT TEST) test strip 1 strip by In Vitro route Use as directed with testing blood sugars once daily.  Dispense Contour Next brand per pt's insurance coverage       blood glucose (NO BRAND SPECIFIED) lancing device [LANCING DEVICE MISC] Use as directed 4 times a week. 1 each 3     CALCIUM CARBONATE (CALCIUM 500 ORAL) [CALCIUM CARBONATE (CALCIUM 500 ORAL)] Take 1 tablet by mouth 2 (two) times a day.       cholecalciferol, vitamin D3, 1,000 unit tablet [CHOLECALCIFEROL, VITAMIN D3, 1,000 UNIT TABLET] Take 2,000 Units by mouth 2 (two) times a day.        clindamycin (CLEOCIN) 150 MG capsule [CLINDAMYCIN (CLEOCIN) 150 MG CAPSULE] TAKE 4 CAPSULES  BY MOUTH 1 HOUR PRIOR TO DENTAL APPOINTMENT. 90 capsule 3     colestipol (COLESTID) 1 g tablet Take 1 tablet (1 g) by mouth 2 times daily 180 tablet 3     cranberry extract 300 mg Tab [CRANBERRY EXTRACT 300  "MG TAB] Take 1 tablet by mouth daily.        estradiol (ESTRACE) 0.1 MG/GM vaginal cream Place 2 g vaginally every other day 42.5 g 3     famotidine (PEPCID) 40 MG tablet Take 1 tablet (40 mg) by mouth 2 times daily 180 tablet 3     LACTOBACILLUS ACIDOPHILUS (PROBIOTIC ORAL) [LACTOBACILLUS ACIDOPHILUS (PROBIOTIC ORAL)] Take 1 tablet by mouth 2 (two) times a day.        loperamide (IMODIUM) 2 mg capsule [LOPERAMIDE (IMODIUM) 2 MG CAPSULE] Take 2 mg by mouth 4 (four) times a day as needed.       losartan (COZAAR) 100 MG tablet Take 1 tablet (100 mg) by mouth daily 90 tablet 3     LUTEIN ORAL [LUTEIN ORAL] Take 1 tablet by mouth daily. With Zeaxanthin       Magnesium Oxide 250 MG TABS Use every 3rd day       metFORMIN (GLUCOPHAGE XR) 500 MG 24 hr tablet TAKE TWO TABLETS BY MOUTH TWICE A  tablet 3     metroNIDAZOLE (METROGEL) 0.75 % gel [METRONIDAZOLE (METROGEL) 0.75 % GEL] Apply 1 application topically as needed.        montelukast (SINGULAIR) 10 MG tablet Take 1 tablet (10 mg) by mouth daily 90 tablet 3     psyllium (METAMUCIL) 3.4 gram packet [PSYLLIUM (METAMUCIL) 3.4 GRAM PACKET] Take 1 packet by mouth every evening.       SIMETHICONE (GAS-X ORAL) [SIMETHICONE (GAS-X ORAL)] Take 1 tablet by mouth 2 (two) times a day as needed.       sotalol (BETAPACE) 120 MG tablet Take 0.5 tablets (60 mg) by mouth 2 times daily 90 tablet 3     tiotropium (SPIRIVA HANDIHALER) 18 MCG inhaled capsule Inhale 1 capsule (18 mcg) into the lungs daily (Patient taking differently: Inhale 18 mcg into the lungs daily as needed) 90 capsule 3     UBIDECARENONE (COENZYME Q10 ORAL) [UBIDECARENONE (COENZYME Q10 ORAL)] Take 1 tablet by mouth daily.        zinc gluconate 50 mg tablet [ZINC GLUCONATE 50 MG TABLET] Take 50 mg by mouth daily.         Allergies   Allergen Reactions     Conray [Iothalamate] Anaphylaxis     30+ yr's ago / x-ray exam / was given \"dye\" iodine contrast / had Anaphylaxis reaction, [facility] - 11/30/16 1509      Diagnostic " X-Ray Materials Other (See Comments)     hypotension       Alendronate Sodium [Alendronic Acid] Nausea     Amoxicillin Unknown     Diatrizoate Meglumine [Diatrizoate] Other (See Comments)     Fosamax [Alendronic Acid] Unknown     Stomach cramps     House Dust [Dust Mite Extract] Unknown     Mold/Mildew [Molds & Smuts] Unknown     Other Allergy (See Comments) [External Allergen Needs Reconciliation - See Comment] Unknown     Contrast Media Ready-Box MISC, 11/06/2007.; Contrast Media Ready-Box MISC, 11/06/2007.       Penicillins Unknown     Prednisone Nausea     Other reaction(s): Abdominal Pain, She can have this as a shot not oral     Seafood Unknown     Sulfa (Sulfonamide Antibiotics) [Sulfa Drugs] Unknown          Lab Results    Chemistry/lipid CBC Cardiac Enzymes/BNP/TSH/INR   Recent Labs   Lab Test 12/16/22  0917 02/22/22  0819   CHOL 199 158   HDL 58 49*   LDL 99 63   TRIG 209* 228*     Recent Labs   Lab Test 12/16/22  0917 06/01/22  0949    141   POTASSIUM 4.3 4.0   CHLORIDE 104 105   CO2 27 24   ANIONGAP 10 12   * 133*   BUN 14.8 18   CR 0.64 0.74   WILNER 9.7 9.1     Hemoglobin A1C   Date Value Ref Range Status   12/16/2022 7.2 (H) 0.0 - 5.6 % Final     Comment:     Normal <5.7%   Prediabetes 5.7-6.4%    Diabetes 6.5% or higher     Note: Adopted from ADA consensus guidelines.      CBC RESULTS: Recent Labs   Lab Test 02/22/22  0819   WBC 11.2*   RBC 4.83   HGB 13.9   HCT 43.6   MCV 90   MCH 28.8   MCHC 31.9   RDW 14.0         Recent Labs   Lab Test 11/26/19  1540   TROPONINI <0.01     Recent Labs   Lab Test 10/02/20  1504   *     Recent Labs   Lab Test 02/22/22  0819   TSH 0.77     Recent Labs   Lab Test 02/16/21  1031 02/09/21  0953 01/25/21  1043   INR 1.40* 3.40* 2.90*      45 minutes were spent on the date of encounter performing chart review, history and exam, documentation, and further activities as noted above.            Thank you for allowing me to participate in the care of  your patient.      Sincerely,     WASHINGTON Smith CNP     [facility]  cc:   WASHINGTON Smith CNP  [address], [address]  [place] [address]

## 2023-02-21 ENCOUNTER — HOSPITAL ENCOUNTER (OUTPATIENT)
Dept: CARDIOLOGY | Facility: HOSPITAL | Age: 83
Discharge: HOME | End: 2023-02-21
Attending: NURSE PRACTITIONER
Payer: COMMERCIAL

## 2023-02-21 DIAGNOSIS — I49.3 PVC'S (PREMATURE VENTRICULAR CONTRACTIONS): ICD-10-CM

## 2023-02-21 DIAGNOSIS — I48.0 PAROXYSMAL ATRIAL FIBRILLATION (H): ICD-10-CM

## 2023-02-21 PROCEDURE — 999N000096 CARDIAC MOBILE TELEMETRY MONITOR

## 2023-02-27 ENCOUNTER — OFFICE VISIT (OUTPATIENT)
Dept: INTERNAL MEDICINE | Facility: CLINIC | Age: 83
End: 2023-02-27
Payer: COMMERCIAL

## 2023-02-27 ENCOUNTER — TELEPHONE (OUTPATIENT)
Dept: CARDIOLOGY | Facility: CLINIC | Age: 83
End: 2023-02-27

## 2023-02-27 VITALS
WEIGHT: 218 LBS | OXYGEN SATURATION: 98 % | SYSTOLIC BLOOD PRESSURE: 132 MMHG | DIASTOLIC BLOOD PRESSURE: 62 MMHG | HEIGHT: 62 IN | RESPIRATION RATE: 20 BRPM | HEART RATE: 67 BPM | BODY MASS INDEX: 40.12 KG/M2

## 2023-02-27 DIAGNOSIS — E78.5 HYPERLIPIDEMIA WITH TARGET LDL LESS THAN 100: ICD-10-CM

## 2023-02-27 DIAGNOSIS — I48.0 PAF (PAROXYSMAL ATRIAL FIBRILLATION) (H): ICD-10-CM

## 2023-02-27 DIAGNOSIS — K21.9 GASTROESOPHAGEAL REFLUX DISEASE WITHOUT ESOPHAGITIS: ICD-10-CM

## 2023-02-27 DIAGNOSIS — Z86.711 HISTORY OF PULMONARY EMBOLUS (PE): ICD-10-CM

## 2023-02-27 DIAGNOSIS — I49.3 PVC'S (PREMATURE VENTRICULAR CONTRACTIONS): ICD-10-CM

## 2023-02-27 DIAGNOSIS — G47.33 OSA (OBSTRUCTIVE SLEEP APNEA): ICD-10-CM

## 2023-02-27 DIAGNOSIS — I10 BENIGN ESSENTIAL HYPERTENSION: ICD-10-CM

## 2023-02-27 DIAGNOSIS — K58.0 IRRITABLE BOWEL SYNDROME WITH DIARRHEA: ICD-10-CM

## 2023-02-27 DIAGNOSIS — E11.9 TYPE 2 DIABETES MELLITUS WITHOUT COMPLICATION, WITHOUT LONG-TERM CURRENT USE OF INSULIN (H): ICD-10-CM

## 2023-02-27 DIAGNOSIS — J45.40 MODERATE PERSISTENT ASTHMA WITHOUT COMPLICATION: ICD-10-CM

## 2023-02-27 DIAGNOSIS — M81.0 AGE-RELATED OSTEOPOROSIS WITHOUT CURRENT PATHOLOGICAL FRACTURE: Primary | ICD-10-CM

## 2023-02-27 DIAGNOSIS — J30.1 NON-SEASONAL ALLERGIC RHINITIS DUE TO POLLEN: ICD-10-CM

## 2023-02-27 PROBLEM — K31.84 GASTROPARESIS: Status: ACTIVE | Noted: 2020-01-02

## 2023-02-27 PROBLEM — R91.1 LUNG NODULE: Status: ACTIVE | Noted: 2017-01-27

## 2023-02-27 PROBLEM — J32.9 CHRONIC SINUSITIS: Status: ACTIVE | Noted: 2022-09-28

## 2023-02-27 PROBLEM — E55.9 VITAMIN D DEFICIENCY: Status: ACTIVE | Noted: 2020-01-02

## 2023-02-27 PROBLEM — D24.9 BENIGN NEOPLASM OF BREAST: Status: ACTIVE | Noted: 2020-01-02

## 2023-02-27 PROBLEM — E04.9 ADENOMATOUS GOITER: Status: ACTIVE | Noted: 2020-01-02

## 2023-02-27 PROBLEM — D12.3 BENIGN NEOPLASM OF TRANSVERSE COLON: Status: ACTIVE | Noted: 2019-01-10

## 2023-02-27 PROBLEM — M19.019 ARTHROPATHY OF SHOULDER REGION: Status: ACTIVE | Noted: 2020-01-02

## 2023-02-27 PROBLEM — K58.9 IRRITABLE BOWEL SYNDROME: Status: ACTIVE | Noted: 2017-12-20

## 2023-02-27 PROBLEM — Z86.0100 HISTORY OF COLON POLYPS: Status: ACTIVE | Noted: 2018-02-13

## 2023-02-27 PROBLEM — J45.909 ASTHMA: Status: ACTIVE | Noted: 2020-01-02

## 2023-02-27 PROBLEM — S52.122A CLOSED FRACTURE OF HEAD OF LEFT RADIUS: Status: ACTIVE | Noted: 2020-01-02

## 2023-02-27 PROCEDURE — 99214 OFFICE O/P EST MOD 30 MIN: CPT | Performed by: INTERNAL MEDICINE

## 2023-02-27 NOTE — CONFIDENTIAL NOTE
Raissa MILLER notify alert noted.    New onset afib 2-24-23, 0500, hr 131.    Pt with a hx of paroxysmal afib, currently being followed by Ellyn Sr.  Current medications include Sotalol 60 mg bid and Eliquis 5 mg bid.    Will continue to monitor.

## 2023-02-27 NOTE — PROGRESS NOTES
Assessment & Plan   Problem List Items Addressed This Visit        Respiratory    Asthma     Follows with Dr. Mccormick, through [facility], last visit 12/2022, note reviewed. Stable on current regimen of Qvar, spiriva PRN, PRN albuterol.   - Continue current regimen and follow up with Dr. Mccormick         Allergic rhinitis     Follows with Dr. Mccormick. Stable on astelin nasal spray and singulair 10 mg daily.         CHRISTOPHER (obstructive sleep apnea)     Nocturnal CPAP            Digestive    Gastroesophageal reflux disease without esophagitis     Well controlled on pepcid 40 mg daily         Irritable bowel syndrome      Sees Radha Breaux at [facility], last 11/3/22. She alternates between constipation and diarrhea but mainly diarrhea.   - Continue PRN imodium, magnesium, probiotic and colestipol             Endocrine    Type 2 diabetes mellitus without complication (H)     Current regimen is metformin 1g BID. Well controlled with  A1c 7.2 (12/2022)  - Continue current metformin  - Foot exam next visit          Hyperlipidemia with target LDL less than 100     Lipids 12/2022 Tchol 199, LDL 99, HDL 58,   - Continue atorvastatin 80 and CoQ10            Circulatory    Benign essential hypertension     Well controlled today on losartan 100 and amlodipine 5. Continue these.          PVC's (premature ventricular contractions)     Previously had high burden, improved on most recent Holter.   - Continue sotalol 60 BID  - Continue follow up with EP         PAF (paroxysmal atrial fibrillation) (H)     Follows with EP, Dr. Wong. Has 2 week monitor right now because of increased burden seen on most recent monitor.   - Continue sotalol 60 BID  - Continue eliquis for AC  - Continue follow up with EP            Musculoskeletal and Integumentary    Age-related osteoporosis without current pathological fracture - Primary     Prolia, next injection will 3/9/23, that will be her third shot. Vitamin D3 1000 IU daily and calcium  "carb.   - Next Prolia at [place] in March  - Endo referral for continued prolia  - Continue vitamin D3 and calcium supplements  - Due for vitamin D recheck with next labs         Relevant Orders    Adult Endocrinology  Referral       Other    History of pulmonary embolus (PE)     On lifelong AC with Eliquis.            Review of prior external note(s) from - [facility] information from [facility]  reviewed  I spent a total of 39 minutes on the day of the visit.   Time spent doing chart review, history and exam, documentation and further activities per the note       BMI:   Estimated body mass index is 39.87 kg/m  as calculated from the following:    Height as of this encounter: 1.575 m (5' 2\").    Weight as of this encounter: 98.9 kg (218 lb).   Weight management plan: Discussed healthy diet and exercise guidelines    Return in about 2 months (around 4/27/2023).    Cori Campuzano MD  [facility] CHRISTIANO Dalton is a 82 year old accompanied by her spouse, presenting for the following health issues:  Recheck Medication and Establish Care    History of Present Illness       Reason for visit:  Medication check    She eats 0-1 servings of fruits and vegetables daily.She consumes 0 sweetened beverage(s) daily.She exercises with enough effort to increase her heart rate 9 or less minutes per day.  She exercises with enough effort to increase her heart rate 3 or less days per week.   She is taking medications regularly.     T2DM: Metformin 1g BID, A1c 7.2 (12/2022)    Afib / PVC: Follows with EP, Dr. Wong. Last saw NP Ellyn Gregorion 2/13/23, note reviewed. Current regimen Eliquis 5 BID and sotalol 60 BID. She had recent holter that showed rare PVCs but increased AFib. So, they proceeded with repeat monitor, which she is wearing for 2 weeks right now. There was an alert this morning for episode of Afib per chart review. Plan for follow up in 6 months.      H/o b/l PEs: " "Unprovoked. Eliquis 5 BID. Previously on warfarin but issues with epistaxis and bad bleeds so switched to eliquis.     Asthma: follows with Dr. Mccormick, Qvar, spiriva PRN, PRN albuterol    AR: Astelin nasal spray, singulair 10. Now off steroid nasal sprays.     HLD: Atorvastatin 80, CoQ10. Lipids 12/2022 Tchol 199, LDL 99, HDL 58,     IBS: Colestipol 1g BID, magnesium, imodium, probiotic  - Sees Radha Breaux at [facility], last 11/3/22. Instructed her to take more imodium.     Atrophic vaginitis: Estrace cream     GERD: Pepcid 40    HTN: losartan 100, amlodipine 5. BP today 132/62. Home BPs usually 120s/60s.     Osteoporosis: Prolia, next injection will 3/9/23, that will be her third shot. Vitamin D3 1000 IU daily and calcium carb  - Scheduled for March 9     Review of Systems   Constitutional, HEENT, cardiovascular, pulmonary, gi and gu systems are negative, except as otherwise noted.      Objective    /62 (BP Location: Right arm, Patient Position: Sitting, Cuff Size: Adult Large)   Pulse 67   Resp 20   Ht 1.575 m (5' 2\")   Wt 98.9 kg (218 lb)   SpO2 98%   BMI 39.87 kg/m    Body mass index is 39.87 kg/m .  Physical Exam   GENERAL: healthy, alert and no distress  EYES: Eyes grossly normal to inspection and conjunctivae and sclerae normal  HENT: nose and mouth without ulcers or lesions  RESP: lungs clear to auscultation - no rales, rhonchi or wheezes  CV: regular rate and rhythm, normal S1 S2, no S3 or S4, no murmur, click or rub, no peripheral edema and peripheral pulses strong  ABDOMEN: soft, nontender, no hepatosplenomegaly, no masses and bowel sounds normal  MS: no gross musculoskeletal defects noted, no edema  SKIN: no suspicious lesions or rashes  NEURO: Normal strength and tone, mentation intact and speech normal  PSYCH: mentation appears normal, affect normal/bright    Lab on 12/16/2022   Component Date Value Ref Range Status     Sodium 12/16/2022 141  136 - 145 mmol/L Final     Potassium " 12/16/2022 4.3  3.4 - 5.3 mmol/L Final     Chloride 12/16/2022 104  98 - 107 mmol/L Final     Carbon Dioxide (CO2) 12/16/2022 27  22 - 29 mmol/L Final     Anion Gap 12/16/2022 10  7 - 15 mmol/L Final     Urea Nitrogen 12/16/2022 14.8  8.0 - 23.0 mg/dL Final     Creatinine 12/16/2022 0.64  0.51 - 0.95 mg/dL Final     Calcium 12/16/2022 9.7  8.8 - 10.2 mg/dL Final     Glucose 12/16/2022 142 (H)  70 - 99 mg/dL Final     GFR Estimate 12/16/2022 89  >60 mL/min/1.73m2 Final    Effective December 21, 2021 eGFRcr in adults is calculated using the 2021 CKD-EPI creatinine equation which includes age and gender (Halina et al., NEJ, DOI: 10.1056/NVFMeq1857148)     Hemoglobin A1C 12/16/2022 7.2 (H)  0.0 - 5.6 % Final    Normal <5.7%   Prediabetes 5.7-6.4%    Diabetes 6.5% or higher     Note: Adopted from ADA consensus guidelines.     Cholesterol 12/16/2022 199  <200 mg/dL Final     Triglycerides 12/16/2022 209 (H)  <150 mg/dL Final     Direct Measure HDL 12/16/2022 58  >=50 mg/dL Final     LDL Cholesterol Calculated 12/16/2022 99  <=100 mg/dL Final     Non HDL Cholesterol 12/16/2022 141 (H)  <130 mg/dL Final

## 2023-02-28 NOTE — ASSESSMENT & PLAN NOTE
Sees Radha Breaux at [facility], last 11/3/22. She alternates between constipation and diarrhea but mainly diarrhea.   - Continue PRN imodium, magnesium, probiotic and colestipol

## 2023-02-28 NOTE — ASSESSMENT & PLAN NOTE
Prolia, next injection will 3/9/23, that will be her third shot. Vitamin D3 1000 IU daily and calcium carb.   - Next Prolia at [place] in March  - Endo referral for continued prolia  - Continue vitamin D3 and calcium supplements  - Due for vitamin D recheck with next labs

## 2023-02-28 NOTE — ASSESSMENT & PLAN NOTE
Follows with Dr. Mccormick, through [facility], last visit 12/2022, note reviewed. Stable on current regimen of Qvar, spiriva PRN, PRN albuterol.   - Continue current regimen and follow up with Dr. Mccormick

## 2023-02-28 NOTE — ASSESSMENT & PLAN NOTE
Previously had high burden, improved on most recent Holter.   - Continue sotalol 60 BID  - Continue follow up with EP

## 2023-02-28 NOTE — ASSESSMENT & PLAN NOTE
Current regimen is metformin 1g BID. Well controlled with  A1c 7.2 (12/2022)  - Continue current metformin  - Foot exam next visit

## 2023-02-28 NOTE — ASSESSMENT & PLAN NOTE
Follows with EP, Dr. Wong. Has 2 week monitor right now because of increased burden seen on most recent monitor.   - Continue sotalol 60 BID  - Continue eliquis for AC  - Continue follow up with EP

## 2023-03-06 ENCOUNTER — TELEPHONE (OUTPATIENT)
Dept: INTERNAL MEDICINE | Facility: CLINIC | Age: 83
End: 2023-03-06
Payer: COMMERCIAL

## 2023-03-06 DIAGNOSIS — M81.0 SENILE OSTEOPOROSIS: Primary | ICD-10-CM

## 2023-03-06 DIAGNOSIS — Z92.29 PERSONAL HISTORY OF OTHER DRUG THERAPY: ICD-10-CM

## 2023-03-06 NOTE — TELEPHONE ENCOUNTER
Pt was previously seen by Dr. Mehta, provider is no longer with [facility]. Pt is scheduled for prolia 3/10/23, Friday this week.     Pt had establish care visit with Dr. Campuzano 2/27/23 and patient has an appt with ENDO 8/21/23.

## 2023-03-07 ENCOUNTER — TRANSFERRED RECORDS (OUTPATIENT)
Dept: HEALTH INFORMATION MANAGEMENT | Facility: CLINIC | Age: 83
End: 2023-03-07

## 2023-03-09 PROCEDURE — 93228 REMOTE 30 DAY ECG REV/REPORT: CPT | Performed by: INTERNAL MEDICINE

## 2023-03-10 ENCOUNTER — ALLIED HEALTH/NURSE VISIT (OUTPATIENT)
Dept: FAMILY MEDICINE | Facility: CLINIC | Age: 83
End: 2023-03-10
Payer: COMMERCIAL

## 2023-03-10 DIAGNOSIS — M81.0 AGE-RELATED OSTEOPOROSIS WITHOUT CURRENT PATHOLOGICAL FRACTURE: Primary | ICD-10-CM

## 2023-03-10 PROCEDURE — 99207 PR NO CHARGE NURSE ONLY: CPT

## 2023-03-10 PROCEDURE — 96372 THER/PROPH/DIAG INJ SC/IM: CPT | Performed by: INTERNAL MEDICINE

## 2023-03-10 NOTE — PROGRESS NOTES
Clinic Administered Medication Documentation    Administrations This Visit     denosumab (PROLIA) injection 60 mg     Admin Date  03/10/2023 Action  $Given Dose  60 mg Route  Subcutaneous Site  Left Arm Administered By  Sancho Hernández RN    Ordering Provider: Sg Keys MD    Patient Supplied?: No              Prolia Documentation    Prior to injection, verified patient identity using patient's name and date of birth. Medication was administered. Please see MAR and medication order for additional information. Patient instructed to remain in clinic for 15 minutes.    Indication: Prolia  (denosumab) is a prescription medicine used to treat osteoporosis in patients who:     Are at high risk for fracture, meaning patients who have had a fracture related to osteoporosis, or who have multiple risk factors for fracture.    Cannot use another osteoporosis medicine or other osteoporosis medicines did not work well.    The timeline for early/late injections would be 4 weeks early and any time after the 6 month chico. If a patient receives their injection late, then the subsequent injection would be 6 months from the date that they actually received the injection.    When was the last injection?  9/9/2022  Was the last injection at least 6 months ago? Yes  Has the prior authorization been completed?  Yes  Is there an active order (within the past 365 days) in the chart?  Yes  Patient denies any dental work involving the bone (e.g. tooth extraction or dental implants) in the past 4 weeks?  Yes  Patient denies plans for any dental work involving the bone (e.g. tooth extraction or dental implants) in the next 4 weeks? Yes    The following steps were completed to comply with the REMS program for Prolia:    Reviewed information in the Medication Guide and Patient Counseling Chart, including the serious risks of Prolia  and the symptoms of each risk.    Advised patient to seek prompt medical attention if they have signs or  symptoms of any of the serious risks.    Provided each patient a copy of the Medication Guide and Patient Brochure.      Was entire vial of medication used? Yes  Vial/Syringe: Single dose vial  Expiration Date:  03/30/2025  Was this medication supplied by the patient? No

## 2023-03-10 NOTE — NURSING NOTE
Patient notified that this will be her last prolia injection here. Patient stated she is scheduled with a new endo provider and will get her next prolia injection with endo.

## 2023-03-13 NOTE — RESULT ENCOUNTER NOTE
MCOT shows sinus rhythm with episodes of A-fib with elevated ventricular rates.  She was in AF about half the day on 2/24 and 14% of 2/26 (overall burden was 5%).  Rare PVCs, only 1%, which is significantly decreased from previous.  She appears to be symptomatic with A-fib, but a couple were sinus rhythm.    If she is feeling okay we can continue on the current dose of sotalol and and monitor, or further discuss other antiarrhythmic medications versus ablation.  Thanks,  Ellyn

## 2023-04-12 DIAGNOSIS — K58.9 IRRITABLE BOWEL SYNDROME, UNSPECIFIED TYPE: ICD-10-CM

## 2023-04-13 RX ORDER — MONTELUKAST SODIUM 4 MG/1
1 TABLET, CHEWABLE ORAL 2 TIMES DAILY
Qty: 180 TABLET | Refills: 3 | Status: SHIPPED | OUTPATIENT
Start: 2023-04-13 | End: 2024-03-29

## 2023-04-13 NOTE — TELEPHONE ENCOUNTER
"Last Written Prescription Date:  12/6/2022  Last Fill Quantity: 180,  # refills: 3   Last office visit provider:  2/27/2023     Requested Prescriptions   Pending Prescriptions Disp Refills     colestipol (COLESTID) 1 g tablet 180 tablet 3     Sig: Take 1 tablet (1 g) by mouth 2 times daily       Bile Acid Sequestrant Agents Passed - 4/12/2023  3:09 PM        Passed - Lipid panel on file in past 12 mos     Recent Labs   Lab Test 12/16/22  0917   CHOL 199   TRIG 209*   HDL 58   LDL 99   NHDL 141*               Passed - Recent (12 mo) or future (30 days) visit within the authorizing provider's specialty     Patient has had an office visit with the authorizing provider or a provider within the authorizing providers department within the previous 12 mos or has a future within next 30 days. See \"Patient Info\" tab in inbasket, or \"Choose Columns\" in Meds & Orders section of the refill encounter.              Passed - Medication is active on med list        Passed - Patient is age 18 years or older        Passed - No active pregnancy on record        Passed - No positive pregnancy test in past 12 months             Adeline Youssef RN 04/13/23 12:43 PM          "

## 2023-04-14 DIAGNOSIS — I49.3 PVC'S (PREMATURE VENTRICULAR CONTRACTIONS): ICD-10-CM

## 2023-04-14 DIAGNOSIS — I48.0 PAF (PAROXYSMAL ATRIAL FIBRILLATION) (H): ICD-10-CM

## 2023-04-14 RX ORDER — SOTALOL HYDROCHLORIDE 120 MG/1
TABLET ORAL
Qty: 90 TABLET | Refills: 3 | Status: SHIPPED | OUTPATIENT
Start: 2023-04-14 | End: 2024-04-15

## 2023-04-27 ENCOUNTER — TELEPHONE (OUTPATIENT)
Dept: INTERNAL MEDICINE | Facility: CLINIC | Age: 83
End: 2023-04-27
Payer: COMMERCIAL

## 2023-04-27 ASSESSMENT — ASTHMA QUESTIONNAIRES
QUESTION_1 LAST FOUR WEEKS HOW MUCH OF THE TIME DID YOUR ASTHMA KEEP YOU FROM GETTING AS MUCH DONE AT WORK, SCHOOL OR AT HOME: NONE OF THE TIME
QUESTION_4 LAST FOUR WEEKS HOW OFTEN HAVE YOU USED YOUR RESCUE INHALER OR NEBULIZER MEDICATION (SUCH AS ALBUTEROL): NOT AT ALL
QUESTION_3 LAST FOUR WEEKS HOW OFTEN DID YOUR ASTHMA SYMPTOMS (WHEEZING, COUGHING, SHORTNESS OF BREATH, CHEST TIGHTNESS OR PAIN) WAKE YOU UP AT NIGHT OR EARLIER THAN USUAL IN THE MORNING: NOT AT ALL
QUESTION_5 LAST FOUR WEEKS HOW WOULD YOU RATE YOUR ASTHMA CONTROL: WELL CONTROLLED
QUESTION_2 LAST FOUR WEEKS HOW OFTEN HAVE YOU HAD SHORTNESS OF BREATH: ONCE OR TWICE A WEEK
ACT_TOTALSCORE: 23
ACT_TOTALSCORE: 23

## 2023-04-27 NOTE — TELEPHONE ENCOUNTER
April 27, 2023 I spoke with Krystle, she is in need of financial assistance for medication.    We reviewed the Pharmacy Assistance Fund $500, the Prescription Assistance Program for manfacturer assistance programs, gross income, insurance and Rx list.    Unfortunately Krystle is well over income for all manufacture assistance programs and the Pharmacy Assistance Fund $500.    She does not expect the income will change, but will keep our contact information for future reference.    Krystle Ramírez  Prescription Assistance Supervisor  Pharmacy Assistance  [phone]

## 2023-04-28 ENCOUNTER — OFFICE VISIT (OUTPATIENT)
Dept: INTERNAL MEDICINE | Facility: CLINIC | Age: 83
End: 2023-04-28
Payer: COMMERCIAL

## 2023-04-28 VITALS
RESPIRATION RATE: 16 BRPM | SYSTOLIC BLOOD PRESSURE: 129 MMHG | DIASTOLIC BLOOD PRESSURE: 63 MMHG | HEART RATE: 83 BPM | BODY MASS INDEX: 39.38 KG/M2 | OXYGEN SATURATION: 94 % | HEIGHT: 62 IN | WEIGHT: 214 LBS | TEMPERATURE: 97.6 F

## 2023-04-28 DIAGNOSIS — I48.0 PAF (PAROXYSMAL ATRIAL FIBRILLATION) (H): ICD-10-CM

## 2023-04-28 DIAGNOSIS — J30.1 NON-SEASONAL ALLERGIC RHINITIS DUE TO POLLEN: ICD-10-CM

## 2023-04-28 DIAGNOSIS — G47.33 OSA (OBSTRUCTIVE SLEEP APNEA): ICD-10-CM

## 2023-04-28 DIAGNOSIS — J45.40 MODERATE PERSISTENT ASTHMA WITHOUT COMPLICATION: ICD-10-CM

## 2023-04-28 DIAGNOSIS — E11.9 TYPE 2 DIABETES MELLITUS WITHOUT COMPLICATION, WITHOUT LONG-TERM CURRENT USE OF INSULIN (H): Primary | ICD-10-CM

## 2023-04-28 DIAGNOSIS — I10 BENIGN ESSENTIAL HYPERTENSION: ICD-10-CM

## 2023-04-28 DIAGNOSIS — M81.0 AGE-RELATED OSTEOPOROSIS WITHOUT CURRENT PATHOLOGICAL FRACTURE: ICD-10-CM

## 2023-04-28 DIAGNOSIS — E55.9 VITAMIN D DEFICIENCY: ICD-10-CM

## 2023-04-28 DIAGNOSIS — K21.9 GASTROESOPHAGEAL REFLUX DISEASE WITHOUT ESOPHAGITIS: ICD-10-CM

## 2023-04-28 DIAGNOSIS — E78.5 HYPERLIPIDEMIA WITH TARGET LDL LESS THAN 100: ICD-10-CM

## 2023-04-28 DIAGNOSIS — K58.0 IRRITABLE BOWEL SYNDROME WITH DIARRHEA: ICD-10-CM

## 2023-04-28 LAB — HBA1C MFR BLD: 6.6 % (ref 0–5.6)

## 2023-04-28 PROCEDURE — 83036 HEMOGLOBIN GLYCOSYLATED A1C: CPT | Performed by: INTERNAL MEDICINE

## 2023-04-28 PROCEDURE — 36415 COLL VENOUS BLD VENIPUNCTURE: CPT | Performed by: INTERNAL MEDICINE

## 2023-04-28 PROCEDURE — 82306 VITAMIN D 25 HYDROXY: CPT | Performed by: INTERNAL MEDICINE

## 2023-04-28 PROCEDURE — 99213 OFFICE O/P EST LOW 20 MIN: CPT | Performed by: INTERNAL MEDICINE

## 2023-04-28 ASSESSMENT — ANXIETY QUESTIONNAIRES
1. FEELING NERVOUS, ANXIOUS, OR ON EDGE: SEVERAL DAYS
GAD7 TOTAL SCORE: 2
3. WORRYING TOO MUCH ABOUT DIFFERENT THINGS: NOT AT ALL
5. BEING SO RESTLESS THAT IT IS HARD TO SIT STILL: NOT AT ALL
7. FEELING AFRAID AS IF SOMETHING AWFUL MIGHT HAPPEN: SEVERAL DAYS
2. NOT BEING ABLE TO STOP OR CONTROL WORRYING: NOT AT ALL
GAD7 TOTAL SCORE: 2
IF YOU CHECKED OFF ANY PROBLEMS ON THIS QUESTIONNAIRE, HOW DIFFICULT HAVE THESE PROBLEMS MADE IT FOR YOU TO DO YOUR WORK, TAKE CARE OF THINGS AT HOME, OR GET ALONG WITH OTHER PEOPLE: NOT DIFFICULT AT ALL
6. BECOMING EASILY ANNOYED OR IRRITABLE: NOT AT ALL

## 2023-04-28 ASSESSMENT — PATIENT HEALTH QUESTIONNAIRE - PHQ9
5. POOR APPETITE OR OVEREATING: NOT AT ALL
SUM OF ALL RESPONSES TO PHQ QUESTIONS 1-9: 1

## 2023-04-28 ASSESSMENT — PAIN SCALES - GENERAL: PAINLEVEL: NO PAIN (0)

## 2023-04-28 NOTE — PROGRESS NOTES
Assessment & Plan   Problem List Items Addressed This Visit        Respiratory    Asthma     Follows with Dr. Mccormick through . Next visit is Mon 5/1. Symptoms stable today.   - Continue Qvar, spririva PRN and albuterol PRN         Allergic rhinitis     Follows at , Dr. Mccormick. Well controlled on astelin and singulair 10 mg daily.          CHRISTOPHER (obstructive sleep apnea)     Nocturnal CPAP            Digestive    Gastroesophageal reflux disease without esophagitis     Well controlled with pepcid 40 mg daily         Irritable bowel syndrome     Follows at [facility] with Radha Breaux.   - Continue PRN imodium, magnesium, probiotic and colestipol         Vitamin D deficiency    Relevant Orders    Vitamin D Deficiency (Completed)       Endocrine    Type 2 diabetes mellitus without complication (H) - Primary     Well controlled with metformin 1g BID. Last A1c 7.2 (12/2022)  - Repeat A1c today even better at 6.6  - Continue metformin  - Foot exam at [facility]         Relevant Orders    HEMOGLOBIN A1C (Completed)    Hyperlipidemia with target LDL less than 100     Lipids 12/2022 showed LDL of 99.   - Continue atorvastatin 80 and CoQ10  - Repeat lipids at [facility]            Circulatory    Benign essential hypertension     Well controlled on losartan 100 and amlodipine 5.         PAF (paroxysmal atrial fibrillation) (H)     Follows with EP, Dr. Wong. Two week monitor in February showed predominant sinus rhythm, 5% paroxysmal afib, rare ectopic beast and PVC. Symptom trigger correlated to sinus rhythm, afib and PVCs.  - Continue sotalol 60 BID, eliquis 5 BID for AC  - Continue follow up with EP            Musculoskeletal and Integumentary    Age-related osteoporosis without current pathological fracture     On Prolia since 3/2022. Taking vitamin D3 and calcium supplements.   - Continue prolia, next injection will be 9/10/23  - Repeat DEXA after next injection             Ordering of each unique test  I spent a total of 23 minutes on  the day of the visit.   Time spent by me doing chart review, history and exam, documentation and further activities per the note     FUTURE APPOINTMENTS:       - Follow-up visit in 2 months for PRISCILA Campuzano MD  [facility] CHRISTIANO Dalton is a 82 year old, presenting for the following health issues:  Follow Up        11/21/2022     4:20 PM   Additional Questions   Roomed by colin   Accompanied by spouse     History of Present Illness       Reason for visit:  2 Month Follow Up    She eats 4 or more servings of fruits and vegetables daily.She consumes 1 sweetened beverage(s) daily.She exercises with enough effort to increase her heart rate 10 to 19 minutes per day.  She exercises with enough effort to increase her heart rate 4 days per week.   She is taking medications regularly.     T2DM: Metformin 1g BID, A1c 7.2 (12/2022). Due for repeat today.      Afib / PVC: Follows with EP, Dr. Wong. Last saw NP Ellyn Sr 2/13/23, note reviewed. Current regimen Eliquis 5 BID and sotalol 60 BID. She had holter 8/2022 that showed rare PVCs but increased AFib. Repeat 2 week monitor 2-3/2023 showed predominant sinus rhythm, 5% paroxysmal afib, rare ectopic beast and PVC. Symptom trigger correlated to sinus rhythm, afib and PVCs. Plan for follow up in 6 months - could further discuss ablation vs other antiarrhythmic meds.       H/o b/l PEs: Unprovoked. Eliquis 5 BID. Previously on warfarin but issues with epistaxis and bad bleeds so switched to eliquis.      Asthma: follows with Dr. Mccormick, Qvar, spiriva PRN, PRN albuterol     AR: Astelin nasal spray, singulair 10. Now off steroid nasal sprays.      HLD: Atorvastatin 80, CoQ10. Lipids 12/2022 Tchol 199, LDL 99, HDL 58,      IBS: Colestipol 1g BID, magnesium, imodium, probiotic. This is stable, better with increased or sooner imodium use on bad days.      Atrophic vaginitis: Estrace cream      GERD: Pepcid 40     HTN: losartan  "100, amlodipine 5. BP today 132/62. Home BPs usually 120s/60s. Today 129/63.      Osteoporosis: Prolia, last injection 3/10/23, that was her third shot. Vitamin D3 1000 IU daily and calcium carb    Review of Systems   Constitutional, HEENT, cardiovascular, pulmonary, gi and gu systems are negative, except as otherwise noted.      Objective    /63 (BP Location: Right arm, Patient Position: Sitting, Cuff Size: Adult Large)   Pulse 83   Temp 97.6  F (36.4  C) (Oral)   Resp 16   Ht 1.575 m (5' 2\")   Wt 97.1 kg (214 lb)   LMP  (LMP Unknown)   SpO2 94%   BMI 39.14 kg/m    Body mass index is 39.14 kg/m .  Physical Exam   GENERAL: healthy, alert and no distress  EYES: Eyes grossly normal to inspection and conjunctivae and sclerae normal  HENT: nose and mouth without ulcers or lesions  NECK: no adenopathy, no asymmetry, masses, or scars and thyroid normal to palpation  RESP: lungs clear to auscultation - no rales, rhonchi or wheezes  CV: regular rate and rhythm, normal S1 S2, no S3 or S4, no murmur, click or rub, no peripheral edema and peripheral pulses strong  ABDOMEN: soft, nontender, no hepatosplenomegaly, no masses and bowel sounds normal  MS: no gross musculoskeletal defects noted, no edema  SKIN: no suspicious lesions or rashes  NEURO: Normal strength and tone, mentation intact and speech normal  PSYCH: mentation appears normal, affect normal/bright    Results for orders placed or performed in visit on 04/28/23   HEMOGLOBIN A1C     Status: Abnormal   Result Value Ref Range    Hemoglobin A1C 6.6 (H) 0.0 - 5.6 %   Vitamin D Deficiency     Status: Normal   Result Value Ref Range    Vitamin D, Total (25-Hydroxy) 72 20 - 75 ug/L    Narrative    Season, race, dietary intake, and treatment affect the concentration of 25-hydroxy-Vitamin D. Values may decrease during winter months and increase during summer months. Values 20-29 ug/L may indicate Vitamin D insufficiency and values <20 ug/L may indicate Vitamin D " deficiency.    Vitamin D determination is routinely performed by an immunoassay specific for 25 hydroxyvitamin D3.  If an individual is on vitamin D2(ergocalciferol) supplementation, please specify 25 OH vitamin D2 and D3 level determination by LCMSMS test [id number].

## 2023-04-29 LAB — DEPRECATED CALCIDIOL+CALCIFEROL SERPL-MC: 72 UG/L (ref 20–75)

## 2023-04-30 NOTE — ASSESSMENT & PLAN NOTE
On Prolia since 3/2022. Taking vitamin D3 and calcium supplements.   - Continue prolia, next injection will be 9/10/23  - Repeat DEXA after next injection

## 2023-04-30 NOTE — ASSESSMENT & PLAN NOTE
Well controlled with metformin 1g BID. Last A1c 7.2 (12/2022)  - Repeat A1c today even better at 6.6  - Continue metformin  - Foot exam at [facility]

## 2023-04-30 NOTE — ASSESSMENT & PLAN NOTE
Follows at [facility] with Radha Breaux.   - Continue PRN imodium, magnesium, probiotic and colestipol

## 2023-04-30 NOTE — ASSESSMENT & PLAN NOTE
Follows with Dr. Mccormick through . Next visit is Mon 5/1. Symptoms stable today.   - Continue Qvar, spririva PRN and albuterol PRN

## 2023-04-30 NOTE — ASSESSMENT & PLAN NOTE
Follows with EP, Dr. Wong. Two week monitor in February showed predominant sinus rhythm, 5% paroxysmal afib, rare ectopic beast and PVC. Symptom trigger correlated to sinus rhythm, afib and PVCs.  - Continue sotalol 60 BID, eliquis 5 BID for AC  - Continue follow up with EP

## 2023-05-07 ENCOUNTER — HEALTH MAINTENANCE LETTER (OUTPATIENT)
Age: 83
End: 2023-05-07

## 2023-05-12 ENCOUNTER — TRANSFERRED RECORDS (OUTPATIENT)
Dept: HEALTH INFORMATION MANAGEMENT | Facility: CLINIC | Age: 83
End: 2023-05-12
Payer: COMMERCIAL

## 2023-05-31 ENCOUNTER — MEDICAL CORRESPONDENCE (OUTPATIENT)
Dept: HEALTH INFORMATION MANAGEMENT | Facility: CLINIC | Age: 83
End: 2023-05-31
Payer: COMMERCIAL

## 2023-06-06 ENCOUNTER — TELEPHONE (OUTPATIENT)
Dept: LAB | Facility: CLINIC | Age: 83
End: 2023-06-06
Payer: COMMERCIAL

## 2023-06-06 DIAGNOSIS — I10 BENIGN ESSENTIAL HYPERTENSION: Primary | ICD-10-CM

## 2023-06-06 NOTE — PROGRESS NOTES
Patient has an upcoming lab appointment with no orders. Please place orders. If not labs are needed at this time please reach out to the patient.   Thank you

## 2023-06-06 NOTE — CONFIDENTIAL NOTE
RECORDS RECEIVED FROM: internal /ce   DATE RECEIVED: 8.21.23    NOTES (FOR ALL VISITS) STATUS DETAILS   OFFICE NOTES from referring provider internal   Dr Campuzano     MEDICATION LIST internal     IMAGING      DEXASCAN internal  10.15.21      CT (HEAD/NECK/CHEST/ABDOMEN) internal  2.16.22, 1.28.22     ULTRASOUND (HEAD/NECK) ce - 8.29.22   LABS     DIABETES: HBGA1C, CREATININE, FASTING LIPIDS, MICROALBUMIN URINE, POTASSIUM, TSH, T4    THYROID: TSH, T4, CBC, THYRODLONULIN, TOTAL T3, FREE T4, CALCITONIN, CEA internal /ce Vitamin D- 4.28.23  HBGA1C- 4.28.23  Lipid- 12.16.22  BMP- 12.16.22   Cbc- 8/4/22  Glucose whole body- 6/7/22   TSH/T4- 2.22.22

## 2023-06-08 ENCOUNTER — TRANSFERRED RECORDS (OUTPATIENT)
Dept: HEALTH INFORMATION MANAGEMENT | Facility: CLINIC | Age: 83
End: 2023-06-08
Payer: COMMERCIAL

## 2023-06-08 LAB — RETINOPATHY: NEGATIVE

## 2023-06-30 ENCOUNTER — OFFICE VISIT (OUTPATIENT)
Dept: INTERNAL MEDICINE | Facility: CLINIC | Age: 83
End: 2023-06-30
Payer: COMMERCIAL

## 2023-06-30 VITALS
TEMPERATURE: 98 F | HEART RATE: 60 BPM | SYSTOLIC BLOOD PRESSURE: 122 MMHG | DIASTOLIC BLOOD PRESSURE: 78 MMHG | HEIGHT: 62 IN | RESPIRATION RATE: 23 BRPM | BODY MASS INDEX: 38.64 KG/M2 | OXYGEN SATURATION: 94 % | WEIGHT: 210 LBS

## 2023-06-30 DIAGNOSIS — K58.0 IRRITABLE BOWEL SYNDROME WITH DIARRHEA: ICD-10-CM

## 2023-06-30 DIAGNOSIS — M81.0 AGE-RELATED OSTEOPOROSIS WITHOUT CURRENT PATHOLOGICAL FRACTURE: ICD-10-CM

## 2023-06-30 DIAGNOSIS — I48.0 PAF (PAROXYSMAL ATRIAL FIBRILLATION) (H): ICD-10-CM

## 2023-06-30 DIAGNOSIS — I10 BENIGN ESSENTIAL HYPERTENSION: ICD-10-CM

## 2023-06-30 DIAGNOSIS — J30.1 NON-SEASONAL ALLERGIC RHINITIS DUE TO POLLEN: ICD-10-CM

## 2023-06-30 DIAGNOSIS — Z86.711 HISTORY OF PULMONARY EMBOLUS (PE): ICD-10-CM

## 2023-06-30 DIAGNOSIS — J45.40 MODERATE PERSISTENT ASTHMA WITHOUT COMPLICATION: ICD-10-CM

## 2023-06-30 DIAGNOSIS — D32.9 MENINGIOMA (H): Primary | ICD-10-CM

## 2023-06-30 DIAGNOSIS — Z23 ENCOUNTER FOR VACCINATION: ICD-10-CM

## 2023-06-30 DIAGNOSIS — E11.9 TYPE 2 DIABETES MELLITUS WITHOUT COMPLICATION, WITHOUT LONG-TERM CURRENT USE OF INSULIN (H): ICD-10-CM

## 2023-06-30 DIAGNOSIS — K21.9 GASTROESOPHAGEAL REFLUX DISEASE WITHOUT ESOPHAGITIS: ICD-10-CM

## 2023-06-30 DIAGNOSIS — G47.33 OSA (OBSTRUCTIVE SLEEP APNEA): ICD-10-CM

## 2023-06-30 PROCEDURE — 91312 COVID-19 BIVALENT 12+ (PFIZER): CPT | Performed by: INTERNAL MEDICINE

## 2023-06-30 PROCEDURE — 0124A COVID-19 BIVALENT 12+ (PFIZER): CPT | Performed by: INTERNAL MEDICINE

## 2023-06-30 PROCEDURE — 99214 OFFICE O/P EST MOD 30 MIN: CPT | Mod: 25 | Performed by: INTERNAL MEDICINE

## 2023-06-30 ASSESSMENT — PAIN SCALES - GENERAL: PAINLEVEL: MILD PAIN (3)
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
## 2023-06-30 NOTE — PATIENT INSTRUCTIONS
For your eye. Do warm compresses 5-10 minutes 4 times a day.     Cardiology phone number: [phone]    Call to get prolia scheduled 9/10/23. Cancel endocrine appointment.

## 2023-07-01 ENCOUNTER — MYC MEDICAL ADVICE (OUTPATIENT)
Dept: INTERNAL MEDICINE | Facility: CLINIC | Age: 83
End: 2023-07-01
Payer: COMMERCIAL

## 2023-07-01 PROBLEM — D32.9 MENINGIOMA (H): Status: ACTIVE | Noted: 2023-07-01

## 2023-07-01 NOTE — ASSESSMENT & PLAN NOTE
Well controlled with metformin 1g BID. A1c 6.6 (4/28/23). Eye exam was normal 6/8/23.   - Continue current regimen   - Foot exam next time

## 2023-07-01 NOTE — ASSESSMENT & PLAN NOTE
Continues on Prolia, next due 9/10/23. She will call clinic to get this set up. Next DEXA due 10/2023 or later.

## 2023-07-01 NOTE — ASSESSMENT & PLAN NOTE
Follows at [facility], last visit 11/2022. Did have normal colonoscopy (negative biopsies for colitis) 4/2022. Symptoms slightly worse recently, however she is hesitant to use PRN imodium.   - Continue current regimen of colestipol BID, magnesium, imodium, probiotic  - Encouraged her to use imodium as needed
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
## 2023-07-01 NOTE — ASSESSMENT & PLAN NOTE
Follows with EP. Overall stable, continues to have some breakthrough Afib but only with exertion and is not bothering her too much with symptoms.   - Continue sotalol 60 BID and eliquis 5 BID  - Continue cardiology follow up

## 2023-07-03 NOTE — TELEPHONE ENCOUNTER
FYI - Status Update    Who is Calling: Fernanda    Update: Patient refused to schedule MRI wants to speak to Dr. Campuzano states she does not know anything about this.

## 2023-07-03 NOTE — TELEPHONE ENCOUNTER
ANTICOAGULATION  MANAGEMENT    Assessment     Today's INR result of 2.3 is Therapeutic (goal INR of 2.0-3.0)        Warfarin taken as previously instructed    No new diet changes affecting INR    No new medication/supplements affecting INR    Continues to tolerate warfarin with no reported s/s of bleeding or thromboembolism     Previous INR was Therapeutic    Plan:     Spoke with Krystle regarding INR result and instructed:     Warfarin Dosing Instructions:  Continue current warfarin dose 2.5 mg daily     Instructed patient to follow up no later than: one month    .    Instructed to call the [facility] for any changes, questions or concerns. [phone])   ?   Mikki Morley RN    Subjective/Objective:      Krystle Polanco, a 79 y.o. female is on warfarin.     Krystle reports:     Home warfarin dose: as updated on anticoagulation calendar per template     Missed doses: No     Medication changes:  No     S/S of bleeding or thromboembolism:  No     New Injury or illness:  No     Changes in diet or alcohol consumption:  No     Upcoming surgery, procedure or cardioversion:  No    Anticoagulation Episode Summary     Current INR goal:   2.0-3.0   TTR:   80.0 % (2.5 y)   Next INR check:   7/19/2019   INR from last check:   2.30 (6/21/2019)   Weekly max warfarin dose:      Target end date:   Indefinite   INR check location:      Preferred lab:      Send INR reminders to:   NING [facility]    Indications    Pulmonary embolism (H) [I26.99]           Comments:            Anticoagulation Care Providers     Provider Role Specialty Phone number    Elke Mehta MD  Internal Medicine [phone]         Notification Instructions: Patient will be notified of biopsy results. However, patient instructed to call the office if not contacted within 2 weeks.

## 2023-07-06 NOTE — TELEPHONE ENCOUNTER
"Please call patient with my message about the MRI and reason for scheduling, thank you!  ---  \"Jerman Dalton,      I was finishing your note today and looking through the imaging that was done in the ED at [facility] for your fall. The CT head noted a possible meningioma (a benign growth). Just to be sure that is what this is, we should further evaluate with a brain MRI. I am ordering this and you will get a call to schedule. Please don't worry too much about this, like I said these are benign lesions and typically don't cause problems, we just want to be sure that is what it is.      Dr. Campuzano\"  "

## 2023-07-07 NOTE — TELEPHONE ENCOUNTER
Pt informed of PCP's message below. Pt states she is having some issues with insurance. Writer provided patient with imaging scheduling number to call if she decides to go through with MRI as recommended.     Pt verbalized understanding and thanked for call.

## 2023-07-09 DIAGNOSIS — Z79.899 MEDICATION MANAGEMENT: ICD-10-CM

## 2023-07-09 DIAGNOSIS — I10 ESSENTIAL HYPERTENSION, BENIGN: ICD-10-CM

## 2023-07-10 RX ORDER — AMLODIPINE BESYLATE 5 MG/1
5 TABLET ORAL DAILY
Qty: 90 TABLET | Refills: 3 | Status: SHIPPED | OUTPATIENT
Start: 2023-07-10 | End: 2024-06-24

## 2023-07-30 ENCOUNTER — HEALTH MAINTENANCE LETTER (OUTPATIENT)
Age: 83
End: 2023-07-30

## 2023-08-02 ENCOUNTER — ANCILLARY PROCEDURE (OUTPATIENT)
Dept: MAMMOGRAPHY | Facility: HOSPITAL | Age: 83
End: 2023-08-02
Attending: INTERNAL MEDICINE
Payer: COMMERCIAL

## 2023-08-02 DIAGNOSIS — Z12.31 VISIT FOR SCREENING MAMMOGRAM: ICD-10-CM

## 2023-08-02 PROCEDURE — 77067 SCR MAMMO BI INCL CAD: CPT

## 2023-08-21 ENCOUNTER — PRE VISIT (OUTPATIENT)
Dept: ENDOCRINOLOGY | Facility: CLINIC | Age: 83
End: 2023-08-21

## 2023-09-12 ENCOUNTER — OFFICE VISIT (OUTPATIENT)
Dept: INTERNAL MEDICINE | Facility: CLINIC | Age: 83
End: 2023-09-12
Payer: COMMERCIAL

## 2023-09-12 VITALS
RESPIRATION RATE: 28 BRPM | BODY MASS INDEX: 38.24 KG/M2 | SYSTOLIC BLOOD PRESSURE: 120 MMHG | HEIGHT: 62 IN | TEMPERATURE: 97.9 F | HEART RATE: 95 BPM | DIASTOLIC BLOOD PRESSURE: 62 MMHG | WEIGHT: 207.8 LBS | OXYGEN SATURATION: 95 %

## 2023-09-12 DIAGNOSIS — K21.9 GASTROESOPHAGEAL REFLUX DISEASE WITHOUT ESOPHAGITIS: ICD-10-CM

## 2023-09-12 DIAGNOSIS — Z00.00 ENCOUNTER FOR MEDICARE ANNUAL WELLNESS EXAM: Primary | ICD-10-CM

## 2023-09-12 DIAGNOSIS — M81.0 AGE-RELATED OSTEOPOROSIS WITHOUT CURRENT PATHOLOGICAL FRACTURE: ICD-10-CM

## 2023-09-12 DIAGNOSIS — M81.0 SENILE OSTEOPOROSIS: ICD-10-CM

## 2023-09-12 DIAGNOSIS — J45.40 MODERATE PERSISTENT ASTHMA WITHOUT COMPLICATION: ICD-10-CM

## 2023-09-12 DIAGNOSIS — E66.01 CLASS 2 SEVERE OBESITY DUE TO EXCESS CALORIES WITH SERIOUS COMORBIDITY AND BODY MASS INDEX (BMI) OF 38.0 TO 38.9 IN ADULT (H): ICD-10-CM

## 2023-09-12 DIAGNOSIS — E11.42 TYPE 2 DIABETES MELLITUS WITH DIABETIC POLYNEUROPATHY, WITHOUT LONG-TERM CURRENT USE OF INSULIN (H): ICD-10-CM

## 2023-09-12 DIAGNOSIS — I48.0 PAF (PAROXYSMAL ATRIAL FIBRILLATION) (H): ICD-10-CM

## 2023-09-12 DIAGNOSIS — E11.9 TYPE 2 DIABETES MELLITUS WITHOUT COMPLICATION, WITHOUT LONG-TERM CURRENT USE OF INSULIN (H): ICD-10-CM

## 2023-09-12 DIAGNOSIS — Z92.29 PERSONAL HISTORY OF OTHER DRUG THERAPY: ICD-10-CM

## 2023-09-12 DIAGNOSIS — D32.9 MENINGIOMA (H): ICD-10-CM

## 2023-09-12 DIAGNOSIS — I10 BENIGN ESSENTIAL HYPERTENSION: ICD-10-CM

## 2023-09-12 DIAGNOSIS — G47.33 OSA (OBSTRUCTIVE SLEEP APNEA): ICD-10-CM

## 2023-09-12 DIAGNOSIS — K58.0 IRRITABLE BOWEL SYNDROME WITH DIARRHEA: ICD-10-CM

## 2023-09-12 DIAGNOSIS — Z23 ENCOUNTER FOR VACCINATION: ICD-10-CM

## 2023-09-12 DIAGNOSIS — Z86.711 HISTORY OF PULMONARY EMBOLUS (PE): ICD-10-CM

## 2023-09-12 DIAGNOSIS — E66.812 CLASS 2 SEVERE OBESITY DUE TO EXCESS CALORIES WITH SERIOUS COMORBIDITY AND BODY MASS INDEX (BMI) OF 38.0 TO 38.9 IN ADULT (H): ICD-10-CM

## 2023-09-12 LAB
BASOPHILS # BLD AUTO: 0 10E3/UL (ref 0–0.2)
BASOPHILS NFR BLD AUTO: 0 %
EOSINOPHIL # BLD AUTO: 0.4 10E3/UL (ref 0–0.7)
EOSINOPHIL NFR BLD AUTO: 3 %
ERYTHROCYTE [DISTWIDTH] IN BLOOD BY AUTOMATED COUNT: 13.1 % (ref 10–15)
HBA1C MFR BLD: 6.8 % (ref 0–5.6)
HCT VFR BLD AUTO: 41.3 % (ref 35–47)
HGB BLD-MCNC: 13.9 G/DL (ref 11.7–15.7)
IMM GRANULOCYTES # BLD: 0 10E3/UL
IMM GRANULOCYTES NFR BLD: 0 %
LYMPHOCYTES # BLD AUTO: 3.3 10E3/UL (ref 0.8–5.3)
LYMPHOCYTES NFR BLD AUTO: 22 %
MCH RBC QN AUTO: 30.4 PG (ref 26.5–33)
MCHC RBC AUTO-ENTMCNC: 33.7 G/DL (ref 31.5–36.5)
MCV RBC AUTO: 90 FL (ref 78–100)
MONOCYTES # BLD AUTO: 1.3 10E3/UL (ref 0–1.3)
MONOCYTES NFR BLD AUTO: 9 %
NEUTROPHILS # BLD AUTO: 9.8 10E3/UL (ref 1.6–8.3)
NEUTROPHILS NFR BLD AUTO: 66 %
PLATELET # BLD AUTO: 248 10E3/UL (ref 150–450)
RBC # BLD AUTO: 4.57 10E6/UL (ref 3.8–5.2)
WBC # BLD AUTO: 14.9 10E3/UL (ref 4–11)

## 2023-09-12 PROCEDURE — G0439 PPPS, SUBSEQ VISIT: HCPCS | Performed by: INTERNAL MEDICINE

## 2023-09-12 PROCEDURE — 80061 LIPID PANEL: CPT | Performed by: INTERNAL MEDICINE

## 2023-09-12 PROCEDURE — 36415 COLL VENOUS BLD VENIPUNCTURE: CPT | Performed by: INTERNAL MEDICINE

## 2023-09-12 PROCEDURE — 84443 ASSAY THYROID STIM HORMONE: CPT | Performed by: INTERNAL MEDICINE

## 2023-09-12 PROCEDURE — 85025 COMPLETE CBC W/AUTO DIFF WBC: CPT | Performed by: INTERNAL MEDICINE

## 2023-09-12 PROCEDURE — 80053 COMPREHEN METABOLIC PANEL: CPT | Performed by: INTERNAL MEDICINE

## 2023-09-12 PROCEDURE — 82306 VITAMIN D 25 HYDROXY: CPT | Performed by: INTERNAL MEDICINE

## 2023-09-12 PROCEDURE — 83036 HEMOGLOBIN GLYCOSYLATED A1C: CPT | Performed by: INTERNAL MEDICINE

## 2023-09-12 PROCEDURE — 99207 PR FOOT EXAM NO CHARGE: CPT | Mod: 25 | Performed by: INTERNAL MEDICINE

## 2023-09-12 PROCEDURE — 99214 OFFICE O/P EST MOD 30 MIN: CPT | Mod: 25 | Performed by: INTERNAL MEDICINE

## 2023-09-12 ASSESSMENT — ENCOUNTER SYMPTOMS
HEARTBURN: 0
DYSURIA: 0
DIZZINESS: 0
HEMATOCHEZIA: 0
EYE PAIN: 0
COUGH: 0
ARTHRALGIAS: 0
FEVER: 0
MYALGIAS: 0
CHILLS: 0
DIARRHEA: 0
ABDOMINAL PAIN: 0
HEMATURIA: 0
CONSTIPATION: 0
BREAST MASS: 0
SORE THROAT: 0
FREQUENCY: 0
NERVOUS/ANXIOUS: 0
NAUSEA: 0
PARESTHESIAS: 0
SHORTNESS OF BREATH: 0
WEAKNESS: 0
HEADACHES: 0
PALPITATIONS: 0
JOINT SWELLING: 0

## 2023-09-12 ASSESSMENT — ACTIVITIES OF DAILY LIVING (ADL): CURRENT_FUNCTION: NO ASSISTANCE NEEDED

## 2023-09-12 NOTE — PATIENT INSTRUCTIONS
Call [phone] to schedule your brain MRI and DEXA scan.     Give [facility] a call to get follow up scheduled with Dr. Breaux.     Bring in form for shoes.       Patient Education   Personalized Prevention Plan  You are due for the preventive services outlined below.  Your care team is available to assist you in scheduling these services.  If you have already completed any of these items, please share that information with your care team to update in your medical record.  Health Maintenance Due   Topic Date Due    Asthma Action Plan - yearly  Never done    Diabetic Foot Exam  11/23/2022    Annual Wellness Visit  02/22/2023    ANNUAL REVIEW OF HM ORDERS  06/01/2023    A1C Lab  07/28/2023    Flu Vaccine (1) 09/01/2023

## 2023-09-12 NOTE — PROGRESS NOTES
"SUBJECTIVE:   Krystle is a 83 year old who presents for Preventive Visit.      9/12/2023     1:56 PM   Additional Questions   Roomed by Ramona   Accompanied by      Are you in the first 12 months of your Medicare coverage?  No    Healthy Habits:     In general, how would you rate your overall health?  Good    Frequency of exercise:  2-3 days/week    Duration of exercise:  30-45 minutes    Do you usually eat at least 4 servings of fruit and vegetables a day, include whole grains    & fiber and avoid regularly eating high fat or \"junk\" foods?  Yes    Taking medications regularly:  Yes    Medication side effects:  None    Ability to successfully perform activities of daily living:  No assistance needed    Home Safety:  No safety concerns identified    Hearing Impairment:  No hearing concerns    In the past 6 months, have you been bothered by leaking of urine?  No    In general, how would you rate your overall mental or emotional health?  Excellent    Additional concerns today:  No    Krysten come for their wellness visits today. Unfortunately, they have been having a stressful last few weeks. They have a 28 year old nephew is dying from metastatic testicular cancer and Fitz has had a difficult time recovering from recent surgery.     Hip Pain: Almost done with pool therapy at  [facility]. This really helped her pain. Will be starting pulm rehab on 9/14, which she is looking forward to.     Dysphonia: Stable at ENT f/up 8/23/23. Plan follow up in 1 year.     T2DM: A1c 6.6 (4/28/23). Metformin 1g BID. Eye exam was normal 6/8/23.    - L 2nd -4th toes altered sensation on exam today    H/o b/l PEs: Unprovoked. Eliquis 5 BID. Previously on warfarin but issues with epistaxis and bad bleeds so switched to eliquis. Has done well on this.      Asthma: follows with Dr. Mccormick, last visit 7/3/23. Current regimen is Qvar BID, spiriva PRN, PRN albuterol. Had thrush at that time, treated with mycelex. This is " resolved now.     Afib / PVC: Follows with EP, Dr. Wong. Last saw NP Ellyn Sr 2/13/23, note reviewed. Does follow on her Apple Watch. No more episodes of Afib since last time. Feels Afib is exercise induced.     Atrophic vaginitis: Estrace cream works well      Psoriasis: This is much better after using clobetasol cream.      GERD: Pepcid 40 mg daily     HTN: losartan 100, amlodipine 5. Today 120/62. Not checking at home.      Osteoporosis: Prolia, last injection 3/10/23, that was her third shot. Vitamin D3 1000 IU daily and calcium carb. Due ASAP, will order today.     CHRISTOPHER: continues with CPAP     AR: Astelin nasal spray, singulair 10.      HLD: Atorvastatin 80, CoQ10. Lipids 12/2022 Tchol 199, LDL 99, HDL 58,     Meningioma: Due for repeat brain MRI, this was ordered last time. She hasn't gotten time to schedule this yet.      IBS: current regimen is Colestipol 1g BID, magnesium, imodium, probiotic. Says this is worse recently. Last visit with [facility] was 11/2022. She is hesitant to use imodium daily but it is helpful in controlling things when she does use it. Had normal colonoscopy 4/2022.   - Two accidents in last 4 months   - Has started to use imodium more, which does help     BIRADS2 8/2/23    Have you ever done Advance Care Planning? (For example, a Health Directive, POLST, or a discussion with a medical provider or your loved ones about your wishes): Yes, advance care planning is on file.     Fall risk  Fallen 2 or more times in the past year?: No  Any fall with injury in the past year?: No  Cognitive Screening   1) Repeat 3 items (Leader, Season, Table)   2) Clock draw:ABNORMAL wrong time  3) 3 item recall:Recalls 2 objects   Results: ABNORMAL clock, 1-2 items recalled: PROBABLE COGNITIVE IMPAIRMENT, **INFORM PROVIDER**    Mini-CogTM Copyright S Celestina. Licensed by the author for use in [facility] Definition 6; reprinted with permission (melita@.[facility]). All rights reserved.      Do you have sleep  apnea, excessive snoring or daytime drowsiness? : no    Reviewed and updated as needed this visit by clinical staff   Tobacco  Allergies  Meds  Problems  Med Hx  Surg Hx  Fam Hx          Reviewed and updated as needed this visit by Provider   Tobacco  Allergies  Meds  Problems  Med Hx  Surg Hx  Fam Hx         Social History     Tobacco Use    Smoking status: Never    Smokeless tobacco: Never   Substance Use Topics    Alcohol use: No         9/12/2023     1:56 PM   Alcohol Use   Prescreen: >3 drinks/day or >7 drinks/week? No     Do you have a current opioid prescription? No  Do you use any other controlled substances or medications that are not prescribed by a provider? None    Current providers sharing in care for this patient include:   Patient Care Team:  Cori Campuzano MD as PCP - General (Internal Medicine)  Damian Youssef, PharmD as Pharmacist (Pharmacist)  Brooklyn Wagner RPH as Pharmacist (Pharmacist)  Ellyn Sr APRN CNP as Assigned Heart and Vascular Provider  Hardik Carlisle MD as MD (Endocrinology, Diabetes, and Metabolism)  Jhon Guthrie MD as Physician (Endocrinology, Diabetes, and Metabolism)  Cori Campuzano MD as Assigned PCP    The following health maintenance items are reviewed in Epic and correct as of today:  Health Maintenance   Topic Date Due    ASTHMA ACTION PLAN  Never done    ANNUAL REVIEW OF HM ORDERS  06/01/2023    INFLUENZA VACCINE (1) 09/01/2023    ASTHMA CONTROL TEST  10/28/2023    A1C  12/12/2023    EYE EXAM  06/08/2024    MEDICARE ANNUAL WELLNESS VISIT  09/12/2024    BMP  09/12/2024    LIPID  09/12/2024    DIABETIC FOOT EXAM  09/12/2024    FALL RISK ASSESSMENT  09/12/2024    ADVANCE CARE PLANNING  09/12/2028    COLORECTAL CANCER SCREENING  04/12/2032    DTAP/TDAP/TD IMMUNIZATION (4 - Td or Tdap) 11/07/2032    DEXA  10/15/2036    PHQ-2 (once per calendar year)  Completed    Pneumococcal Vaccine: 65+ Years  Completed    ZOSTER  "IMMUNIZATION  Completed    COVID-19 Vaccine  Completed    IPV IMMUNIZATION  Aged Out    HPV IMMUNIZATION  Aged Out    MENINGITIS IMMUNIZATION  Aged Out    MICROALBUMIN  Discontinued     Mammogram Screening - Patient over age 75, has elected to continue with screening.  Pertinent mammograms are reviewed under the imaging tab.    Review of Systems   Constitutional:  Negative for chills and fever.   HENT:  Negative for congestion, ear pain, hearing loss and sore throat.    Eyes:  Negative for pain and visual disturbance.   Respiratory:  Negative for cough and shortness of breath.    Cardiovascular:  Negative for chest pain, palpitations and peripheral edema.   Gastrointestinal:  Negative for abdominal pain, constipation, diarrhea, heartburn, hematochezia and nausea.   Breasts:  Negative for tenderness, breast mass and discharge.   Genitourinary:  Negative for dysuria, frequency, genital sores, hematuria, pelvic pain, urgency, vaginal bleeding and vaginal discharge.   Musculoskeletal:  Negative for arthralgias, joint swelling and myalgias.   Skin:  Negative for rash.   Neurological:  Negative for dizziness, weakness, headaches and paresthesias.   Psychiatric/Behavioral:  Negative for mood changes. The patient is not nervous/anxious.      OBJECTIVE:   /62 (BP Location: Right arm, Patient Position: Sitting, Cuff Size: Adult Regular)   Pulse 95   Temp 97.9  F (36.6  C) (Oral)   Resp 28   Ht 1.575 m (5' 2\")   Wt 94.3 kg (207 lb 12.8 oz)   LMP  (LMP Unknown)   SpO2 95%   BMI 38.01 kg/m   Estimated body mass index is 38.01 kg/m  as calculated from the following:    Height as of this encounter: 1.575 m (5' 2\").    Weight as of this encounter: 94.3 kg (207 lb 12.8 oz).  Physical Exam  GENERAL APPEARANCE: healthy, alert and no distress  EYES: Eyes grossly normal to inspection, PERRL and conjunctivae and sclerae normal  HENT: nose and mouth without ulcers or lesions, oropharynx clear and oral mucous membranes " moist  NECK: no adenopathy, no asymmetry, masses, or scars and thyroid normal to palpation  RESP: lungs clear to auscultation - no rales, rhonchi or wheezes  CV: regular rate and rhythm, normal S1 S2, no S3 or S4, no murmur, click or rub, no peripheral edema and peripheral pulses strong  ABDOMEN: soft, nontender, no hepatosplenomegaly, no masses and bowel sounds normal  MS: no musculoskeletal defects are noted and gait is age appropriate without ataxia  SKIN: no suspicious lesions or rashes  NEURO: Normal strength and tone, sensory exam grossly normal, mentation intact and speech normal  PSYCH: mentation appears normal and affect normal/bright    ADDENDUM: Below is updated foot exam to satisfy medicare requirements (MB 11/21/23).     FOOT: No ulcerations or trophic changes, decreased/altered sensation in 2nd-4th toes on L foot on monofilament exam. Normal sensation on R foot on monofilament exam. Preulcerative calluses found on plantar surfaces of b/l 1st MTP joints.     Diagnostic Test Results:  Labs reviewed in Epic  Results for orders placed or performed in visit on 09/12/23   HEMOGLOBIN A1C     Status: Abnormal   Result Value Ref Range    Hemoglobin A1C 6.8 (H) 0.0 - 5.6 %   Vitamin D Deficiency     Status: Normal   Result Value Ref Range    Vitamin D, Total (25-Hydroxy) 59 20 - 75 ug/L    Narrative    Season, race, dietary intake, and treatment affect the concentration of 25-hydroxy-Vitamin D. Values may decrease during winter months and increase during summer months. Values 20-29 ug/L may indicate Vitamin D insufficiency and values <20 ug/L may indicate Vitamin D deficiency.    Vitamin D determination is routinely performed by an immunoassay specific for 25 hydroxyvitamin D3.  If an individual is on vitamin D2(ergocalciferol) supplementation, please specify 25 OH vitamin D2 and D3 level determination by LCMSMS test [id number].     Comprehensive metabolic panel     Status: Normal   Result Value Ref Range    Sodium  141 136 - 145 mmol/L    Potassium 4.5 3.4 - 5.3 mmol/L    Chloride 105 98 - 107 mmol/L    Carbon Dioxide (CO2) 23 22 - 29 mmol/L    Anion Gap 13 7 - 15 mmol/L    Urea Nitrogen 17.6 8.0 - 23.0 mg/dL    Creatinine 0.58 0.51 - 0.95 mg/dL    Calcium 9.8 8.8 - 10.2 mg/dL    Glucose 91 70 - 99 mg/dL    Alkaline Phosphatase 71 35 - 104 U/L    AST 19 0 - 45 U/L    ALT 18 0 - 50 U/L    Protein Total 6.9 6.4 - 8.3 g/dL    Albumin 4.1 3.5 - 5.2 g/dL    Bilirubin Total 0.4 <=1.2 mg/dL    GFR Estimate 90 >60 mL/min/1.73m2   Lipid Profile (Chol, Trig, HDL, LDL calc)     Status: Abnormal   Result Value Ref Range    Cholesterol 161 <200 mg/dL    Triglycerides 201 (H) <150 mg/dL    Direct Measure HDL 47 (L) >=50 mg/dL    LDL Cholesterol Calculated 74 <=100 mg/dL    Non HDL Cholesterol 114 <130 mg/dL    Narrative    Cholesterol  Desirable:  <200 mg/dL    Triglycerides  Normal:  Less than 150 mg/dL  Borderline High:  150-199 mg/dL  High:  200-499 mg/dL  Very High:  Greater than or equal to 500 mg/dL    Direct Measure HDL  Female:  Greater than or equal to 50 mg/dL   Male:  Greater than or equal to 40 mg/dL    LDL Cholesterol  Desirable:  <100mg/dL  Above Desirable:  100-129 mg/dL   Borderline High:  130-159 mg/dL   High:  160-189 mg/dL   Very High:  >= 190 mg/dL    Non HDL Cholesterol  Desirable:  130 mg/dL  Above Desirable:  130-159 mg/dL  Borderline High:  160-189 mg/dL  High:  190-219 mg/dL  Very High:  Greater than or equal to 220 mg/dL   TSH with free T4 reflex     Status: Normal   Result Value Ref Range    TSH 0.61 0.30 - 4.20 uIU/mL   CBC with platelets and differential     Status: Abnormal   Result Value Ref Range    WBC Count 14.9 (H) 4.0 - 11.0 10e3/uL    RBC Count 4.57 3.80 - 5.20 10e6/uL    Hemoglobin 13.9 11.7 - 15.7 g/dL    Hematocrit 41.3 35.0 - 47.0 %    MCV 90 78 - 100 fL    MCH 30.4 26.5 - 33.0 pg    MCHC 33.7 31.5 - 36.5 g/dL    RDW 13.1 10.0 - 15.0 %    Platelet Count 248 150 - 450 10e3/uL    % Neutrophils 66 %     % Lymphocytes 22 %    % Monocytes 9 %    % Eosinophils 3 %    % Basophils 0 %    % Immature Granulocytes 0 %    Absolute Neutrophils 9.8 (H) 1.6 - 8.3 10e3/uL    Absolute Lymphocytes 3.3 0.8 - 5.3 10e3/uL    Absolute Monocytes 1.3 0.0 - 1.3 10e3/uL    Absolute Eosinophils 0.4 0.0 - 0.7 10e3/uL    Absolute Basophils 0.0 0.0 - 0.2 10e3/uL    Absolute Immature Granulocytes 0.0 <=0.4 10e3/uL   CBC with platelets and differential     Status: Abnormal    Narrative    The following orders were created for panel order CBC with platelets and differential.  Procedure                               Abnormality         Status                     ---------                               -----------         ------                     CBC with platelets and d...[[id number]]  Abnormal            Final result                 Please view results for these tests on the individual orders.       ASSESSMENT / PLAN:     Problem List Items Addressed This Visit          Nervous and Auditory    Type 2 diabetes mellitus with diabetic polyneuropathy, without long-term current use of insulin (H)     Well controlled with metformin 1g BID. Does have some peripheral neuropathy on exam, denies associated pain. Eye exam normal 6/8/23.  - A1c repeated today at 6.8, this is at goal  - Continue metformin 1g BID         Relevant Medications    denosumab (PROLIA) injection 60 mg    Meningioma (H)     CTH 6/23/23 showed extra-axial dural based calcified lesion arising from parasagittal R parietal inner table, most c/w meningioma.   - Will get MRI brain to characterize, this was ordered last time, phone number given to her to schedule in AVS            Respiratory    Asthma     Follows with Dr. Mccormick through . Last visit 7/3/23, note reviewed. Breathing is stable today.   - Continues on Qvar BID, spiriva PRN, PRN albuterol  - will be starting pulm rehab, which will be good          CHRISTOPHER (obstructive sleep apnea)     Well controlled with nocturnal CPAP.             Digestive    Gastroesophageal reflux disease without esophagitis     Well controlled with pepcid 40 mg daily.         Irritable bowel syndrome     Follows at [facility], last visit 11/2022. Did have normal colonoscopy (negative biopsies for colitis) 4/2022. Symptoms slightly worse recently, however she is hesitant to use PRN imodium.   - Continue current regimen of colestipol BID, magnesium, imodium, probiotic  - Encouraged her to use imodium as needed, max 8g/day  - Encouraged her to make f/up appt with [facility] as it has been almost a whole year         Relevant Medications    denosumab (PROLIA) injection 60 mg    Class 2 severe obesity due to excess calories with serious comorbidity and body mass index (BMI) of 38.0 to 38.9 in adult (H)     Associated with HTN, T2DM, and hip pain.   - Will be starting pulm rehab, which will be good for increasing activity  - Further work on weight loss would help with chronic conditions  - Encouraged to work on healthy diet choices as well             Circulatory    Benign essential hypertension     Well controlled on current regimen.   - Continue losartan 100, amlodipine 5.         PAF (paroxysmal atrial fibrillation) (H)     Follows with EP. Overall stable.  - Continue sotalol 60 BID and eliquis 5 BID  - Continue cardiology follow up             Musculoskeletal and Integumentary    Age-related osteoporosis without current pathological fracture     Continues on Prolia, next due right now. Unfortunately, she misunderstood my instructions at last visit so we didn't get it ordered in time.   - Prolia ordered today, hopefully can get injection in next 2-3 weeks  - Next DEXA due 10/2023 or later, ordered today         Relevant Medications    denosumab (PROLIA) injection 60 mg       Other    History of pulmonary embolus (PE)     On lifelong AC with Eliquis.          Encounter for Medicare annual wellness exam - Primary     We discussed healthy lifestyle, nutrition, cardiovascular risk  "reduction, self care, safety, sunscreen, and timing of cancer screening.  Health maintenance screening and immunizations reviewed with the patient.   - Flu shot today, they will get COVID booster in fall when available  - Labs today  - BIRADS2 8/2/23  - No further colonoscopies needed            Relevant Orders    Comprehensive metabolic panel (Completed)    CBC with platelets and differential (Completed)    TSH with free T4 reflex (Completed)     Other Visit Diagnoses       Personal history of other drug therapy        Relevant Medications    denosumab (PROLIA) injection 60 mg    Senile osteoporosis        Relevant Medications    denosumab (PROLIA) injection 60 mg    Other Relevant Orders    Vitamin D Deficiency (Completed)    DX Hip/Pelvis/Spine    Encounter for vaccination        Relevant Orders    INFLUENZA VACCINE 65+ (FLUZONE HD) (Completed)            Patient has been advised of split billing requirements and indicates understanding: Yes      COUNSELING:  Reviewed preventive health counseling, as reflected in patient instructions  Special attention given to:       Regular exercise       Healthy diet/nutrition       Immunizations  Vaccinated for: Influenza           Osteoporosis prevention/bone health      BMI:   Estimated body mass index is 38.01 kg/m  as calculated from the following:    Height as of this encounter: 1.575 m (5' 2\").    Weight as of this encounter: 94.3 kg (207 lb 12.8 oz).   Weight management plan: Discussed healthy diet and exercise guidelines      She reports that she has never smoked. She has never used smokeless tobacco.      Appropriate preventive services were discussed with this patient, including applicable screening as appropriate for cardiovascular disease, diabetes, osteopenia/osteoporosis, and glaucoma.  As appropriate for age/gender, discussed screening for colorectal cancer, prostate cancer, breast cancer, and cervical cancer. Checklist reviewing preventive services available " has been given to the patient.    Reviewed patients plan of care and provided an AVS. The Basic Care Plan (routine screening as documented in Health Maintenance) for Krystle meets the Care Plan requirement. This Care Plan has been established and reviewed with the Patient.          Cori Campuzano MD  [facility]    Identified Health Risks:  I have reviewed Opioid Use Disorder and Substance Use Disorder risk factors and made any needed referrals.

## 2023-09-12 NOTE — Clinical Note
Patient is due for prolia now, I ordered this visit. Please help get scheduled as soon as insurance approves. Thanks!

## 2023-09-13 LAB
ALBUMIN SERPL BCG-MCNC: 4.1 G/DL (ref 3.5–5.2)
ALP SERPL-CCNC: 71 U/L (ref 35–104)
ALT SERPL W P-5'-P-CCNC: 18 U/L (ref 0–50)
ANION GAP SERPL CALCULATED.3IONS-SCNC: 13 MMOL/L (ref 7–15)
AST SERPL W P-5'-P-CCNC: 19 U/L (ref 0–45)
BILIRUB SERPL-MCNC: 0.4 MG/DL
BUN SERPL-MCNC: 17.6 MG/DL (ref 8–23)
CALCIUM SERPL-MCNC: 9.8 MG/DL (ref 8.8–10.2)
CHLORIDE SERPL-SCNC: 105 MMOL/L (ref 98–107)
CHOLEST SERPL-MCNC: 161 MG/DL
CREAT SERPL-MCNC: 0.58 MG/DL (ref 0.51–0.95)
DEPRECATED CALCIDIOL+CALCIFEROL SERPL-MC: 59 UG/L (ref 20–75)
DEPRECATED HCO3 PLAS-SCNC: 23 MMOL/L (ref 22–29)
EGFRCR SERPLBLD CKD-EPI 2021: 90 ML/MIN/1.73M2
GLUCOSE SERPL-MCNC: 91 MG/DL (ref 70–99)
HDLC SERPL-MCNC: 47 MG/DL
LDLC SERPL CALC-MCNC: 74 MG/DL
NONHDLC SERPL-MCNC: 114 MG/DL
POTASSIUM SERPL-SCNC: 4.5 MMOL/L (ref 3.4–5.3)
PROT SERPL-MCNC: 6.9 G/DL (ref 6.4–8.3)
SODIUM SERPL-SCNC: 141 MMOL/L (ref 136–145)
TRIGL SERPL-MCNC: 201 MG/DL
TSH SERPL DL<=0.005 MIU/L-ACNC: 0.61 UIU/ML (ref 0.3–4.2)

## 2023-09-14 ENCOUNTER — VIRTUAL VISIT (OUTPATIENT)
Dept: URGENT CARE | Facility: CLINIC | Age: 83
End: 2023-09-14
Payer: COMMERCIAL

## 2023-09-14 DIAGNOSIS — U07.1 INFECTION DUE TO 2019 NOVEL CORONAVIRUS: Primary | ICD-10-CM

## 2023-09-14 DIAGNOSIS — J30.89 NON-SEASONAL ALLERGIC RHINITIS, UNSPECIFIED TRIGGER: ICD-10-CM

## 2023-09-14 PROCEDURE — 99213 OFFICE O/P EST LOW 20 MIN: CPT | Mod: 95

## 2023-09-14 RX ORDER — AZELASTINE 1 MG/ML
2 SPRAY, METERED NASAL 2 TIMES DAILY
Qty: 90 ML | Refills: 3 | Status: SHIPPED | OUTPATIENT
Start: 2023-09-14 | End: 2024-06-25

## 2023-09-14 NOTE — PROGRESS NOTES
"Assessment & Plan     Infection due to 2019 novel coronavirus  - molnupiravir (LAGEVRIO) 200 MG capsule; Take 4 capsules (800 mg) by mouth every 12 hours    Non-seasonal allergic rhinitis, unspecified trigger  - azelastine (ASTELIN) 0.1 % nasal spray; Spray 2 sprays in nostril 2 times daily    Return in about 1 week (around 9/21/2023) for visit with primary care provider if not improving.   COVID-19 positive patient.  Encounter for consideration of medication intervention. Patient does qualify for a prescription. Full discussion with patient including medication options, risks and benefits. Potential drug interactions reviewed with patient.     Treatment Planned: Paxlovid contraindicated with Eliquis.  Treatment with molnupiravir.  Temporary change to home medications: none     Estimated body mass index is 38.01 kg/m  as calculated from the following:    Height as of 9/12/23: 1.575 m (5' 2\").    Weight as of 9/12/23: 94.3 kg (207 lb 12.8 oz).  GFR Estimate   Date Value Ref Range Status   09/12/2023 90 >60 mL/min/1.73m2 Final   02/09/2021 >60 >60 mL/min/1.73m2 Final     ALT   Date Value Ref Range Status   09/12/2023 18 0 - 50 U/L Final     Comment:     Reference intervals for this test were updated on 6/12/2023 to more accurately reflect our healthy population. There may be differences in the flagging of prior results with similar values performed with this method. Interpretation of those prior results can be made in the context of the updated reference intervals.       AST   Date Value Ref Range Status   09/12/2023 19 0 - 45 U/L Final     Comment:     Reference intervals for this test were updated on 6/12/2023 to more accurately reflect our healthy population. There may be differences in the flagging of prior results with similar values performed with this method. Interpretation of those prior results can be made in the context of the updated reference intervals.     Alkaline Phosphatase   Date Value Ref Range " Status   09/12/2023 71 35 - 104 U/L Final     No results found for: [id number]    Zully Sales PA-C  [facility]    Subjective   Krystle Polanco is a 83 year old who presents for the following health issues    HPI    Krystle presents via telephone after testing positive for COVID-19.  Symptoms first began 2 days ago with a runny nose, postnasal drainage and fatigue.  Symptoms came on slowly but got worse yesterday.  She has been fully vaccinated against COVID-19.    Review of Systems   ROS negative except as stated above.      Objective    Physical Exam   healthy, alert and no distress  PSYCH: Alert and oriented times 3; coherent speech, normal   rate and volume, able to articulate logical thoughts, able   to abstract reason, no tangential thoughts, no hallucinations   or delusions. Affect is normal and pleasant  RESP: No cough, no audible wheezing, able to talk in full sentences  Remainder of exam unable to be completed due to telephone visits    Call duration: 28 min  Provider location: offsite at home, [place]    No results found for any visits on 09/14/23.

## 2023-09-14 NOTE — PATIENT INSTRUCTIONS
Symptoms began September 12  Stay home and away from others through September 17  You may be around others wearing a well fitting mask September 18-22  Your isolation restrictions are over on September 23 as long as your symptoms are improving and you have been fever free for 24 hours, even if you still test positive for COVID.  However, if you test negative twice, at least 48 hours apart between days 6-10, you can stop wearing your mask earlier    Check O2 levels. If your number stays at 90 or below at rest, go to the emergency room.    Visit the CDC websites for more information and most up to date guidelines:  www.cdc.gov/coronavirus/2019-ncov/your-health/isolation.html  www.cdc.gov/coronavirus/2019-ncov/hcp/duration-isolation.html

## 2023-09-15 PROBLEM — Z00.00 ENCOUNTER FOR MEDICARE ANNUAL WELLNESS EXAM: Status: ACTIVE | Noted: 2023-09-15

## 2023-09-15 NOTE — ASSESSMENT & PLAN NOTE
CTH 6/23/23 showed extra-axial dural based calcified lesion arising from parasagittal R parietal inner table, most c/w meningioma.   - Will get MRI brain to characterize, this was ordered last time, phone number given to her to schedule in [facility]   [de-identified] : Bilateral shoulder impingement known diagnosis of fibromyalgia. Recommend physical therapy\par \par Injection: Right shoulder (Subacromial).\par Indication: [Impingement\par \par A discussion was had with the patient regarding this procedure and all questions were answered. All risks, benefits and alternatives were discussed. These included but were not limited to bleeding, infection, and allergic reaction. Alcohol was used to clean the skin, and betadine was used to sterilize and prep the area in the posterior aspect of the right shoulder. Ethyl chloride spray was then used as a topical anesthetic. A 21-gauge needle was used to inject 4cc of 1% lidocaine and 1cc of 40mg/ml methylprednisolone into the right subacromial space. A sterile bandage was then applied. The patient tolerated the procedure well and there were no complications. \par \par Injection: Left shoulder (Subacromial).\par Indication: impingement].\par \par A discussion was had with the patient regarding this procedure and all questions were answered. All risks, benefits and alternatives were discussed. These included but were not limited to bleeding, infection, and allergic reaction. Alcohol was used to clean the skin, and betadine was used to sterilize and prep the area in the posterior aspect of the left shoulder. Ethyl chloride spray was then used as a topical anesthetic. A 21-gauge needle was used to inject 4cc of 1% lidocaine and 1cc of 40mg/ml methylprednisolone into the left subacromial space. A sterile bandage was then applied. The patient tolerated the procedure well and there were no complications. \par \par All questions answered follow up as needed

## 2023-09-15 NOTE — ASSESSMENT & PLAN NOTE
Follows with Dr. Mccormick through . Last visit 7/3/23, note reviewed. Breathing is stable today.   - Continues on Qvar BID, spiriva PRN, PRN albuterol  - will be starting pulm rehab, which will be good

## 2023-09-15 NOTE — ASSESSMENT & PLAN NOTE
Associated with HTN, T2DM, and hip pain.   - Will be starting pulm rehab, which will be good for increasing activity  - Further work on weight loss would help with chronic conditions  - Encouraged to work on healthy diet choices as well

## 2023-09-15 NOTE — ASSESSMENT & PLAN NOTE
We discussed healthy lifestyle, nutrition, cardiovascular risk reduction, self care, safety, sunscreen, and timing of cancer screening.  Health maintenance screening and immunizations reviewed with the patient.   - Flu shot today, they will get COVID booster in fall when available  - Labs today  - BIRADS2 8/2/23  - No further colonoscopies needed

## 2023-09-15 NOTE — ASSESSMENT & PLAN NOTE
Well controlled with metformin 1g BID. Does have some peripheral neuropathy on exam, denies associated pain. Eye exam normal 6/8/23.  - A1c repeated today at 6.8, this is at goal  - Continue metformin 1g BID   46-year-old female with flulike symptoms.  Well-appearing.  Nasal swab positive for COVID.  Will DC to follow-up with PCP.  Instructed to rest, drink fluids and take Tylenol for fever.

## 2023-09-15 NOTE — ASSESSMENT & PLAN NOTE
Follows at [facility], last visit 11/2022. Did have normal colonoscopy (negative biopsies for colitis) 4/2022. Symptoms slightly worse recently, however she is hesitant to use PRN imodium.   - Continue current regimen of colestipol BID, magnesium, imodium, probiotic  - Encouraged her to use imodium as needed, max 8g/day  - Encouraged her to make f/up appt with [facility] as it has been almost a whole year

## 2023-09-15 NOTE — ASSESSMENT & PLAN NOTE
Follows with EP. Overall stable.  - Continue sotalol 60 BID and eliquis 5 BID  - Continue cardiology follow up

## 2023-09-15 NOTE — ASSESSMENT & PLAN NOTE
Continues on Prolia, next due right now. Unfortunately, she misunderstood my instructions at last visit so we didn't get it ordered in time.   - Prolia ordered today, hopefully can get injection in next 2-3 weeks  - Next DEXA due 10/2023 or later, ordered today

## 2023-10-04 ENCOUNTER — ALLIED HEALTH/NURSE VISIT (OUTPATIENT)
Dept: FAMILY MEDICINE | Facility: CLINIC | Age: 83
End: 2023-10-04
Payer: COMMERCIAL

## 2023-10-04 DIAGNOSIS — Z23 NEED FOR INFLUENZA VACCINATION: Primary | ICD-10-CM

## 2023-10-04 PROCEDURE — 96372 THER/PROPH/DIAG INJ SC/IM: CPT | Performed by: INTERNAL MEDICINE

## 2023-10-04 PROCEDURE — 90662 IIV NO PRSV INCREASED AG IM: CPT

## 2023-10-04 PROCEDURE — G0008 ADMIN INFLUENZA VIRUS VAC: HCPCS

## 2023-10-04 PROCEDURE — 99207 PR NO CHARGE NURSE ONLY: CPT

## 2023-10-04 NOTE — PROGRESS NOTES
Clinic Administered Medication Documentation      Prolia Documentation    Indication: Prolia  (denosumab) is a prescription medicine used to treat osteoporosis in patients who:   Are at high risk for fracture, meaning patients who have had a fracture related to osteoporosis, or who have multiple risk factors for fracture.  Cannot use another osteoporosis medicine or other osteoporosis medicines did not work well.  The timeline for early/late injections would be 4 weeks early and any time after the 6 month chico. If a patient receives their injection late, then the subsequent injection would be 6 months from the date that they actually received the injection.    When was the last injection?  3/10/2023  Was the last injection at least 6 months ago? Yes  Has the prior authorization been completed?  Yes  Is there an active order (written within the past 365 days, with administrations remaining, not ) in the chart?  Yes  Patient denies any dental work involving the bone (e.g. tooth extraction or dental implants) in the past 4 weeks?  Yes  Patient denies plans for any dental work involving the bone (e.g. tooth extraction or dental implants) in the next 4 weeks? Yes    The following steps were completed to comply with the REMS program for Prolia:  Reviewed information in the Medication Guide and Patient Counseling Chart, including the serious risks of Prolia  and the symptoms of each risk.  Advised patient to seek prompt medical attention if they have signs or symptoms of any of the serious risks.  Provided each patient a copy of the Medication Guide and Patient Brochure.    Prior to injection, verified patient identity using patient's name and date of birth. Medication was administered. Please see MAR and medication order for additional information. Patient instructed to remain in clinic for 15 minutes and report any adverse reaction to staff immediately.    Vial/Syringe: Syringe  Was this medication supplied by the  patient? No  Verified that the patient has refills remaining in their prescription.

## 2023-10-06 ENCOUNTER — HOSPITAL ENCOUNTER (OUTPATIENT)
Dept: MRI IMAGING | Facility: HOSPITAL | Age: 83
Discharge: HOME | End: 2023-10-06
Attending: INTERNAL MEDICINE | Admitting: INTERNAL MEDICINE
Payer: COMMERCIAL

## 2023-10-06 DIAGNOSIS — D32.9 MENINGIOMA (H): ICD-10-CM

## 2023-10-06 PROCEDURE — 255N000002 HC RX 255 OP 636: Mod: JZ | Performed by: INTERNAL MEDICINE

## 2023-10-06 PROCEDURE — A9585 GADOBUTROL INJECTION: HCPCS | Mod: JZ | Performed by: INTERNAL MEDICINE

## 2023-10-06 PROCEDURE — 70553 MRI BRAIN STEM W/O & W/DYE: CPT

## 2023-10-06 RX ORDER — GADOBUTROL 604.72 MG/ML
9.5 INJECTION INTRAVENOUS ONCE
Status: COMPLETED | OUTPATIENT
Start: 2023-10-06 | End: 2023-10-06

## 2023-10-06 RX ADMIN — GADOBUTROL 9.5 ML: 604.72 INJECTION INTRAVENOUS at 16:02

## 2023-10-16 ENCOUNTER — ANCILLARY PROCEDURE (OUTPATIENT)
Dept: BONE DENSITY | Facility: CLINIC | Age: 83
End: 2023-10-16
Attending: INTERNAL MEDICINE
Payer: COMMERCIAL

## 2023-10-16 DIAGNOSIS — Z78.0 MENOPAUSE: ICD-10-CM

## 2023-10-16 DIAGNOSIS — M81.0 SENILE OSTEOPOROSIS: ICD-10-CM

## 2023-10-16 PROCEDURE — 77080 DXA BONE DENSITY AXIAL: CPT | Mod: TC | Performed by: PHYSICIAN ASSISTANT

## 2023-10-16 PROCEDURE — 77081 DXA BONE DENSITY APPENDICULR: CPT | Mod: TC | Performed by: PHYSICIAN ASSISTANT

## 2023-10-27 ENCOUNTER — TELEPHONE (OUTPATIENT)
Dept: INTERNAL MEDICINE | Facility: CLINIC | Age: 83
End: 2023-10-27
Payer: COMMERCIAL

## 2023-10-27 DIAGNOSIS — M25.552 HIP PAIN, LEFT: Primary | ICD-10-CM

## 2023-10-27 NOTE — TELEPHONE ENCOUNTER
Order/Referral Request    Who is requesting: PT    Orders being requested: Physical Therapy extended to 6 sessions    Reason service is needed/diagnosis: for left hip    When are orders needed by: 11/2    Has this been discussed with Provider: Yes    Does patient have a preference on a Group/Provider/Facility? With Mayi at [facility]    Does patient have an appointment scheduled?: No    Where to send orders:  [facility] or phone call    Could we send this information to you in [employer] or would you prefer to receive a phone call?:   No preference   Okay to leave a detailed message?: Yes at Cell number on file:    Telephone Information:   Mobile [phone]

## 2023-10-29 NOTE — TELEPHONE ENCOUNTER
Generic PT order placed. [facility] doesn't come up as a place I can directly order to, we might need to just fax this order.

## 2023-10-31 NOTE — TELEPHONE ENCOUNTER
JANEM for pt/Krystle to call bk with a fax # for [facility]. Once we have this, we can fax the referral order over to Atten: Mayi    Insurance reviewed:  [facility] Medicare Advantage, PPO plan      Plan(s) MED ADV RPPO    Plan Dates 01/01/2020 - open-ended  Blue Plan Blue [employer] [place]    Member ID Code [id number]  Group ID [id number]  Group Name [facility]AGE [facility]    ~Adeline  [facility] Referral Coordinator

## 2023-11-06 ENCOUNTER — TELEPHONE (OUTPATIENT)
Dept: INTERNAL MEDICINE | Facility: CLINIC | Age: 83
End: 2023-11-06
Payer: COMMERCIAL

## 2023-11-06 NOTE — TELEPHONE ENCOUNTER
Order sent to [facility] (fax # [phone]) Atten: Mayi     Received call back from mayo/Krystle with fax # to [facility]    ~Adeline  [facility] Referral Coordinator

## 2023-11-07 NOTE — TELEPHONE ENCOUNTER
Placed on PCP's desk for review and signature.      Austen Royal Jr., CMA on 11/7/2023 at 12:27 PM

## 2023-11-08 NOTE — TELEPHONE ENCOUNTER
Form filled out, likely just need to attach my note from 9/12/23 for required documentation as well.

## 2023-11-09 NOTE — TELEPHONE ENCOUNTER
Form and appt notes have been faxed. Copies of forms sent to scanning and placed in Hold Bin.       Austen Royal Jr., CMA on 11/9/2023 at 1:45 PM

## 2023-12-13 ENCOUNTER — OFFICE VISIT (OUTPATIENT)
Dept: INTERNAL MEDICINE | Facility: CLINIC | Age: 83
End: 2023-12-13
Payer: COMMERCIAL

## 2023-12-13 VITALS
SYSTOLIC BLOOD PRESSURE: 118 MMHG | BODY MASS INDEX: 36.77 KG/M2 | TEMPERATURE: 98.2 F | HEART RATE: 83 BPM | DIASTOLIC BLOOD PRESSURE: 62 MMHG | RESPIRATION RATE: 20 BRPM | HEIGHT: 62 IN | OXYGEN SATURATION: 99 % | WEIGHT: 199.8 LBS

## 2023-12-13 DIAGNOSIS — Z86.711 HISTORY OF PULMONARY EMBOLUS (PE): ICD-10-CM

## 2023-12-13 DIAGNOSIS — E11.42 TYPE 2 DIABETES MELLITUS WITH DIABETIC POLYNEUROPATHY, WITHOUT LONG-TERM CURRENT USE OF INSULIN (H): ICD-10-CM

## 2023-12-13 DIAGNOSIS — I10 BENIGN ESSENTIAL HYPERTENSION: ICD-10-CM

## 2023-12-13 DIAGNOSIS — M25.552 LEFT HIP PAIN: ICD-10-CM

## 2023-12-13 DIAGNOSIS — F43.21 GRIEF REACTION: Primary | ICD-10-CM

## 2023-12-13 DIAGNOSIS — D32.9 MENINGIOMA (H): ICD-10-CM

## 2023-12-13 DIAGNOSIS — J45.40 MODERATE PERSISTENT ASTHMA WITHOUT COMPLICATION: ICD-10-CM

## 2023-12-13 DIAGNOSIS — E78.5 HYPERLIPIDEMIA WITH TARGET LDL LESS THAN 100: ICD-10-CM

## 2023-12-13 DIAGNOSIS — G47.33 OSA (OBSTRUCTIVE SLEEP APNEA): ICD-10-CM

## 2023-12-13 DIAGNOSIS — M81.0 AGE-RELATED OSTEOPOROSIS WITHOUT CURRENT PATHOLOGICAL FRACTURE: ICD-10-CM

## 2023-12-13 DIAGNOSIS — I48.0 PAF (PAROXYSMAL ATRIAL FIBRILLATION) (H): ICD-10-CM

## 2023-12-13 PROCEDURE — 99213 OFFICE O/P EST LOW 20 MIN: CPT | Performed by: INTERNAL MEDICINE

## 2023-12-13 RX ORDER — RESPIRATORY SYNCYTIAL VIRUS VACCINE 120MCG/0.5
0.5 KIT INTRAMUSCULAR ONCE
Qty: 1 EACH | Refills: 0 | Status: CANCELLED | OUTPATIENT
Start: 2023-12-13 | End: 2023-12-13

## 2023-12-13 ASSESSMENT — ASTHMA QUESTIONNAIRES: ACT_TOTALSCORE: 23

## 2023-12-13 NOTE — PROGRESS NOTES
Assessment & Plan   Problem List Items Addressed This Visit          Nervous and Auditory    Type 2 diabetes mellitus with diabetic polyneuropathy, without long-term current use of insulin (H)     Well controlled with metformin 1g BID, A1c 6.8 (9/2023). Does have some peripheral neuropathy on exam, denies associated pain. Eye exam normal 6/8/23.  - Continue metformin 1g BID         Meningioma (H)     Meningioma was stable on brain MRI 10/2023.  Will keep an eye on this with imaging annually.         Left hip pain     Better with pool therapy and pulmonary rehab.  She plans to start pool therapy again in the new year, this was ordered a month or so ago.            Respiratory    Asthma     Follows with Dr. Mccormick through . Last visit 7/3/23, note reviewed. Breathing is stable today.   - Continues on Qvar BID, spiriva PRN, PRN albuterol         CHRISTOPHER (obstructive sleep apnea)     Well controlled with nocturnal CPAP.            Endocrine    Hyperlipidemia with target LDL less than 100      Lipids 9/12/23 Tchol 161, , HDL 47, LDL 74.  - Continue Atorvastatin 80, CoQ10            Circulatory    Benign essential hypertension     Well controlled on current regimen.   - Continue losartan 100, amlodipine 5.         PAF (paroxysmal atrial fibrillation) (H)     Follows with EP. Overall stable, has not seen any recent episodes of A-fib on her Apple Watch.  - Continue sotalol 60 BID and eliquis 5 BID  - Continue cardiology follow up             Musculoskeletal and Integumentary    Age-related osteoporosis without current pathological fracture     Bone density improved on DEXA scan 10/2023.  -Continue Prolia, last injection 10/4/2023  -She does have some upcoming dental work, I encouraged her to let her dentist and oral surgeons know that she is on Prolia            Other    History of pulmonary embolus (PE)     On lifelong AC with Eliquis.          Grief reaction - Maurisio Byrnes unexpectedly passed after a fall  leading to subdural hematoma the week after Rudy. Trudy is overall doing well, still in a little bit of shockri from everything that led up to his death.  She has her 4 sons and daughters in law nearby who are keeping watch over her.  She is finding willie and looking back at memories they shared.  - She will let me know if we can help in any way over next few months as she navigates life without Fitz               I spent a total of 25 minutes on the day of the visit.   Time spent by me doing chart review, history and exam, documentation and further activities per the note    FUTURE APPOINTMENTS:       - Follow-up visit in 3 montsh    Cori Belem Cmapuzano MD  [facility]    Michael Dalton is a 83 year old, presenting for the following health issues:  Follow Up        2023     3:20 PM   Additional Questions   Roomed by Ramona     History of Present Illness     Reason for visit:  Follow up    Krystle is here for follow-up today.  Unfortunately, since our last visit, Fitz suddenly passed the week after Rudy.  He had suffered a fall the week prior to that which led to a subdural hematoma.  He was brought to [facility] and admitted there in the trauma/neuro ICU.  Unfortunately, despite what sounds like twp craniotomies and some initial improvement, Fitz eventually deteriorated related to his breathing and known COPD.  They then placed him on comfort care and he  shortly thereafter.  We spent the visit today talking about this, the [place] 60+ years that Krystle and just got to spend together, and is planning a celebration of life service which will be this week Friday.    Despite this sudden loss, Krystle seems to be doing okay today.  Their four sons live close and her sons and daughters in law are keeping her well tended and watched out for.  She is finding comfort in looking back at happy memories that the 2 of them shared.  She does note that everything is different, even  small things remind her of him.  Has had several other family members passed around the holidays, she is happy to be around family and friends for the upcoming few weeks.    We did briefly discuss her medical conditions below, although no big changes fro I was in the m the last time she was here and no significant complaints.    She has been struggling with an abscess and infected tooth on the right side of her jaw.  She tells me she completed course of antibiotics and then sounds like she might of even had an I&D.  Still having issues and they are working on getting her into see the oral surgeon soon.    Hip Pain: Got better with pool therapy, she plans to get another round of this next year.  She also has been doing pulmonary rehab which has helped.     Dysphonia: Stable at ENT f/up 8/23/23. Plan follow up in 1 year.      T2DM: A1c 6.8 (9/12/23). Metformin 1g BID. Eye exam was normal 6/8/23.       H/o b/l PEs: Unprovoked. Eliquis 5 BID. Previously on warfarin but issues with epistaxis and bad bleeds so switched to eliquis. Has done well on this.      Asthma: follows with Dr. Mccormick, last visit 7/3/23. Current regimen is Qvar BID, spiriva PRN, PRN albuterol.      Afib / PVC: Follows with EP, Dr. Wong. Last saw NP Ellyn Sr 2/13/23, note reviewed. Does follow on her Apple Watch. No more episodes of Afib since last time. Feels Afib is exercise induced.      Atrophic vaginitis: Estrace cream works well      Psoriasis: This is much better after using clobetasol cream.      GERD: Pepcid 40 mg daily     HTN: losartan 100, amlodipine 5. Today 118/62. Not checking at home.      Osteoporosis: Prolia, last injection 10/4/23. Vitamin D3 1000 IU daily and calcium carb. DEXA with improvement in spine bone density 10/2023     CHRISTOPHER: continues with CPAP     AR: Astelin nasal spray, singulair 10.      HLD: Atorvastatin 80, CoQ10. Lipids 9/12/23 Tchol 161, , HDL 47, LDL 74.     Meningioma: Stable on MRI 10/2023     IBS:  "current regimen is Colestipol 1g BID, magnesium, imodium, probiotic.  This is fairly stable right now.    She has noticed some weight loss, just with the shock of Fitz's passing and how intense the week was when he was in the ICU at [facility].  She feels she is doing little bit better with eating right now.  Wt Readings from Last 4 Encounters:   12/13/23 90.6 kg (199 lb 12.8 oz)   09/12/23 94.3 kg (207 lb 12.8 oz)   06/30/23 95.3 kg (210 lb)   04/28/23 97.1 kg (214 lb)     Review of Systems   Constitutional, HEENT, cardiovascular, pulmonary, gi and gu systems are negative, except as otherwise noted.      Objective    /62   Pulse 83   Temp 98.2  F (36.8  C) (Oral)   Resp 20   Ht 1.575 m (5' 2\")   Wt 90.6 kg (199 lb 12.8 oz)   LMP  (LMP Unknown)   SpO2 99%   BMI 36.54 kg/m    Body mass index is 36.54 kg/m .  Physical Exam   GENERAL: healthy, alert and no distress  EYES: Eyes grossly normal to inspection and conjunctivae and sclerae normal  HENT: nose and mouth without ulcers or lesions  RESP: breathing comfortably and speaking in full sentences on room air with no respiratory distress or coughing  CV: warm and well perfused  SKIN: no suspicious lesions or rashes on exposed skin  NEURO: No focal deficits, mentation intact and speech normal  PSYCH: mentation appears normal, affect normal                  "

## 2023-12-14 PROBLEM — F43.21 GRIEF REACTION: Status: ACTIVE | Noted: 2023-12-14

## 2023-12-14 PROBLEM — M25.552 LEFT HIP PAIN: Status: ACTIVE | Noted: 2023-07-20

## 2023-12-14 PROBLEM — F43.20 GRIEF REACTION: Status: ACTIVE | Noted: 2023-12-14

## 2023-12-14 NOTE — ASSESSMENT & PLAN NOTE
Follows with EP. Overall stable, has not seen any recent episodes of A-fib on her Apple Watch.  - Continue sotalol 60 BID and eliquis 5 BID  - Continue cardiology follow up

## 2023-12-14 NOTE — ASSESSMENT & PLAN NOTE
Follows with Dr. Mccormick through . Last visit 7/3/23, note reviewed. Breathing is stable today.   - Continues on Qvar BID, spiriva PRN, PRN albuterol

## 2023-12-14 NOTE — ASSESSMENT & PLAN NOTE
Well controlled with metformin 1g BID, A1c 6.8 (9/2023). Does have some peripheral neuropathy on exam, denies associated pain. Eye exam normal 6/8/23.  - Continue metformin 1g BID

## 2023-12-14 NOTE — ASSESSMENT & PLAN NOTE
Bone density improved on DEXA scan 10/2023.  -Continue Prolia, last injection 10/4/2023  -She does have some upcoming dental work, I encouraged her to let her dentist and oral surgeons know that she is on Prolia

## 2023-12-14 NOTE — ASSESSMENT & PLAN NOTE
Better with pool therapy and pulmonary rehab.  She plans to start pool therapy again in the new year, this was ordered a month or so ago.

## 2023-12-14 NOTE — ASSESSMENT & PLAN NOTE
Fitz unexpectedly passed after a fall leading to subdural hematoma the week after Thanksgiving. Trudy is overall doing well, still in a little bit of shockri from everything that led up to his death.  She has her 4 sons and daughters in law nearby who are keeping watch over her.  She is finding willie and looking back at memories they shared.  - She will let me know if we can help in any way over next few months as she navigates life without Fitz

## 2023-12-17 ENCOUNTER — HEALTH MAINTENANCE LETTER (OUTPATIENT)
Age: 83
End: 2023-12-17

## 2023-12-21 ENCOUNTER — ALLIED HEALTH/NURSE VISIT (OUTPATIENT)
Dept: FAMILY MEDICINE | Facility: CLINIC | Age: 83
End: 2023-12-21
Payer: COMMERCIAL

## 2023-12-21 DIAGNOSIS — Z23 ENCOUNTER FOR IMMUNIZATION: Primary | ICD-10-CM

## 2023-12-21 PROCEDURE — 99207 PR NO CHARGE NURSE ONLY: CPT

## 2023-12-21 PROCEDURE — 90480 ADMN SARSCOV2 VAC 1/ONLY CMP: CPT

## 2023-12-21 PROCEDURE — 91320 SARSCV2 VAC 30MCG TRS-SUC IM: CPT

## 2024-01-02 ENCOUNTER — TELEPHONE (OUTPATIENT)
Dept: INTERNAL MEDICINE | Facility: CLINIC | Age: 84
End: 2024-01-02
Payer: COMMERCIAL

## 2024-01-02 NOTE — LETTER
January 4, 2024      Krystle Polanco  [address] [address]        To Whom It May Concern,        is ok with hold for eliquis 1-2 days prior to dental procedure.           Sincerely,        Cori Campuzano MD

## 2024-01-02 NOTE — TELEPHONE ENCOUNTER
FYI - Status Update    Who is Calling: patient    Update:      1.pt state is having a oral mouth procedure and her provider/  orthodontist was wondering if it will be okay to have her go off her medication eliquis a day early before her procedure. 1/10/2024. Dr. Wilkes have been trying to reach you.    2. Pt have a question on prolia injection due to jaw surgery.   Does caller want a call/response back: Yes     Could we send this information to you in [employer] or would you prefer to receive a phone call?:   Patient would prefer a phone call   Okay to leave a detailed message?: Yes at Cell number on file:    Telephone Information:   Mobile [phone]

## 2024-01-03 NOTE — TELEPHONE ENCOUNTER
[facility] calling to ask for the plan of pause for (ELIQUIS) be sent to them as they heard verbally but looking to have the approval faxed over  to them     [facility]  Phone: [phone]  Fax: [phone]

## 2024-01-03 NOTE — TELEPHONE ENCOUNTER
Contacted patient and informed ok to hold 1-2 days prior to procedure.  Reviewed no prolia 1 month before and 1 month after any dental procedure involving jaw bone.  Patient also would like med list faxed to her dental office, unable to produce fax number.  She will call back with fax number.

## 2024-01-05 ENCOUNTER — TELEPHONE (OUTPATIENT)
Dept: INTERNAL MEDICINE | Facility: CLINIC | Age: 84
End: 2024-01-05
Payer: COMMERCIAL

## 2024-01-05 DIAGNOSIS — I26.99 PULMONARY EMBOLISM WITHOUT ACUTE COR PULMONALE, UNSPECIFIED CHRONICITY, UNSPECIFIED PULMONARY EMBOLISM TYPE (H): ICD-10-CM

## 2024-01-08 NOTE — TELEPHONE ENCOUNTER
Completed form received and faxed.    Copy placed in forms bin until scanned into chart.   
Received form from [facility] ([facility] regarding Eliquis. Placed form on PCP's desk for review and signature.     Austen Royal Jr., CMA on 1/5/2024 at 4:14 PM      
Signed and placed in outbox.  
normal...

## 2024-01-19 DIAGNOSIS — E78.00 HYPERCHOLESTEROLEMIA: ICD-10-CM

## 2024-01-19 DIAGNOSIS — K21.00 GASTROESOPHAGEAL REFLUX DISEASE WITH ESOPHAGITIS WITHOUT HEMORRHAGE: ICD-10-CM

## 2024-01-19 DIAGNOSIS — I10 ESSENTIAL HYPERTENSION, BENIGN: ICD-10-CM

## 2024-01-19 DIAGNOSIS — J30.89 NON-SEASONAL ALLERGIC RHINITIS, UNSPECIFIED TRIGGER: ICD-10-CM

## 2024-01-19 DIAGNOSIS — E11.9 TYPE 2 DIABETES MELLITUS WITHOUT COMPLICATION, WITHOUT LONG-TERM CURRENT USE OF INSULIN (H): ICD-10-CM

## 2024-01-19 RX ORDER — LOSARTAN POTASSIUM 100 MG/1
100 TABLET ORAL DAILY
Qty: 90 TABLET | Refills: 1 | Status: SHIPPED | OUTPATIENT
Start: 2024-01-19 | End: 2024-06-24

## 2024-01-19 RX ORDER — ATORVASTATIN CALCIUM 80 MG/1
TABLET, FILM COATED ORAL
Qty: 90 TABLET | Refills: 1 | Status: SHIPPED | OUTPATIENT
Start: 2024-01-19 | End: 2024-06-24

## 2024-01-19 RX ORDER — MONTELUKAST SODIUM 10 MG/1
10 TABLET ORAL DAILY
Qty: 90 TABLET | Refills: 0 | Status: SHIPPED | OUTPATIENT
Start: 2024-01-19 | End: 2024-03-15

## 2024-01-19 RX ORDER — METFORMIN HCL 500 MG
TABLET, EXTENDED RELEASE 24 HR ORAL
Qty: 360 TABLET | Refills: 0 | Status: SHIPPED | OUTPATIENT
Start: 2024-01-19 | End: 2024-04-15

## 2024-01-19 RX ORDER — FAMOTIDINE 40 MG/1
40 TABLET, FILM COATED ORAL 2 TIMES DAILY
Qty: 180 TABLET | Refills: 1 | Status: SHIPPED | OUTPATIENT
Start: 2024-01-19

## 2024-02-22 ENCOUNTER — TRANSFERRED RECORDS (OUTPATIENT)
Dept: HEALTH INFORMATION MANAGEMENT | Facility: CLINIC | Age: 84
End: 2024-02-22
Payer: COMMERCIAL

## 2024-02-22 LAB — RETINOPATHY: NEGATIVE

## 2024-03-04 ENCOUNTER — MYC MEDICAL ADVICE (OUTPATIENT)
Dept: INTERNAL MEDICINE | Facility: CLINIC | Age: 84
End: 2024-03-04
Payer: COMMERCIAL

## 2024-03-12 ENCOUNTER — NURSE TRIAGE (OUTPATIENT)
Dept: NURSING | Facility: CLINIC | Age: 84
End: 2024-03-12
Payer: COMMERCIAL

## 2024-03-12 NOTE — TELEPHONE ENCOUNTER
Does not need to be fasting. Typically will get labs after visit since before she would need lab appointment.

## 2024-03-12 NOTE — TELEPHONE ENCOUNTER
Krystle has an appt tomorrow with Dr Campuzano. She is calling to ask if she should prepare for lab work tomorrow? Fasting? Hoping to have lab draw before visit.  Wanda Hays RN on 3/12/2024 at 4:19 PM    Reason for Disposition   Nursing judgment    Protocols used: Information Only Call - No Triage-A-OH

## 2024-03-13 ENCOUNTER — OFFICE VISIT (OUTPATIENT)
Dept: INTERNAL MEDICINE | Facility: CLINIC | Age: 84
End: 2024-03-13
Payer: COMMERCIAL

## 2024-03-13 VITALS
WEIGHT: 205 LBS | DIASTOLIC BLOOD PRESSURE: 72 MMHG | HEIGHT: 62 IN | BODY MASS INDEX: 37.73 KG/M2 | RESPIRATION RATE: 20 BRPM | OXYGEN SATURATION: 98 % | SYSTOLIC BLOOD PRESSURE: 114 MMHG | HEART RATE: 72 BPM | TEMPERATURE: 97.5 F

## 2024-03-13 DIAGNOSIS — I10 BENIGN ESSENTIAL HYPERTENSION: ICD-10-CM

## 2024-03-13 DIAGNOSIS — K58.0 IRRITABLE BOWEL SYNDROME WITH DIARRHEA: ICD-10-CM

## 2024-03-13 DIAGNOSIS — E66.812 CLASS 2 SEVERE OBESITY DUE TO EXCESS CALORIES WITH SERIOUS COMORBIDITY AND BODY MASS INDEX (BMI) OF 37.0 TO 37.9 IN ADULT (H): ICD-10-CM

## 2024-03-13 DIAGNOSIS — Z86.711 HISTORY OF PULMONARY EMBOLUS (PE): ICD-10-CM

## 2024-03-13 DIAGNOSIS — E66.01 CLASS 2 SEVERE OBESITY DUE TO EXCESS CALORIES WITH SERIOUS COMORBIDITY AND BODY MASS INDEX (BMI) OF 37.0 TO 37.9 IN ADULT (H): ICD-10-CM

## 2024-03-13 DIAGNOSIS — E78.5 HYPERLIPIDEMIA WITH TARGET LDL LESS THAN 100: ICD-10-CM

## 2024-03-13 DIAGNOSIS — J30.1 NON-SEASONAL ALLERGIC RHINITIS DUE TO POLLEN: ICD-10-CM

## 2024-03-13 DIAGNOSIS — D32.9 MENINGIOMA (H): ICD-10-CM

## 2024-03-13 DIAGNOSIS — E11.42 TYPE 2 DIABETES MELLITUS WITH DIABETIC POLYNEUROPATHY, WITHOUT LONG-TERM CURRENT USE OF INSULIN (H): Primary | ICD-10-CM

## 2024-03-13 DIAGNOSIS — K21.9 GASTROESOPHAGEAL REFLUX DISEASE WITHOUT ESOPHAGITIS: ICD-10-CM

## 2024-03-13 DIAGNOSIS — J45.40 MODERATE PERSISTENT ASTHMA WITHOUT COMPLICATION: ICD-10-CM

## 2024-03-13 DIAGNOSIS — L40.9 PSORIASIS: ICD-10-CM

## 2024-03-13 DIAGNOSIS — M81.0 AGE-RELATED OSTEOPOROSIS WITHOUT CURRENT PATHOLOGICAL FRACTURE: ICD-10-CM

## 2024-03-13 DIAGNOSIS — I48.0 PAF (PAROXYSMAL ATRIAL FIBRILLATION) (H): ICD-10-CM

## 2024-03-13 DIAGNOSIS — G47.33 OSA (OBSTRUCTIVE SLEEP APNEA): ICD-10-CM

## 2024-03-13 LAB
ERYTHROCYTE [DISTWIDTH] IN BLOOD BY AUTOMATED COUNT: 13.2 % (ref 10–15)
HBA1C MFR BLD: 6.9 % (ref 0–5.6)
HCT VFR BLD AUTO: 43.4 % (ref 35–47)
HGB BLD-MCNC: 14.3 G/DL (ref 11.7–15.7)
MCH RBC QN AUTO: 29.9 PG (ref 26.5–33)
MCHC RBC AUTO-ENTMCNC: 32.9 G/DL (ref 31.5–36.5)
MCV RBC AUTO: 91 FL (ref 78–100)
PLATELET # BLD AUTO: 289 10E3/UL (ref 150–450)
RBC # BLD AUTO: 4.78 10E6/UL (ref 3.8–5.2)
WBC # BLD AUTO: 13.4 10E3/UL (ref 4–11)

## 2024-03-13 PROCEDURE — 85027 COMPLETE CBC AUTOMATED: CPT | Performed by: INTERNAL MEDICINE

## 2024-03-13 PROCEDURE — 99215 OFFICE O/P EST HI 40 MIN: CPT | Performed by: INTERNAL MEDICINE

## 2024-03-13 PROCEDURE — 36415 COLL VENOUS BLD VENIPUNCTURE: CPT | Performed by: INTERNAL MEDICINE

## 2024-03-13 PROCEDURE — 83036 HEMOGLOBIN GLYCOSYLATED A1C: CPT | Performed by: INTERNAL MEDICINE

## 2024-03-13 RX ORDER — CLOBETASOL PROPIONATE 0.5 MG/G
CREAM TOPICAL 2 TIMES DAILY
Qty: 60 G | Refills: 0 | Status: SHIPPED | OUTPATIENT
Start: 2024-03-13

## 2024-03-13 NOTE — ASSESSMENT & PLAN NOTE
Well controlled with metformin 1g BID, A1c 6.8 (9/2023). Does have some peripheral neuropathy. Eye exam normal 6/8/23. Sugars were elevated while dealing with tooth infection.   - Will repeat A1c today   - Continue metformin 1g BID

## 2024-03-13 NOTE — PROGRESS NOTES
Assessment & Plan   Problem List Items Addressed This Visit          Nervous and Auditory    Type 2 diabetes mellitus with diabetic polyneuropathy, without long-term current use of insulin (H) - Primary     Well controlled with metformin 1g BID, A1c 6.8 (9/2023). Does have some peripheral neuropathy. Eye exam normal 6/8/23. Sugars were elevated while dealing with tooth infection.   - Will repeat A1c today   - Continue metformin 1g BID         Relevant Orders    HEMOGLOBIN A1C    Meningioma (H)     Meningioma was stable on brain MRI 10/2023.  Will keep an eye on this with imaging annually.            Respiratory    Asthma     Follows with Dr. Mccormick through . Last visit 2/5/24, note reviewed. Breathing is stable today.   - Continues on Qvar BID, singulair 10 mg daily, spiriva PRN, PRN albuterol         Allergic rhinitis     Follows with Dr. Mccormick. Continues on astelin and singulair 10 mg daily.          CHRISTOPHER (obstructive sleep apnea)     Well controlled with nocturnal CPAP.            Digestive    Gastroesophageal reflux disease without esophagitis     Well controlled with pepcid 40 mg daily.         Irritable bowel syndrome     Follows at [facility], last visit 11/2022. Did have normal colonoscopy (negative biopsies for colitis) 4/2022. Symptoms doing well right now.   - Continue current regimen of colestipol BID, magnesium, imodium, probiotic  - Encouraged her to use imodium as needed, max 8g/day  - Encouraged her to make f/up appt with [facility] as it has been over a year         Class 2 severe obesity due to excess calories with serious comorbidity and body mass index (BMI) of 37.0 to 37.9 in adult (H)     Associated with HTN, T2DM, and hip pain.   - Continue PT and pulm rehab  - Further work on weight loss would help with chronic conditions  - Encouraged to work on healthy diet choices as well             Endocrine    Hyperlipidemia with target LDL less than 100      Lipids 9/12/23 Tchol 161, , HDL 47, LDL 74.  -  "Continue Atorvastatin 80, CoQ10            Circulatory    Benign essential hypertension     Well controlled on current regimen.   - Continue losartan 100, amlodipine 5.         PAF (paroxysmal atrial fibrillation) (H)     Follows with EP. Overall stable, has not seen any recent episodes of A-fib on her Apple Watch.  - Continue sotalol 60 BID and eliquis 5 BID  - Continue cardiology follow up, this is due, provided phone number in AVS to schedule         Relevant Orders    CBC with platelets       Musculoskeletal and Integumentary    Age-related osteoporosis without current pathological fracture     Bone density improved on DEXA scan 10/2023.  -Continue Prolia, next scheduled for 4/4/24            Immune    Psoriasis     Recent flare treated well with PRN clobetasol, almost all healed on exam today.   - Refill of clobetasol cream provided for future flares, cautioned on use only 2 weeks at a time at most         Relevant Medications    clobetasol propionate (TEMOVATE) 0.05 % external cream       Other    History of pulmonary embolus (PE)     On lifelong AC with Eliquis. We discussed possibility of going to warfarin for cost, she will think about this but continue eliquis for now.              Review of prior external note(s) from - [facility] information from [employer] ([employer]  reviewed  Ordering of each unique test  I spent a total of 47 minutes on the day of the visit.   Time spent by me doing chart review, history and exam, documentation and further activities per the note     BMI  Estimated body mass index is 37.49 kg/m  as calculated from the following:    Height as of this encounter: 1.575 m (5' 2\").    Weight as of this encounter: 93 kg (205 lb).   Weight management plan: Discussed healthy diet and exercise guidelines    FUTURE APPOINTMENTS:       - Follow-up visit in 3 months       Michael Dalton is a 83 year old, presenting for the following health issues:  Follow Up        3/13/2024     9:49 AM "   Additional Questions   Roomed by Ethan MORRELL CMA   Accompanied by N/A     History of Present Illness     Krystle is here for general follow up today. Overall, is doing okay. Still grieving after Fitz's passing but feels she is doing okay. Has a great support system with family and friends. Since our last visit, she has had extensive dental work done surrounding an abscessed tooth and bridge that needed to be removed. To continue to fix this issues will take another 8-12 months. While dealing with the tooth, couldn't really eat anything that wasn't soft. Noticed sugars were more unpredictable.     CHRISTOPHER: CPAP at night     Asthma: saw Dr. Mccormick 2/5/24. Qvar increased to 2 puffs twice daily for one month in addition to singulair 10 mg daily. Spiriva with colds, albuterol PRN. Plan f/up 3 months.     Hip Pain: Continues with PT    T2DM: metformin 1g BID. Last A1c 6.8 (9/12/23). Had some higher sugars in s/o abscessed tooth and difficulty eating after.     H/o b/l PEs: Unprovoked. Eliquis 5 BID. Previously on warfarin and tolerated okay. Has done well on this.  However, with cost wonders about going back to warfarin.     Afib / PVC: Follows with EP, Dr. Wong. Due for follow up, last visit 2/2023.     Atrophic vaginitis: Estrace cream works well      Psoriasis: Had flare when Fitz was ill and after passing. Did see dermatology who refilled her clobetasol cream that helped.  They had recommended light therapy if the steroid cream did not help and was even told this was covered by her insurance, however the flare itself has resolved aside from some scarring.  Wondering if she should do the light therapy anyway.     GERD: Pepcid 40 mg daily     HTN: losartan 100, amlodipine 5. BP today 114/72.     HLD: atorvastatin 80 mg     AR: Astelin nasal spray, singulair 10.     Osteoporosis: DEXA with improvement in spine bone density 10/2023. Prolia, next injection scheduled 4/4/24.     IBS: Current regimen is Colestipol 1g BID,  "magnesium, imodium, probiotic. Says these are doing better today. Can control better with types of food. Only need imodium 2 times a month.     Meningioma: Brain MRI 10/6/23, stable, small meningioma, mild small vessel disease    Wt Readings from Last 4 Encounters:   03/13/24 93 kg (205 lb)   12/13/23 90.6 kg (199 lb 12.8 oz)   09/12/23 94.3 kg (207 lb 12.8 oz)   06/30/23 95.3 kg (210 lb)     She eats 4 or more servings of fruits and vegetables daily.She consumes 0 sweetened beverage(s) daily.She exercises with enough effort to increase her heart rate 10 to 19 minutes per day.  She exercises with enough effort to increase her heart rate 3 or less days per week.   She is taking medications regularly.     Review of Systems  Constitutional, neuro, ENT, endocrine, pulmonary, cardiac, gastrointestinal, genitourinary, musculoskeletal, integument and psychiatric systems are negative, except as otherwise noted.      Objective    /72 (BP Location: Right arm, Patient Position: Sitting, Cuff Size: Adult Large)   Pulse 72   Temp 97.5  F (36.4  C) (Oral)   Resp 20   Ht 1.575 m (5' 2\")   Wt 93 kg (205 lb)   LMP  (LMP Unknown)   SpO2 98%   BMI 37.49 kg/m    Body mass index is 37.49 kg/m .  Physical Exam   GENERAL: healthy, alert and no distress  EYES: Eyes grossly normal to inspection and conjunctivae and sclerae normal  HENT: nose and mouth without ulcers or lesions  RESP: breathing comfortably and speaking in full sentences on room air with no respiratory distress or coughing  CV: warm and well perfused  ABDOMEN: soft, nontender  SKIN: scattered areas of scarred hyperpigmented areas on arms and back form recent psoriasis flare  NEURO: No focal deficits, mentation intact and speech normal  PSYCH: mentation appears normal, affect normal/bright    No results found for this or any previous visit (from the past 24 hour(s)).        Signed Electronically by: Cori Campuzano MD  "

## 2024-03-13 NOTE — ASSESSMENT & PLAN NOTE
Follows with EP. Overall stable, has not seen any recent episodes of A-fib on her Apple Watch.  - Continue sotalol 60 BID and eliquis 5 BID  - Continue cardiology follow up, this is due, provided phone number in AVS to schedule

## 2024-03-13 NOTE — ASSESSMENT & PLAN NOTE
Follows with Dr. Mccormick through . Last visit 2/5/24, note reviewed. Breathing is stable today.   - Continues on Qvar BID, singulair 10 mg daily, spiriva PRN, PRN albuterol

## 2024-03-13 NOTE — ASSESSMENT & PLAN NOTE
On lifelong AC with Eliquis. We discussed possibility of going to warfarin for cost, she will think about this but continue eliquis for now.

## 2024-03-13 NOTE — ASSESSMENT & PLAN NOTE
Recent flare treated well with PRN clobetasol, almost all healed on exam today.   - Refill of clobetasol cream provided for future flares, cautioned on use only 2 weeks at a time at most

## 2024-03-13 NOTE — ASSESSMENT & PLAN NOTE
Follows at [facility], last visit 11/2022. Did have normal colonoscopy (negative biopsies for colitis) 4/2022. Symptoms doing well right now.   - Continue current regimen of colestipol BID, magnesium, imodium, probiotic  - Encouraged her to use imodium as needed, max 8g/day  - Encouraged her to make f/up appt with [facility] as it has been over a year

## 2024-03-13 NOTE — ASSESSMENT & PLAN NOTE
Associated with HTN, T2DM, and hip pain.   - Continue PT and pulm rehab  - Further work on weight loss would help with chronic conditions  - Encouraged to work on healthy diet choices as well

## 2024-03-15 ENCOUNTER — TELEPHONE (OUTPATIENT)
Dept: INTERNAL MEDICINE | Facility: CLINIC | Age: 84
End: 2024-03-15

## 2024-03-15 DIAGNOSIS — Z92.29 PERSONAL HISTORY OF OTHER DRUG THERAPY: ICD-10-CM

## 2024-03-15 DIAGNOSIS — M81.0 SENILE OSTEOPOROSIS: Primary | ICD-10-CM

## 2024-03-15 DIAGNOSIS — J30.89 NON-SEASONAL ALLERGIC RHINITIS, UNSPECIFIED TRIGGER: ICD-10-CM

## 2024-03-15 RX ORDER — MONTELUKAST SODIUM 10 MG/1
10 TABLET ORAL DAILY
Qty: 90 TABLET | Refills: 3 | Status: SHIPPED | OUTPATIENT
Start: 2024-03-15

## 2024-03-15 NOTE — TELEPHONE ENCOUNTER
Patient's last prolia was 10/4/23, pt has an upcoming prolia injection appointment on 24.     If this patient is to continue with Prolia injection therapy a new, active prolia order is needed for the upcoming administration.     A Prolia order has been pended with the previously administered order's associated diagnoses; signing provider to review diagnoses to ensure these still apply to patient.    Diagnoses from order used during prolia administration:   Personal history of other drug therapy [Z92.29]      Senile osteoporosis [M81.0]          Have previous orders been discontinued due to being ? Yes    Patient's most recent labs within the past year (Calcium, Albumin, Creatinine):     Calcium 9.8  Albumin 4.1  Creatinine 0.58    Labs were completed more than 6 months ago , labs are pended if provider would like values checked prior to administration..     Prolia provider information with link to prescribing information: [email or web address]  Note: Per Prolia ordering smartset, an albumin level is recommended if Calcium level is < 8.5.     Provider action needed: please review/sign prolia order, review associated diagnoses, review/sign recent labs (if needed); please remove lab orders if not needed.  If labs are ordered, please route to nurse pool so that patient can be scheduled for lab draw and labs can be followed for results prior to prolia administration.

## 2024-03-15 NOTE — TELEPHONE ENCOUNTER
Last Written Prescription Date:  1/19/24  Last Fill Quantity: 90,  # refills: 0   Last office visit provider:  3/13/24     Requested Prescriptions   Pending Prescriptions Disp Refills    montelukast (SINGULAIR) 10 MG tablet 90 tablet 3     Sig: Take 1 tablet (10 mg) by mouth daily       Leukotriene Inhibitors Protocol Passed - 3/15/2024  4:34 PM        Passed - Patient is age 12 or older     If patient is under 16, ok to refill using age based dosing.           Passed - Medication is active on med list             Lisa Wilburn RN 03/15/24 4:34 PM

## 2024-03-15 NOTE — TELEPHONE ENCOUNTER
Called patient to reschedule Prolia 1 day later (needs to be 6 months and 1 day after last injection). Appointment scheduled.     Discussed lab results from 3/13 visit and relayed provider's message.    Patient would like a call from PCP today, as she will be out of town next week. Or she will send a [employer] message next week. Writer said PCP will unlikely be able to call patient today. Writer asked if they could take a message for Dr. Campuzano or what the concern is, patient declined.

## 2024-03-19 DIAGNOSIS — K58.9 IRRITABLE BOWEL SYNDROME, UNSPECIFIED TYPE: ICD-10-CM

## 2024-03-19 RX ORDER — MONTELUKAST SODIUM 4 MG/1
1 TABLET, CHEWABLE ORAL 2 TIMES DAILY
Qty: 180 TABLET | Refills: 3 | OUTPATIENT
Start: 2024-03-19

## 2024-03-29 DIAGNOSIS — K58.9 IRRITABLE BOWEL SYNDROME, UNSPECIFIED TYPE: ICD-10-CM

## 2024-03-29 RX ORDER — MONTELUKAST SODIUM 4 MG/1
1 TABLET, CHEWABLE ORAL 2 TIMES DAILY
Qty: 180 TABLET | Refills: 3 | Status: SHIPPED | OUTPATIENT
Start: 2024-03-29

## 2024-04-05 ENCOUNTER — ALLIED HEALTH/NURSE VISIT (OUTPATIENT)
Dept: FAMILY MEDICINE | Facility: CLINIC | Age: 84
End: 2024-04-05
Payer: COMMERCIAL

## 2024-04-05 DIAGNOSIS — M81.0 SENILE OSTEOPOROSIS: Primary | ICD-10-CM

## 2024-04-05 PROCEDURE — 96372 THER/PROPH/DIAG INJ SC/IM: CPT | Performed by: INTERNAL MEDICINE

## 2024-04-05 PROCEDURE — 99207 PR NO CHARGE NURSE ONLY: CPT

## 2024-04-05 NOTE — PROGRESS NOTES
Clinic Administered Medication Documentation      Prolia Documentation    Indication: Prolia  (denosumab) is a prescription medicine used to treat osteoporosis in patients who:   Are at high risk for fracture, meaning patients who have had a fracture related to osteoporosis, or who have multiple risk factors for fracture.  Cannot use another osteoporosis medicine or other osteoporosis medicines did not work well.  The timeline for early/late injections would be 4 weeks early and any time after the 6 month chico. If a patient receives their injection late, then the subsequent injection would be 6 months from the date that they actually received the injection.    When was the last injection?  10/4/23  Was the last injection at least 6 months ago? Yes  Has the prior authorization been completed?  Yes  Is there an active order (written within the past 365 days, with administrations remaining, not ) in the chart?  Yes  Patient denies any dental work involving the bone (e.g. tooth extraction or dental implants) in the past 4 weeks?  Yes  Patient denies plans for any dental work involving the bone (e.g. tooth extraction or dental implants) in the next 4 weeks? Yes    The following steps were completed to comply with the REMS program for Prolia:  Reviewed information in the Medication Guide and Patient Counseling Chart, including the serious risks of Prolia  and the symptoms of each risk.  Advised patient to seek prompt medical attention if they have signs or symptoms of any of the serious risks.  Provided each patient a copy of the Medication Guide and Patient Brochure.    Prior to injection, verified patient identity using patient's name and date of birth. Medication was administered. Please see MAR and medication order for additional information. Patient instructed to remain in clinic for 15 minutes and report any adverse reaction to staff immediately.    Vial/Syringe: Single dose vial. Was entire vial of  medication used? Yes  Was this medication supplied by the patient? No  Patient has no refills remaining.

## 2024-04-13 DIAGNOSIS — E11.9 TYPE 2 DIABETES MELLITUS WITHOUT COMPLICATION, WITHOUT LONG-TERM CURRENT USE OF INSULIN (H): ICD-10-CM

## 2024-04-15 DIAGNOSIS — I48.0 PAF (PAROXYSMAL ATRIAL FIBRILLATION) (H): ICD-10-CM

## 2024-04-15 DIAGNOSIS — I49.3 PVC'S (PREMATURE VENTRICULAR CONTRACTIONS): ICD-10-CM

## 2024-04-15 RX ORDER — SOTALOL HYDROCHLORIDE 120 MG/1
TABLET ORAL
Qty: 90 TABLET | Refills: 0 | Status: SHIPPED | OUTPATIENT
Start: 2024-04-15 | End: 2024-06-24

## 2024-04-15 RX ORDER — METFORMIN HCL 500 MG
TABLET, EXTENDED RELEASE 24 HR ORAL
Qty: 360 TABLET | Refills: 0 | Status: SHIPPED | OUTPATIENT
Start: 2024-04-15 | End: 2024-06-24

## 2024-06-11 DIAGNOSIS — I48.0 PAF (PAROXYSMAL ATRIAL FIBRILLATION) (H): Primary | ICD-10-CM

## 2024-06-12 ENCOUNTER — OFFICE VISIT (OUTPATIENT)
Dept: CARDIOLOGY | Facility: CLINIC | Age: 84
End: 2024-06-12
Payer: COMMERCIAL

## 2024-06-12 VITALS
SYSTOLIC BLOOD PRESSURE: 110 MMHG | HEART RATE: 90 BPM | BODY MASS INDEX: 37.62 KG/M2 | OXYGEN SATURATION: 88 % | RESPIRATION RATE: 20 BRPM | WEIGHT: 205.7 LBS | DIASTOLIC BLOOD PRESSURE: 60 MMHG

## 2024-06-12 DIAGNOSIS — I10 BENIGN ESSENTIAL HYPERTENSION: ICD-10-CM

## 2024-06-12 DIAGNOSIS — I48.0 PAF (PAROXYSMAL ATRIAL FIBRILLATION) (H): Primary | ICD-10-CM

## 2024-06-12 DIAGNOSIS — I49.3 PVC'S (PREMATURE VENTRICULAR CONTRACTIONS): ICD-10-CM

## 2024-06-12 LAB
ATRIAL RATE - MUSE: 79 BPM
DIASTOLIC BLOOD PRESSURE - MUSE: NORMAL MMHG
INTERPRETATION ECG - MUSE: NORMAL
P AXIS - MUSE: 28 DEGREES
PR INTERVAL - MUSE: 154 MS
QRS DURATION - MUSE: 86 MS
QT - MUSE: 404 MS
QTC - MUSE: 463 MS
R AXIS - MUSE: 68 DEGREES
SYSTOLIC BLOOD PRESSURE - MUSE: NORMAL MMHG
T AXIS - MUSE: 18 DEGREES
VENTRICULAR RATE- MUSE: 79 BPM

## 2024-06-12 PROCEDURE — G2211 COMPLEX E/M VISIT ADD ON: HCPCS | Performed by: INTERNAL MEDICINE

## 2024-06-12 PROCEDURE — 99215 OFFICE O/P EST HI 40 MIN: CPT | Performed by: INTERNAL MEDICINE

## 2024-06-12 PROCEDURE — 99417 PROLNG OP E/M EACH 15 MIN: CPT | Performed by: INTERNAL MEDICINE

## 2024-06-12 PROCEDURE — 93000 ELECTROCARDIOGRAM COMPLETE: CPT | Performed by: GENERAL ACUTE CARE HOSPITAL

## 2024-06-12 NOTE — PROGRESS NOTES
[facility]  Cardiac Electrophysiology     Progress Note: Tanner Wong MD    Primary Care: Cori Campuzano MD    Primary Cardiologist:     Primary Electrophysiologist: Tanner Wong MD    Assessment:       Frequent PVCs: Patient previously seen for high-frequency PVCs (burden 35%) on Holter monitor from 2020 including some short runs of nonsustained VT.  Patient had no symptoms resulting from her arrhythmia.   PVC morphology has been largely unifocal (left bundle, inferior axis, transition V3) consistent with LVOT site of origin.  The patient's most recent MCOT monitor demonstrated a 1% burden of PVCs.  The patient's echo studies have demonstrated preserved LV function.    Paroxysmal atrial fibrillation stage 3C: Diagnosed and confirmed by Holter monitor dated 11/22/2019.  The patient demonstrated a 5% atrial fibrillation burden on her MCOT monitor from March 2023.  The patients symptomatic recordings were associated with sinus rhythm, PVCs, and atrial fibrillation.  Subsequently the patient has utilized a Apple Watch to record her rhythm during her symptoms of breathlessness.  She now reports that these episodes occur when her Apple Watch confirms atrial fibrillation.  Episode duration is approximately 1 hour and they are occurring roughly every 2 months.  The patient remains on low-dose sotalol 40 mg twice daily as she was intolerant to higher dose.  She has a [id number] score of 5 (age 4, gender, HTN, DM) and is appropriately treated with OAC (apixaban).  Patient has a prior history of unprovoked pulmonary embolism and probably is not a candidate for left atrial appendage closure.  Primary hypertension: Chronic problem for patient and her blood pressure today is at target on her current medical therapy.    Social: Patient's  passed away 6 months ago she is still grieving.    Recommendations:  Patient will remain on her current medical therapy  Patient is undergoing dental  "reconstruction of her right jaw and still has significantly more work to do.  She would like to wait until this is completed before considering mapping/ablation of her atrial fibrillation.  The patient anticipates that would be at the beginning of 2025.  Follow-up will be scheduled in the arrhythmia clinic with the EP LEILANI in 6 months.    Time spent: 55 minutes spent on the date of the encounter doing chart review, history and exam, documentation and further activities as noted above.    Subjective:  Krystle Polanco (84 year old female) returns to the arrhythmia clinic for for interval reevaluation of her atrial fibrillation, PVC, and blood pressure status.  The patient reports that she has no significant awareness of her ventricular ectopy and feels no \"skipped beats\".  She also reports that she obtained a Apple Watch and her family helped her set it up.  She has been diligent in checking her rhythm status with the ECG feature during her periods of breathlessness which occur about every other month.  The Apple Watch is consistently confirmed the presence of atrial fibrillation during those episodes.  She has validated episode duration at about approximately 60 minutes.  She reports no other cardiovascular symptoms at this time.    Current Outpatient Medications   Medication Sig Dispense Refill    acetaminophen (TYLENOL) 500 MG tablet [ACETAMINOPHEN (TYLENOL) 500 MG TABLET] Take 1,000 mg by mouth every 6 (six) hours as needed for pain.      albuterol (PROAIR HFA/PROVENTIL HFA/VENTOLIN HFA) 108 (90 Base) MCG/ACT inhaler [ALBUTEROL (PROAIR HFA) 90 MCG/ACTUATION INHALER] INHALE TWO PUFFS BY MOUTH FOUR TIMES A DAY AS NEEDED FOR WHEEZING 25.5 g 3    amLODIPine (NORVASC) 5 MG tablet Take 1 tablet (5 mg) by mouth daily 90 tablet 3    apixaban ANTICOAGULANT (ELIQUIS) 5 MG tablet Take 1 tablet (5 mg) by mouth 2 times daily 180 tablet 3    atorvastatin (LIPITOR) 80 MG tablet TAKE ONE TABLET BY MOUTH EVERY NIGHT AT BEDTIME 90 " tablet 1    azelastine (ASTELIN) 0.1 % nasal spray Spray 2 sprays in nostril 2 times daily 90 mL 3    beclomethasone HFA (QVAR REDIHALER) 80 MCG/ACT inhaler Inhale 2 puffs into the lungs 2 times daily 31.2 g 3    blood glucose (CONTOUR NEXT TEST) test strip 1 strip by In Vitro route Use as directed with testing blood sugars once daily.  Dispense Contour Next brand per pt's insurance coverage      blood glucose (NO BRAND SPECIFIED) lancing device [LANCING DEVICE MISC] Use as directed 4 times a week. 1 each 3    CALCIUM CARBONATE (CALCIUM 500 ORAL) [CALCIUM CARBONATE (CALCIUM 500 ORAL)] Take 1 tablet by mouth 2 (two) times a day.      cholecalciferol, vitamin D3, 1,000 unit tablet [CHOLECALCIFEROL, VITAMIN D3, 1,000 UNIT TABLET] Take 2,000 Units by mouth 2 (two) times a day.       clindamycin (CLEOCIN) 150 MG capsule [CLINDAMYCIN (CLEOCIN) 150 MG CAPSULE] TAKE 4 CAPSULES  BY MOUTH 1 HOUR PRIOR TO DENTAL APPOINTMENT. (Patient not taking: Reported on 3/13/2024) 90 capsule 3    clobetasol propionate (TEMOVATE) 0.05 % external cream Apply topically 2 times daily Apply twice daily to arms, chest and back areas of psoriasis flares. Two weeks at a time at most. 60 g 0    colestipol (COLESTID) 1 g tablet Take 1 tablet (1 g) by mouth 2 times daily 180 tablet 3    cranberry extract 300 mg Tab [CRANBERRY EXTRACT 300 MG TAB] Take 1 tablet by mouth daily.       estradiol (ESTRACE) 0.1 MG/GM vaginal cream Place 2 g vaginally every other day 42.5 g 3    famotidine (PEPCID) 40 MG tablet Take 1 tablet (40 mg) by mouth 2 times daily 180 tablet 1    LACTOBACILLUS ACIDOPHILUS (PROBIOTIC ORAL) [LACTOBACILLUS ACIDOPHILUS (PROBIOTIC ORAL)] Take 1 tablet by mouth 2 (two) times a day.       loperamide (IMODIUM) 2 mg capsule [LOPERAMIDE (IMODIUM) 2 MG CAPSULE] Take 2 mg by mouth 4 (four) times a day as needed.      losartan (COZAAR) 100 MG tablet Take 1 tablet (100 mg) by mouth daily 90 tablet 1    LUTEIN ORAL [LUTEIN ORAL] Take 1 tablet by  mouth daily. With Zeaxanthin      Magnesium Oxide 250 MG TABS Use every 3rd day      metFORMIN (GLUCOPHAGE XR) 500 MG 24 hr tablet TAKE TWO TABLETS BY MOUTH TWICE A  tablet 0    metroNIDAZOLE (METROGEL) 0.75 % gel [METRONIDAZOLE (METROGEL) 0.75 % GEL] Apply 1 application topically as needed.       montelukast (SINGULAIR) 10 MG tablet Take 1 tablet (10 mg) by mouth daily 90 tablet 3    psyllium (METAMUCIL) 3.4 gram packet [PSYLLIUM (METAMUCIL) 3.4 GRAM PACKET] Take 1 packet by mouth every evening.      SIMETHICONE (GAS-X ORAL) [SIMETHICONE (GAS-X ORAL)] Take 1 tablet by mouth 2 (two) times a day as needed.      sotalol (BETAPACE) 120 MG tablet TAKE 0.5 TABLETS (60 MG) BY MOUTH 2 TIMES DAILY 90 tablet 0    tiotropium (SPIRIVA HANDIHALER) 18 MCG inhaled capsule Inhale 1 capsule (18 mcg) into the lungs daily (Patient taking differently: Inhale 18 mcg into the lungs daily as needed) 90 capsule 3    UBIDECARENONE (COENZYME Q10 ORAL) [UBIDECARENONE (COENZYME Q10 ORAL)] Take 1 tablet by mouth daily.       zinc gluconate 50 mg tablet [ZINC GLUCONATE 50 MG TABLET] Take 50 mg by mouth daily.         Review of Systems:     Family History  Family History   Problem Relation Age of Onset    Breast Cancer Sister 56.00    Depression Mother     Dementia Mother        Social History   reports that she has never smoked. She has never used smokeless tobacco. She reports that she does not drink alcohol and does not use drugs.    Objective:   Vital signs in last 24 hours:  /60 (BP Location: Left arm, Patient Position: Sitting, Cuff Size: Adult Large)   Pulse 90   Resp 20   Wt 93.3 kg (205 lb 11.2 oz)   LMP  (LMP Unknown)   SpO2 (!) 88%   BMI 37.62 kg/m      Physical Exam:  General: The patient is alert oriented to person place and situation.  The patient is in no acute distress at the time of my evaluation.  Eyes: Pupils are equal, round, and reactive to light.  Conjunctiva and sclera are clear.  ENT: Oral mucosa is  moist and without redness. No evident nasal discharge.  Pulmonary: Lungs are clear bilaterally with no rales, rhonchi, or wheezes.    Cardiovascular exam: Rhythm is regular. S1 and S2 are normal. No significant murmur is present. JVP is normal. Lower extremities demonstrate no significant edema. Distal pulses are intact bilaterally.  Abdomen is soft, nontender, no organomegaly, and bowel sounds present.  Musculoskeletal: Spine is straight. Extremities without deformity.  Neuro: Gait is normal.   Skin is warm, dry, and otherwise intact.      Cardiographics:   MCOT monitor dated 3/9/2023  Mobile cardiac telemetry monitoring from 2/21/2023 to 3/6/2023 (monitored duration 12d 23h 37m).  Predominant underlying rhythm was sinus rhythm.  Overall heart rate range 50 to 173bpm, average 82bpm.  2 episodes of nonsustained ventricular tachycardia - longest 4 beats, fastest 173bpm.  5% paroxysmal atrial fibrillation burden, longest reported episode 1h 1m.  Ventricular rates 51-173bpm, average 104bpm.  There were no pauses noted.  Rare supraventricular ectopic beats (1%).  Rare premature ventricular contractions (1%).  Symptom triggers (11, lightheaded, dizzy) correlated to sinus rhythm, atrial fibrillation PVCs.    Lab Results:         Outside record review:

## 2024-06-12 NOTE — LETTER
6/12/2024    Cori Campuzano MD  [address] [facility] [id number]    RE: Krystle Polanco       Dear Colleague,     I had the pleasure of seeing Krystle ARIAS Homeworth in the [facility].    [facility]  Cardiac Electrophysiology     Progress Note: Tanner Wong MD    Primary Care: Cori Campuzano MD    Primary Cardiologist:     Primary Electrophysiologist: Tanner Wong MD    Assessment:       Frequent PVCs: Patient previously seen for high-frequency PVCs (burden 35%) on Holter monitor from 2020 including some short runs of nonsustained VT.  Patient had no symptoms resulting from her arrhythmia.   PVC morphology has been largely unifocal (left bundle, inferior axis, transition V3) consistent with LVOT site of origin.  The patient's most recent MCOT monitor demonstrated a 1% burden of PVCs.  The patient's echo studies have demonstrated preserved LV function.    Paroxysmal atrial fibrillation stage 3C: Diagnosed and confirmed by Holter monitor dated 11/22/2019.  The patient demonstrated a 5% atrial fibrillation burden on her MCOT monitor from March 2023.  The patients symptomatic recordings were associated with sinus rhythm, PVCs, and atrial fibrillation.  Subsequently the patient has utilized a Apple Watch to record her rhythm during her symptoms of breathlessness.  She now reports that these episodes occur when her Apple Watch confirms atrial fibrillation.  Episode duration is approximately 1 hour and they are occurring roughly every 2 months.  The patient remains on low-dose sotalol 40 mg twice daily as she was intolerant to higher dose.  She has a [id number] score of 5 (age 4, gender, HTN, DM) and is appropriately treated with OAC (apixaban).  Patient has a prior history of unprovoked pulmonary embolism and probably is not a candidate for left atrial appendage closure.  Primary hypertension: Chronic problem for patient and her blood pressure today is at  "target on her current medical therapy.    Social: Patient's  passed away 6 months ago she is still grieving.    Recommendations:  Patient will remain on her current medical therapy  Patient is undergoing dental reconstruction of her right jaw and still has significantly more work to do.  She would like to wait until this is completed before considering mapping/ablation of her atrial fibrillation.  The patient anticipates that would be at the beginning of 2025.  Follow-up will be scheduled in the arrhythmia clinic with the EP LEILANI in 6 months.    Time spent: 55 minutes spent on the date of the encounter doing chart review, history and exam, documentation and further activities as noted above.    Subjective:  Krystle Polanco (84 year old female) returns to the arrhythmia clinic for for interval reevaluation of her atrial fibrillation, PVC, and blood pressure status.  The patient reports that she has no significant awareness of her ventricular ectopy and feels no \"skipped beats\".  She also reports that she obtained a Apple Watch and her family helped her set it up.  She has been diligent in checking her rhythm status with the ECG feature during her periods of breathlessness which occur about every other month.  The Apple Watch is consistently confirmed the presence of atrial fibrillation during those episodes.  She has validated episode duration at about approximately 60 minutes.  She reports no other cardiovascular symptoms at this time.    Current Outpatient Medications   Medication Sig Dispense Refill    acetaminophen (TYLENOL) 500 MG tablet [ACETAMINOPHEN (TYLENOL) 500 MG TABLET] Take 1,000 mg by mouth every 6 (six) hours as needed for pain.      albuterol (PROAIR HFA/PROVENTIL HFA/VENTOLIN HFA) 108 (90 Base) MCG/ACT inhaler [ALBUTEROL (PROAIR HFA) 90 MCG/ACTUATION INHALER] INHALE TWO PUFFS BY MOUTH FOUR TIMES A DAY AS NEEDED FOR WHEEZING 25.5 g 3    amLODIPine (NORVASC) 5 MG tablet Take 1 tablet (5 mg) by " mouth daily 90 tablet 3    apixaban ANTICOAGULANT (ELIQUIS) 5 MG tablet Take 1 tablet (5 mg) by mouth 2 times daily 180 tablet 3    atorvastatin (LIPITOR) 80 MG tablet TAKE ONE TABLET BY MOUTH EVERY NIGHT AT BEDTIME 90 tablet 1    azelastine (ASTELIN) 0.1 % nasal spray Spray 2 sprays in nostril 2 times daily 90 mL 3    beclomethasone HFA (QVAR REDIHALER) 80 MCG/ACT inhaler Inhale 2 puffs into the lungs 2 times daily 31.2 g 3    blood glucose (CONTOUR NEXT TEST) test strip 1 strip by In Vitro route Use as directed with testing blood sugars once daily.  Dispense Contour Next brand per pt's insurance coverage      blood glucose (NO BRAND SPECIFIED) lancing device [LANCING DEVICE MISC] Use as directed 4 times a week. 1 each 3    CALCIUM CARBONATE (CALCIUM 500 ORAL) [CALCIUM CARBONATE (CALCIUM 500 ORAL)] Take 1 tablet by mouth 2 (two) times a day.      cholecalciferol, vitamin D3, 1,000 unit tablet [CHOLECALCIFEROL, VITAMIN D3, 1,000 UNIT TABLET] Take 2,000 Units by mouth 2 (two) times a day.       clindamycin (CLEOCIN) 150 MG capsule [CLINDAMYCIN (CLEOCIN) 150 MG CAPSULE] TAKE 4 CAPSULES  BY MOUTH 1 HOUR PRIOR TO DENTAL APPOINTMENT. (Patient not taking: Reported on 3/13/2024) 90 capsule 3    clobetasol propionate (TEMOVATE) 0.05 % external cream Apply topically 2 times daily Apply twice daily to arms, chest and back areas of psoriasis flares. Two weeks at a time at most. 60 g 0    colestipol (COLESTID) 1 g tablet Take 1 tablet (1 g) by mouth 2 times daily 180 tablet 3    cranberry extract 300 mg Tab [CRANBERRY EXTRACT 300 MG TAB] Take 1 tablet by mouth daily.       estradiol (ESTRACE) 0.1 MG/GM vaginal cream Place 2 g vaginally every other day 42.5 g 3    famotidine (PEPCID) 40 MG tablet Take 1 tablet (40 mg) by mouth 2 times daily 180 tablet 1    LACTOBACILLUS ACIDOPHILUS (PROBIOTIC ORAL) [LACTOBACILLUS ACIDOPHILUS (PROBIOTIC ORAL)] Take 1 tablet by mouth 2 (two) times a day.       loperamide (IMODIUM) 2 mg capsule  [LOPERAMIDE (IMODIUM) 2 MG CAPSULE] Take 2 mg by mouth 4 (four) times a day as needed.      losartan (COZAAR) 100 MG tablet Take 1 tablet (100 mg) by mouth daily 90 tablet 1    LUTEIN ORAL [LUTEIN ORAL] Take 1 tablet by mouth daily. With Zeaxanthin      Magnesium Oxide 250 MG TABS Use every 3rd day      metFORMIN (GLUCOPHAGE XR) 500 MG 24 hr tablet TAKE TWO TABLETS BY MOUTH TWICE A  tablet 0    metroNIDAZOLE (METROGEL) 0.75 % gel [METRONIDAZOLE (METROGEL) 0.75 % GEL] Apply 1 application topically as needed.       montelukast (SINGULAIR) 10 MG tablet Take 1 tablet (10 mg) by mouth daily 90 tablet 3    psyllium (METAMUCIL) 3.4 gram packet [PSYLLIUM (METAMUCIL) 3.4 GRAM PACKET] Take 1 packet by mouth every evening.      SIMETHICONE (GAS-X ORAL) [SIMETHICONE (GAS-X ORAL)] Take 1 tablet by mouth 2 (two) times a day as needed.      sotalol (BETAPACE) 120 MG tablet TAKE 0.5 TABLETS (60 MG) BY MOUTH 2 TIMES DAILY 90 tablet 0    tiotropium (SPIRIVA HANDIHALER) 18 MCG inhaled capsule Inhale 1 capsule (18 mcg) into the lungs daily (Patient taking differently: Inhale 18 mcg into the lungs daily as needed) 90 capsule 3    UBIDECARENONE (COENZYME Q10 ORAL) [UBIDECARENONE (COENZYME Q10 ORAL)] Take 1 tablet by mouth daily.       zinc gluconate 50 mg tablet [ZINC GLUCONATE 50 MG TABLET] Take 50 mg by mouth daily.         Review of Systems:     Family History  Family History   Problem Relation Age of Onset    Breast Cancer Sister 56.00    Depression Mother     Dementia Mother        Social History   reports that she has never smoked. She has never used smokeless tobacco. She reports that she does not drink alcohol and does not use drugs.    Objective:   Vital signs in last 24 hours:  /60 (BP Location: Left arm, Patient Position: Sitting, Cuff Size: Adult Large)   Pulse 90   Resp 20   Wt 93.3 kg (205 lb 11.2 oz)   LMP  (LMP Unknown)   SpO2 (!) 88%   BMI 37.62 kg/m      Physical Exam:  General: The patient is alert  oriented to person place and situation.  The patient is in no acute distress at the time of my evaluation.  Eyes: Pupils are equal, round, and reactive to light.  Conjunctiva and sclera are clear.  ENT: Oral mucosa is moist and without redness. No evident nasal discharge.  Pulmonary: Lungs are clear bilaterally with no rales, rhonchi, or wheezes.    Cardiovascular exam: Rhythm is regular. S1 and S2 are normal. No significant murmur is present. JVP is normal. Lower extremities demonstrate no significant edema. Distal pulses are intact bilaterally.  Abdomen is soft, nontender, no organomegaly, and bowel sounds present.  Musculoskeletal: Spine is straight. Extremities without deformity.  Neuro: Gait is normal.   Skin is warm, dry, and otherwise intact.      Cardiographics:   MCOT monitor dated 3/9/2023  Mobile cardiac telemetry monitoring from 2/21/2023 to 3/6/2023 (monitored duration 12d 23h 37m).  Predominant underlying rhythm was sinus rhythm.  Overall heart rate range 50 to 173bpm, average 82bpm.  2 episodes of nonsustained ventricular tachycardia - longest 4 beats, fastest 173bpm.  5% paroxysmal atrial fibrillation burden, longest reported episode 1h 1m.  Ventricular rates 51-173bpm, average 104bpm.  There were no pauses noted.  Rare supraventricular ectopic beats (1%).  Rare premature ventricular contractions (1%).  Symptom triggers (11, lightheaded, dizzy) correlated to sinus rhythm, atrial fibrillation PVCs.    Lab Results:         Outside record review:          Thank you for allowing me to participate in the care of your patient.      Sincerely,     Tanner Wong MD     [facility]  cc:   Cori Capmuzano MD  [address] [place] [address]

## 2024-06-24 DIAGNOSIS — I10 ESSENTIAL HYPERTENSION, BENIGN: ICD-10-CM

## 2024-06-24 DIAGNOSIS — E11.9 TYPE 2 DIABETES MELLITUS WITHOUT COMPLICATION, WITHOUT LONG-TERM CURRENT USE OF INSULIN (H): ICD-10-CM

## 2024-06-24 DIAGNOSIS — I49.3 PVC'S (PREMATURE VENTRICULAR CONTRACTIONS): ICD-10-CM

## 2024-06-24 DIAGNOSIS — I48.0 PAF (PAROXYSMAL ATRIAL FIBRILLATION) (H): ICD-10-CM

## 2024-06-24 DIAGNOSIS — E78.00 HYPERCHOLESTEROLEMIA: ICD-10-CM

## 2024-06-24 RX ORDER — METFORMIN HCL 500 MG
TABLET, EXTENDED RELEASE 24 HR ORAL
Qty: 360 TABLET | Refills: 0 | Status: SHIPPED | OUTPATIENT
Start: 2024-06-24

## 2024-06-24 RX ORDER — SOTALOL HYDROCHLORIDE 120 MG/1
TABLET ORAL
Qty: 90 TABLET | Refills: 3 | Status: SHIPPED | OUTPATIENT
Start: 2024-06-24 | End: 2024-07-15

## 2024-06-24 RX ORDER — AMLODIPINE BESYLATE 5 MG/1
5 TABLET ORAL DAILY
Qty: 90 TABLET | Refills: 1 | Status: SHIPPED | OUTPATIENT
Start: 2024-06-24

## 2024-06-24 RX ORDER — ATORVASTATIN CALCIUM 80 MG/1
TABLET, FILM COATED ORAL
Qty: 90 TABLET | Refills: 1 | Status: SHIPPED | OUTPATIENT
Start: 2024-06-24

## 2024-06-24 RX ORDER — LOSARTAN POTASSIUM 100 MG/1
100 TABLET ORAL DAILY
Qty: 90 TABLET | Refills: 1 | Status: SHIPPED | OUTPATIENT
Start: 2024-06-24

## 2024-06-24 NOTE — TELEPHONE ENCOUNTER
Pended all prescription's and patient's only favored pharmacy. Will reroute to refill pool to run refill protocol.

## 2024-06-25 ENCOUNTER — OFFICE VISIT (OUTPATIENT)
Dept: INTERNAL MEDICINE | Facility: CLINIC | Age: 84
End: 2024-06-25
Payer: COMMERCIAL

## 2024-06-25 VITALS
RESPIRATION RATE: 12 BRPM | TEMPERATURE: 98.5 F | BODY MASS INDEX: 37.73 KG/M2 | HEART RATE: 90 BPM | SYSTOLIC BLOOD PRESSURE: 122 MMHG | HEIGHT: 62 IN | DIASTOLIC BLOOD PRESSURE: 64 MMHG | WEIGHT: 205 LBS | OXYGEN SATURATION: 96 %

## 2024-06-25 DIAGNOSIS — M81.0 AGE-RELATED OSTEOPOROSIS WITHOUT CURRENT PATHOLOGICAL FRACTURE: ICD-10-CM

## 2024-06-25 DIAGNOSIS — E66.01 CLASS 2 SEVERE OBESITY WITH SERIOUS COMORBIDITY AND BODY MASS INDEX (BMI) OF 37.0 TO 37.9 IN ADULT, UNSPECIFIED OBESITY TYPE (H): ICD-10-CM

## 2024-06-25 DIAGNOSIS — J30.89 NON-SEASONAL ALLERGIC RHINITIS, UNSPECIFIED TRIGGER: ICD-10-CM

## 2024-06-25 DIAGNOSIS — I48.0 PAF (PAROXYSMAL ATRIAL FIBRILLATION) (H): ICD-10-CM

## 2024-06-25 DIAGNOSIS — J45.40 MODERATE PERSISTENT ASTHMA WITHOUT COMPLICATION: ICD-10-CM

## 2024-06-25 DIAGNOSIS — J38.3 VOCAL CORD DYSFUNCTION: ICD-10-CM

## 2024-06-25 DIAGNOSIS — G47.33 OSA (OBSTRUCTIVE SLEEP APNEA): ICD-10-CM

## 2024-06-25 DIAGNOSIS — E78.5 HYPERLIPIDEMIA WITH TARGET LDL LESS THAN 100: ICD-10-CM

## 2024-06-25 DIAGNOSIS — K58.0 IRRITABLE BOWEL SYNDROME WITH DIARRHEA: ICD-10-CM

## 2024-06-25 DIAGNOSIS — K21.9 GASTROESOPHAGEAL REFLUX DISEASE WITHOUT ESOPHAGITIS: ICD-10-CM

## 2024-06-25 DIAGNOSIS — Z86.711 HISTORY OF PULMONARY EMBOLUS (PE): ICD-10-CM

## 2024-06-25 DIAGNOSIS — Z23 ENCOUNTER FOR VACCINATION: ICD-10-CM

## 2024-06-25 DIAGNOSIS — I10 BENIGN ESSENTIAL HYPERTENSION: ICD-10-CM

## 2024-06-25 DIAGNOSIS — D32.9 MENINGIOMA (H): ICD-10-CM

## 2024-06-25 DIAGNOSIS — E11.42 TYPE 2 DIABETES MELLITUS WITH DIABETIC POLYNEUROPATHY, WITHOUT LONG-TERM CURRENT USE OF INSULIN (H): Primary | ICD-10-CM

## 2024-06-25 DIAGNOSIS — E66.812 CLASS 2 SEVERE OBESITY WITH SERIOUS COMORBIDITY AND BODY MASS INDEX (BMI) OF 37.0 TO 37.9 IN ADULT, UNSPECIFIED OBESITY TYPE (H): ICD-10-CM

## 2024-06-25 PROCEDURE — 91320 SARSCV2 VAC 30MCG TRS-SUC IM: CPT | Performed by: INTERNAL MEDICINE

## 2024-06-25 PROCEDURE — 90480 ADMN SARSCOV2 VAC 1/ONLY CMP: CPT | Performed by: INTERNAL MEDICINE

## 2024-06-25 PROCEDURE — 99215 OFFICE O/P EST HI 40 MIN: CPT | Mod: 25 | Performed by: INTERNAL MEDICINE

## 2024-06-25 RX ORDER — AZELASTINE 1 MG/ML
2 SPRAY, METERED NASAL 2 TIMES DAILY
Qty: 90 ML | Refills: 3 | Status: SHIPPED | OUTPATIENT
Start: 2024-06-25

## 2024-06-25 ASSESSMENT — ASTHMA QUESTIONNAIRES
ACT_TOTALSCORE: 22
ACT_TOTALSCORE: 22
QUESTION_3 LAST FOUR WEEKS HOW OFTEN DID YOUR ASTHMA SYMPTOMS (WHEEZING, COUGHING, SHORTNESS OF BREATH, CHEST TIGHTNESS OR PAIN) WAKE YOU UP AT NIGHT OR EARLIER THAN USUAL IN THE MORNING: NOT AT ALL
QUESTION_2 LAST FOUR WEEKS HOW OFTEN HAVE YOU HAD SHORTNESS OF BREATH: THREE TO SIX TIMES A WEEK
QUESTION_1 LAST FOUR WEEKS HOW MUCH OF THE TIME DID YOUR ASTHMA KEEP YOU FROM GETTING AS MUCH DONE AT WORK, SCHOOL OR AT HOME: NONE OF THE TIME
QUESTION_4 LAST FOUR WEEKS HOW OFTEN HAVE YOU USED YOUR RESCUE INHALER OR NEBULIZER MEDICATION (SUCH AS ALBUTEROL): NOT AT ALL
QUESTION_5 LAST FOUR WEEKS HOW WOULD YOU RATE YOUR ASTHMA CONTROL: WELL CONTROLLED

## 2024-06-25 NOTE — ASSESSMENT & PLAN NOTE
Follows with Dr. Mccormick. Continues on astelin and singulair 10 mg daily.   - Refill of astelin provided today for two sprays twice daily, has had an increase in symptoms with only one spray daily

## 2024-06-25 NOTE — ASSESSMENT & PLAN NOTE
Hx of PE based on abnormal V/Q scan only 12/2016 (was high probability).   - On lifelong AC with Eliquis, which we will continue.

## 2024-06-25 NOTE — ASSESSMENT & PLAN NOTE
Follows at [facility], last visit 11/2022. Did have normal colonoscopy (negative biopsies for colitis) 4/2022. Symptoms stable today.   - Continue current regimen of colestipol BID, magnesium, imodium, probiotic  - Can continue imodium as needed, max 8g/day

## 2024-06-25 NOTE — ASSESSMENT & PLAN NOTE
Prolia started 3/2022. BMD improved with Prolia on DEXA 10/2023. GI side effects with fosamax prior to this. Prolia copay has gone up, she is wondering about long term plan.   - Discussed that prolia needs to be continued indefinitely or gains sealed with an alternative agent, in her case, likely IV Reclast  - She would like to continue Prolia at least through next injection in October   - Will revisit possible change next spring

## 2024-06-25 NOTE — PROGRESS NOTES
Assessment & Plan   Problem List Items Addressed This Visit          Nervous and Auditory    Type 2 diabetes mellitus with diabetic polyneuropathy, without long-term current use of insulin (H) - Primary     Well controlled with metformin 1g BID, A1c 6.9 (3/2024). Does have some peripheral neuropathy. Eye exam normal 6/8/23.  - Continue metformin 1g BID  - Plan to repeat A1c at [facility] in September         Meningioma (H)     Meningioma was stable on brain MRI 10/2023.  Will keep an eye on this with imaging annually.            Respiratory    Asthma     Follows with Dr. Mccormick through . Last visit 2/5/24, note reviewed. Breathing is stable today.   - Continues on Qvar BID, singulair 10 mg daily, spiriva PRN, PRN albuterol         Relevant Medications    azelastine (ASTELIN) 0.1 % nasal spray    Allergic rhinitis     Follows with Dr. Mccormick. Continues on astelin and singulair 10 mg daily.   - Refill of astelin provided today for two sprays twice daily, has had an increase in symptoms with only one spray daily          Relevant Medications    azelastine (ASTELIN) 0.1 % nasal spray    CHRISTOPHER (obstructive sleep apnea)     Well controlled with nocturnal CPAP.         Vocal cord dysfunction     Does follow annually with ENT, next visit scheduled for 8/28/24.             Digestive    Gastroesophageal reflux disease without esophagitis     Well controlled with pepcid 40 mg daily.         Irritable bowel syndrome     Follows at [facility], last visit 11/2022. Did have normal colonoscopy (negative biopsies for colitis) 4/2022. Symptoms stable today.   - Continue current regimen of colestipol BID, magnesium, imodium, probiotic  - Can continue imodium as needed, max 8g/day         Class 2 severe obesity with serious comorbidity and body mass index (BMI) of 37.0 to 37.9 in adult (H)     Weight is stable today. Associated with well controlled HTN, HLD and T2DM. We had a long discussion regarding diagnosis of obesity today. This has been a  lifelong struggle for her. Had some weight loss surrounding 's passing but has since stabilized.   - Eats a very healthy diet  - Discussed importance of increasing exercise and movement             Endocrine    Hyperlipidemia with target LDL less than 100      Lipids 9/12/23 Tchol 161, , HDL 47, LDL 74.  - Continue Atorvastatin 80, CoQ10  - Will repeat lipids at [facility] in Sept            Circulatory    Benign essential hypertension     Well controlled on current regimen.   - Continue losartan 100, amlodipine 5.         PAF (paroxysmal atrial fibrillation) (H)     Follows with the EP, Dr. Wong.  Overall, stable.  Having an episode of A-fib once every couple months that she notices on her Apple Watch.  Last saw Dr. Wong 6/12/2024, plan to follow-up in 6 months, she is considering ablation at that time.  - Continue sotalol 60 mg BID and eliquis 5 mg BID  - F/up with Dr. Wong as scheduled             Musculoskeletal and Integumentary    Age-related osteoporosis without current pathological fracture     Prolia started 3/2022. BMD improved with Prolia on DEXA 10/2023. GI side effects with fosamax prior to this. Prolia copay has gone up, she is wondering about long term plan.   - Discussed that prolia needs to be continued indefinitely or gains sealed with an alternative agent, in her case, likely IV Reclast  - She would like to continue Prolia at least through next injection in October   - Will revisit possible change next spring            Other    History of pulmonary embolus (PE)     Hx of PE based on abnormal V/Q scan only 12/2016 (was high probability).   - On lifelong AC with Eliquis, which we will continue.           Other Visit Diagnoses       Encounter for vaccination        Relevant Orders    COVID-19 12+ (2023-24) (PFIZER) (Completed)             Prescription drug management  I spent a total of 49 minutes on the day of the visit.   Time spent by me doing chart review, history and exam,  documentation and further activities per the note     FUTURE APPOINTMENTS:       - Follow-up visit in 3 months for AWV      Subjective   Krystle is a 84 year old, presenting for the following health issues:  Follow Up (3M - Per pt that she's just here for general health check up. Pt states she want the provider to check her left foot and questions about prolia. )        6/25/2024    10:42 AM   Additional Questions   Roomed by Derek SHEPPARD MA   Accompanied by Self     History of Present Illness     Krystle is here for regular follow up today. We reviewed her medical conditions and questions for today below.     L foot: Has noticed a little knob on first toe in last month.     Afib: EP f/up 6/12/24 with Dr. Wong, plan to cont current meds (sotalol 60 BID and eliquis 5 BID), f/up 6 months to consider mapping/ablation. Episodes of Afib maybe once every 2 months on her Apple Watch, almost always induced by exercise.     Weight: frustrated by diagnosis of obesity in her chart.     Oral surgery scheduled for 8/12/24. Wondering about holding eliquis and if prolia spacing is appropriate.     T2DM: A1c 6.9 (3/13/24) on metformin 1g BID    Asthma: Last visit with Dr. Mccormick 2/5/24. Current regimen Qvar BID, singulair 10 mg daily, spiriva PRN, PRN albuterol    IBS: current regimen of colestipol BID, magnesium, imodium, probiotic    GERD: pepcid 40 mg daily    VCD: feels more dryness and phlegm recently, is going to try to move up ENT appt from 8/28/24    HLD: atorvastatin 80, coQ10. Lipids 9/2023 Tchol 161, , LDL 74, HDL 47.     HTN: losartan 100, amlodipine 5. BP today 122/65.     Osteoporosis: BMD improved on DEXA 10/2023 on Prolia, last injection 4/5/24  - Nausea/stomach pain with fosamax in the past and has significant GI issues as above   - Wonders about plan overall with Prolia, copay has gone up     Psorasis: clobetasol PRN available    She eats 4 or more servings of fruits and vegetables daily.She consumes 0 sweetened  "beverage(s) daily.She exercises with enough effort to increase her heart rate 10 to 19 minutes per day.  She exercises with enough effort to increase her heart rate 4 days per week.   She is taking medications regularly.    Review of Systems  Constitutional, neuro, ENT, endocrine, pulmonary, cardiac, gastrointestinal, genitourinary, musculoskeletal, integument and psychiatric systems are negative, except as otherwise noted.      Objective    /64   Pulse 90   Temp 98.5  F (36.9  C) (Oral)   Resp 12   Ht 1.575 m (5' 2\")   Wt 93 kg (205 lb)   LMP  (LMP Unknown)   SpO2 96%   BMI 37.49 kg/m    Body mass index is 37.49 kg/m .  Physical Exam   GENERAL: healthy, alert and no distress  EYES: Eyes grossly normal to inspection and conjunctivae and sclerae normal  HENT: nose and mouth without ulcers or lesions  RESP: breathing comfortably and speaking in full sentences on room air with no respiratory distress or coughing  CV: warm and well perfused  SKIN: no suspicious lesions or rashes on exposed skin  FOOT: L first toe +bunion  NEURO: No focal deficits, mentation intact and speech normal  PSYCH: mentation appears normal, affect normal/bright          Signed Electronically by: Cori Campuzano MD  "

## 2024-06-25 NOTE — ASSESSMENT & PLAN NOTE
Weight is stable today. Associated with well controlled HTN, HLD and T2DM. We had a long discussion regarding diagnosis of obesity today. This has been a lifelong struggle for her. Had some weight loss surrounding 's passing but has since stabilized.   - Eats a very healthy diet  - Discussed importance of increasing exercise and movement

## 2024-06-25 NOTE — ASSESSMENT & PLAN NOTE
Lipids 9/12/23 Tchol 161, , HDL 47, LDL 74.  - Continue Atorvastatin 80, CoQ10  - Will repeat lipids at [facility] in Sept

## 2024-06-25 NOTE — ASSESSMENT & PLAN NOTE
Follows with the EP, Dr. Wong.  Overall, stable.  Having an episode of A-fib once every couple months that she notices on her Apple Watch.  Last saw Dr. Wong 6/12/2024, plan to follow-up in 6 months, she is considering ablation at that time.  - Continue sotalol 60 mg BID and eliquis 5 mg BID  - F/up with Dr. Wong as scheduled

## 2024-06-25 NOTE — ASSESSMENT & PLAN NOTE
Well controlled with metformin 1g BID, A1c 6.9 (3/2024). Does have some peripheral neuropathy. Eye exam normal 6/8/23.  - Continue metformin 1g BID  - Plan to repeat A1c at [facility] in September

## 2024-07-09 NOTE — TELEPHONE ENCOUNTER
"Last Written Prescription Date:  8/4/21  Last Fill Quantity: 360,  # refills: 0   Last office visit provider:  7/23/21     Requested Prescriptions   Pending Prescriptions Disp Refills     metFORMIN (GLUCOPHAGE-XR) 500 MG 24 hr tablet [Pharmacy Med Name: METFORMIN HCL ER 500MG TB24] 360 tablet 0     Sig: TAKE TWO TABLETS BY MOUTH TWICE A DAY       Biguanide Agents Passed - 10/27/2021  3:18 PM        Passed - Patient is age 10 or older        Passed - Patient has documented A1c within the specified period of time.     If HgbA1C is 8 or greater, it needs to be on file within the past 3 months.  If less than 8, must be on file within the past 6 months.     Recent Labs   Lab Test 07/23/21  0817   A1C 6.5*             Passed - Patient's CR is NOT>1.4 OR Patient's EGFR is NOT<45 within past 12 mos.     Recent Labs   Lab Test 07/23/21  0817 02/09/21  0951   GFRESTIMATED 73 >60   GFRESTBLACK  --  >60       Recent Labs   Lab Test 07/23/21  0817   CR 0.78             Passed - Patient does NOT have a diagnosis of CHF.        Passed - Medication is active on med list        Passed - Patient is not pregnant        Passed - Patient has not had a positive pregnancy test within the past 12 mos.         Passed - Recent (6 mo) or future (30 days) visit within the authorizing provider's specialty     Patient had office visit in the last 6 months or has a visit in the next 30 days with authorizing provider or within the authorizing provider's specialty.  See \"Patient Info\" tab in inbasket, or \"Choose Columns\" in Meds & Orders section of the refill encounter.                 Ellyn Cook RN 10/28/21 2:52 PM  "
20

## 2024-07-14 ENCOUNTER — HEALTH MAINTENANCE LETTER (OUTPATIENT)
Age: 84
End: 2024-07-14

## 2024-07-15 DIAGNOSIS — I49.3 PVC'S (PREMATURE VENTRICULAR CONTRACTIONS): ICD-10-CM

## 2024-07-15 DIAGNOSIS — I48.0 PAF (PAROXYSMAL ATRIAL FIBRILLATION) (H): ICD-10-CM

## 2024-07-15 RX ORDER — SOTALOL HYDROCHLORIDE 120 MG/1
TABLET ORAL
Qty: 90 TABLET | Refills: 3 | Status: SHIPPED | OUTPATIENT
Start: 2024-07-15

## 2024-08-27 ENCOUNTER — ANCILLARY PROCEDURE (OUTPATIENT)
Dept: MAMMOGRAPHY | Facility: HOSPITAL | Age: 84
End: 2024-08-27
Payer: COMMERCIAL

## 2024-08-27 DIAGNOSIS — Z12.31 VISIT FOR SCREENING MAMMOGRAM: ICD-10-CM

## 2024-08-27 PROCEDURE — 77063 BREAST TOMOSYNTHESIS BI: CPT

## 2024-09-17 ENCOUNTER — TELEPHONE (OUTPATIENT)
Dept: INTERNAL MEDICINE | Facility: CLINIC | Age: 84
End: 2024-09-17

## 2024-09-17 ENCOUNTER — OFFICE VISIT (OUTPATIENT)
Dept: INTERNAL MEDICINE | Facility: CLINIC | Age: 84
End: 2024-09-17
Payer: COMMERCIAL

## 2024-09-17 VITALS
HEART RATE: 77 BPM | TEMPERATURE: 97.9 F | HEIGHT: 61 IN | RESPIRATION RATE: 16 BRPM | WEIGHT: 205.1 LBS | OXYGEN SATURATION: 96 % | BODY MASS INDEX: 38.72 KG/M2 | DIASTOLIC BLOOD PRESSURE: 78 MMHG | SYSTOLIC BLOOD PRESSURE: 125 MMHG

## 2024-09-17 DIAGNOSIS — D32.9 MENINGIOMA (H): ICD-10-CM

## 2024-09-17 DIAGNOSIS — Z00.00 ENCOUNTER FOR MEDICARE ANNUAL WELLNESS EXAM: Primary | ICD-10-CM

## 2024-09-17 DIAGNOSIS — K58.0 IRRITABLE BOWEL SYNDROME WITH DIARRHEA: ICD-10-CM

## 2024-09-17 DIAGNOSIS — Z23 ENCOUNTER FOR VACCINATION: ICD-10-CM

## 2024-09-17 DIAGNOSIS — J38.3 VOCAL CORD DYSFUNCTION: ICD-10-CM

## 2024-09-17 DIAGNOSIS — J45.40 MODERATE PERSISTENT ASTHMA WITHOUT COMPLICATION: ICD-10-CM

## 2024-09-17 DIAGNOSIS — E11.42 TYPE 2 DIABETES MELLITUS WITH DIABETIC POLYNEUROPATHY, WITHOUT LONG-TERM CURRENT USE OF INSULIN (H): ICD-10-CM

## 2024-09-17 DIAGNOSIS — M81.0 AGE-RELATED OSTEOPOROSIS WITHOUT CURRENT PATHOLOGICAL FRACTURE: ICD-10-CM

## 2024-09-17 DIAGNOSIS — I48.0 PAF (PAROXYSMAL ATRIAL FIBRILLATION) (H): ICD-10-CM

## 2024-09-17 DIAGNOSIS — K21.9 GASTROESOPHAGEAL REFLUX DISEASE WITHOUT ESOPHAGITIS: ICD-10-CM

## 2024-09-17 DIAGNOSIS — I10 BENIGN ESSENTIAL HYPERTENSION: ICD-10-CM

## 2024-09-17 DIAGNOSIS — E04.9 ADENOMATOUS GOITER: ICD-10-CM

## 2024-09-17 DIAGNOSIS — G47.33 OSA (OBSTRUCTIVE SLEEP APNEA): ICD-10-CM

## 2024-09-17 DIAGNOSIS — E78.5 HYPERLIPIDEMIA WITH TARGET LDL LESS THAN 100: ICD-10-CM

## 2024-09-17 DIAGNOSIS — L40.9 PSORIASIS: ICD-10-CM

## 2024-09-17 DIAGNOSIS — J30.1 NON-SEASONAL ALLERGIC RHINITIS DUE TO POLLEN: ICD-10-CM

## 2024-09-17 DIAGNOSIS — M25.552 LEFT HIP PAIN: ICD-10-CM

## 2024-09-17 DIAGNOSIS — Z86.711 HISTORY OF PULMONARY EMBOLUS (PE): ICD-10-CM

## 2024-09-17 LAB
ALBUMIN SERPL BCG-MCNC: 4 G/DL (ref 3.5–5.2)
ALP SERPL-CCNC: 59 U/L (ref 40–150)
ALT SERPL W P-5'-P-CCNC: 17 U/L (ref 0–50)
ANION GAP SERPL CALCULATED.3IONS-SCNC: 12 MMOL/L (ref 7–15)
AST SERPL W P-5'-P-CCNC: 23 U/L (ref 0–45)
BILIRUB SERPL-MCNC: 0.4 MG/DL
BUN SERPL-MCNC: 15.1 MG/DL (ref 8–23)
CALCIUM SERPL-MCNC: 9.8 MG/DL (ref 8.8–10.4)
CHLORIDE SERPL-SCNC: 105 MMOL/L (ref 98–107)
CHOLEST SERPL-MCNC: 176 MG/DL
CREAT SERPL-MCNC: 0.57 MG/DL (ref 0.51–0.95)
EGFRCR SERPLBLD CKD-EPI 2021: 90 ML/MIN/1.73M2
ERYTHROCYTE [DISTWIDTH] IN BLOOD BY AUTOMATED COUNT: 13.4 % (ref 10–15)
FASTING STATUS PATIENT QL REPORTED: ABNORMAL
FASTING STATUS PATIENT QL REPORTED: ABNORMAL
GLUCOSE SERPL-MCNC: 121 MG/DL (ref 70–99)
HBA1C MFR BLD: 6.6 % (ref 0–5.6)
HCO3 SERPL-SCNC: 25 MMOL/L (ref 22–29)
HCT VFR BLD AUTO: 42.5 % (ref 35–47)
HDLC SERPL-MCNC: 50 MG/DL
HGB BLD-MCNC: 14 G/DL (ref 11.7–15.7)
LDLC SERPL CALC-MCNC: 75 MG/DL
MCH RBC QN AUTO: 29.9 PG (ref 26.5–33)
MCHC RBC AUTO-ENTMCNC: 32.9 G/DL (ref 31.5–36.5)
MCV RBC AUTO: 91 FL (ref 78–100)
NONHDLC SERPL-MCNC: 126 MG/DL
PLATELET # BLD AUTO: 259 10E3/UL (ref 150–450)
POTASSIUM SERPL-SCNC: 4.5 MMOL/L (ref 3.4–5.3)
PROT SERPL-MCNC: 7.1 G/DL (ref 6.4–8.3)
RBC # BLD AUTO: 4.69 10E6/UL (ref 3.8–5.2)
SODIUM SERPL-SCNC: 142 MMOL/L (ref 135–145)
TRIGL SERPL-MCNC: 253 MG/DL
TSH SERPL DL<=0.005 MIU/L-ACNC: 0.64 UIU/ML (ref 0.3–4.2)
VIT D+METAB SERPL-MCNC: 57 NG/ML (ref 20–50)
WBC # BLD AUTO: 11.1 10E3/UL (ref 4–11)

## 2024-09-17 PROCEDURE — 84443 ASSAY THYROID STIM HORMONE: CPT | Performed by: INTERNAL MEDICINE

## 2024-09-17 PROCEDURE — G0008 ADMIN INFLUENZA VIRUS VAC: HCPCS | Performed by: INTERNAL MEDICINE

## 2024-09-17 PROCEDURE — 99207 PR FOOT EXAM NO CHARGE: CPT | Performed by: INTERNAL MEDICINE

## 2024-09-17 PROCEDURE — 83036 HEMOGLOBIN GLYCOSYLATED A1C: CPT | Performed by: INTERNAL MEDICINE

## 2024-09-17 PROCEDURE — 80061 LIPID PANEL: CPT | Performed by: INTERNAL MEDICINE

## 2024-09-17 PROCEDURE — 85027 COMPLETE CBC AUTOMATED: CPT | Performed by: INTERNAL MEDICINE

## 2024-09-17 PROCEDURE — 80053 COMPREHEN METABOLIC PANEL: CPT | Performed by: INTERNAL MEDICINE

## 2024-09-17 PROCEDURE — 36415 COLL VENOUS BLD VENIPUNCTURE: CPT | Performed by: INTERNAL MEDICINE

## 2024-09-17 PROCEDURE — 90662 IIV NO PRSV INCREASED AG IM: CPT | Performed by: INTERNAL MEDICINE

## 2024-09-17 PROCEDURE — 99214 OFFICE O/P EST MOD 30 MIN: CPT | Mod: 25 | Performed by: INTERNAL MEDICINE

## 2024-09-17 PROCEDURE — G0439 PPPS, SUBSEQ VISIT: HCPCS | Performed by: INTERNAL MEDICINE

## 2024-09-17 PROCEDURE — 82306 VITAMIN D 25 HYDROXY: CPT | Performed by: INTERNAL MEDICINE

## 2024-09-17 RX ORDER — MOMETASONE FUROATE 200 UG/1
2 AEROSOL RESPIRATORY (INHALATION) 2 TIMES DAILY
COMMUNITY

## 2024-09-17 ASSESSMENT — PAIN SCALES - GENERAL: PAINLEVEL: NO PAIN (0)

## 2024-09-17 NOTE — PROGRESS NOTES
Preventive Care Visit  [facility] Belem Campuzano MD, Internal Medicine  Sep 17, 2024      Assessment & Plan   Problem List Items Addressed This Visit          Nervous and Auditory    Type 2 diabetes mellitus with diabetic polyneuropathy, without long-term current use of insulin (H)     Well controlled with metformin 1g BID, A1c 6.9 (3/2024). Does have some peripheral neuropathy, which is stable on exam today.   - Continue metformin 1g BID  - repeat A1c today  - Neuropathy present on exam, forms filled out for DM shoes this week          Relevant Orders    HEMOGLOBIN A1C (Completed)    Lipid panel reflex to direct LDL Fasting    NH FOOT EXAM NO CHARGE (Completed)    Meningioma (H)     Meningioma was stable on brain MRI 10/2023.    - plan to image annually for 3-5 years, next due 10/2024, ordered today          Relevant Orders    MR Brain w/o & w Contrast    Left hip pain     Better with pool therapy and PT.             Respiratory    Asthma     Follows with Dr. Mccormick through . Last visit 7/22/24, note reviewed. Inhaler was changed for insurance reasons, just started the new asmanex two days ago. Breathing is stable today.   - Continue asmanex, f/up with Dr. Mccormick as scheduled, sooner if breathing worse with inhaler change  - Continue singulair 10 mg daily, spiriva PRN and albuterol PRN         Relevant Medications    mometasone furoate (ASMANEX HFA) 200 MCG/ACT inhaler    mometasone furoate (ASMANEX HFA) 200 MCG/ACT inhaler    Allergic rhinitis     Follows with Dr. Mccormick. Continues on astelin and singulair 10 mg daily.          Relevant Medications    mometasone furoate (ASMANEX HFA) 200 MCG/ACT inhaler    mometasone furoate (ASMANEX HFA) 200 MCG/ACT inhaler    CHRISTOPHER (obstructive sleep apnea)     Well controlled with nocturnal CPAP.         Vocal cord dysfunction     Does follow annually with ENT, last visit 8/28/24.             Digestive    Gastroesophageal reflux disease  without esophagitis     Well controlled with pepcid 40 mg daily.         Irritable bowel syndrome     Follows at [facility], last visit 11/2022. Did have normal colonoscopy (negative biopsies for colitis) 4/2022. Symptoms better recently.   - Continue current regimen of colestipol BID, magnesium, imodium, probiotic  - Can continue imodium as needed, max 8g/day            Endocrine    Adenomatous goiter     Follows with ENT, Dr. Guzmán. Next thyroid US is scheduled next week.          Hyperlipidemia with target LDL less than 100      Lipids 9/12/23 Tchol 161, , HDL 47, LDL 74.  - Continue Atorvastatin 80, CoQ10  - Repeat lipids today, goal LDL <100            Circulatory    Benign essential hypertension     Well controlled on current regimen.   - Continue losartan 100, amlodipine 5.         PAF (paroxysmal atrial fibrillation) (H)     Follows with the EP, Dr. Wong.  Overall, stable.  Having an episode of A-fib once every couple months that she notices on her Apple Watch.  Last saw Dr. Wong 6/12/2024, plan to follow-up in 6 months, she is considering ablation at that time.  - Continue sotalol 60 mg BID and eliquis 5 mg BID  - F/up with Dr. Wong as scheduled          Relevant Orders    TSH with free T4 reflex       Musculoskeletal and Integumentary    Age-related osteoporosis without current pathological fracture     Prolia started 3/2022. BMD improved with Prolia on DEXA 10/2023. GI side effects with fosamax prior to this.   - She would like to continue Prolia for now, next scheduled 10/7/24  - Would plan to repeat DEXA 10/2025         Relevant Orders    Vitamin D Deficiency    Psoriasis     Flares well managed with PRN clobetasol.         Relevant Medications    mometasone furoate (ASMANEX HFA) 200 MCG/ACT inhaler    mometasone furoate (ASMANEX HFA) 200 MCG/ACT inhaler       Other    History of pulmonary embolus (PE)     Hx of PE based on abnormal V/Q scan only 12/2016 (was high probability).   - On lifelong AC  "with Eliquis, which we will continue.          Encounter for Medicare annual wellness exam - Primary     We discussed healthy lifestyle, nutrition, cardiovascular risk reduction, self care, safety, sunscreen, and timing of cancer screening.  Health maintenance screening and immunizations reviewed with the patient.   - Flu shot today, she will get COVID vaccine at pharmacy and discuss PCV20 with Dr. Mccormick   - Labs today  - BIRADS1 8/2024  - No further colonoscopies needed          Relevant Orders    Comprehensive metabolic panel    CBC with platelets (Completed)    TSH with free T4 reflex     Other Visit Diagnoses       Encounter for vaccination        Relevant Orders    INFLUENZA HIGH DOSE, TRIVALENT, PF (FLUZONE) (Completed)             Patient has been advised of split billing requirements and indicates understanding: Yes       BMI  Estimated body mass index is 38.75 kg/m  as calculated from the following:    Height as of this encounter: 1.549 m (5' 1\").    Weight as of this encounter: 93 kg (205 lb 1.6 oz).   Weight management plan: Discussed healthy diet and exercise guidelines    Counseling  Appropriate preventive services were addressed with this patient via screening, questionnaire, or discussion as appropriate for fall prevention, nutrition, physical activity, Tobacco-use cessation, social engagement, weight loss and cognition.  Checklist reviewing preventive services available has been given to the patient.  Reviewed patient's diet, addressing concerns and/or questions.   Information on urinary incontinence and treatment options given to patient.     FUTURE APPOINTMENTS:       - Follow-up visit in 6 months       Michael Dalton is a 84 year old, presenting for the following:  Physical (AWV)        9/17/2024     2:49 PM   Additional Questions   Roomed by TORI Cottrell   Accompanied by CARROLL         Health Care Directive  Patient does not have a Health Care Directive or Living Will: Patient states has " Advance Directive and will bring in a copy to clinic.    HPI    Krystle is here for AWV today. Doing well. More energy with taking electrolyte powders.     Oral surgery saga continues. Put two initial implants in July on R side and a third on L that didn't work. They have to redo this one, which will be in another few months.     L hip: Finished PT 8/8/24    Asthma / CHRISTOPHER / AR: Saw Dr. Mccormick 7/22/24. Continue CPAP. Replace Qvar with Asmanex. Hold on daily spiriva, use with onset of cold. Continue albuterol PRN. Continue astelin BID and biotene mouthwash. F/up 3 months.   - just started asmanex b/c she used up old Qvar first     T2DM: A1c 6.9 (3/2024). Metformin 1g BID. Has noticed more neuropathy recently. We are working on updating orthotic prescription for her.     IBS: colestipol BID, magnesium, imodium, probiotic. Stools doing better recently, maybe with less red meat.     HLD: atorvastatin 80, CoQ10. Repeat lipids due today.     HTN: losartan 100, amlodipine 5. BP today 125/78.     Afib / PVC: sotalol 60 mg BID and eliquis 5 mg BID. Last visit with Dr. Wong 6/12/24.     Osteoporosis: Next prolia scheduled 10/7/24. DEXA improved 10/2023.     Hx PE: Eliquis    Meningioma: stable on brain MRI 10/2023.     GERD: pepcid 40 mg daily     Atrophic vaginitis: Estrace cream works well      Psoriasis: This is much better after using clobetasol cream.     Vocal cord dysfunction / thyroid nodules: Saw Dr. Guzmán 8/28/24. Flex laryngoscopy stable. US thyroid scheduled next week.     HCM: Mammo BIRADS 1 8/27/24    L bunion - bothering her more recently.         9/17/2024   General Health   How would you rate your overall physical health? Good   Feel stress (tense, anxious, or unable to sleep) Not at all            9/17/2024   Nutrition   Diet: Diabetic            9/17/2024   Exercise   Days per week of moderate/strenous exercise 4 days            9/17/2024   Social Factors   Frequency of gathering with friends or relatives More  than three times a week   Worry food won't last until get money to buy more No   Food not last or not have enough money for food? No   Do you have housing? (Housing is defined as stable permanent housing and does not include staying ouside in a car, in a tent, in an abandoned building, in an overnight shelter, or couch-surfing.) Yes   Are you worried about losing your housing? No   Lack of transportation? No   Unable to get utilities (heat,electricity)? No            9/17/2024   Fall Risk   Fallen 2 or more times in the past year? No    No   Trouble with walking or balance? Yes    Yes   Gait Speed Test (Document in seconds) 3.93   Gait Speed Test Interpretation Less than or equal to 5.00 seconds - PASS       Multiple values from one day are sorted in reverse-chronological order          9/17/2024   Activities of Daily Living- Home Safety   Needs help with the following daily activites None of the above   Safety concerns in the home None of the above            9/17/2024   Dental   Dentist two times every year? Yes            9/17/2024   Hearing Screening   Hearing concerns? None of the above            9/17/2024   Driving Risk Screening   Patient/family members have concerns about driving No            9/17/2024   General Alertness/Fatigue Screening   Have you been more tired than usual lately? No            9/17/2024   Urinary Incontinence Screening   Bothered by leaking urine in past 6 months Yes            9/17/2024   TB Screening   Were you born outside of the [place]? No            Today's PHQ-2 Score:       9/17/2024     2:57 PM   PHQ-2 ( 1999 [employer])   Q1: Little interest or pleasure in doing things 0   Q2: Feeling down, depressed or hopeless 0   PHQ-2 Score 0   Q1: Little interest or pleasure in doing things Not at all   Q2: Feeling down, depressed or hopeless Not at all   PHQ-2 Score 0           9/17/2024   Substance Use   Alcohol more than 3/day or more than 7/wk Not Applicable   Do you have a current opioid  prescription? No   How severe/bad is pain from 1 to 10? 0/10 (No Pain)   Do you use any other substances recreationally? No        Social History     Tobacco Use    Smoking status: Never    Smokeless tobacco: Never   Vaping Use    Vaping status: Never Used   Substance Use Topics    Alcohol use: No    Drug use: No           2024   LAST FHS-7 RESULTS   1st degree relative breast or ovarian cancer Yes   Any relative bilateral breast cancer Unknown   Any male have breast cancer No   Any ONE woman have BOTH breast AND ovarian cancer No   Any woman with breast cancer before 50yrs Unknown   2 or more relatives with breast AND/OR ovarian cancer No   2 or more relatives with breast AND/OR bowel cancer No        Mammogram Screening - After age 74- determine frequency with patient based on health status, life expectancy and patient goals    Reviewed and updated as needed this visit by Provider   Tobacco  Allergies  Meds  Problems  Med Hx  Surg Hx  Fam Hx     Sexual Activity          Past Medical History:   Diagnosis Date    Adenomatous goiter 2004    Arthropathy of shoulder region unknown    Asthma 2009    Atrial flutter (H) 2017    Bartholin gland cyst unknown    Diabetes mellitus type II, controlled (H)     Esophageal reflux     Essential hypertension     Gastroparesis     Herpes simplex type 1 infection unknown    IBS (irritable bowel syndrome) 1971    Leukocytosis unknown    Osteopenia 2009    Vitamin D deficiency      Past Surgical History:   Procedure Laterality Date    BIOPSY BREAST Left 1/22/15    Papilloma    EXCISION / BIOPSY BREAST / NIPPLE / DUCT Left      DILATION/CURETTAGE DIAG/THER NON OB  Unknown    Description: Dilation And Curettage;  Recorded: 2007;    HYSTERECTOMY      OOPHORECTOMY      1 removed, 1 remains    CO NASAL/SINUS ENDOSCOPY,BX/RMV POLYP/DEBRID      Description: Nasal Endoscopy Polypectomy;  Recorded: 2007;    ZZC  DELIVERY  ONLY  1971    Description:  Section;  Recorded: 2008;  Comments: x2    [facility] TOTAL ABDOM HYSTERECTOMY  1983    Description: Total Abdominal Hysterectomy;  Recorded: 2008;    [facility] TOTAL KNEE ARTHROPLASTY Bilateral     Description: Total Knee Arthroplasty;  Recorded: 2008;    [facility] XFER SINGLE SUPERFICI LOW LEG TENDON  UNknown    Description: Tibialis Posterior Tendon Transfer Right Foot;  Recorded: 2008;     Current providers sharing in care for this patient include:  Patient Care Team:  Cori Campuzano MD as PCP - General (Internal Medicine)  Damian Youssef, PharmD as Pharmacist (Pharmacist)  Brooklyn Wagner [facility] as Pharmacist (Pharmacist)  Hardik Carlisle MD as MD (Endocrinology, Diabetes, and Metabolism)  Jhon Guhtrie MD (Inactive) as Physician (Endocrinology, Diabetes, and Metabolism)  Cori Campuzano MD as Assigned PCP  Tanner Wong MD as MD (Cardiovascular Disease)  Tanner Wong MD as Assigned Heart and Vascular Provider    The following health maintenance items are reviewed in Epic and correct as of today:  Health Maintenance   Topic Date Due    ASTHMA ACTION PLAN  Never done    ANNUAL REVIEW OF HM ORDERS  2023    COVID-19 Vaccine ( season) 2024    BMP  2024    LIPID  2024    A1C  2024    ASTHMA CONTROL TEST  2024    EYE EXAM  2025    MEDICARE ANNUAL WELLNESS VISIT  2025    DIABETIC FOOT EXAM  2025    FALL RISK ASSESSMENT  2025    ADVANCE CARE PLANNING  2029    COLORECTAL CANCER SCREENING  2032    DTAP/TDAP/TD IMMUNIZATION (4 - Td or Tdap) 2032    DEXA  10/16/2038    PHQ-2 (once per calendar year)  Completed    INFLUENZA VACCINE  Completed    Pneumococcal Vaccine: 65+ Years  Completed    ZOSTER IMMUNIZATION  Completed    HPV IMMUNIZATION  Aged Out    MENINGITIS IMMUNIZATION  Aged Out    RSV MONOCLONAL ANTIBODY  Aged Out    MICROALBUMIN  Discontinued  "    Review of Systems  Constitutional, neuro, ENT, endocrine, pulmonary, cardiac, gastrointestinal, genitourinary, musculoskeletal, integument and psychiatric systems are negative, except as otherwise noted.     Objective    Exam  /78 (BP Location: Right arm, Patient Position: Sitting, Cuff Size: Adult Large)   Pulse 77   Temp 97.9  F (36.6  C) (Oral)   Resp 16   Ht 1.549 m (5' 1\")   Wt 93 kg (205 lb 1.6 oz)   LMP  (LMP Unknown)   SpO2 96%   BMI 38.75 kg/m     Estimated body mass index is 38.75 kg/m  as calculated from the following:    Height as of this encounter: 1.549 m (5' 1\").    Weight as of this encounter: 93 kg (205 lb 1.6 oz).    Physical Exam  GENERAL: alert and no distress  EYES: Eyes grossly normal to inspection and conjunctivae and sclerae normal  HENT: ear canals and TM's normal, nose and mouth without ulcers or lesions  NECK: no adenopathy, no asymmetry, masses, or scars  RESP: lungs clear to auscultation - no rales, rhonchi or wheezes  CV: regular rate and rhythm, normal S1 S2, no S3 or S4, no murmur, click or rub, no peripheral edema  ABDOMEN: soft, nontender, no hepatosplenomegaly, no masses and bowel sounds normal  MS: no gross musculoskeletal defects noted, no edema  SKIN: no suspicious lesions or rashes  NEURO: Normal strength and tone, mentation intact and speech normal  PSYCH: mentation appears normal, affect normal/bright  Diabetic foot exam: normal DP and PT pulses, no trophic changes or ulcerative lesions, and abnormal sensation through all L toes and 1st and 2nd toes on R foot on monofilament exam. +bunion on L first MTP joint         9/17/2024   Mini Cog   Clock Draw Score 2 Normal   3 Item Recall 3 objects recalled   Mini Cog Total Score 5                 Signed Electronically by: Cori Campuzano MD    "

## 2024-09-17 NOTE — ASSESSMENT & PLAN NOTE
Meningioma was stable on brain MRI 10/2023.    - plan to image annually for 3-5 years, next due 10/2024, ordered today

## 2024-09-17 NOTE — PATIENT INSTRUCTIONS
Repeat brain MRI due anytime after October 7th. Ordered today, they should call you. Or you can call [phone] ([phone]) to schedule.     Ask Dr. Mccormick her thoughts on updating pneumonia vaccine with Prevnar 20.       Patient Education   Preventive Care Advice   This is general advice given by our system to help you stay healthy. However, your care team may have specific advice just for you. Please talk to your care team about your preventive care needs.  Nutrition  Eat 5 or more servings of fruits and vegetables each day.  Try wheat bread, brown rice and whole grain pasta (instead of white bread, rice, and pasta).  Get enough calcium and vitamin D. Check the label on foods and aim for 100% of the RDA (recommended daily allowance).  Lifestyle  Exercise at least 150 minutes each week  (30 minutes a day, 5 days a week).  Do muscle strengthening activities 2 days a week. These help control your weight and prevent disease.  No smoking.  Wear sunscreen to prevent skin cancer.  Have a dental exam and cleaning every 6 months.  Yearly exams  See your health care team every year to talk about:  Any changes in your health.  Any medicines your care team has prescribed.  Preventive care, family planning, and ways to prevent chronic diseases.  Shots (vaccines)   HPV shots (up to age 26), if you've never had them before.  Hepatitis B shots (up to age 59), if you've never had them before.  COVID-19 shot: Get this shot when it's due.  Flu shot: Get a flu shot every year.  Tetanus shot: Get a tetanus shot every 10 years.  Pneumococcal, hepatitis A, and RSV shots: Ask your care team if you need these based on your risk.  Shingles shot (for age 50 and up)  General health tests  Diabetes screening:  Starting at age 35, Get screened for diabetes at least every 3 years.  If you are younger than age 35, ask your care team if you should be screened for diabetes.  Cholesterol test: At age 39, start having a cholesterol test every  5 years, or more often if advised.  Bone density scan (DEXA): At age 50, ask your care team if you should have this scan for osteoporosis (brittle bones).  Hepatitis C: Get tested at least once in your life.  STIs (sexually transmitted infections)  Before age 24: Ask your care team if you should be screened for STIs.  After age 24: Get screened for STIs if you're at risk. You are at risk for STIs (including HIV) if:  You are sexually active with more than one person.  You don't use condoms every time.  You or a partner was diagnosed with a sexually transmitted infection.  If you are at risk for HIV, ask about PrEP medicine to prevent HIV.  Get tested for HIV at least once in your life, whether you are at risk for HIV or not.  Cancer screening tests  Cervical cancer screening: If you have a cervix, begin getting regular cervical cancer screening tests starting at age 21.  Breast cancer scan (mammogram): If you've ever had breasts, begin having regular mammograms starting at age 40. This is a scan to check for breast cancer.  Colon cancer screening: It is important to start screening for colon cancer at age 45.  Have a colonoscopy test every 10 years (or more often if you're at risk) Or, ask your provider about stool tests like a FIT test every year or Cologuard test every 3 years.  To learn more about your testing options, visit:   .  For help making a decision, visit:   https://bit.ly/wf81389.  Prostate cancer screening test: If you have a prostate, ask your care team if a prostate cancer screening test (PSA) at age 55 is right for you.  Lung cancer screening: If you are a current or former smoker ages 50 to 80, ask your care team if ongoing lung cancer screenings are right for you.  For informational purposes only. Not to replace the advice of your health care provider. Copyright   2023 [employer]. All rights reserved. Clinically reviewed by the [facility]itions Program. Personaling [id number]  - REV 01/24.  Preventing Falls: Care Instructions  Injuries and health problems such as trouble walking or poor eyesight can increase your risk of falling. So can some medicines. But there are things you can do to help prevent falls. You can exercise to get stronger. You can also arrange your home to make it safer.    Talk to your doctor about the medicines you take. Ask if any of them increase the risk of falls and whether they can be changed or stopped.   Try to exercise regularly. It can help improve your strength and balance. This can help lower your risk of falling.     Practice fall safety and prevention.    Wear low-heeled shoes that fit well and give your feet good support. Talk to your doctor if you have foot problems that make this hard.  Carry a cellphone or wear a medical alert device that you can use to call for help.  Use stepladders instead of chairs to reach high objects. Don't climb if you're at risk for falls. Ask for help, if needed.  Wear the correct eyeglasses, if you need them.    Make your home safer.    Remove rugs, cords, clutter, and furniture from walkways.  Keep your house well lit. Use night-lights in hallways and bathrooms.  Install and use sturdy handrails on stairways.  Wear nonskid footwear, even inside. Don't walk barefoot or in socks without shoes.    Be safe outside.    Use handrails, curb cuts, and ramps whenever possible.  Keep your hands free by using a shoulder bag or backpack.  Try to walk in well-lit areas. Watch out for uneven ground, changes in pavement, and debris.  Be careful in the winter. Walk on the grass or gravel when sidewalks are slippery. Use de-icer on steps and walkways. Add non-slip devices to shoes.    Put grab bars and nonskid mats in your shower or tub and near the toilet. Try to use a shower chair or bath bench when bathing.   Get into a tub or shower by putting in your weaker leg first. Get out with your strong side first. Have a phone or medical alert  "device in the bathroom with you.   Where can you learn more?  Go to https://www.[employer].net/patiented  Enter G117 in the search box to learn more about \"Preventing Falls: Care Instructions.\"  Current as of: July 17, 2023               Content Version: 14.0    0683-0649 [employer]   Care instructions adapted under license by your healthcare professional. If you have questions about a medical condition or this instruction, always ask your healthcare professional. [employer] disclaims any warranty or liability for your use of this information.      Bladder Training: Care Instructions  Your Care Instructions     Bladder training is used to treat urge incontinence and stress incontinence. Urge incontinence means that the need to urinate comes on so fast that you can't get to a toilet in time. Stress incontinence means that you leak urine because of pressure on your bladder. For example, it may happen when you laugh, cough, or lift something heavy.  Bladder training can increase how long you can wait before you have to urinate. It can also help your bladder hold more urine. And it can give you better control over the urge to urinate.  It is important to remember that bladder training takes a few weeks to a few months to make a difference. You may not see results right away, but don't give up.  Follow-up care is a key part of your treatment and safety. Be sure to make and go to all appointments, and call your doctor if you are having problems. It's also a good idea to know your test results and keep a list of the medicines you take.  How can you care for yourself at home?  Work with your doctor to come up with a bladder training program that is right for you. You may use one or more of the following methods.  Delayed urination  In the beginning, try to keep from urinating for 5 minutes after you first feel the need to go.  While you wait, take deep, slow breaths to relax. Kegel exercises can " "also help you delay the need to go to the bathroom.  After some practice, when you can easily wait 5 minutes to urinate, try to wait 10 minutes before you urinate.  Slowly increase the waiting period until you are able to control when you have to urinate.  Scheduled urination  Empty your bladder when you first wake up in the morning.  Schedule times throughout the day when you will urinate.  Start by going to the bathroom every hour, even if you don't need to go.  Slowly increase the time between trips to the bathroom.  When you have found a schedule that works well for you, keep doing it.  If you wake up during the night and have to urinate, do it. Apply your schedule to waking hours only.  Kegel exercises  These tighten and strengthen pelvic muscles, which can help you control the flow of urine. (If doing these exercises causes pain, stop doing them and talk with your doctor.) To do Kegel exercises:  Squeeze your muscles as if you were trying not to pass gas. Or squeeze your muscles as if you were stopping the flow of urine. Your belly, legs, and buttocks shouldn't move.  Hold the squeeze for 3 seconds, then relax for 5 to 10 seconds.  Start with 3 seconds, then add 1 second each week until you are able to squeeze for 10 seconds.  Repeat the exercise 10 times a session. Do 3 to 8 sessions a day.  When should you call for help?  Watch closely for changes in your health, and be sure to contact your doctor if:    Your incontinence is getting worse.     You do not get better as expected.   Where can you learn more?  Go to https://www.[employer].net/patiented  Enter V684 in the search box to learn more about \"Bladder Training: Care Instructions.\"  Current as of: November 15, 2023               Content Version: 14.0    1541-8011 [employer].   Care instructions adapted under license by your healthcare professional. If you have questions about a medical condition or this instruction, always ask your " healthcare professional. [employer] disclaims any warranty or liability for your use of this information.

## 2024-09-17 NOTE — TELEPHONE ENCOUNTER
[facility] - DM shoes    Form completed and faxed out.    Copies made for monthly hold bin and sent to scan.

## 2024-09-17 NOTE — ASSESSMENT & PLAN NOTE
Follows at [facility], last visit 11/2022. Did have normal colonoscopy (negative biopsies for colitis) 4/2022. Symptoms better recently.   - Continue current regimen of colestipol BID, magnesium, imodium, probiotic  - Can continue imodium as needed, max 8g/day

## 2024-09-17 NOTE — ASSESSMENT & PLAN NOTE
Lipids 9/12/23 Tchol 161, , HDL 47, LDL 74.  - Continue Atorvastatin 80, CoQ10  - Repeat lipids today, goal LDL <100

## 2024-09-17 NOTE — ASSESSMENT & PLAN NOTE
Prolia started 3/2022. BMD improved with Prolia on DEXA 10/2023. GI side effects with fosamax prior to this.   - She would like to continue Prolia for now, next scheduled 10/7/24  - Would plan to repeat DEXA 10/2025

## 2024-09-17 NOTE — ASSESSMENT & PLAN NOTE
We discussed healthy lifestyle, nutrition, cardiovascular risk reduction, self care, safety, sunscreen, and timing of cancer screening.  Health maintenance screening and immunizations reviewed with the patient.   - Flu shot today, she will get COVID vaccine at pharmacy and discuss PCV20 with Dr. Mccormick   - Labs today  - BIRADS1 8/2024  - No further colonoscopies needed

## 2024-09-17 NOTE — ASSESSMENT & PLAN NOTE
Follows with Dr. Mccormick through . Last visit 7/22/24, note reviewed. Inhaler was changed for insurance reasons, just started the new asmanex two days ago. Breathing is stable today.   - Continue asmanex, f/up with Dr. Mccormick as scheduled, sooner if breathing worse with inhaler change  - Continue singulair 10 mg daily, spiriva PRN and albuterol PRN

## 2024-10-07 ENCOUNTER — ALLIED HEALTH/NURSE VISIT (OUTPATIENT)
Dept: FAMILY MEDICINE | Facility: CLINIC | Age: 84
End: 2024-10-07
Payer: COMMERCIAL

## 2024-10-07 DIAGNOSIS — M81.0 SENILE OSTEOPOROSIS: Primary | ICD-10-CM

## 2024-10-07 PROCEDURE — 99207 PR NO CHARGE NURSE ONLY: CPT

## 2024-10-07 PROCEDURE — 96372 THER/PROPH/DIAG INJ SC/IM: CPT | Performed by: INTERNAL MEDICINE

## 2024-10-07 NOTE — PROGRESS NOTES
Clinic Administered Medication Documentation      Prolia Documentation    Indication: Prolia  (denosumab) is a prescription medicine used to treat osteoporosis in patients who:   Are at high risk for fracture, meaning patients who have had a fracture related to osteoporosis, or who have multiple risk factors for fracture.  Cannot use another osteoporosis medicine or other osteoporosis medicines did not work well.  The timeline for early/late injections would be 4 weeks early and any time after the 6 month [place]ico. If a patient receives their injection late, then the subsequent injection would be 6 months from the date that they actually received the injection.    When was the last injection?  24  Was the last injection at least 6 months ago? Yes  Has the prior authorization been completed?  Yes  Is there an active order (written within the past 365 days, with administrations remaining, not ) in the chart?  Yes   GFR Estimate   Date Value Ref Range Status   2024 90 >60 mL/min/1.73m2 Final     Comment:     eGFR calculated using  CKD-EPI equation.   2021 >60 >60 mL/min/1.73m2 Final     Has patient had a GFR within the last 12 months? Yes   Is GFR under 30, or patient has a diagnosis of CKD4 or CKD5? No   Patient denies gastric bypass or parathyroid surgery in past 6 months? No - patient has had gastric bypass or parathyroid surgery in past 6 months   Was Calcium checked after surgery and at or above 8.5? Yes   Patient denies dental work in the past two months involving drilling into the bone, such as implants/extractions, oral surgery or a tooth extraction that has not healed yet?  Yes  Patient denies plans for an emergency tooth extraction within the next week? Yes    The following steps were completed to comply with the REMS program for Prolia:  Reviewed information in the Medication Guide, including the serious risks of Prolia  and the symptoms of each risk.  Advised patient to seek prompt  medical attention if they have signs or symptoms of any of the serious risks.  Provided each patient a copy of the Medication Guide and Patient Guide.    Prior to injection, verified patient identity using patient's name and date of birth. Medication was administered. Please see MAR and medication order for additional information. Patient instructed to remain in clinic for 15 minutes and report any adverse reaction to staff immediately.    Vial/Syringe: Syringe  Was this medication supplied by the patient? No  Patient has no refills remaining.  NA

## 2024-10-11 ENCOUNTER — HOSPITAL ENCOUNTER (OUTPATIENT)
Dept: MRI IMAGING | Facility: HOSPITAL | Age: 84
Discharge: HOME | End: 2024-10-11
Attending: INTERNAL MEDICINE | Admitting: INTERNAL MEDICINE
Payer: COMMERCIAL

## 2024-10-11 DIAGNOSIS — D32.9 MENINGIOMA (H): ICD-10-CM

## 2024-10-11 PROCEDURE — 255N000002 HC RX 255 OP 636: Performed by: INTERNAL MEDICINE

## 2024-10-11 PROCEDURE — 70553 MRI BRAIN STEM W/O & W/DYE: CPT

## 2024-10-11 PROCEDURE — A9585 GADOBUTROL INJECTION: HCPCS | Performed by: INTERNAL MEDICINE

## 2024-10-11 RX ORDER — GADOBUTROL 604.72 MG/ML
0.1 INJECTION INTRAVENOUS ONCE
Status: COMPLETED | OUTPATIENT
Start: 2024-10-11 | End: 2024-10-11

## 2024-10-11 RX ADMIN — GADOBUTROL 9 ML: 604.72 INJECTION INTRAVENOUS at 16:36

## 2024-10-18 DIAGNOSIS — Z79.899 MEDICATION MANAGEMENT: ICD-10-CM

## 2024-10-18 NOTE — TELEPHONE ENCOUNTER
Left message for pt to call back. Need to verify if patient requesting Qvar inhaler refill as it was discontinued.   Note from 9/17/24:    Asthma       Follows with Dr. Mccormick through [facility]. Last visit 7/22/24, note reviewed. Inhaler was changed for insurance reasons, just started the new asmanex two days ago. Breathing is stable today.   - Continue asmanex, f/up with Dr. Mccormick as scheduled, sooner if breathing worse with inhaler change  - Continue singulair 10 mg daily, spiriva PRN and albuterol PRN           Relevant Medications     mometasone furoate (ASMANEX HFA) 200 MCG/ACT inhaler     mometasone furoate (ASMANEX HFA) 200 MCG/ACT inhaler

## 2024-10-23 RX ORDER — BECLOMETHASONE DIPROPIONATE HFA 80 UG/1
2 AEROSOL, METERED RESPIRATORY (INHALATION) 2 TIMES DAILY
Qty: 31.8 G | Refills: 3 | OUTPATIENT
Start: 2024-10-23

## 2024-10-24 ENCOUNTER — NURSE TRIAGE (OUTPATIENT)
Dept: INTERNAL MEDICINE | Facility: CLINIC | Age: 84
End: 2024-10-24

## 2024-10-24 ENCOUNTER — HOSPITAL ENCOUNTER (OUTPATIENT)
Dept: ULTRASOUND IMAGING | Facility: HOSPITAL | Age: 84
Discharge: HOME | End: 2024-10-24
Attending: STUDENT IN AN ORGANIZED HEALTH CARE EDUCATION/TRAINING PROGRAM
Payer: COMMERCIAL

## 2024-10-24 ENCOUNTER — OFFICE VISIT (OUTPATIENT)
Dept: PEDIATRICS | Facility: CLINIC | Age: 84
End: 2024-10-24
Payer: COMMERCIAL

## 2024-10-24 ENCOUNTER — HOSPITAL ENCOUNTER (OUTPATIENT)
Dept: GENERAL RADIOLOGY | Facility: HOSPITAL | Age: 84
Discharge: HOME | End: 2024-10-24
Attending: STUDENT IN AN ORGANIZED HEALTH CARE EDUCATION/TRAINING PROGRAM
Payer: COMMERCIAL

## 2024-10-24 VITALS
SYSTOLIC BLOOD PRESSURE: 129 MMHG | OXYGEN SATURATION: 95 % | HEART RATE: 77 BPM | BODY MASS INDEX: 38.05 KG/M2 | TEMPERATURE: 98.1 F | DIASTOLIC BLOOD PRESSURE: 56 MMHG | RESPIRATION RATE: 17 BRPM | WEIGHT: 201.4 LBS

## 2024-10-24 DIAGNOSIS — Z86.711 HISTORY OF PULMONARY EMBOLISM: ICD-10-CM

## 2024-10-24 DIAGNOSIS — M19.071 ARTHRITIS OF RIGHT FOOT: Primary | ICD-10-CM

## 2024-10-24 DIAGNOSIS — M79.671 RIGHT FOOT PAIN: ICD-10-CM

## 2024-10-24 LAB
ANION GAP SERPL CALCULATED.3IONS-SCNC: 14 MMOL/L (ref 3–14)
BASOPHILS # BLD AUTO: 0 10E3/UL (ref 0–0.2)
BASOPHILS NFR BLD AUTO: 0 %
BUN SERPL-MCNC: 13 MG/DL (ref 7–30)
CALCIUM SERPL-MCNC: 9.2 MG/DL (ref 8.5–10.1)
CHLORIDE BLD-SCNC: 106 MMOL/L (ref 94–109)
CO2 SERPL-SCNC: 26 MMOL/L (ref 20–32)
CREAT SERPL-MCNC: 0.5 MG/DL (ref 0.52–1.04)
EGFRCR SERPLBLD CKD-EPI 2021: >90 ML/MIN/1.73M2
EOSINOPHIL # BLD AUTO: 0.1 10E3/UL (ref 0–0.7)
EOSINOPHIL NFR BLD AUTO: 1 %
ERYTHROCYTE [DISTWIDTH] IN BLOOD BY AUTOMATED COUNT: 13.6 % (ref 10–15)
GLUCOSE BLD-MCNC: 146 MG/DL (ref 70–99)
HCT VFR BLD AUTO: 43.2 % (ref 35–47)
HGB BLD-MCNC: 14.1 G/DL (ref 11.7–15.7)
IMM GRANULOCYTES # BLD: 0 10E3/UL
IMM GRANULOCYTES NFR BLD: 0 %
LYMPHOCYTES # BLD AUTO: 3.4 10E3/UL (ref 0.8–5.3)
LYMPHOCYTES NFR BLD AUTO: 26 %
MCH RBC QN AUTO: 29.3 PG (ref 26.5–33)
MCHC RBC AUTO-ENTMCNC: 32.6 G/DL (ref 31.5–36.5)
MCV RBC AUTO: 90 FL (ref 78–100)
MONOCYTES # BLD AUTO: 1 10E3/UL (ref 0–1.3)
MONOCYTES NFR BLD AUTO: 8 %
NEUTROPHILS # BLD AUTO: 8.3 10E3/UL (ref 1.6–8.3)
NEUTROPHILS NFR BLD AUTO: 65 %
PLATELET # BLD AUTO: 276 10E3/UL (ref 150–450)
POTASSIUM BLD-SCNC: 4.3 MMOL/L (ref 3.4–5.3)
RBC # BLD AUTO: 4.81 10E6/UL (ref 3.8–5.2)
SODIUM SERPL-SCNC: 146 MMOL/L (ref 135–145)
URATE SERPL-MCNC: 5 MG/DL (ref 2.4–5.7)
WBC # BLD AUTO: 12.8 10E3/UL (ref 4–11)

## 2024-10-24 PROCEDURE — 99215 OFFICE O/P EST HI 40 MIN: CPT | Performed by: STUDENT IN AN ORGANIZED HEALTH CARE EDUCATION/TRAINING PROGRAM

## 2024-10-24 PROCEDURE — 85025 COMPLETE CBC W/AUTO DIFF WBC: CPT | Performed by: STUDENT IN AN ORGANIZED HEALTH CARE EDUCATION/TRAINING PROGRAM

## 2024-10-24 PROCEDURE — 80048 BASIC METABOLIC PNL TOTAL CA: CPT | Performed by: STUDENT IN AN ORGANIZED HEALTH CARE EDUCATION/TRAINING PROGRAM

## 2024-10-24 PROCEDURE — 93971 EXTREMITY STUDY: CPT | Mod: RT

## 2024-10-24 PROCEDURE — 73630 X-RAY EXAM OF FOOT: CPT | Mod: RT

## 2024-10-24 PROCEDURE — 36415 COLL VENOUS BLD VENIPUNCTURE: CPT | Performed by: STUDENT IN AN ORGANIZED HEALTH CARE EDUCATION/TRAINING PROGRAM

## 2024-10-24 PROCEDURE — 84550 ASSAY OF BLOOD/URIC ACID: CPT | Performed by: STUDENT IN AN ORGANIZED HEALTH CARE EDUCATION/TRAINING PROGRAM

## 2024-10-24 RX ORDER — METHOCARBAMOL 500 MG/1
500 TABLET, FILM COATED ORAL
Qty: 14 TABLET | Refills: 0 | Status: SHIPPED | OUTPATIENT
Start: 2024-10-24

## 2024-10-24 RX ORDER — ACETAMINOPHEN 500 MG
1000 TABLET ORAL EVERY 8 HOURS
Qty: 84 TABLET | Refills: 0 | Status: SHIPPED | OUTPATIENT
Start: 2024-10-24 | End: 2024-11-07

## 2024-10-24 ASSESSMENT — PAIN SCALES - GENERAL: PAINLEVEL_OUTOF10: EXTREME PAIN (9)

## 2024-10-24 NOTE — PROGRESS NOTES
[facility] Clinic Visit    Assessment & Plan      Diagnosis Comments   1. Arthritis of right foot        2. Right foot pain  CBC with platelets differential, Basic metabolic panel, Uric acid, XR Foot Right G/E 3 Views, acetaminophen (TYLENOL) 500 MG tablet, methocarbamol (ROBAXIN) 500 MG tablet       3. History of pulmonary embolism  US Lower Extremity Venous Duplex Right           On exam, patient does have swelling of the right ankle and dorsal aspect of the right foot.  Negative Homans' sign.  Patient also has significant pain along the forefoot area foot with extension of the right foot.  Vitals are otherwise stable  DDx: DVT, OA flare, stress/fragility fracture 2/2 history of osteoporosis, muscle strain, gout  Will get CBC, BMP, uric acid, DVT and x-ray of the right foot  Nex steps will be based on the results    2:30 PM:  Doppler negative for DVT  X-ray is consistent with osseous demineralization and no fractures  Arthritis in the forefoot and midfoot area  Blood work is consistent with leukocytosis (chronic), mild hypernatremia  I am wondering if patient's pain is due to arthritis flare  Given that she is on Eliquis, hesitant to do Naprosyn burst.  I am hesitant to do prednisone burst as well because history of osteoporosis and risk of hip osteonecrosis.  Will have her do Tylenol 1000 mg every 8 hours x 1 week, compression socks, ice and elevation.  Continue to stay as active as possible.    45 minutes were spent doing chart review, history and exam, documentation and further activities per the note.       No follow-ups on file.    Michael Dalton is a 84 year old, presenting for the following health issues:  Leg Swelling (Right foot)        9/17/2024     2:49 PM   Additional Questions   Roomed by TORI Cottrell   Accompanied by CARROLL BORGES     Patient is an 84-year-old female with PMH of osteoporosis, HTN, history of unprovoked PE (2016), meningioma, CHRISTOPHER on CPAP, paroxysmal A-fib on  "chronic Eliquis, type 2 diabetes who presents today for right foot pain/leg swelling    Yesterday, she was at the [employer] and then went to [employer].  When she got to [employer], her right foot was hurting a little bit.  By the time she ended her grocery run, she was not sure if she was going to be able to carry the groceries to the house.  She saw her sister but was unable to walk to the sister's house because of the pain.  She then went home and then went into the house.  She called her son to come get the groceries because she was unable to do it on her own.  She elevated her right foot and iced it.  However, the pain has not gone away.    Currently, the right foot is globally swollen with swelling at the ankle.  She also feels like there is tightness in her right lower leg.  There is no specific pain when she is sitting.  The pain is \"really bad\" when she tries to walk or does any level of weightbearing.  The pain is 9-10/10.  She feels like the medial aspect of the right ankle is a little bit more red than usual.  Has a history of orthopedic surgery of the right knee and right ankle.  The right leg also feels weaker because of the pain.    She has no shortness of breath, tachycardia, runs of A-fib, fevers or chills associated with the symptoms.  She denies any acute illness or recent travel.    Has a history of unprovoked PE in 2016 but no recurrent episodes.            Evaluation for possible DVT  Onset/Duration: Yesterday morning  Description:       Location: right foot       Redness: YES- little bit       Pain: 9 or 10/10       Warmth: no        Joint swelling YES  Progression of symptoms worse  Accompanying signs and symptoms:       Fevers: no        Numbness/tingling/weakness: YES- tingling and a lot of weakness in the leg       Chest pain/pleurisy: no        Shortness of breath: no   History        Trauma: no         Recent travel/when: no         Previous history of DVT: YES - 2016        Family history " of DVT: YES- mother clots in the brain and as well as a stroke        Recent surgery: no - 22  Aggravating factors include: standing and walking  Therapies tried and outcome: ice and elevation/staying off of it  Prior surgery on arteries of veins in this area: Yes, date unknown      Review of Systems  As per HPI         Objective    /56 (BP Location: Right arm, Patient Position: Sitting, Cuff Size: Adult Large)   Pulse 77   Temp 98.1  F (36.7  C) (Oral)   Resp 17   Wt 91.4 kg (201 lb 6.4 oz)   LMP  (LMP Unknown)   SpO2 95%   BMI 38.05 kg/m    Body mass index is 38.05 kg/m .  Physical Exam   GENERAL: alert and no distress  NECK: no adenopathy, no asymmetry, masses, or scars  RESP: lungs clear to auscultation - no rales, rhonchi or wheezes  CV: regular rate and rhythm, no murmur, click or rub, no peripheral edema  MS: no gross musculoskeletal defects noted, no edema  RLE:   (+) Swelling on the dorsal aspect of the right foot with right ankle swelling as well  1+ DP and posterior tib pulses  Has tenderness with axial loading of the big toe  Patient has reproducible tenderness along the forefoot area w/foot with extension  Negative Homans' sign but does report mild tenderness to palpation of the calf  PSYCH: mentation appears normal, affect anxious       Signed Electronically by: Tamiko Bustos DO

## 2024-10-24 NOTE — TELEPHONE ENCOUNTER
"Nurse Triage SBAR    Is this a 2nd Level Triage? YES, LICENSED PRACTITIONER REVIEW IS REQUIRED    Situation: sudden onset pain and swelling in the Right leg, up to knee.    Background: history of \"undifferentiated\" blood clots in 2016 per patient, on Eliquis  A. 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 Slight improvement with elevation. Denies facial or hand swelling  Slight redness on the inside of the ankle near the heal. Denies redness anywhere else on leg  Reports throbbing/burning pain if stands on foot, pain especially near the toes, \"hurts really bad\", but unable to rate  If sitting/not weight bearing and foot is elevated, pain 1/10  Denies additional pain with touch  States left leg looks \"a tiny bit swollen\" but nothing in comparison to right leg  Patient states she is unable to walk properly, using walker for support, barely able to stand. Pain makes patient feel leg will buckle if she attempts to stand on it.   Patient reports pain is slightly improved today, but swelling is the same if not slightly worsened  Denies fever  Denies chest pain, difficulty breathing, any other symptoms    Protocol Recommended Disposition:   Go To ED/UCC Now (Or To Office With PCP Approval)    Recommendation: Provided patient with above disposition. Patient states she does not want to go the Emergency Room or Urgent Care. Education provided on importance of evaluation. Recommended patient be evaluated at [facility] today, and patient agreed. Called [facility], discussed patient and symptoms with Savannah (visit facilitator), who reviewed the information with the [facility] provider. [facility] provider accepted the patient, and will " "call patient to schedule.    Called back to patient (per patient request) and confirmed she is scheduled with [facility] today at 1130am. Patient states her children are coming to pick her up now. Denies further questions at this time.    Routing to provider to review and advise if [facility] is appropriate. No need to call patient if ok to be evaluated in [facility].    Routed to provider    Does the patient meet one of the following criteria for [facility] visit consideration? 16+ years old, with an [facility] PCP     TIP  Providers, please consider if this condition is appropriate for management at one of our Acute and Diagnostic Services sites.     If patient is a good candidate, please use dotphrase <dot>triageresponse and select Refer to [facility] to document.     RN Referral to Acute and Diagnostic Services    [phone] ([address], [place] [address]           Presenting symptoms/reason for [facility] referral:  sudden onset pain and swelling in the Right leg, up to knee. No injury. Slight redness on the inside of the ankle near the heal. Denies redness anywhere else on leg. See above triage for further information    Relevant Medical History:  history of \"undifferentiated\" blood clots in 2016 per patient, on Eliquis. History of A. Fib, Type 2 diabetes, Osteoporosis, bilateral knee replacement and ankle surgeries last in 2000    Transition to [facility] clinic discussed with patient and patient is agreeable.    Patient has been advised that appointments at [facility] typically takes significantly longer than in clinic/urgent care (~ 2.5-3 hours or more):  YES  Patient has transportation and is available for ASAP same day appointment: Yes  Patient aware that [facility] will contact them directly YES     Does patient does have claustrophobia No  Patient has contrast allergy No  Patient has PICC line or port in place No         [facility] clinic has accepted this patient.           Liudmila Mendoza RN  [facility] " "Clinic      Reason for Disposition   Can't walk or can barely stand (new-onset)    Additional Information   Negative: Sounds like a life-threatening emergency to the triager   Negative: Chest pain   Negative: Followed an insect bite and has localized swelling (e.g., small area of puffy or swollen skin)   Negative: Followed a knee injury   Negative: Ankle or foot injury   Negative: Pregnant with leg swelling or edema   Negative: Difficulty breathing at rest   Negative: Entire foot is cool or blue in comparison to other side   Negative: SEVERE swelling (e.g., swelling extends above knee, entire leg is swollen, weeping fluid)   Negative: Cast on leg or ankle and has increasing pain    Answer Assessment - Initial Assessment Questions  1. ONSET: \"When did the swelling start?\" (e.g., minutes, hours, days)      Yesterday morning  2. LOCATION: \"What part of the leg is swollen?\"  \"Are both legs swollen or just one leg?\"      Right leg only, Ankle and foot, edema, leg feels tight up to just below knee  States left leg looks a \"tiny bit swollen\" but nothing in comparison to right leg  3. SEVERITY: \"How bad is the swelling?\" (e.g., localized; mild, moderate, severe)    - Localized: Small area of swelling localized to one leg.    - MILD pedal edema: Swelling limited to foot and ankle, pitting edema < 1/4 inch (6 mm) deep, rest and elevation eliminate most or all swelling.    - MODERATE edema: Swelling of lower leg to knee, pitting edema > 1/4 inch (6 mm) deep, rest and elevation only partially reduce swelling.    - SEVERE edema: Swelling extends above knee, facial or hand swelling present.       Moderate edema, pitting in foot and ankle,m up to knee level, slight improvement with elevation  Denies facial or hand swelling  4. REDNESS: \"Does the swelling look red or infected?\"      Slight redness on the inside of ankle near the heal  Denies redness anywhere else on leg  5. PAIN: \"Is the swelling painful to touch?\" If Yes, ask: " "\"How painful is it?\"   (Scale 1-10; mild, moderate or severe)      Throbbing pain if stands on it, pain especially near toes, \"hurts really bad\"  If sitting and elevated 1/10  Denies additional pain with touch  6. FEVER: \"Do you have a fever?\" If Yes, ask: \"What is it, how was it measured, and when did it start?\"       denies  7. CAUSE: \"What do you think is causing the leg swelling?\"      unknwon  8. MEDICAL HISTORY: \"Do you have a history of blood clots (e.g., DVT), cancer, heart failure, kidney disease, or liver failure?\"      Has history of blood clot in 2016. On eliquis  Has Afib  No history of kidney disease, liver failure or heart failure  9. RECURRENT SYMPTOM: \"Have you had leg swelling before?\" If Yes, ask: \"When was the last time?\" \"What happened that time?\"      Never had swelling like this before  10. OTHER SYMPTOMS: \"Do you have any other symptoms?\" (e.g., chest pain, difficulty breathing)        Denies chest pain, difficulty breathing, any other symptoms  11. PREGNANCY: \"Is there any chance you are pregnant?\" \"When was your last menstrual period?\"        NA    Protocols used: Leg Swelling and Edema-A-OH    "

## 2024-10-25 ENCOUNTER — TELEPHONE (OUTPATIENT)
Dept: INTERNAL MEDICINE | Facility: CLINIC | Age: 84
End: 2024-10-25
Payer: COMMERCIAL

## 2024-10-25 DIAGNOSIS — M19.90 ARTHRITIS: Primary | ICD-10-CM

## 2024-10-25 NOTE — TELEPHONE ENCOUNTER
Pt states she was seen yesterday and is now requesting prednisone to be sent in for her to the [facility] pharmacy

## 2024-10-27 ENCOUNTER — MYC MEDICAL ADVICE (OUTPATIENT)
Dept: FAMILY MEDICINE | Facility: CLINIC | Age: 84
End: 2024-10-27
Payer: COMMERCIAL

## 2024-10-28 NOTE — TELEPHONE ENCOUNTER
"Called patient back per the request of the provider below. Patient states that her foot isn't any better and she finds it very difficult to walk. It is making it difficult for her to go about her day to day activities. She says she is taking the Tylenol and the Robaxin, which is helping, but reiterates that it isn't any better and that she \"can't walk right\". She is not in pain when she is sitting and uses ice and elevation and says it is just the swelling that is uncomfortable when she sits but reiterates that walking is difficult and painful. She is wondering if the provider will reconsider the prednisone. She says she uses it very sparingly and is familiar with the risks. She verbalizes frustration that she hasn't heard from the clinic until now. She is also frustrated that she can't view her imaging or lab results in her My Chart. She is wondering about her uric acid specifically. Writer reminded her that she called at the end of the day on a Friday and that is why the clinic is returning her call today. Informed her that a message would be sent to the provider to update.   "

## 2024-10-29 RX ORDER — PREDNISONE 20 MG/1
20 TABLET ORAL DAILY
Qty: 5 TABLET | Refills: 0 | Status: SHIPPED | OUTPATIENT
Start: 2024-10-29

## 2024-10-29 NOTE — TELEPHONE ENCOUNTER
Called patient to relay below message from provider. She verbalizes that she understands the risks of taking the prednisone and thanked us for the call.

## 2024-10-29 NOTE — TELEPHONE ENCOUNTER
"Uric acid was normal    Will do a short course of prednisone.  Patient should understand that prednisone can cause her to feel jittery, can cause increased swelling, carries risk of hip osteonecrosis and can cause increased bruising.  If she is okay with the risks, she can use it for 5 days.  If no improvement despite the prednisone burst, we should consider doing CT or MRI of the foot    Please let the patient know that she does carry \"an allergy\" with prednisone.  It seems to cause her nausea.  She should be aware of it before starting the medication.  "

## 2024-11-04 ENCOUNTER — TELEPHONE (OUTPATIENT)
Dept: INTERNAL MEDICINE | Facility: CLINIC | Age: 84
End: 2024-11-04
Payer: COMMERCIAL

## 2024-11-04 DIAGNOSIS — E11.9 TYPE 2 DIABETES MELLITUS WITHOUT COMPLICATION, WITHOUT LONG-TERM CURRENT USE OF INSULIN (H): ICD-10-CM

## 2024-11-04 DIAGNOSIS — M79.671 RIGHT FOOT PAIN: Primary | ICD-10-CM

## 2024-11-04 RX ORDER — METFORMIN HYDROCHLORIDE 500 MG/1
TABLET, EXTENDED RELEASE ORAL
Qty: 360 TABLET | Refills: 0 | Status: SHIPPED | OUTPATIENT
Start: 2024-11-04

## 2024-11-04 NOTE — TELEPHONE ENCOUNTER
Patient Returning Call    Reason for call:  Patient is calling to let the PCP (Juli) know that her foot is better but not completely and its been 2 weeks with swelling and soreness     -Patient stated they discussed if it didn't get better that a MRI could be ordered     Patient is requesting that MRI order     Could we send this information to you in [facility] or would you prefer to receive a phone call?:   Patient would prefer a phone call   Okay to leave a detailed message?: Yes at Cell number on file:    Telephone Information:   Mobile [phone]

## 2024-11-05 NOTE — TELEPHONE ENCOUNTER
Patient doesn't meet insurance criteria for stat imaging. She will, unfortunately, have to wait till her MRI slot or she can move up if there is a cancellation

## 2024-11-05 NOTE — TELEPHONE ENCOUNTER
Notified pt, pt frustrated as this has been going on for weeks.  Her foot swelling is still ongoing, has improved some on Prednisone but swelling has increased to the original swelling since off. Patient does not know why her foot is swelling as no known injury    Patient would like to reconsider stat MRI as she is unable to put on shoes

## 2024-11-05 NOTE — TELEPHONE ENCOUNTER
Patient calling in to report she is not able to get her MRI scheduled until later this month, she believes like 2 weeks out or more.  She is very concerned about her foot, it has been over 2 weeks and the prednisone didn't help.  Difficulty getting around and caring for herself, she is requesting MRI order be changed to stat or emergent so she can get imaging done asap.  Advised this request will be sent to Dr. Bustos to review.  Writer also asked if patient would be willing to be evaluated in [facility] with regards to MRI, to which patient replied that is where she originally seen Dr. Bustos.

## 2024-11-07 ENCOUNTER — TRANSFERRED RECORDS (OUTPATIENT)
Dept: HEALTH INFORMATION MANAGEMENT | Facility: CLINIC | Age: 84
End: 2024-11-07

## 2024-11-07 ENCOUNTER — LAB (OUTPATIENT)
Dept: LAB | Facility: CLINIC | Age: 84
End: 2024-11-07
Payer: COMMERCIAL

## 2024-11-07 DIAGNOSIS — M25.473 ANKLE SWELLING: Primary | ICD-10-CM

## 2024-11-07 DIAGNOSIS — I10 BENIGN ESSENTIAL HYPERTENSION: ICD-10-CM

## 2024-11-07 LAB
BASOPHILS # BLD AUTO: 0 10E3/UL (ref 0–0.2)
BASOPHILS NFR BLD AUTO: 0 %
EOSINOPHIL # BLD AUTO: 0.2 10E3/UL (ref 0–0.7)
EOSINOPHIL NFR BLD AUTO: 1 %
ERYTHROCYTE [SEDIMENTATION RATE] IN BLOOD BY WESTERGREN METHOD: 14 MM/HR (ref 0–30)
IMM GRANULOCYTES # BLD: 0 10E3/UL
IMM GRANULOCYTES NFR BLD: 0 %
LYMPHOCYTES # BLD AUTO: 4.8 10E3/UL (ref 0.8–5.3)
LYMPHOCYTES NFR BLD AUTO: 38 %
MONOCYTES # BLD AUTO: 1 10E3/UL (ref 0–1.3)
MONOCYTES NFR BLD AUTO: 8 %
NEUTROPHILS # BLD AUTO: 6.8 10E3/UL (ref 1.6–8.3)
NEUTROPHILS NFR BLD AUTO: 53 %
WBC # BLD AUTO: 12.9 10E3/UL (ref 4–11)

## 2024-11-07 PROCEDURE — 86431 RHEUMATOID FACTOR QUANT: CPT

## 2024-11-07 PROCEDURE — 85004 AUTOMATED DIFF WBC COUNT: CPT

## 2024-11-07 PROCEDURE — 85048 AUTOMATED LEUKOCYTE COUNT: CPT

## 2024-11-07 PROCEDURE — 86038 ANTINUCLEAR ANTIBODIES: CPT

## 2024-11-07 PROCEDURE — 86618 LYME DISEASE ANTIBODY: CPT

## 2024-11-07 PROCEDURE — 84480 ASSAY TRIIODOTHYRONINE (T3): CPT

## 2024-11-07 PROCEDURE — 84443 ASSAY THYROID STIM HORMONE: CPT

## 2024-11-07 PROCEDURE — 36415 COLL VENOUS BLD VENIPUNCTURE: CPT

## 2024-11-07 PROCEDURE — 84550 ASSAY OF BLOOD/URIC ACID: CPT

## 2024-11-07 PROCEDURE — 86141 C-REACTIVE PROTEIN HS: CPT

## 2024-11-07 PROCEDURE — 85652 RBC SED RATE AUTOMATED: CPT

## 2024-11-07 PROCEDURE — 84439 ASSAY OF FREE THYROXINE: CPT

## 2024-11-08 LAB
ANA SER QL IF: NEGATIVE
CRP SERPL HS-MCNC: 5.02 MG/L
CRP SERPL-MCNC: 4.81 MG/L
RHEUMATOID FACT SERPL-ACNC: <10 IU/ML
T3 SERPL-MCNC: 111 NG/DL (ref 85–202)
T4 FREE SERPL-MCNC: 1.47 NG/DL (ref 0.9–1.7)
TSH SERPL DL<=0.005 MIU/L-ACNC: 0.52 UIU/ML (ref 0.3–4.2)
URATE SERPL-MCNC: 5.2 MG/DL (ref 2.4–5.7)

## 2024-11-11 NOTE — TELEPHONE ENCOUNTER
Called and spoke with pt regarding foot swelling. Right foot swelling is still ongoing. Some improvement but not much.    Swelling is painful, painful to walk, unable to do normal activities.     Pain has improved but still affecting day to day activities.    Swelling is worse when active. Pt is resting, applying ice, and elevation.-This helps.    MRI scheduled 11/22.    Per pt, she was seen at [facility]. They completed X-rays, and stated it is unusual for the amount of swelling she is having. [place] ordered an emergent MRI. Pt has not heard back from imaging yet.    Pt states she is miserable, she is an active senior and this is affecting her life greatly.    Will route to provider.    LOVE Nur.

## 2024-11-12 NOTE — TELEPHONE ENCOUNTER
Patient returning call to clinic, reviewed message from Dr. Bustos.  Also informed that all the outside labs ordered by [place] provider are all final, advised she contact [place] to review and patient agreed.

## 2024-11-12 NOTE — TELEPHONE ENCOUNTER
Left message for patient to call back. Please relay message from Dr. Bustos and assist as needed.

## 2024-11-13 LAB — B BURGDOR IGG+IGM SER QL: 0.09

## 2024-11-22 ENCOUNTER — HOSPITAL ENCOUNTER (OUTPATIENT)
Dept: MRI IMAGING | Facility: HOSPITAL | Age: 84
Discharge: HOME | End: 2024-11-22
Attending: STUDENT IN AN ORGANIZED HEALTH CARE EDUCATION/TRAINING PROGRAM | Admitting: STUDENT IN AN ORGANIZED HEALTH CARE EDUCATION/TRAINING PROGRAM
Payer: COMMERCIAL

## 2024-11-22 DIAGNOSIS — M79.671 RIGHT FOOT PAIN: ICD-10-CM

## 2024-11-22 PROCEDURE — 73718 MRI LOWER EXTREMITY W/O DYE: CPT | Mod: RT

## 2024-11-24 DIAGNOSIS — M84.376D STRESS FRACTURE OF FOOT WITH ROUTINE HEALING, UNSPECIFIED LATERALITY, SUBSEQUENT ENCOUNTER: Primary | ICD-10-CM

## 2024-11-24 DIAGNOSIS — M19.079 ARTHRITIS OF FOOT, UNSPECIFIED LATERALITY: ICD-10-CM

## 2024-11-25 ENCOUNTER — PATIENT OUTREACH (OUTPATIENT)
Dept: CARE COORDINATION | Facility: CLINIC | Age: 84
End: 2024-11-25
Payer: COMMERCIAL

## 2024-11-26 ENCOUNTER — TRANSFERRED RECORDS (OUTPATIENT)
Dept: HEALTH INFORMATION MANAGEMENT | Facility: CLINIC | Age: 84
End: 2024-11-26
Payer: COMMERCIAL

## 2024-11-27 ENCOUNTER — PATIENT OUTREACH (OUTPATIENT)
Dept: CARE COORDINATION | Facility: CLINIC | Age: 84
End: 2024-11-27
Payer: COMMERCIAL

## 2024-12-24 DIAGNOSIS — I10 ESSENTIAL HYPERTENSION, BENIGN: ICD-10-CM

## 2024-12-24 DIAGNOSIS — I26.99 PULMONARY EMBOLISM WITHOUT ACUTE COR PULMONALE, UNSPECIFIED CHRONICITY, UNSPECIFIED PULMONARY EMBOLISM TYPE (H): ICD-10-CM

## 2024-12-24 RX ORDER — AMLODIPINE BESYLATE 5 MG/1
5 TABLET ORAL DAILY
Qty: 90 TABLET | Refills: 2 | Status: SHIPPED | OUTPATIENT
Start: 2024-12-24

## 2024-12-30 DIAGNOSIS — I10 ESSENTIAL HYPERTENSION, BENIGN: ICD-10-CM

## 2024-12-30 DIAGNOSIS — E78.00 HYPERCHOLESTEROLEMIA: ICD-10-CM

## 2024-12-30 RX ORDER — ATORVASTATIN CALCIUM 80 MG/1
TABLET, FILM COATED ORAL
Qty: 90 TABLET | Refills: 0 | Status: SHIPPED | OUTPATIENT
Start: 2024-12-30

## 2024-12-30 RX ORDER — LOSARTAN POTASSIUM 100 MG/1
100 TABLET ORAL DAILY
Qty: 90 TABLET | Refills: 0 | Status: SHIPPED | OUTPATIENT
Start: 2024-12-30

## 2025-01-12 ENCOUNTER — HEALTH MAINTENANCE LETTER (OUTPATIENT)
Age: 85
End: 2025-01-12

## 2025-01-16 ENCOUNTER — TELEPHONE (OUTPATIENT)
Dept: INTERNAL MEDICINE | Facility: CLINIC | Age: 85
End: 2025-01-16
Payer: COMMERCIAL

## 2025-01-26 DIAGNOSIS — E11.9 TYPE 2 DIABETES MELLITUS WITHOUT COMPLICATION, WITHOUT LONG-TERM CURRENT USE OF INSULIN (H): ICD-10-CM

## 2025-01-27 RX ORDER — METFORMIN HYDROCHLORIDE 500 MG/1
TABLET, EXTENDED RELEASE ORAL
Qty: 360 TABLET | Refills: 0 | Status: SHIPPED | OUTPATIENT
Start: 2025-01-27

## 2025-03-11 DIAGNOSIS — E78.00 HYPERCHOLESTEROLEMIA: ICD-10-CM

## 2025-03-11 RX ORDER — ATORVASTATIN CALCIUM 80 MG/1
TABLET, FILM COATED ORAL
Qty: 90 TABLET | Refills: 1 | Status: SHIPPED | OUTPATIENT
Start: 2025-03-11

## 2025-04-26 ENCOUNTER — HEALTH MAINTENANCE LETTER (OUTPATIENT)
Age: 85
End: 2025-04-26

## 2025-06-28 ENCOUNTER — HEALTH MAINTENANCE LETTER (OUTPATIENT)
Age: 85
End: 2025-06-28

## 2025-07-16 DIAGNOSIS — I48.0 PAF (PAROXYSMAL ATRIAL FIBRILLATION) (H): Primary | ICD-10-CM

## 2025-07-16 DIAGNOSIS — I49.3 PVC'S (PREMATURE VENTRICULAR CONTRACTIONS): ICD-10-CM

## 2025-07-21 ENCOUNTER — TELEPHONE (OUTPATIENT)
Dept: CARDIOLOGY | Facility: CLINIC | Age: 85
End: 2025-07-21
Payer: COMMERCIAL

## 2025-07-21 ENCOUNTER — DOCUMENTATION ONLY (OUTPATIENT)
Dept: CARDIOLOGY | Facility: CLINIC | Age: 85
End: 2025-07-21
Payer: COMMERCIAL

## 2025-07-21 ENCOUNTER — PREP FOR PROCEDURE (OUTPATIENT)
Dept: CARDIOLOGY | Facility: CLINIC | Age: 85
End: 2025-07-21
Payer: COMMERCIAL

## 2025-07-21 DIAGNOSIS — I48.0 PAF (PAROXYSMAL ATRIAL FIBRILLATION) (H): Primary | ICD-10-CM

## 2025-07-21 RX ORDER — SODIUM CHLORIDE 9 MG/ML
100 INJECTION, SOLUTION INTRAVENOUS CONTINUOUS
OUTPATIENT
Start: 2025-07-21

## 2025-07-21 RX ORDER — FENTANYL CITRATE 50 UG/ML
25 INJECTION, SOLUTION INTRAMUSCULAR; INTRAVENOUS
Refills: 0 | OUTPATIENT
Start: 2025-07-21

## 2025-07-21 RX ORDER — LIDOCAINE 40 MG/G
CREAM TOPICAL
OUTPATIENT
Start: 2025-07-21

## 2025-07-21 NOTE — TELEPHONE ENCOUNTER
Noted, I will forward to EP NP doing pre op to review if Prolla injection a week prior to PVI would be a concern.    Will call pt once provider has reviewed.    Yusra

## 2025-07-21 NOTE — TELEPHONE ENCOUNTER
Miriam Bautista to [facility] Ep Support Pool - [facility] (Selected Message)        7/21/25  2:41 PM  Pt wanted to make sure her prolla shot (gets twice a year) will not play a factor in her being able to proceed with ablation on 9/23. She is getting it done on 9/16. I didn't know the answer to that and told her I would pass her question along.      Thank you,  Sonja

## 2025-07-21 NOTE — PROGRESS NOTES
PVI  Order Case Req Y  Order Set Y Imaging Order Echo     AC Eliquis-CONTINUE   AAD Sotalol-Hold 3 days prior   PPI/H2 Blocker PPI not needed PFA   Diuretics None   DM/GLP-1 DM Meds- Metformin  GLP-1- None  SGLT2- None   ED Meds  None     Hi team,  Please see my note from today.  Can 1 of you reach out to the patient next week to begin the scheduling process for mapping and ablation of her atrial fibrillation.  Discontinue sotalol 3 days prior to her procedure.  Mapping system: [employer]  Jennifer Polanco, 1940, [id number]  Home:[phone] (home) Cell:[phone] (mobile)  Emergency Contact: SherriGarcia [phone]  PCP: Aracelis Wall, [phone]    Important patient information for CSC/Cath Lab staff : None    [facility] EP Cath Lab Procedure Order   Ablation Type:  Atrial Fibrillation (Paroxsymal)  Case Request: PFA REQUIRED Per pt/MD request *NICOLASA HERRING*  Ordering Provider: Dr Wong  Date Ordered and Prepped: 7/21/2025 Ethel Betancourt RN    Scheduling Information:  Anticipated Case Duration:  Standard ( Case per day SA 2:1, DW 5:1, KA 3:1)   Scheduling Timeframe:  Next Available  Scheduling Restrictions: None  Scheduling Contact: Please contact pt to schedule, if you are unable to schedule date within the next 24 hours please contact pt to update on scheduling process  EP RN Follow Up Apt: Schedule EP RN PC visit 3-4 days s/p PVI  MD Preference: Scheduling with ordering provider  Current Device/Device Co Needed for Procedure: None NoneNone  Pre-Procedural Testing needed: Echo  Mapping System Required:  ALEXANDR (Dr Wong)  ICE Needed:  Yes  Anesthesia:General Anesthesia    [facility] EP Cath Lab Prep   H&P:  Compled by cardiology on 7/18/2025 if scheduled within 30 days, pt will need RN education and Labs apt within 3 days of procedure. If pts PVI scheduled outside of 30 days, pt to schedule H&P with EP LEILANI, RN Teach, and Labs within 30 days of PVI  Pre-op Labs: CBC, BMP, Beta HcG if  "appropriate, and INR if on Warfarin will be ordered AM of procedure, if not completed at pre-op H&P within 7 days of procedure.  T&S Pre-Procedure Review: Does not need for PVI procedures  Medical Records Pertinent for Procedure:  None  Iodinated Contrast Dye Allergies (Does not include Shellfish, Egg, and/or Iodine Allergy): NA  Lidocaine and/or Chlorhexidine Allergies (scheduling to include alert on snapboard): None  GLP-1 Protocol: If on Dulaglutide (Trulicity) (weekly)- Injection hold 7 days prior to procedure  , Exenatide extended release (Bydureon bcise) (weekly)- Injection hold 7 days prior to procedure, Exenatide (Byetta) (twice daily)- Oral Tablet hold day prior and morning of procedure and for Injection hold 7 days prior to procedure, Semaglutide (Ozempic) (weekly)- Injection and Oral hold 7 days prior to procedure, Liraglutide (Victoza, Saxenda) (daily)- Injection hold day prior and morning of procedure  SGLT2 Inhibitors Protocol: ertugliflozin (Steglatro)- Hold 4 days prior to procedure for risk of ketoacidosis. canagliflozin (Invokana), dapagliflozin (Farxiga), and empagliflozin (Jardiance)- Hold 3 days prior to procedure for risk of ketoacidosis. If pt taking for pulmonary HTN, continue.   ED Meds Protocol:  If taking Sildenafil (Viagra), Tadalafill (Cialis, Adcirca), or Vardnafil (Levitra, Staxyn)- Hold 3 days prior to procedure  Follow Up S/P: EP RN 3-4 PC Visit and EP LEILANI 6wk (To be scheduled at time of case scheduling)    Allergies   Allergen Reactions    Conray [Iothalamate] Anaphylaxis     30+ yr's ago / x-ray exam / was given \"dye\" iodine contrast / had Anaphylaxis reaction, [facility] - 11/30/16 1509     Iodinated Contrast Media Other (See Comments)     hypotension      Alendronate Sodium [Alendronate] Nausea    Amoxicillin Unknown    Diatrizoate Meglumine [Diatrizoate] Other (See Comments)    Fosamax [Alendronate] Unknown     Stomach cramps    House Dust [Dust Mite Extract] Unknown    Mold/Mildew " [Molds & Smuts] Unknown    Other Allergy (See Comments) [External Allergen Needs Reconciliation - See Comment] Unknown     Contrast Media Ready-Box MISC, 11/06/2007.; Contrast Media Ready-Box MISC, 11/06/2007.      Penicillins Unknown    Prednisone Nausea     Other reaction(s): Abdominal Pain, She can have this as a shot not oral    Seafood Unknown    Sulfa (Sulfonamide Antibiotics) [Sulfa Antibiotics] Unknown       Current Outpatient Medications:     acetaminophen (TYLENOL) 500 MG tablet, [ACETAMINOPHEN (TYLENOL) 500 MG TABLET] Take 1,000 mg by mouth every 6 (six) hours as needed for pain., Disp: , Rfl:     albuterol (PROAIR HFA/PROVENTIL HFA/VENTOLIN HFA) 108 (90 Base) MCG/ACT inhaler, [ALBUTEROL (PROAIR HFA) 90 MCG/ACTUATION INHALER] INHALE TWO PUFFS BY MOUTH FOUR TIMES A DAY AS NEEDED FOR WHEEZING, Disp: 25.5 g, Rfl: 3    amLODIPine (NORVASC) 5 MG tablet, Take 1 tablet (5 mg) by mouth daily., Disp: 90 tablet, Rfl: 2    apixaban ANTICOAGULANT (ELIQUIS) 5 MG tablet, Take 1 tablet (5 mg) by mouth 2 times daily., Disp: 180 tablet, Rfl: 1    atorvastatin (LIPITOR) 80 MG tablet, TAKE ONE TABLET BY MOUTH EVERY NIGHT AT BEDTIME (Patient taking differently: Take 80 mg by mouth daily. TAKE ONE TABLET BY MOUTH EVERY NIGHT AT BEDTIME), Disp: 90 tablet, Rfl: 1    azelastine (ASTELIN) 0.1 % nasal spray, Spray 2 sprays in nostril 2 times daily, Disp: 90 mL, Rfl: 3    blood glucose (CONTOUR NEXT TEST) test strip, 1 strip by In Vitro route Use as directed with testing blood sugars once daily.  Dispense Contour Next brand per pt's insurance coverage, Disp: , Rfl:     blood glucose (NO BRAND SPECIFIED) lancing device, [LANCING DEVICE MISC] Use as directed 4 times a week., Disp: 1 each, Rfl: 3    CALCIUM CARBONATE (CALCIUM 500 ORAL), [CALCIUM CARBONATE (CALCIUM 500 ORAL)] Take 1 tablet by mouth 2 (two) times a day., Disp: , Rfl:     cholecalciferol, vitamin D3, 1,000 unit tablet, [CHOLECALCIFEROL, VITAMIN D3, 1,000 UNIT TABLET]  Take 2,000 Units by mouth 2 (two) times a day. , Disp: , Rfl:     clindamycin (CLEOCIN) 150 MG capsule, [CLINDAMYCIN (CLEOCIN) 150 MG CAPSULE] TAKE 4 CAPSULES  BY MOUTH 1 HOUR PRIOR TO DENTAL APPOINTMENT. (Patient taking differently: as needed. [CLINDAMYCIN (CLEOCIN) 150 MG CAPSULE] TAKE 4 CAPSULES  BY MOUTH 1 HOUR PRIOR TO DENTAL APPOINTMENT.), Disp: 90 capsule, Rfl: 3    clobetasol propionate (TEMOVATE) 0.05 % external cream, Apply topically 2 times daily Apply twice daily to arms, chest and back areas of psoriasis flares. Two weeks at a time at most., Disp: 60 g, Rfl: 0    colestipol (COLESTID) 1 g tablet, Take 1 tablet (1 g) by mouth 2 times daily, Disp: 180 tablet, Rfl: 3    cranberry extract 300 mg Tab, Take 1 tablet by mouth daily, Disp: , Rfl:     estradiol (ESTRACE) 0.1 MG/GM vaginal cream, Place 2 g vaginally every other day, Disp: 42.5 g, Rfl: 3    famotidine (PEPCID) 40 MG tablet, Take 1 tablet (40 mg) by mouth 2 times daily, Disp: 180 tablet, Rfl: 1    LACTOBACILLUS ACIDOPHILUS (PROBIOTIC ORAL), [LACTOBACILLUS ACIDOPHILUS (PROBIOTIC ORAL)] Take 1 tablet by mouth 2 (two) times a day. , Disp: , Rfl:     loperamide (IMODIUM) 2 mg capsule, [LOPERAMIDE (IMODIUM) 2 MG CAPSULE] Take 2 mg by mouth 4 (four) times a day as needed., Disp: , Rfl:     losartan (COZAAR) 100 MG tablet, Take 1 tablet (100 mg) by mouth daily., Disp: 90 tablet, Rfl: 0    LUTEIN ORAL, [LUTEIN ORAL] Take 1 tablet by mouth daily. With Zeaxanthin (Patient taking differently: Take 1 tablet by mouth 2 times daily.), Disp: , Rfl:     Magnesium Oxide 250 MG TABS, Use every 3rd day (Patient taking differently: 400 mg. 3 times a week), Disp: , Rfl:     metFORMIN (GLUCOPHAGE XR) 500 MG 24 hr tablet, TAKE TWO TABLETS BY MOUTH TWICE A DAY (Patient taking differently: Take 1,000 mg by mouth daily (with dinner). TAKE TWO TABLETS BY MOUTH TWICE A DAY), Disp: 360 tablet, Rfl: 0    methocarbamol (ROBAXIN) 500 MG tablet, Take 1 tablet (500 mg) by mouth  nightly as needed for muscle spasms. (Patient not taking: Reported on 7/18/2025), Disp: 14 tablet, Rfl: 0    metroNIDAZOLE (METROGEL) 0.75 % gel, [METRONIDAZOLE (METROGEL) 0.75 % GEL] Apply 1 application topically as needed. , Disp: , Rfl:     mometasone furoate (ASMANEX HFA) 200 MCG/ACT inhaler, Inhale 2 puffs into the lungs 2 times daily. (Patient not taking: Reported on 7/18/2025), Disp: , Rfl:     montelukast (SINGULAIR) 10 MG tablet, Take 1 tablet (10 mg) by mouth daily, Disp: 90 tablet, Rfl: 3    predniSONE (DELTASONE) 20 MG tablet, Take 1 tablet (20 mg) by mouth daily. (Patient not taking: Reported on 7/18/2025), Disp: 5 tablet, Rfl: 0    psyllium (METAMUCIL) 3.4 gram packet, [PSYLLIUM (METAMUCIL) 3.4 GRAM PACKET] Take 1 packet by mouth every evening., Disp: , Rfl:     QVAR REDIHALER 80 MCG/ACT inhaler, INHALE 2 PUFFS INTO THE LUNGS 2 TIMES DAILY, Disp: 31.8 g, Rfl: 3    SIMETHICONE (GAS-X ORAL), [SIMETHICONE (GAS-X ORAL)] Take 1 tablet by mouth 2 (two) times a day as needed., Disp: , Rfl:     sotalol (BETAPACE) 120 MG tablet, TAKE 0.5 TABLETS (60 MG) BY MOUTH 2 TIMES DAILY (Patient taking differently: Take 60 mg by mouth 2 times daily.), Disp: 90 tablet, Rfl: 3    tiotropium (SPIRIVA HANDIHALER) 18 MCG inhaled capsule, Inhale 1 capsule (18 mcg) into the lungs daily (Patient taking differently: Inhale 18 mcg into the lungs as needed.), Disp: 90 capsule, Rfl: 3    UBIDECARENONE (COENZYME Q10 ORAL), [UBIDECARENONE (COENZYME Q10 ORAL)] Take 1 tablet by mouth daily. , Disp: , Rfl:     zinc gluconate 50 mg tablet, [ZINC GLUCONATE 50 MG TABLET] Take 50 mg by mouth daily., Disp: , Rfl:     Current Facility-Administered Medications:     denosumab (PROLIA) injection 60 mg, 60 mg, Subcutaneous, Once,     Documentation Date:7/21/2025 9:06 AM  Ethel Betancourt RN

## 2025-07-22 DIAGNOSIS — I26.99 PULMONARY EMBOLISM WITHOUT ACUTE COR PULMONALE, UNSPECIFIED CHRONICITY, UNSPECIFIED PULMONARY EMBOLISM TYPE (H): ICD-10-CM

## 2025-07-22 RX ORDER — APIXABAN 5 MG/1
5 TABLET, FILM COATED ORAL 2 TIMES DAILY
Qty: 180 TABLET | Refills: 1 | OUTPATIENT
Start: 2025-07-22

## 2025-07-22 NOTE — TELEPHONE ENCOUNTER
Hardik Irizarry PACHINYERE to [facility] Ep Support Pool - [facility] (Selected Message)  EDMUNDO      7/22/25 11:38 AM  From what I can tell, it shouldn't be an issue prior to ablation. Could confirm with [facility] if needed     Florencio     ________________________      Spoke with pt and let her know likely not an issue and will send message to Dr. Wong.    Message sent to Dr. Wong to review at this time.    Roseanne Hills, RN

## 2025-07-28 ENCOUNTER — TELEPHONE (OUTPATIENT)
Dept: CARDIOLOGY | Facility: CLINIC | Age: 85
End: 2025-07-28
Payer: COMMERCIAL

## 2025-07-28 NOTE — TELEPHONE ENCOUNTER
[facility] Call Center    Phone Message    May a detailed message be left on voicemail: yes     Reason for Call: Other: Please call patient to reschedule 9/23 ablation procedure and all associated appointments,   Patient received mychart letter regarding procedure but needs it resent.     Action Taken: Other: cardio    Travel Screening: Not Applicable     Date of Service:

## 2025-07-29 DIAGNOSIS — I48.0 PAF (PAROXYSMAL ATRIAL FIBRILLATION) (H): Primary | ICD-10-CM

## 2025-08-04 DIAGNOSIS — I26.99 PULMONARY EMBOLISM WITHOUT ACUTE COR PULMONALE, UNSPECIFIED CHRONICITY, UNSPECIFIED PULMONARY EMBOLISM TYPE (H): ICD-10-CM

## 2025-08-04 RX ORDER — APIXABAN 5 MG/1
5 TABLET, FILM COATED ORAL 2 TIMES DAILY
Qty: 180 TABLET | Refills: 1 | OUTPATIENT
Start: 2025-08-04

## 2025-08-05 ENCOUNTER — HOSPITAL ENCOUNTER (OUTPATIENT)
Dept: CARDIOLOGY | Facility: CLINIC | Age: 85
Discharge: HOME | End: 2025-08-05
Attending: INTERNAL MEDICINE
Payer: COMMERCIAL

## 2025-08-05 DIAGNOSIS — I48.0 PAF (PAROXYSMAL ATRIAL FIBRILLATION) (H): ICD-10-CM

## 2025-08-05 PROCEDURE — 93306 TTE W/DOPPLER COMPLETE: CPT | Mod: 26 | Performed by: INTERNAL MEDICINE

## 2025-08-05 PROCEDURE — 93306 TTE W/DOPPLER COMPLETE: CPT

## 2025-08-09 ENCOUNTER — HEALTH MAINTENANCE LETTER (OUTPATIENT)
Age: 85
End: 2025-08-09